# Patient Record
Sex: FEMALE | Race: WHITE | Employment: OTHER | URBAN - METROPOLITAN AREA
[De-identification: names, ages, dates, MRNs, and addresses within clinical notes are randomized per-mention and may not be internally consistent; named-entity substitution may affect disease eponyms.]

---

## 2017-05-15 LAB
ALBUMIN SERPL-MCNC: 4.3 G/DL
ALP BLD-CCNC: 92 U/L
ALT SERPL-CCNC: 16 U/L
AST SERPL-CCNC: 14 U/L
BASOPHILS ABSOLUTE: ABNORMAL /ΜL
BASOPHILS RELATIVE PERCENT: ABNORMAL %
BILIRUB SERPL-MCNC: 0.2 MG/DL (ref 0.1–1.4)
BUN BLDV-MCNC: 15 MG/DL
CALCIUM SERPL-MCNC: 9.1 MG/DL
CHLORIDE BLD-SCNC: 105 MMOL/L
CHOLESTEROL, TOTAL: 192 MG/DL
CHOLESTEROL/HDL RATIO: ABNORMAL
CO2: 21 MMOL/L
CREAT SERPL-MCNC: 0.77 MG/DL
EOSINOPHILS ABSOLUTE: ABNORMAL /ΜL
EOSINOPHILS RELATIVE PERCENT: ABNORMAL %
GFR CALCULATED: >60
GLUCOSE BLD-MCNC: 120 MG/DL
HCT VFR BLD CALC: 44.3 % (ref 36–46)
HDLC SERPL-MCNC: 76 MG/DL (ref 35–70)
HEMOGLOBIN: 13.8 G/DL (ref 12–16)
LDL CHOLESTEROL CALCULATED: 104 MG/DL (ref 0–160)
LYMPHOCYTES ABSOLUTE: ABNORMAL /ΜL
LYMPHOCYTES RELATIVE PERCENT: ABNORMAL %
MCH RBC QN AUTO: 25.1 PG
MCHC RBC AUTO-ENTMCNC: 31.1 G/DL
MCV RBC AUTO: 80.9 FL
MONOCYTES ABSOLUTE: ABNORMAL /ΜL
MONOCYTES RELATIVE PERCENT: ABNORMAL %
NEUTROPHILS ABSOLUTE: ABNORMAL /ΜL
NEUTROPHILS RELATIVE PERCENT: ABNORMAL %
PDW BLD-RTO: 15.9 %
PLATELET # BLD: 245 K/ΜL
PMV BLD AUTO: ABNORMAL FL
POTASSIUM SERPL-SCNC: 4.8 MMOL/L
RBC # BLD: 5.47 10^6/ΜL
SODIUM BLD-SCNC: 141 MMOL/L
TOTAL PROTEIN: 7.1
TRIGL SERPL-MCNC: 59 MG/DL
VLDLC SERPL CALC-MCNC: ABNORMAL MG/DL
WBC # BLD: 9 10^3/ML

## 2017-06-14 ENCOUNTER — HOSPITAL ENCOUNTER (OUTPATIENT)
Age: 77
Setting detail: SPECIMEN
Discharge: HOME | End: 2017-06-14
Payer: MEDICARE

## 2017-06-14 ENCOUNTER — OFFICE VISIT (OUTPATIENT)
Dept: INTERNAL MEDICINE | Age: 77
End: 2017-06-14

## 2017-06-14 VITALS
HEIGHT: 62 IN | SYSTOLIC BLOOD PRESSURE: 128 MMHG | WEIGHT: 248.8 LBS | BODY MASS INDEX: 45.78 KG/M2 | DIASTOLIC BLOOD PRESSURE: 70 MMHG | HEART RATE: 68 BPM

## 2017-06-14 DIAGNOSIS — E03.9 ACQUIRED HYPOTHYROIDISM: ICD-10-CM

## 2017-06-14 LAB
T3 FREE: 2.4 PG/ML (ref 2–4.4)
T4 FREE: 1.39 NG/DL (ref 0.93–1.7)
TSH REFLEX: 1.52 UIU/ML (ref 0.27–4.2)

## 2017-06-14 PROCEDURE — 36415 COLL VENOUS BLD VENIPUNCTURE: CPT | Performed by: FAMILY MEDICINE

## 2017-06-14 PROCEDURE — 99213 OFFICE O/P EST LOW 20 MIN: CPT | Performed by: FAMILY MEDICINE

## 2017-06-14 PROCEDURE — 84439 ASSAY OF FREE THYROXINE: CPT

## 2017-06-14 PROCEDURE — 84481 FREE ASSAY (FT-3): CPT

## 2017-06-14 PROCEDURE — 84443 ASSAY THYROID STIM HORMONE: CPT

## 2017-06-14 RX ORDER — LEVOTHYROXINE SODIUM 150 MCG
TABLET ORAL
Qty: 90 TABLET | Refills: 3 | Status: SHIPPED | OUTPATIENT
Start: 2017-06-14 | End: 2018-06-13 | Stop reason: SDUPTHER

## 2017-06-14 ASSESSMENT — ENCOUNTER SYMPTOMS
APNEA: 0
CHOKING: 0
EYE ITCHING: 0
CHEST TIGHTNESS: 0
EYE PAIN: 0
EYE DISCHARGE: 0

## 2017-06-14 ASSESSMENT — PATIENT HEALTH QUESTIONNAIRE - PHQ9
1. LITTLE INTEREST OR PLEASURE IN DOING THINGS: 0
2. FEELING DOWN, DEPRESSED OR HOPELESS: 0
SUM OF ALL RESPONSES TO PHQ9 QUESTIONS 1 & 2: 0
SUM OF ALL RESPONSES TO PHQ QUESTIONS 1-9: 0

## 2017-07-29 ENCOUNTER — OFFICE VISIT (OUTPATIENT)
Dept: FAMILY MEDICINE CLINIC | Age: 77
End: 2017-07-29

## 2017-07-29 VITALS
DIASTOLIC BLOOD PRESSURE: 77 MMHG | HEART RATE: 104 BPM | BODY MASS INDEX: 43.94 KG/M2 | RESPIRATION RATE: 20 BRPM | TEMPERATURE: 98 F | OXYGEN SATURATION: 98 % | WEIGHT: 248 LBS | HEIGHT: 63 IN | SYSTOLIC BLOOD PRESSURE: 128 MMHG

## 2017-07-29 DIAGNOSIS — H61.21 IMPACTED CERUMEN OF RIGHT EAR: Primary | ICD-10-CM

## 2017-07-29 PROCEDURE — 99213 OFFICE O/P EST LOW 20 MIN: CPT | Performed by: NURSE PRACTITIONER

## 2017-07-29 PROCEDURE — 69209 REMOVE IMPACTED EAR WAX UNI: CPT | Performed by: NURSE PRACTITIONER

## 2017-07-29 ASSESSMENT — ENCOUNTER SYMPTOMS: SORE THROAT: 0

## 2017-07-30 ASSESSMENT — ENCOUNTER SYMPTOMS
WHEEZING: 0
SHORTNESS OF BREATH: 0

## 2018-06-13 ENCOUNTER — OFFICE VISIT (OUTPATIENT)
Dept: INTERNAL MEDICINE | Age: 78
End: 2018-06-13
Payer: MEDICARE

## 2018-06-13 ENCOUNTER — HOSPITAL ENCOUNTER (OUTPATIENT)
Age: 78
Setting detail: SPECIMEN
Discharge: HOME | End: 2018-06-13
Payer: MEDICARE

## 2018-06-13 VITALS
SYSTOLIC BLOOD PRESSURE: 162 MMHG | WEIGHT: 244.6 LBS | HEIGHT: 62 IN | OXYGEN SATURATION: 98 % | BODY MASS INDEX: 45.01 KG/M2 | DIASTOLIC BLOOD PRESSURE: 78 MMHG | HEART RATE: 57 BPM

## 2018-06-13 DIAGNOSIS — R73.03 PREDIABETES: ICD-10-CM

## 2018-06-13 DIAGNOSIS — Z13.220 SCREENING CHOLESTEROL LEVEL: ICD-10-CM

## 2018-06-13 DIAGNOSIS — Z00.00 ROUTINE GENERAL MEDICAL EXAMINATION AT A HEALTH CARE FACILITY: Primary | ICD-10-CM

## 2018-06-13 DIAGNOSIS — E03.9 ACQUIRED HYPOTHYROIDISM: ICD-10-CM

## 2018-06-13 DIAGNOSIS — Z12.39 SCREENING FOR BREAST CANCER: ICD-10-CM

## 2018-06-13 DIAGNOSIS — Z00.00 ROUTINE GENERAL MEDICAL EXAMINATION AT A HEALTH CARE FACILITY: ICD-10-CM

## 2018-06-13 LAB
ALBUMIN SERPL-MCNC: 4 G/DL (ref 3.9–4.9)
ALP BLD-CCNC: 87 U/L (ref 40–130)
ALT SERPL-CCNC: 17 U/L (ref 0–33)
ANION GAP SERPL CALCULATED.3IONS-SCNC: 16 MEQ/L (ref 7–13)
AST SERPL-CCNC: 15 U/L (ref 0–35)
BASOPHILS ABSOLUTE: 0.1 K/UL (ref 0–0.2)
BASOPHILS RELATIVE PERCENT: 0.8 %
BILIRUB SERPL-MCNC: 0.3 MG/DL (ref 0–1.2)
BUN BLDV-MCNC: 15 MG/DL (ref 8–23)
CALCIUM SERPL-MCNC: 9.5 MG/DL (ref 8.6–10.2)
CHLORIDE BLD-SCNC: 104 MEQ/L (ref 98–107)
CHOLESTEROL, TOTAL: 181 MG/DL (ref 0–199)
CO2: 22 MEQ/L (ref 22–29)
CREAT SERPL-MCNC: 0.7 MG/DL (ref 0.5–0.9)
EOSINOPHILS ABSOLUTE: 0.2 K/UL (ref 0–0.7)
EOSINOPHILS RELATIVE PERCENT: 2.4 %
GFR AFRICAN AMERICAN: >60
GFR NON-AFRICAN AMERICAN: >60
GLOBULIN: 3.1 G/DL (ref 2.3–3.5)
GLUCOSE BLD-MCNC: 110 MG/DL (ref 74–109)
HCT VFR BLD CALC: 43.8 % (ref 37–47)
HDLC SERPL-MCNC: 67 MG/DL (ref 40–59)
HEMOGLOBIN: 14 G/DL (ref 12–16)
LDL CHOLESTEROL CALCULATED: 101 MG/DL (ref 0–129)
LYMPHOCYTES ABSOLUTE: 1.8 K/UL (ref 1–4.8)
LYMPHOCYTES RELATIVE PERCENT: 22.7 %
MCH RBC QN AUTO: 25.4 PG (ref 27–31.3)
MCHC RBC AUTO-ENTMCNC: 31.9 % (ref 33–37)
MCV RBC AUTO: 79.7 FL (ref 82–100)
MONOCYTES ABSOLUTE: 0.5 K/UL (ref 0.2–0.8)
MONOCYTES RELATIVE PERCENT: 6.1 %
NEUTROPHILS ABSOLUTE: 5.4 K/UL (ref 1.4–6.5)
NEUTROPHILS RELATIVE PERCENT: 68 %
PDW BLD-RTO: 15.8 % (ref 11.5–14.5)
PLATELET # BLD: 282 K/UL (ref 130–400)
POTASSIUM SERPL-SCNC: 4.8 MEQ/L (ref 3.5–5.1)
RBC # BLD: 5.5 M/UL (ref 4.2–5.4)
SODIUM BLD-SCNC: 142 MEQ/L (ref 132–144)
TOTAL PROTEIN: 7.1 G/DL (ref 6.4–8.1)
TRIGL SERPL-MCNC: 66 MG/DL (ref 0–200)
TSH REFLEX: 1.4 UIU/ML (ref 0.27–4.2)
WBC # BLD: 7.9 K/UL (ref 4.8–10.8)

## 2018-06-13 PROCEDURE — 85025 COMPLETE CBC W/AUTO DIFF WBC: CPT

## 2018-06-13 PROCEDURE — 80053 COMPREHEN METABOLIC PANEL: CPT

## 2018-06-13 PROCEDURE — 36415 COLL VENOUS BLD VENIPUNCTURE: CPT

## 2018-06-13 PROCEDURE — 84443 ASSAY THYROID STIM HORMONE: CPT

## 2018-06-13 PROCEDURE — G0439 PPPS, SUBSEQ VISIT: HCPCS | Performed by: FAMILY MEDICINE

## 2018-06-13 PROCEDURE — 83036 HEMOGLOBIN GLYCOSYLATED A1C: CPT

## 2018-06-13 PROCEDURE — 80061 LIPID PANEL: CPT

## 2018-06-13 RX ORDER — LEVOTHYROXINE SODIUM 150 MCG
TABLET ORAL
Qty: 90 TABLET | Refills: 3 | Status: SHIPPED | OUTPATIENT
Start: 2018-06-13 | End: 2019-06-19 | Stop reason: SDUPTHER

## 2018-06-13 ASSESSMENT — LIFESTYLE VARIABLES: HOW OFTEN DO YOU HAVE A DRINK CONTAINING ALCOHOL: 0

## 2018-06-13 ASSESSMENT — PATIENT HEALTH QUESTIONNAIRE - PHQ9: SUM OF ALL RESPONSES TO PHQ QUESTIONS 1-9: 0

## 2018-06-13 ASSESSMENT — ANXIETY QUESTIONNAIRES: GAD7 TOTAL SCORE: 0

## 2018-06-14 DIAGNOSIS — R73.03 PREDIABETES: Primary | ICD-10-CM

## 2018-06-14 LAB — HBA1C MFR BLD: 6.3 % (ref 4.8–5.9)

## 2018-06-21 ENCOUNTER — HOSPITAL ENCOUNTER (OUTPATIENT)
Dept: WOMENS IMAGING | Age: 78
Discharge: HOME | End: 2018-06-23
Payer: MEDICARE

## 2018-06-21 DIAGNOSIS — Z12.39 SCREENING FOR BREAST CANCER: ICD-10-CM

## 2018-06-21 PROCEDURE — 77067 SCR MAMMO BI INCL CAD: CPT

## 2019-06-15 ENCOUNTER — OFFICE VISIT (OUTPATIENT)
Dept: FAMILY MEDICINE CLINIC | Age: 79
End: 2019-06-15
Payer: MEDICARE

## 2019-06-15 VITALS
OXYGEN SATURATION: 96 % | WEIGHT: 240 LBS | RESPIRATION RATE: 20 BRPM | BODY MASS INDEX: 44.16 KG/M2 | DIASTOLIC BLOOD PRESSURE: 70 MMHG | HEIGHT: 62 IN | SYSTOLIC BLOOD PRESSURE: 124 MMHG | TEMPERATURE: 99 F | HEART RATE: 94 BPM

## 2019-06-15 DIAGNOSIS — J40 BRONCHITIS: ICD-10-CM

## 2019-06-15 PROCEDURE — 99213 OFFICE O/P EST LOW 20 MIN: CPT | Performed by: NURSE PRACTITIONER

## 2019-06-15 RX ORDER — ERYTHROMYCIN ETHYLSUCCINATE 400 MG/1
400 TABLET ORAL 4 TIMES DAILY
Qty: 40 TABLET | Refills: 0 | Status: SHIPPED | OUTPATIENT
Start: 2019-06-15 | End: 2019-06-25

## 2019-06-15 ASSESSMENT — ENCOUNTER SYMPTOMS
WHEEZING: 0
SWOLLEN GLANDS: 0
TROUBLE SWALLOWING: 0
RHINORRHEA: 1
SINUS PAIN: 0
CHEST TIGHTNESS: 0
SINUS PRESSURE: 1
DIARRHEA: 0
FACIAL SWELLING: 0
SORE THROAT: 1
NAUSEA: 0
ABDOMINAL PAIN: 0
COUGH: 1
VOMITING: 0
SHORTNESS OF BREATH: 0

## 2019-06-15 ASSESSMENT — PATIENT HEALTH QUESTIONNAIRE - PHQ9
SUM OF ALL RESPONSES TO PHQ QUESTIONS 1-9: 0
SUM OF ALL RESPONSES TO PHQ QUESTIONS 1-9: 0
1. LITTLE INTEREST OR PLEASURE IN DOING THINGS: 0
SUM OF ALL RESPONSES TO PHQ9 QUESTIONS 1 & 2: 0
2. FEELING DOWN, DEPRESSED OR HOPELESS: 0

## 2019-06-15 NOTE — PROGRESS NOTES
Non-medical: Not on file   Tobacco Use    Smoking status: Former Smoker     Packs/day: 5.00     Years: 30.00     Pack years: 150.00     Types: Cigarettes     Last attempt to quit: 10/1/2017     Years since quittin.7    Smokeless tobacco: Never Used    Tobacco comment: Has been trying to quit since January   Substance and Sexual Activity    Alcohol use: No     Alcohol/week: 0.0 oz    Drug use: No    Sexual activity: Not on file   Lifestyle    Physical activity:     Days per week: Not on file     Minutes per session: Not on file    Stress: Not on file   Relationships    Social connections:     Talks on phone: Not on file     Gets together: Not on file     Attends [employer] service: Not on file     Active member of club or organization: Not on file     Attends meetings of clubs or organizations: Not on file     Relationship status: Not on file    Intimate partner violence:     Fear of current or ex partner: Not on file     Emotionally abused: Not on file     Physically abused: Not on file     Forced sexual activity: Not on file   Other Topics Concern    Not on file   Social History Narrative    Not on file     Allergies:  Benadryl [diphenhydramine hcl]; Cortisone; Codeine; and Pcn [penicillins]    Review of Systems   Constitutional: Negative for chills, diaphoresis, fatigue and fever. HENT: Positive for congestion, postnasal drip, rhinorrhea, sinus pressure and sore throat. Negative for ear pain, facial swelling, sinus pain, sneezing and trouble swallowing. Respiratory: Positive for cough. Negative for chest tightness, shortness of breath and wheezing. Cardiovascular: Negative for chest pain. Gastrointestinal: Negative for abdominal pain, diarrhea, nausea and vomiting. Genitourinary: Negative for dysuria. Musculoskeletal: Negative for neck pain. Skin: Negative for rash. Neurological: Negative for dizziness, weakness, light-headedness and headaches.        Objective:    /70 (Site: Left Upper Arm, Position: Sitting, Cuff Size: Large Adult)   Pulse 94   Temp 99 °F (37.2 °C) (Temporal)   Resp 20   Ht 5' 2\" (1.575 m)   Wt 240 lb (108.9 kg)   SpO2 96%   Breastfeeding? No   BMI 43.90 kg/m²     Physical Exam   Constitutional: She is oriented to person, place, and time. She appears well-developed and well-nourished. No distress. HENT:   Head: Normocephalic and atraumatic. Right Ear: Tympanic membrane, external ear and ear canal normal.   Left Ear: Tympanic membrane, external ear and ear canal normal.   Nose: Nose normal.   Mouth/Throat: Uvula is midline and mucous membranes are normal. Posterior oropharyngeal erythema present. No oropharyngeal exudate or posterior oropharyngeal edema. Eyes: Conjunctivae are normal. Right eye exhibits no discharge. Left eye exhibits no discharge. Neck: Normal range of motion. Neck supple. Cardiovascular: Normal rate, regular rhythm and normal heart sounds. Pulmonary/Chest: Effort normal and breath sounds normal. No respiratory distress. She has no decreased breath sounds. She has no wheezes. She has no rhonchi. She has no rales. Musculoskeletal: She exhibits no edema. Lymphadenopathy:     She has no cervical adenopathy. Neurological: She is alert and oriented to person, place, and time. Skin: Skin is warm. No rash noted. She is not diaphoretic. Assessment & Plan:       Diagnosis Orders   1. Bronchitis  erythromycin ethylsuccinate (EES) 400 MG tablet     No orders of the defined types were placed in this encounter. Orders Placed This Encounter   Medications    erythromycin ethylsuccinate (EES) 400 MG tablet     Sig: Take 1 tablet by mouth 4 times daily for 10 days     Dispense:  40 tablet     Refill:  0     There are no discontinued medications. Return in about 1 week (around 6/22/2019) for PCP follow-up. Reviewed with the patient: currentclinical status, medications, activities and diet.      Side effects, adverse effects of the medicationprescribed today, as well as treatment plan/ rationale and result expectations havebeen discussed with the patient who expresses understanding and desires to proceed. Pt instructions reviewed and given to patient.     Close follow up to evaluate treatment resultsand for coordination of care. I have reviewed the patient's medical historyin detail and updated the computerized patient record.     Ekta Burns, MARLEE - CNP

## 2019-07-10 ENCOUNTER — APPOINTMENT (OUTPATIENT)
Dept: GENERAL RADIOLOGY | Age: 79
End: 2019-07-10
Payer: MEDICARE

## 2019-07-10 ENCOUNTER — HOSPITAL ENCOUNTER (EMERGENCY)
Age: 79
Discharge: HOME | End: 2019-07-10
Attending: EMERGENCY MEDICINE
Payer: MEDICARE

## 2019-07-10 VITALS
DIASTOLIC BLOOD PRESSURE: 82 MMHG | TEMPERATURE: 98.5 F | RESPIRATION RATE: 20 BRPM | HEART RATE: 85 BPM | BODY MASS INDEX: 42.17 KG/M2 | WEIGHT: 238 LBS | OXYGEN SATURATION: 98 % | HEIGHT: 63 IN | SYSTOLIC BLOOD PRESSURE: 150 MMHG

## 2019-07-10 DIAGNOSIS — S29.011A INTERCOSTAL MUSCLE STRAIN, INITIAL ENCOUNTER: ICD-10-CM

## 2019-07-10 DIAGNOSIS — S23.9XXA THORACIC SPRAIN: Primary | ICD-10-CM

## 2019-07-10 PROCEDURE — 71100 X-RAY EXAM RIBS UNI 2 VIEWS: CPT

## 2019-07-10 PROCEDURE — 6370000000 HC RX 637 (ALT 250 FOR IP): Performed by: EMERGENCY MEDICINE

## 2019-07-10 PROCEDURE — 99283 EMERGENCY DEPT VISIT LOW MDM: CPT

## 2019-07-10 RX ORDER — OXYCODONE HYDROCHLORIDE AND ACETAMINOPHEN 5; 325 MG/1; MG/1
1 TABLET ORAL ONCE
Status: COMPLETED | OUTPATIENT
Start: 2019-07-10 | End: 2019-07-10

## 2019-07-10 RX ORDER — OXYCODONE HYDROCHLORIDE AND ACETAMINOPHEN 5; 325 MG/1; MG/1
1-2 TABLET ORAL EVERY 6 HOURS PRN
Qty: 14 TABLET | Refills: 0 | Status: SHIPPED | OUTPATIENT
Start: 2019-07-10 | End: 2019-07-13

## 2019-07-10 RX ORDER — IBUPROFEN 600 MG/1
600 TABLET ORAL ONCE
Status: COMPLETED | OUTPATIENT
Start: 2019-07-10 | End: 2019-07-10

## 2019-07-10 RX ADMIN — OXYCODONE HYDROCHLORIDE AND ACETAMINOPHEN 1 TABLET: 5; 325 TABLET ORAL at 22:26

## 2019-07-10 RX ADMIN — IBUPROFEN 600 MG: 600 TABLET ORAL at 21:15

## 2019-07-10 RX ADMIN — OXYCODONE HYDROCHLORIDE AND ACETAMINOPHEN 1 TABLET: 5; 325 TABLET ORAL at 21:34

## 2019-07-10 ASSESSMENT — PAIN DESCRIPTION - FREQUENCY
FREQUENCY: CONTINUOUS
FREQUENCY: CONTINUOUS

## 2019-07-10 ASSESSMENT — PAIN DESCRIPTION - DESCRIPTORS
DESCRIPTORS: SHARP
DESCRIPTORS: SHARP

## 2019-07-10 ASSESSMENT — PAIN - FUNCTIONAL ASSESSMENT: PAIN_FUNCTIONAL_ASSESSMENT: 0-10

## 2019-07-10 ASSESSMENT — PAIN DESCRIPTION - LOCATION
LOCATION: RIB CAGE
LOCATION: RIB CAGE

## 2019-07-10 ASSESSMENT — PAIN DESCRIPTION - ORIENTATION
ORIENTATION: LEFT;POSTERIOR
ORIENTATION: LEFT

## 2019-07-10 ASSESSMENT — PAIN DESCRIPTION - ONSET: ONSET: ON-GOING

## 2019-07-10 ASSESSMENT — PAIN SCALES - GENERAL
PAINLEVEL_OUTOF10: 9
PAINLEVEL_OUTOF10: 10
PAINLEVEL_OUTOF10: 7

## 2019-07-10 ASSESSMENT — PAIN DESCRIPTION - PROGRESSION: CLINICAL_PROGRESSION: GRADUALLY IMPROVING

## 2019-07-10 ASSESSMENT — PAIN DESCRIPTION - PAIN TYPE
TYPE: ACUTE PAIN
TYPE: ACUTE PAIN

## 2019-07-11 NOTE — ED PROVIDER NOTES
[address] [place] [address] ED  eMERGENCY dEPARTMENT eNCOUnter      Pt Name: Ricky Foss  MRN: [id number]  [place] 1940  Date of evaluation: 7/10/2019  Provider: Lianne Trent MD    CHIEF COMPLAINT       Chief Complaint   Patient presents with    Rib Pain     x 2-3 days. Pt lifted 27 lb bag of dog food Friday, thinks it irritated a healed rib fracture. HISTORY OF PRESENT ILLNESS   (Location/Symptom, Timing/Onset,Context/Setting, Quality, Duration, Modifying Factors, Severity)  Note limiting factors. Ricky Foss is a [de-identified] y.o. female who presents to the emergency department with left posterior rib pain, #4 and 5 while she lifted up a bag of dog food, 27 pounds, onset and then twisted again later on in the day and made it worse. There is no midline back pain, only rib pain. She had fractured ribs before. She recently got over bronchitis but this is much better. There is no blunt trauma or falling trauma and shortness of breath is not present    HPI    NursingNotes were reviewed. REVIEW OF SYSTEMS    (2-9 systems for level 4, 10 or more for level 5)     Review of Systems     Constitutional symptoms:  no Fatigue, no fever, no chills. Skin symptoms:  Negative except as documented in HPI. ENMT symptoms:  Negative except as documented in HPI. Respiratory symptoms:  Negative except as documented in HPI. Rib pain posteriorly left as above  Cardiovascular symptoms:  Negative except as documented in HPI. Gastrointestinal symptoms: Negative except for documented as above in the HPI   Genitourinary symptoms:  Negative except as documented in HPI. Musculoskeletal symptoms:  Negative except as documented in HPI. Neurologic symptoms:  Negative except as documented in HPI. Remainder of 10 systems, all negative except for mentioned above      Except as noted above the remainder of the review of systems was reviewed and negative.        PAST MEDICAL HISTORY     Past Medical History:   Diagnosis Non-plain filmimages such as CT, Ultrasound and MRI are read by the radiologist. Plain radiographic images are visualized and preliminarily interpreted by the emergency physician with the below findings:        Interpretation per the Radiologist below, if available at the time ofthis note:    XR RIBS LEFT (2 VIEWS)    (Results Pending)         ED BEDSIDE ULTRASOUND:   Performed by ED Physician - none    LABS:  Labs Reviewed - No data to display    All other labs were within normal range or not returned as of this dictation. EMERGENCY DEPARTMENT COURSE and DIFFERENTIAL DIAGNOSIS/MDM:   Vitals:    Vitals:    07/10/19 2048   BP: (!) 195/88   Pulse: 94   Resp: (!) 35   Temp: 98.5 °F (36.9 °C)   TempSrc: Oral   SpO2: 95%   Weight: 238 lb (108 kg)   Height: 5' 3\" (1.6 m)           MDM     X-rays failed to demonstrate a fracture to my reading, we will go ahead and treat for thoracic sprain and intercostal sprain. She is in significant pain at this juncture. CRITICAL CARE TIME   Total Critical Care time was  minutes, excluding separately reportableprocedures. There was a high probability of clinicallysignificant/life threatening deterioration in the patient's condition which required my urgent intervention. CONSULTS:  None    PROCEDURES:  Unless otherwise noted below, none     Procedures    FINAL IMPRESSION      1. Thoracic sprain    2. Intercostal muscle strain, initial encounter          DISPOSITION/PLAN   DISPOSITION Decision To Discharge 07/10/2019 09:33:05 PM      PATIENT REFERRED TO:  Arielle Hinds MD  [address] [place] [address] [phone]    In 1 week  Return for weakening, worsening, chest pain, fever      DISCHARGE MEDICATIONS:  New Prescriptions    OXYCODONE-ACETAMINOPHEN (PERCOCET) 5-325 MG PER TABLET    Take 1-2 tablets by mouth every 6 hours as needed for Pain for up to 3 days. WARNING:  May cause drowsiness. May impair ability to operate vehicles or machinery.   Do not use in combination with alcohol.           (Please note that portions of this note were completed with a voice recognition program.  Efforts were made to edit the dictations but occasionally words are mis-transcribed.)    Andres Salazar MD (electronically signed)  Attending Emergency Physician          Andres Salazar MD  07/10/19 4623

## 2019-07-25 ENCOUNTER — OFFICE VISIT (OUTPATIENT)
Dept: INTERNAL MEDICINE | Age: 79
End: 2019-07-25
Payer: MEDICARE

## 2019-07-25 VITALS
SYSTOLIC BLOOD PRESSURE: 118 MMHG | HEIGHT: 63 IN | HEART RATE: 90 BPM | BODY MASS INDEX: 42.95 KG/M2 | DIASTOLIC BLOOD PRESSURE: 60 MMHG | WEIGHT: 242.4 LBS | OXYGEN SATURATION: 98 %

## 2019-07-25 DIAGNOSIS — Z12.39 SCREENING FOR BREAST CANCER: ICD-10-CM

## 2019-07-25 DIAGNOSIS — Z12.11 SCREEN FOR COLON CANCER: ICD-10-CM

## 2019-07-25 DIAGNOSIS — R73.03 PREDIABETES: ICD-10-CM

## 2019-07-25 DIAGNOSIS — E03.9 ACQUIRED HYPOTHYROIDISM: ICD-10-CM

## 2019-07-25 DIAGNOSIS — K21.9 GASTROESOPHAGEAL REFLUX DISEASE WITHOUT ESOPHAGITIS: ICD-10-CM

## 2019-07-25 DIAGNOSIS — H61.23 BILATERAL IMPACTED CERUMEN: ICD-10-CM

## 2019-07-25 DIAGNOSIS — Z13.220 SCREENING CHOLESTEROL LEVEL: ICD-10-CM

## 2019-07-25 DIAGNOSIS — R73.03 PREDIABETES: Primary | ICD-10-CM

## 2019-07-25 LAB
ALBUMIN SERPL-MCNC: 4 G/DL (ref 3.5–4.6)
ALP BLD-CCNC: 82 U/L (ref 40–130)
ALT SERPL-CCNC: 22 U/L (ref 0–33)
ANION GAP SERPL CALCULATED.3IONS-SCNC: 11 MEQ/L (ref 9–15)
AST SERPL-CCNC: 19 U/L (ref 0–35)
BASOPHILS ABSOLUTE: 0 K/UL (ref 0–0.2)
BASOPHILS RELATIVE PERCENT: 0.5 %
BILIRUB SERPL-MCNC: 0.3 MG/DL (ref 0.2–0.7)
BUN BLDV-MCNC: 17 MG/DL (ref 8–23)
CALCIUM SERPL-MCNC: 9.4 MG/DL (ref 8.5–9.9)
CHLORIDE BLD-SCNC: 104 MEQ/L (ref 95–107)
CHOLESTEROL, TOTAL: 183 MG/DL (ref 0–199)
CO2: 25 MEQ/L (ref 20–31)
CREAT SERPL-MCNC: 0.73 MG/DL (ref 0.5–0.9)
EOSINOPHILS ABSOLUTE: 0.2 K/UL (ref 0–0.7)
EOSINOPHILS RELATIVE PERCENT: 2.5 %
GFR AFRICAN AMERICAN: >60
GFR NON-AFRICAN AMERICAN: >60
GLOBULIN: 3.3 G/DL (ref 2.3–3.5)
GLUCOSE BLD-MCNC: 116 MG/DL (ref 70–99)
HBA1C MFR BLD: 6.4 % (ref 4.8–5.9)
HCT VFR BLD CALC: 39.6 % (ref 37–47)
HDLC SERPL-MCNC: 70 MG/DL (ref 40–59)
HEMOGLOBIN: 13 G/DL (ref 12–16)
LDL CHOLESTEROL CALCULATED: 98 MG/DL (ref 0–129)
LYMPHOCYTES ABSOLUTE: 1.9 K/UL (ref 1–4.8)
LYMPHOCYTES RELATIVE PERCENT: 24.7 %
MCH RBC QN AUTO: 25.5 PG (ref 27–31.3)
MCHC RBC AUTO-ENTMCNC: 32.8 % (ref 33–37)
MCV RBC AUTO: 77.8 FL (ref 82–100)
MONOCYTES ABSOLUTE: 0.5 K/UL (ref 0.2–0.8)
MONOCYTES RELATIVE PERCENT: 5.8 %
NEUTROPHILS ABSOLUTE: 5.2 K/UL (ref 1.4–6.5)
NEUTROPHILS RELATIVE PERCENT: 66.5 %
PDW BLD-RTO: 16.6 % (ref 11.5–14.5)
PLATELET # BLD: 264 K/UL (ref 130–400)
POTASSIUM SERPL-SCNC: 4.3 MEQ/L (ref 3.4–4.9)
RBC # BLD: 5.08 M/UL (ref 4.2–5.4)
SODIUM BLD-SCNC: 140 MEQ/L (ref 135–144)
T3 FREE: 2.2 PG/ML (ref 2–4.4)
TOTAL PROTEIN: 7.3 G/DL (ref 6.3–8)
TRIGL SERPL-MCNC: 73 MG/DL (ref 0–150)
TSH REFLEX: 2.08 UIU/ML (ref 0.44–3.86)
WBC # BLD: 7.8 K/UL (ref 4.8–10.8)

## 2019-07-25 PROCEDURE — 99214 OFFICE O/P EST MOD 30 MIN: CPT | Performed by: FAMILY MEDICINE

## 2019-07-25 RX ORDER — LEVOTHYROXINE SODIUM 150 MCG
150 TABLET ORAL DAILY
Qty: 90 TABLET | Refills: 3 | Status: SHIPPED | OUTPATIENT
Start: 2019-07-25 | End: 2020-08-06 | Stop reason: SDUPTHER

## 2019-07-25 ASSESSMENT — ENCOUNTER SYMPTOMS
APNEA: 0
CHEST TIGHTNESS: 0
CHOKING: 0

## 2019-08-02 ENCOUNTER — TELEPHONE (OUTPATIENT)
Dept: INTERNAL MEDICINE | Age: 79
End: 2019-08-02

## 2020-08-14 ENCOUNTER — TELEPHONE (OUTPATIENT)
Dept: INTERNAL MEDICINE | Age: 80
End: 2020-08-14

## 2020-08-14 NOTE — LETTER
[facility]  [address] [address]  Phone: [phone]  Fax: [phone]    Fatoumata Gonzalez MD        August 31, 2020    [address] [address]      Dear Alexi WELLS:    We were unable to reach you by phone to let you know that your labs had been ordered. If you have any questions or concerns, please don't hesitate to call.     Sincerely,        Fatoumata Gonzalez MD

## 2020-08-14 NOTE — TELEPHONE ENCOUNTER
Pt called to ask for her blood work to be ordered. She has appt coming up, but will be out of her thyroid medication before that. This way a refill can be sent before she is out. She is aware to fast for the blood work. Please review pended orders.  Last year TSH and T3 free was ordered, instead of T4 free

## 2020-08-17 NOTE — TELEPHONE ENCOUNTER
Orders all placed, fasting labs, can advise pt to have them drawn    Thank you!     Rony Castellanos MD

## 2020-09-07 ENCOUNTER — HOSPITAL ENCOUNTER (EMERGENCY)
Age: 80
Discharge: HOME | End: 2020-09-07
Attending: EMERGENCY MEDICINE
Payer: MEDICARE

## 2020-09-07 VITALS
OXYGEN SATURATION: 97 % | DIASTOLIC BLOOD PRESSURE: 88 MMHG | HEART RATE: 73 BPM | TEMPERATURE: 99.2 F | BODY MASS INDEX: 43.05 KG/M2 | WEIGHT: 243 LBS | RESPIRATION RATE: 18 BRPM | HEIGHT: 63 IN | SYSTOLIC BLOOD PRESSURE: 214 MMHG

## 2020-09-07 PROCEDURE — 6370000000 HC RX 637 (ALT 250 FOR IP): Performed by: EMERGENCY MEDICINE

## 2020-09-07 PROCEDURE — 99283 EMERGENCY DEPT VISIT LOW MDM: CPT

## 2020-09-07 RX ORDER — OXYCODONE HYDROCHLORIDE AND ACETAMINOPHEN 5; 325 MG/1; MG/1
1 TABLET ORAL EVERY 8 HOURS PRN
Qty: 12 TABLET | Refills: 0 | Status: SHIPPED | OUTPATIENT
Start: 2020-09-07 | End: 2020-09-12

## 2020-09-07 RX ORDER — OXYCODONE HYDROCHLORIDE AND ACETAMINOPHEN 5; 325 MG/1; MG/1
1 TABLET ORAL ONCE
Status: COMPLETED | OUTPATIENT
Start: 2020-09-07 | End: 2020-09-07

## 2020-09-07 RX ORDER — CLONIDINE HYDROCHLORIDE 0.1 MG/1
0.2 TABLET ORAL ONCE
Status: COMPLETED | OUTPATIENT
Start: 2020-09-07 | End: 2020-09-07

## 2020-09-07 RX ADMIN — CLONIDINE HYDROCHLORIDE 0.2 MG: 0.1 TABLET ORAL at 18:40

## 2020-09-07 RX ADMIN — OXYCODONE HYDROCHLORIDE AND ACETAMINOPHEN 1 TABLET: 5; 325 TABLET ORAL at 18:20

## 2020-09-07 ASSESSMENT — ENCOUNTER SYMPTOMS
EYE PAIN: 0
FACIAL SWELLING: 0
SORE THROAT: 0
EYE DISCHARGE: 0
COUGH: 0
SINUS PRESSURE: 0
CHOKING: 0
ABDOMINAL PAIN: 0
BLOOD IN STOOL: 0
SHORTNESS OF BREATH: 0
DIARRHEA: 0
EYE REDNESS: 0
VOMITING: 0
CHEST TIGHTNESS: 0
WHEEZING: 0
TROUBLE SWALLOWING: 0
CONSTIPATION: 0
VOICE CHANGE: 0
STRIDOR: 0
BACK PAIN: 1

## 2020-09-07 ASSESSMENT — PAIN DESCRIPTION - PAIN TYPE: TYPE: ACUTE PAIN

## 2020-09-07 ASSESSMENT — PAIN DESCRIPTION - ONSET: ONSET: ON-GOING

## 2020-09-07 ASSESSMENT — PAIN DESCRIPTION - ORIENTATION: ORIENTATION: LEFT

## 2020-09-07 ASSESSMENT — PAIN SCALES - GENERAL: PAINLEVEL_OUTOF10: 10

## 2020-09-07 ASSESSMENT — PAIN DESCRIPTION - FREQUENCY: FREQUENCY: INTERMITTENT

## 2020-09-07 ASSESSMENT — PAIN DESCRIPTION - DESCRIPTORS: DESCRIPTORS: PATIENT UNABLE TO DESCRIBE

## 2020-09-07 ASSESSMENT — PAIN DESCRIPTION - LOCATION: LOCATION: BACK;LEG

## 2020-09-07 NOTE — ED PROVIDER NOTES
[facility] \[facility]"" ED  eMERGENCY dEPARTMENT eNCOUnter      Pt Name: Mackenzie Sutton  MRN: [id number]  [place] 1940  Date of evaluation: 9/7/2020  Provider: Jovan Mckinney MD    [address] [place] [address]       Chief Complaint   Patient presents with    Muscle Pain     Pt having pain in her back and into her leg       HISTORY OF PRESENT ILLNESS   (Location/Symptom, Timing/Onset,Context/Setting, Quality, Duration, Modifying Factors, Severity)  Note limiting factors. Mackenzie Sutton is a 79 y.o. female who presents to the emergency department patient comes to emergency requesting pain medication over-the-counter med pain medication not helping last oxycodone she finished and requesting more oxycodone patient this time blames it on lifting a case of Coke which she lifted and caused sudden pain to the right hip could feel the pain going to the right leg no numbness tingling to the leg no injury no fall no fever no chills as per patient she always has lower back pain history of obesity hypothyroidism anxiety type 2 diabetes with not taking any insulin at this time and history of hypothyroidism    HPI    NursingNotes were reviewed. REVIEW OF SYSTEMS    (2-9 systems for level 4, 10 or more for level 5)     Review of Systems   Constitutional: Negative. Negative for activity change and fever. HENT: Negative for congestion, drooling, facial swelling, mouth sores, nosebleeds, sinus pressure, sore throat, trouble swallowing and voice change. Eyes: Negative for pain, discharge, redness and visual disturbance. Respiratory: Negative for cough, choking, chest tightness, shortness of breath, wheezing and stridor. Cardiovascular: Negative for chest pain, palpitations and leg swelling. Gastrointestinal: Negative for abdominal pain, blood in stool, constipation, diarrhea and vomiting. Endocrine: Negative for cold intolerance, polyphagia and polyuria.    Genitourinary: Negative for dysuria, flank pain, frequency, genital sores and urgency. Musculoskeletal: Positive for arthralgias, back pain and myalgias. Negative for joint swelling, neck pain and neck stiffness. Skin: Negative for pallor and rash. Neurological: Negative for tremors, seizures, syncope, weakness, numbness and headaches. Hematological: Negative for adenopathy. Does not bruise/bleed easily. Psychiatric/Behavioral: Negative for agitation, behavioral problems, hallucinations and sleep disturbance. The patient is not hyperactive. All other systems reviewed and are negative. Except as noted above the remainder of the review of systems was reviewed and negative. 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 SYNTHROID 150 MCG TABLET    Take 1 tablet by mouth Daily       ALLERGIES     Benadryl [diphenhydramine hcl]; Cortisone;  Codeine; and Pcn [penicillins]    FAMILY HISTORY       Family History   Problem Relation Age of Onset    Kidney Disease Mother     High Blood Pressure Mother     Stroke Mother     Heart Disease Father           SOCIAL HISTORY       Social History     Socioeconomic History    Marital status:      Spouse name: None    Number of children: None    Years of education: None    Highest education level: None   Occupational History    None   Social Needs    Financial resource strain: None    Food insecurity     Worry: None     Inability: None    Transportation needs     Medical: None     Non-medical: None   Tobacco Use    Smoking status: Former Smoker     Packs/day: 5.00     Years: 30.00     Pack years: 150.00 discharge. Extraocular Movements: Extraocular movements intact. Pupils: Pupils are equal, round, and reactive to light. Neck:      Musculoskeletal: Normal range of motion. Cardiovascular:      Rate and Rhythm: Normal rate and regular rhythm. Pulses: Normal pulses. Heart sounds: Normal heart sounds. No murmur. No gallop. Pulmonary:      Effort: No respiratory distress. Breath sounds: Normal breath sounds. No stridor. No wheezing. Abdominal:      General: Abdomen is flat. There is no distension. Palpations: Abdomen is soft. There is no mass. Tenderness: There is no abdominal tenderness. There is no left CVA tenderness, guarding or rebound. Hernia: No hernia is present. Musculoskeletal: Normal range of motion. General: Tenderness present. Comments: Attention come to the back examined standing and supine and sitting position patient straight leg raising is negative patient has no sensory deficit diffuse tenderness of the right hip noticeable patient has no hematoma no bruise no rash no shingles no cellulitis no insect bite   Skin:     General: Skin is warm. Capillary Refill: Capillary refill takes less than 2 seconds. Coloration: Skin is not jaundiced. Findings: No bruising or lesion. Neurological:      General: No focal deficit present. Mental Status: She is alert. Cranial Nerves: No cranial nerve deficit. Sensory: No sensory deficit. Motor: No weakness.       Coordination: Coordination normal.      Gait: Gait normal.      Deep Tendon Reflexes: Reflexes normal.      Comments: Attention turned to the neurologic examination patient has no neuro deficit moving all extremities very well good bilateral handgrips no sensory deficit cranial nerves II through XII grossly intact         DIAGNOSTIC RESULTS     EKG: All EKG's are interpreted by the Emergency Department Physician who either signs or Co-signsthis chart in the absence of a cardiologist.        RADIOLOGY:   [place] such as CT, Ultrasound and MRI are read by the radiologist. Plain radiographic images are visualized and preliminarily interpreted by the emergency physician with the below findings:        Interpretation per the Radiologist below, if available at the time ofthis note:    No orders to display         ED BEDSIDE ULTRASOUND:   Performed by ED Physician - none    LABS:  Labs Reviewed - No data to display    All other labs were within normal range or not returned as of this dictation. EMERGENCY DEPARTMENT COURSE and DIFFERENTIAL DIAGNOSIS/MDM:   Vitals:    Vitals:    09/07/20 1805 09/07/20 1807 09/07/20 1822   BP:  (!) 230/104 (!) 205/91   Pulse: 95     Resp: 18     Temp: 99.2 °F (37.3 °C)     TempSrc: Oral     SpO2: 98%     Weight: 243 lb (110.2 kg)     Height: 5' 3\" (1.6 m)         MDM  Number of Diagnoses or Management Options  Contusion of right hip, initial encounter:   Sciatica of right side:   Diagnosis management comments: Patient requesting a pain management nothing works except oxycodone as per patient and she used last tablets over-the-counter medication not helping this time patient blames it on lifting a case of Coke while grocery shopping always have low back pain this time right hip pain and could feel the pain going to the right leg patient has no calf pain no short of breath no chest pain and has no injury no fall as per patient she cannot take steroid she is allergic to it and she allergic to lot of medications and only medication helps his oxycodone    CRITICAL CARE TIME   Total Critical Care time was  minutes, excluding separately reportableprocedures. There was a high probability of clinicallysignificant/life threatening deterioration in the patient's condition which required my urgent intervention. CONSULTS:  None    PROCEDURES:  Unless otherwise noted below, none     Procedures    FINAL IMPRESSION      1.  Contusion of right hip, initial encounter    2. Sciatica of right side    3. Essential hypertension          DISPOSITION/PLAN   DISPOSITION Discharge - Pending Orders Complete 09/07/2020 06:15:50 PM      PATIENT REFERRED TO:  Luciana Nichols MD  [address] [place] [address] [phone]    In 1 week  As needed      DISCHARGE MEDICATIONS:  New Prescriptions    OXYCODONE-ACETAMINOPHEN (PERCOCET) 5-325 MG PER TABLET    Take 1 tablet by mouth every 8 hours as needed for Pain for up to 5 days. Intended supply: 3 days.  Take lowest dose possible to manage pain          (Please note that portions of this note were completed with a voice recognition program.  Efforts were made to edit the dictations but occasionally words are mis-transcribed.)    Samm Pelayo MD (electronically signed)  Attending Emergency Physician       Samm Pelayo MD  09/07/20 Franklin Pelayo MD  09/07/20 [id number]

## 2020-09-15 ENCOUNTER — APPOINTMENT (OUTPATIENT)
Dept: CT IMAGING | Age: 80
End: 2020-09-15
Payer: MEDICARE

## 2020-09-15 ENCOUNTER — HOSPITAL ENCOUNTER (EMERGENCY)
Age: 80
Discharge: HOME | End: 2020-09-15
Attending: EMERGENCY MEDICINE
Payer: MEDICARE

## 2020-09-15 VITALS
TEMPERATURE: 98.3 F | OXYGEN SATURATION: 96 % | SYSTOLIC BLOOD PRESSURE: 196 MMHG | BODY MASS INDEX: 42.88 KG/M2 | RESPIRATION RATE: 20 BRPM | HEIGHT: 63 IN | WEIGHT: 242 LBS | HEART RATE: 80 BPM | DIASTOLIC BLOOD PRESSURE: 88 MMHG

## 2020-09-15 PROCEDURE — 6370000000 HC RX 637 (ALT 250 FOR IP): Performed by: EMERGENCY MEDICINE

## 2020-09-15 PROCEDURE — 99283 EMERGENCY DEPT VISIT LOW MDM: CPT

## 2020-09-15 PROCEDURE — 72128 CT CHEST SPINE W/O DYE: CPT

## 2020-09-15 PROCEDURE — 72131 CT LUMBAR SPINE W/O DYE: CPT

## 2020-09-15 RX ORDER — OXYCODONE HYDROCHLORIDE AND ACETAMINOPHEN 5; 325 MG/1; MG/1
2 TABLET ORAL ONCE
Status: COMPLETED | OUTPATIENT
Start: 2020-09-15 | End: 2020-09-15

## 2020-09-15 RX ORDER — METAXALONE 800 MG/1
800 TABLET ORAL 3 TIMES DAILY
Qty: 21 TABLET | Refills: 0 | Status: SHIPPED | OUTPATIENT
Start: 2020-09-15 | End: 2020-12-01 | Stop reason: SDUPTHER

## 2020-09-15 RX ORDER — OXYCODONE HYDROCHLORIDE AND ACETAMINOPHEN 5; 325 MG/1; MG/1
1 TABLET ORAL EVERY 4 HOURS PRN
COMMUNITY
End: 2020-09-15 | Stop reason: ALTCHOICE

## 2020-09-15 RX ORDER — PREDNISONE 10 MG/1
TABLET ORAL
Qty: 21 TABLET | Refills: 0 | Status: SHIPPED | OUTPATIENT
Start: 2020-09-15 | End: 2020-09-25

## 2020-09-15 RX ORDER — KETOROLAC TROMETHAMINE 15 MG/ML
15 INJECTION, SOLUTION INTRAMUSCULAR; INTRAVENOUS ONCE
Status: DISCONTINUED | OUTPATIENT
Start: 2020-09-15 | End: 2020-09-15 | Stop reason: HOSPADM

## 2020-09-15 RX ORDER — OXYCODONE AND ACETAMINOPHEN 10; 325 MG/1; MG/1
1 TABLET ORAL EVERY 6 HOURS PRN
Qty: 20 TABLET | Refills: 0 | Status: SHIPPED | OUTPATIENT
Start: 2020-09-15 | End: 2020-09-20

## 2020-09-15 RX ORDER — KETOROLAC TROMETHAMINE 15 MG/ML
15 INJECTION, SOLUTION INTRAMUSCULAR; INTRAVENOUS ONCE
Status: DISCONTINUED | OUTPATIENT
Start: 2020-09-15 | End: 2020-09-15

## 2020-09-15 RX ADMIN — OXYCODONE HYDROCHLORIDE AND ACETAMINOPHEN 2 TABLET: 5; 325 TABLET ORAL at 12:31

## 2020-09-15 ASSESSMENT — PAIN SCALES - GENERAL: PAINLEVEL_OUTOF10: 10

## 2020-09-15 ASSESSMENT — PAIN DESCRIPTION - ONSET: ONSET: ON-GOING

## 2020-09-15 ASSESSMENT — PAIN DESCRIPTION - FREQUENCY: FREQUENCY: CONTINUOUS

## 2020-09-15 ASSESSMENT — PAIN DESCRIPTION - DESCRIPTORS: DESCRIPTORS: THROBBING;ACHING

## 2020-09-15 ASSESSMENT — PAIN DESCRIPTION - LOCATION: LOCATION: HIP;BACK

## 2020-09-15 ASSESSMENT — PAIN - FUNCTIONAL ASSESSMENT
PAIN_FUNCTIONAL_ASSESSMENT: INTOLERABLE, UNABLE TO DO ANY ACTIVE OR PASSIVE ACTIVITIES
PAIN_FUNCTIONAL_ASSESSMENT: 0-10

## 2020-09-15 ASSESSMENT — PAIN DESCRIPTION - ORIENTATION: ORIENTATION: RIGHT

## 2020-09-15 ASSESSMENT — PAIN DESCRIPTION - PROGRESSION: CLINICAL_PROGRESSION: GRADUALLY WORSENING

## 2020-09-15 NOTE — ED NOTES
Patient presents with ongoing right hip pain radiating towards the knee. Patient states she took all her pain medications. Patient declines recheck blood pressure. Patient states she does not believe the blood pressure reading. Patient states she does not want shots just medication and a test. Patient denies numbness in leg, no loss of bladder out of the normal age incont.       Dio Singh RN  09/15/20 4623

## 2020-09-15 NOTE — ED PROVIDER NOTES
HPI:  9/15/20, Time: 12:15 PM EDT         Elgin Murdock is a 79 y.o. female presenting to the ED for right-sided back pain, beginning several weeks ago. The complaint has been persistent, moderate in severity, and worsened by changing position. No trauma. No fever no chills. No incontinence. No paresthesias. She describes a right-sided pain as well as spasm in her mid thoracic and lower lumbar spine. She has pain in the right sciatic region down to the level of the knee no difficulty with ambulation. ROS:   Pertinent positives and negatives are stated within HPI, all other systems reviewed and are negative.  --------------------------------------------- PAST HISTORY ---------------------------------------------  Past Medical History:  has a past medical history of Gastroesophageal reflux disease, Hypothyroidism, Obesity, Class III, BMI 40-49.9 (morbid obesity) ([address] and Tobacco abuse. Past Surgical History:  has a past surgical history that includes Cholecystectomy and Appendectomy. Social History:  reports that she quit smoking about 2 years ago. Her smoking use included cigarettes. She has a 150.00 pack-year smoking history. She has never used smokeless tobacco. She reports that she does not drink alcohol or use drugs. Family History: family history includes Heart Disease in her father; High Blood Pressure in her mother; Kidney Disease in her mother; Stroke in her mother. The patients home medications have been reviewed. Allergies: Benadryl [diphenhydramine hcl]; Cortisone;  Codeine; and Pcn [penicillins]    ---------------------------------------------------PHYSICAL EXAM--------------------------------------     Constitutional/General: Alert and oriented x3, well appearing, non toxic in NAD  Head: Normocephalic and atraumatic  Eyes: PERRL, EOMI  Mouth: Oropharynx clear, handling secretions, no trismus  Neck: Supple, full ROM, non tender to palpation in the midline, no stridor, no crepitus, no meningeal signs  Pulmonary: Lungs clear to auscultation bilaterally, no wheezes, rales, or rhonchi. Not in respiratory distress  Cardiovascular:  Regular rate. Regular rhythm. No murmurs, gallops, or rubs. 2+ distal pulses  Chest: no chest wall tenderness  Abdomen: Soft. Non tender. Non distended. +BS. No rebound, guarding, or rigidity. No pulsatile masses appreciated. Back exam reveals tenderness of T6 and T7. No step-off nor deformity there is tenderness of the right sacroiliac region with some spasm. No foot drop no saddle paresthesias normal gait the hips both have full range of motion with no pain elicited  Musculoskeletal: Moves all extremities x 4. Warm and well perfused, no clubbing, cyanosis, or edema. Capillary refill <3 seconds  Skin: warm and dry. No rashes. Neurologic: GCS 15, CN 2-12 grossly intact, no focal deficits, symmetric strength 5/5 in the upper and lower extremities bilaterally  Psych: Normal Affect    -------------------------------------------------- RESULTS -------------------------------------------------  I have personally reviewed all laboratory and imaging results for this patient. Results are listed below. LABS:  No results found for this visit on 09/15/20. RADIOLOGY:  Interpreted by RadiologistJake Pandya   Final Result      No acute fracture or traumatic subluxation. Multilevel degenerative changes lumbar spine, worst at L3-L4. CT THORACIC SPINE WO CONTRAST   Final Result      No acute fracture or traumatic malalignment within the thoracic spine.                                ------------------------- NURSING NOTES AND VITALS REVIEWED ---------------------------   The nursing notes within the ED encounter and vital signs as below have been reviewed by myself.   BP (!) 196/88 Comment: Wont allow EMS to take BP; states it hurts too much  Pulse 80   Temp 98.3 °F (36.8 °C) (Oral)   Resp 20   Ht 5' 3\" (1.6 m)   Wt 242 lb (109.8 kg)   SpO2 96%   BMI 42.87 kg/m²   Oxygen Saturation Interpretation: Normal    The patients available past medical records and past encounters were reviewed. ------------------------------ ED COURSE/MEDICAL DECISION MAKING----------------------  Medications   ketorolac (TORADOL) injection 15 mg (15 mg Intramuscular Not Given 9/15/20 1232)   oxyCODONE-acetaminophen (PERCOCET) 5-325 MG per tablet 2 tablet (2 tablets Oral Given 9/15/20 1231)             Medical Decision Making:    I have ordered a CT scan of the thoracic and lumbar spine. She was given Toradol 15 mg IM and Percocet 10 mg orally. CT findings were reviewed with the patient I did tell her to follow-up with her family doctor as she may need referred to a neurosurgeon for outpatient follow-up    She was discharged home on Percocet, prednisone, and Skelaxin    Re-Evaluations:             Re-evaluation. Patients symptoms are improving      Consultations: This patient's ED course included: re-evaluation prior to disposition and a personal history and physicial eaxmination    This patient has remained hemodynamically stable and improved during their ED course. Counseling: The emergency provider has spoken with the patient and discussed todays results, in addition to providing specific details for the plan of care and counseling regarding the diagnosis and prognosis. Questions are answered at this time and they are agreeable with the plan.       --------------------------------- IMPRESSION AND DISPOSITION ---------------------------------    IMPRESSION  1. Sciatica of right side    2. Bulging lumbar disc        DISPOSITION  Disposition: Discharge to home  Patient condition is good        NOTE: This report was transcribed using voice recognition software.  Every effort was made to ensure accuracy; however, inadvertent computerized transcription errors may be present          Curt Mercedes MD  09/15/20 [id number]

## 2020-09-22 NOTE — TELEPHONE ENCOUNTER
Pt called in today stating she has an appointment 10/8/2020 at 1130. States she was told to get blood work done prior to appointment. States it is impossible because she can't walk. She is asking if she could have it drawn the day of her appointment. States she also have 2 pills left of her thyroid medication and she would like a refill sent to [facility] in [place].

## 2020-09-23 RX ORDER — LEVOTHYROXINE SODIUM 150 MCG
150 TABLET ORAL DAILY
Qty: 90 TABLET | Refills: 0 | Status: SHIPPED | OUTPATIENT
Start: 2020-09-23 | End: 2020-10-23 | Stop reason: SDUPTHER

## 2020-10-23 RX ORDER — LEVOTHYROXINE SODIUM 150 MCG
150 TABLET ORAL DAILY
Qty: 30 TABLET | Refills: 0 | Status: SHIPPED | OUTPATIENT
Start: 2020-10-23 | End: 2020-12-04 | Stop reason: SDUPTHER

## 2020-10-23 NOTE — TELEPHONE ENCOUNTER
Requesting medication refill. Please approve or deny this request.    Rx requested:  Requested Prescriptions     Pending Prescriptions Disp Refills    SYNTHROID 150 MCG tablet 90 tablet 0     Sig: Take 1 tablet by mouth Daily       Last Office Visit:   7/25/2019        REASON LAST SEEN AND BY WHO:    [facility] F/U-DR. Ho     FOLLOW UP PLAN FROM LAST PCP VISIT: COPY AND PASTE FROM LAST PCP NOTE    Return in about 1 year (around 7/25/2020) for Yearly Exam.      PATIENT CONTACTED FOR A FOLLOW UP APPT: YES OR NO    Yes, Pt unable to get transportation to Office    Next Visit Date:  No future appointments.

## 2020-12-01 ENCOUNTER — VIRTUAL VISIT (OUTPATIENT)
Dept: INTERNAL MEDICINE | Age: 80
End: 2020-12-01
Payer: MEDICARE

## 2020-12-01 PROCEDURE — G2025 DIS SITE TELE SVCS RHC/FQHC: HCPCS | Performed by: FAMILY MEDICINE

## 2020-12-01 RX ORDER — METAXALONE 400 MG/1
400 TABLET ORAL NIGHTLY PRN
Qty: 30 TABLET | Refills: 0 | Status: SHIPPED | OUTPATIENT
Start: 2020-12-01 | End: 2021-01-25 | Stop reason: SDUPTHER

## 2020-12-01 ASSESSMENT — PATIENT HEALTH QUESTIONNAIRE - PHQ9
2. FEELING DOWN, DEPRESSED OR HOPELESS: 0
1. LITTLE INTEREST OR PLEASURE IN DOING THINGS: 0
SUM OF ALL RESPONSES TO PHQ QUESTIONS 1-9: 0
SUM OF ALL RESPONSES TO PHQ9 QUESTIONS 1 & 2: 0
SUM OF ALL RESPONSES TO PHQ QUESTIONS 1-9: 0
SUM OF ALL RESPONSES TO PHQ QUESTIONS 1-9: 0

## 2020-12-01 ASSESSMENT — ENCOUNTER SYMPTOMS
BACK PAIN: 1
RESPIRATORY NEGATIVE: 1

## 2020-12-01 NOTE — Clinical Note
Tana Trejo,    I'd love for your help. Jacinto Jorge had a fall recently, seen at ER, she had arthritis of the spine and is healing well. Had 2 open sores on the legs, one is healed, other is healing. She is way overdue for her labs, she says she has trouble with transportation - mainly needs help with getting from her house into and out of the car, not sure if there is any service who can help with this? Thank you!     Nohemy Krause MD

## 2020-12-01 NOTE — PROGRESS NOTES
Patient: Jerlean Kayser    YOB: 1940    Date: 12/1/20       Patient Active Problem List    Diagnosis Date Noted    Type 2 diabetes mellitus without complication, without long-term current use of insulin ([facility] 06/08/2016     Priority: High    Hypothyroidism      Priority: High    Gastroesophageal reflux disease 06/10/2002     Priority: Medium    Tobacco abuse 01/04/2016     Priority: Low    Atopic rhinitis 02/26/2004     Priority: Low    Hearing loss 02/26/2004     Priority: Low    Family history of other specified malignant neoplasm 06/10/2002     Past Medical History:   Diagnosis Date    Gastroesophageal reflux disease 6/10/2002    Hypothyroidism     Obesity, Class III, 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.9 (morbid obesity) ([facility] 1/4/2016    Tobacco abuse      Past Surgical History:   Procedure Laterality Date    APPENDECTOMY      CHOLECYSTECTOMY       Family History   Problem Relation Age of Onset    Kidney Disease Mother     High Blood Pressure Mother     Stroke Mother     Heart Disease Father      Social History     Socioeconomic History    Marital status:      Spouse name: Not on file    Number of children: Not on file    Years of education: Not on file    Highest education level: Not on file   Occupational History    Not on file   Social Needs    Financial resource strain: Not on file    Food insecurity     Worry: Not on file     Inability: Not on file    Transportation needs     Medical: Not on file     Non-medical: Not on file   Tobacco Use    Smoking status: Former Smoker     Packs/day: 5.00     Years: 30.00     Pack years: 150.00     Types: Cigarettes     Last attempt to quit: 10/1/2017     Years since quitting: 3.1    Smokeless tobacco: Never Used    Tobacco comment: Has been trying to quit since January   Substance and Sexual Activity    Alcohol use: No     Alcohol/week: 0.0 standard drinks    Drug use: No    Sexual activity: Not Currently   Lifestyle    Physical activity Days per week: Not on file     Minutes per session: Not on file    Stress: Not on file   Relationships    Social connections     Talks on phone: Not on file     Gets together: Not on file     Attends Sabianist service: Not on file     Active member of club or organization: Not on file     Attends meetings of clubs or organizations: Not on file     Relationship status: Not on file    Intimate partner violence     Fear of current or ex partner: Not on file     Emotionally abused: Not on file     Physically abused: Not on file     Forced sexual activity: Not on file   Other Topics Concern    Not on file   Social History Narrative    Not on file     Current Outpatient Medications on File Prior to Visit   Medication Sig Dispense Refill    SYNTHROID 150 MCG tablet Take 1 tablet by mouth Daily 30 tablet 0    aspirin 325 MG tablet Take 325 mg by mouth daily. No current facility-administered medications on file prior to visit. Allergies   Allergen Reactions    Benadryl [Diphenhydramine Hcl]     Cortisone Swelling    Codeine Rash    Pcn [Penicillins] Rash       Chief Complaint   Patient presents with    Back Pain     pain in tailbone. imaging done at ER shows herniated disk. pt had a fall and feels it made matters worse. also gets muscle spasms in her hips. also has swelling in thigh area and back of knees. Takes ibuprofen    Other     pt got sores on shinbones. one cleard up but still has one that has a white fillm and drains, has yellow/green discharge.  Other     declines awv visit     TELEHEALTH EVALUATION -- Audio/Visual (During [place]ES-85 public health emergency)    Due to COVID 19 outbreak, patient's office visit was converted to a virtual visit. Patient was contacted and agreed to proceed with a virtual visit via Telephone Visit  The risks and benefits of converting to a virtual visit were discussed in light of the current infectious disease epidemic.   Patient also understood that insurance coverage and co-pays are up to their individual insurance plans. Time spent with patient on the phone 14 mins    Pursuant to the emergency declaration under the [facility] [place] [address] waiver authority and the [employer] Act, this Virtual  Visit was conducted, with patient's consent, to reduce the patient's risk of exposure to COVID-19 and provide continuity of care for an established patient. Services were provided through a telephone discussion virtually to substitute for in-person clinic visit. HPI  Symptoms:thigh area muscle spasms, swelling in knees to thigh, soreness in the hip - right  Location:as above  Quality: sore  Severity:mild , improved  Duration:fall and ER visit 9/7 & 9/15  Timing:on and off  Context:hx of herniated disc with sciatica  Seen at ER twice for it  Given oxy - lasted a few days, then she did ok  Then she slipped/trippped and fell on her left side - aggravated her back - Nov 21st  She twisted her ankles at the time and her toe was black/blue with swelling, she scooted across the carpet to get up which caused sores on her legs - one has cleared up, the other still drains green/yellow stuff. never had this problem before, was due to the fall. Alleviating/aggravting factors:  worse at night - muscle spasms in the back of thighs and calves, muscles there feel very hard  Associated signs & symptoms:  Mild swelling in thigh area  Swelling in feet and legs has resolved  Past few weeks - stiffness in feet and ankles     No labs done this year - can not get out of the house       RESULT:         Counting reference:  Lumbosacral junction.  For the purposes of this report, L4-5 is considered the level of the iliac crest.         Alignment:  No traumatic malalignment.  Mild to moderate levoscoliosis.         Bone marrow /fracture:  No evidence of a lytic or blastic process in the visualized spine.  No evidence of acute or chronic fracture. Multilevel degenerative changes. Osteopenia.         Paraspinal soft tissues:  Low-attenuation left adrenal nodule, likely adenoma. Calcified uterine fibroids. Diverticulosis without diverticulitis.         Lower thoracic spine:  Refer to concurrent CT.         T12-L1:  No significant canal or foraminal narrowing.         L1-L2:    No significant canal or foraminal narrowing.         L2-L3:    Broad-based disc bulge, facet degenerative changes, with at least mild to moderate canal narrowing and mild-to-moderate bilateral foraminal narrowing.         L3-L4:    Broad-based disc bulge, disc calcification, disc height loss with vacuum disc phenomena, facet degenerative changes, with at least moderate canal narrowing and moderate bilateral foraminal narrowing.         L4-L5:    Disc bulge, facet degenerative changes, with at least mild to moderate canal narrowing and mild-to-moderate bilateral foraminal narrowing.         L5-S1:    Disc height loss, endplate osteophytes, facet degenerative changes, with moderate left foraminal narrowing without significant canal narrowing         Sacrum and iliac wings:  The visualized sacrum and iliac wings are within normal limits.                     Impression         No acute fracture or traumatic subluxation.         Multilevel degenerative changes lumbar spine, worst at L3-L4. RESULT:         Counting reference:  Lumbosacral junction.  For the purposes of this report,  L4-5 is considered the level of the iliac crest and assume there are 5 lumbar-type vertebrae.  Anatomic variant:  None.         Alignment:    Mild to moderate thoracic kyphosis. Moderate dextroscoliosis.         Bone marrow / fracture: No CT evidence for acute thoracic spine fracture. Osteopenia. Bridging anterior endplate osteophytes.  Remote-appearing rib fractures, including fracture of the left seventh posterior rib.         Thoracic soft tissues:   Limited evaluation of the visualized lungs with motion artifact, without distinct acute finding. Cardiomegaly. Aortic root calcifications.         Canal and foramina:  No distinct significant bony thoracic canal or foraminal narrowing.           Visualized lower cervical and upper lumbar spine: There is some narrowing within the visualized lower cervical spine. See concurrent, separately dictated lumbar spine, for lumbar findings.                   Impression         No acute fracture or traumatic malalignment within the thoracic spine.             Review of Systems   HENT: Negative. Respiratory: Negative. Cardiovascular: Negative. Musculoskeletal: Positive for back pain. Physical Exam    Due to this being a TeleHealth encounter, evaluation of the following organ systems is limited: Vitals/Constitutional/EENT/Resp/CV/GI//MS/Neuro/Skin/Heme-Lymph-Imm. [x] Alert  [] Oriented to person/place/time    [x] No apparent distress  [] Toxic appearing    [] Face flushed appearing [] Sclera clear  [] Lips are cyanotic      [x] Breathing appears normal  [] Appears tachypneic      [] Rash on visible skin    [] Cranial Nerves II-XII grossly intact    [] Motor grossly intact in visible upper extremities    [] Motor grossly intact in visible lower extremities    [] Normal Mood  [] Anxious appearing    [] Depressed appearing  [] Confused appearing      [] Poor short term memory  [] Poor long term memory    [] OTHER: Patient is aware of today's time and date  Patient is aware of current pandemic situation with Covid-19  Patient is able to speaking full sentences  Patient is not SOB during speech    Assessment/Plan:  Barb Conley was seen today for back pain, other and other. Diagnoses and all orders for this visit:    DDD (degenerative disc disease), lumbosacral  -     metaxalone (SKELAXIN) 400 MG tablet; Take 1 tablet by mouth nightly as needed for Pain  Fall, sequela  -     metaxalone (SKELAXIN) 400 MG tablet;  Take 1 tablet by mouth nightly as needed for Pain    Discussed wound care for the leg open sores  Pt described the wound with granulation tissue, and healing redness and pain, therefore less likely infection  She was advised to call me if it does not continue to heal, pain worsens, redness worsens, or discharge changes or increases  She does have evidence of DDD on CT scan, likely exacerbated with her fall  Supportive care discussed  Would like labs - pt is overdue, will place care coordinator referral to help with transportation   For now can cont NSAID, tylenol, and ok to add back on muscle relaxer given at ER for short use    Oralia Regalado MD    Pt is aware that the insurance will be charged for this electric/telephone/virtual visit.

## 2020-12-02 ENCOUNTER — CARE COORDINATION (OUTPATIENT)
Dept: CARE COORDINATION | Age: 80
End: 2020-12-02

## 2020-12-02 ENCOUNTER — TELEPHONE (OUTPATIENT)
Dept: INTERNAL MEDICINE | Age: 80
End: 2020-12-02

## 2020-12-02 RX ORDER — CEPHALEXIN 500 MG/1
500 CAPSULE ORAL 4 TIMES DAILY
Qty: 28 CAPSULE | Refills: 0 | Status: SHIPPED | OUTPATIENT
Start: 2020-12-02 | End: 2021-01-25 | Stop reason: SDUPTHER

## 2020-12-02 NOTE — TELEPHONE ENCOUNTER
SHE NEEDS TO GET HER LABS DONE    Requested Prescriptions     Signed Prescriptions Disp Refills    cephALEXin (KEFLEX) 500 MG capsule 28 capsule 0     Sig: Take 1 capsule by mouth 4 times daily for 7 days     Authorizing Provider: Ruperto Hernandez         Thank you!     Prasanna Crawford MD

## 2020-12-02 NOTE — TELEPHONE ENCOUNTER
Patient says she had a VV yesterday and spoke to you about her leg. She feels she needs an antibiotic.   Please advise    Thank you

## 2020-12-02 NOTE — CARE COORDINATION
Case referred to this writer by Sabrina Cm to assist patient with transportation. Telephone call to patient. Left voicemail of nature of call with request for return phone call. Call back number was provided.

## 2020-12-03 DIAGNOSIS — E03.9 ACQUIRED HYPOTHYROIDISM: ICD-10-CM

## 2020-12-03 RX ORDER — LEVOTHYROXINE SODIUM 150 MCG
150 TABLET ORAL DAILY
Qty: 90 TABLET | Refills: 1 | Status: CANCELLED | OUTPATIENT
Start: 2020-12-03

## 2020-12-03 NOTE — TELEPHONE ENCOUNTER
Patient in need of her synthroid refilled.  Please call patient either way to let her know if it can be done/

## 2020-12-03 NOTE — TELEPHONE ENCOUNTER
Requesting medication refill. Please approve or deny this request.    Rx requested:  Requested Prescriptions     Pending Prescriptions Disp Refills    SYNTHROID 150 MCG tablet 30 tablet 3     Sig: Take 1 tablet by mouth Daily       Last Office Visit:   12/1/2020        REASON LAST SEEN AND BY WHO:    [facility]     FOLLOW UP PLAN FROM LAST PCP VISIT: COPY AND PASTE FROM LAST PCP NOTE     Return if symptoms worsen or fail to improve        PATIENT CONTACTED FOR A FOLLOW UP APPT: YES OR NO    no    Next Visit Date:  No future appointments.

## 2020-12-04 ENCOUNTER — TELEPHONE (OUTPATIENT)
Dept: INTERNAL MEDICINE | Age: 80
End: 2020-12-04

## 2020-12-04 RX ORDER — LEVOTHYROXINE SODIUM 150 MCG
150 TABLET ORAL DAILY
Qty: 90 TABLET | Refills: 1 | Status: SHIPPED | OUTPATIENT
Start: 2020-12-04 | End: 2021-06-17 | Stop reason: SDUPTHER

## 2020-12-04 NOTE — TELEPHONE ENCOUNTER
Requested Prescriptions     Signed Prescriptions Disp Refills    SYNTHROID 150 MCG tablet 90 tablet 1     Sig: Take 1 tablet by mouth Daily     Authorizing Provider: Dianna Bernard         Thank you!     Dagmar Wahl MD

## 2020-12-04 NOTE — TELEPHONE ENCOUNTER
Pt called asking about a prescription med that was not called in the other day with the other meds her  picked up. Pt states she can not leave the house. her  picked them up, the med she is upset and calling about is synthroid. Pt states she doesn't need a  to come to the house and if they do she will tell them to leave. Pt said she needs help getting down the stairs because shes on a walker and her legs are bad. I informed the pt the the physician has 24 to 48 hours to approve or deny a medication. She stated she needs the med but her physician wouldn't give it to her till she had blood work done. Pt stated she will get out of the house and get blood work done when she gets help getting down the stairs. States her  can not help due to back problems.

## 2020-12-07 ENCOUNTER — CARE COORDINATION (OUTPATIENT)
Dept: CARE COORDINATION | Age: 80
End: 2020-12-07

## 2020-12-07 NOTE — CARE COORDINATION
Telephone call with patient. She indicated that she is in need of help getting out of her house for transportation to medical appointment/lab work. She stated that she has rides but needs two people to get down her stairs to get out of the home. She relayed that her insurance provides transportation to medical appointments but she needs to be able to get out of the home. She has spoken to [employer] and was told to go by [employer] she would need to be able to get down the stairs. She also stated that [facility] would charge 250 dollars and she could not afford this. Explained that resources this writer has she would need to get out of the home without assistance. Patient indicated that she was going to contact physician about home care and getting assistance with getting lab work done. Patient also feels she will have the support of her son later this month to assist with transportation.

## 2020-12-18 ENCOUNTER — CARE COORDINATION (OUTPATIENT)
Dept: CARE COORDINATION | Age: 80
End: 2020-12-18

## 2021-01-05 ENCOUNTER — TELEPHONE (OUTPATIENT)
Dept: INTERNAL MEDICINE | Age: 81
End: 2021-01-05

## 2021-01-05 NOTE — TELEPHONE ENCOUNTER
Pt was prescribed keflex 12/2 but she never took it due to her penicillin allergy. She will only take erythromycin.

## 2021-01-05 NOTE — TELEPHONE ENCOUNTER
This was back in December, at the time I did not think she needed an antibiotic anyway.   I do not think she needs an antibiotic for her leg

## 2021-01-06 RX ORDER — SULFAMETHOXAZOLE AND TRIMETHOPRIM 800; 160 MG/1; MG/1
1 TABLET ORAL 2 TIMES DAILY
Qty: 20 TABLET | Refills: 0 | Status: SHIPPED | OUTPATIENT
Start: 2021-01-06 | End: 2021-01-16

## 2021-01-06 NOTE — TELEPHONE ENCOUNTER
Looked at attached picture  I called pt to see how she is doing  She say she has had this for over 4 months now  Never took the keflex given on 12/2 due to PCN allergy  She did not seek any medical advice since then    Says the leg is draining clear fluid and skin is red   She denies pain, fevers, nausea, vomiting    I had a long discussion with the pt regarding her leg and that it NEEDS to be examined in office  Pt refused, she says she can not leave the house  I discussed the risk of worsening infection and even bone infection leading to death. Pt is aware of the risks of waiting. She says she will come by office or ER on Friday  I sent in script of bactrim in the meantime for her to start    Requested Prescriptions     Signed Prescriptions Disp Refills    sulfamethoxazole-trimethoprim (BACTRIM DS) 800-160 MG per tablet 20 tablet 0     Sig: Take 1 tablet by mouth 2 times daily for 10 days     Authorizing Provider: Renita Rosales         Thank you!     Tyler Leblanc MD

## 2021-01-08 ENCOUNTER — CARE COORDINATION (OUTPATIENT)
Dept: CARE COORDINATION | Age: 81
End: 2021-01-08

## 2021-01-08 NOTE — CARE COORDINATION
Called patient for follow up. Spoke with patient to discuss transportation options. Patient shared that she has worked out her transportation concern and no longer needs assistance. We will no longer follow patient.

## 2021-01-15 ENCOUNTER — TELEPHONE (OUTPATIENT)
Dept: FAMILY MEDICINE CLINIC | Age: 81
End: 2021-01-15

## 2021-01-15 ENCOUNTER — HOSPITAL ENCOUNTER (OUTPATIENT)
Dept: LAB | Age: 81
Discharge: HOME | End: 2021-01-15
Payer: MEDICARE

## 2021-01-15 DIAGNOSIS — K21.9 GASTROESOPHAGEAL REFLUX DISEASE WITHOUT ESOPHAGITIS: ICD-10-CM

## 2021-01-15 DIAGNOSIS — E11.9 TYPE 2 DIABETES MELLITUS WITHOUT COMPLICATION, WITHOUT LONG-TERM CURRENT USE OF INSULIN (HCC): ICD-10-CM

## 2021-01-15 DIAGNOSIS — E03.9 ACQUIRED HYPOTHYROIDISM: ICD-10-CM

## 2021-01-15 LAB
ALBUMIN SERPL-MCNC: 4 G/DL (ref 3.5–4.6)
ALP BLD-CCNC: 87 U/L (ref 40–130)
ALT SERPL-CCNC: 12 U/L (ref 0–33)
ANION GAP SERPL CALCULATED.3IONS-SCNC: 13 MEQ/L (ref 9–15)
AST SERPL-CCNC: 13 U/L (ref 0–35)
BASOPHILS ABSOLUTE: 0 K/UL (ref 0–0.2)
BASOPHILS RELATIVE PERCENT: 0.5 %
BILIRUB SERPL-MCNC: <0.2 MG/DL (ref 0.2–0.7)
BUN BLDV-MCNC: 16 MG/DL (ref 8–23)
CALCIUM SERPL-MCNC: 9.2 MG/DL (ref 8.5–9.9)
CHLORIDE BLD-SCNC: 106 MEQ/L (ref 95–107)
CHOLESTEROL, TOTAL: 180 MG/DL (ref 0–199)
CO2: 21 MEQ/L (ref 20–31)
CREAT SERPL-MCNC: 0.87 MG/DL (ref 0.5–0.9)
EOSINOPHILS ABSOLUTE: 0.3 K/UL (ref 0–0.7)
EOSINOPHILS RELATIVE PERCENT: 4.1 %
GFR AFRICAN AMERICAN: >60
GFR NON-AFRICAN AMERICAN: >60
GLOBULIN: 3.5 G/DL (ref 2.3–3.5)
GLUCOSE BLD-MCNC: 88 MG/DL (ref 70–99)
HBA1C MFR BLD: 6.1 % (ref 4.8–5.9)
HCT VFR BLD CALC: 39.7 % (ref 37–47)
HDLC SERPL-MCNC: 77 MG/DL (ref 40–59)
HEMOGLOBIN: 12.5 G/DL (ref 12–16)
LDL CHOLESTEROL CALCULATED: 90 MG/DL (ref 0–129)
LYMPHOCYTES ABSOLUTE: 1.9 K/UL (ref 1–4.8)
LYMPHOCYTES RELATIVE PERCENT: 23.4 %
MCH RBC QN AUTO: 25 PG (ref 27–31.3)
MCHC RBC AUTO-ENTMCNC: 31.5 % (ref 33–37)
MCV RBC AUTO: 79.4 FL (ref 82–100)
MONOCYTES ABSOLUTE: 0.4 K/UL (ref 0.2–0.8)
MONOCYTES RELATIVE PERCENT: 5 %
NEUTROPHILS ABSOLUTE: 5.5 K/UL (ref 1.4–6.5)
NEUTROPHILS RELATIVE PERCENT: 67 %
PDW BLD-RTO: 16.4 % (ref 11.5–14.5)
PLATELET # BLD: 286 K/UL (ref 130–400)
POTASSIUM SERPL-SCNC: 4.6 MEQ/L (ref 3.4–4.9)
RBC # BLD: 4.99 M/UL (ref 4.2–5.4)
SODIUM BLD-SCNC: 140 MEQ/L (ref 135–144)
T4 FREE: 0.73 NG/DL (ref 0.84–1.68)
TOTAL PROTEIN: 7.5 G/DL (ref 6.3–8)
TRIGL SERPL-MCNC: 66 MG/DL (ref 0–150)
TSH SERPL DL<=0.05 MIU/L-ACNC: 10.16 UIU/ML (ref 0.44–3.86)
WBC # BLD: 8.2 K/UL (ref 4.8–10.8)

## 2021-01-15 PROCEDURE — 80053 COMPREHEN METABOLIC PANEL: CPT

## 2021-01-15 PROCEDURE — 85025 COMPLETE CBC W/AUTO DIFF WBC: CPT

## 2021-01-15 PROCEDURE — 84443 ASSAY THYROID STIM HORMONE: CPT

## 2021-01-15 PROCEDURE — 83036 HEMOGLOBIN GLYCOSYLATED A1C: CPT

## 2021-01-15 PROCEDURE — 84439 ASSAY OF FREE THYROXINE: CPT

## 2021-01-15 PROCEDURE — 36415 COLL VENOUS BLD VENIPUNCTURE: CPT

## 2021-01-15 PROCEDURE — 80061 LIPID PANEL: CPT

## 2021-01-15 NOTE — TELEPHONE ENCOUNTER
Patient called to inform Dr. Susana Zacarias that she went to the hospital today and got her blood work done.

## 2021-01-25 ENCOUNTER — TELEPHONE (OUTPATIENT)
Dept: FAMILY MEDICINE CLINIC | Age: 81
End: 2021-01-25

## 2021-01-25 DIAGNOSIS — M51.37 DDD (DEGENERATIVE DISC DISEASE), LUMBOSACRAL: ICD-10-CM
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
## 2021-01-25 RX ORDER — METAXALONE 400 MG/1
400 TABLET ORAL NIGHTLY PRN
Qty: 30 TABLET | Refills: 0 | Status: SHIPPED | OUTPATIENT
Start: 2021-01-25 | End: 2021-02-01

## 2021-01-25 RX ORDER — CEPHALEXIN 500 MG/1
500 CAPSULE ORAL 4 TIMES DAILY
Qty: 28 CAPSULE | Refills: 0 | Status: SHIPPED | OUTPATIENT
Start: 2021-01-25 | End: 2021-02-01

## 2021-01-25 NOTE — TELEPHONE ENCOUNTER
Pt says pharmacy does not have 400mg metaxalone and her insurance will not cover it, but they will cover the 800mg and she could take half a tablet

## 2021-01-25 NOTE — TELEPHONE ENCOUNTER
Patient called back upset that you sent in keflex. States she is not taking that she wants bactrim sent in.     Please advise

## 2021-01-25 NOTE — TELEPHONE ENCOUNTER
Both sent  If she wants anything stronger then NSAIDs she will need to see back specialist, can refer to [facility]    Thank you!     Jennifer Gonzalez MD

## 2021-01-26 RX ORDER — SULFAMETHOXAZOLE AND TRIMETHOPRIM 800; 160 MG/1; MG/1
1 TABLET ORAL 2 TIMES DAILY
Qty: 14 TABLET | Refills: 0 | Status: SHIPPED | OUTPATIENT
Start: 2021-01-26 | End: 2021-02-02

## 2021-01-26 NOTE — TELEPHONE ENCOUNTER
Pharmacist Maritza Pantoja)  Said that they will run the 800mg. Through and see if the insurance will cover it.

## 2021-01-26 NOTE — TELEPHONE ENCOUNTER
Patient is very mad that you did not send bactrim, says she can have an allergic reaction to keflex.

## 2021-01-26 NOTE — TELEPHONE ENCOUNTER
Called patient  Bactrim sent     Requested Prescriptions     Signed Prescriptions Disp Refills    sulfamethoxazole-trimethoprim (BACTRIM DS) 800-160 MG per tablet 14 tablet 0     Sig: Take 1 tablet by mouth 2 times daily for 7 days     Authorizing Provider: Kelvin Nunez         Thank you!     Royce Garza MD

## 2021-01-26 NOTE — TELEPHONE ENCOUNTER
That is what I thought I gave her last time for her legs? Is that not what she took? We had discussed her allergy to penicillin which she was unsure about and I thought we decided she was going to try the Keflex. Since I did not hear back from her, I figured she took the Keflex and did fine with it. Was I mistaken?

## 2021-03-11 ENCOUNTER — HOSPITAL ENCOUNTER (OUTPATIENT)
Age: 81
Setting detail: SPECIMEN
Discharge: HOME | End: 2021-03-11
Payer: MEDICARE

## 2021-03-11 ENCOUNTER — OFFICE VISIT (OUTPATIENT)
Dept: FAMILY MEDICINE CLINIC | Age: 81
End: 2021-03-11
Payer: MEDICARE

## 2021-03-11 VITALS
HEART RATE: 82 BPM | OXYGEN SATURATION: 98 % | HEIGHT: 63 IN | TEMPERATURE: 97.1 F | WEIGHT: 242 LBS | BODY MASS INDEX: 42.88 KG/M2 | SYSTOLIC BLOOD PRESSURE: 134 MMHG | RESPIRATION RATE: 12 BRPM | DIASTOLIC BLOOD PRESSURE: 80 MMHG

## 2021-03-11 DIAGNOSIS — L03.116 BILATERAL LOWER LEG CELLULITIS: ICD-10-CM

## 2021-03-11 DIAGNOSIS — I87.2 STASIS DERMATITIS OF BOTH LEGS: ICD-10-CM

## 2021-03-11 DIAGNOSIS — L03.115 BILATERAL LOWER LEG CELLULITIS: ICD-10-CM

## 2021-03-11 DIAGNOSIS — L03.115 BILATERAL LOWER LEG CELLULITIS: Primary | ICD-10-CM

## 2021-03-11 DIAGNOSIS — L03.116 BILATERAL LOWER LEG CELLULITIS: Primary | ICD-10-CM

## 2021-03-11 PROCEDURE — 87070 CULTURE OTHR SPECIMN AEROBIC: CPT

## 2021-03-11 PROCEDURE — 87205 SMEAR GRAM STAIN: CPT

## 2021-03-11 PROCEDURE — 87186 SC STD MICRODIL/AGAR DIL: CPT

## 2021-03-11 PROCEDURE — 87077 CULTURE AEROBIC IDENTIFY: CPT

## 2021-03-11 PROCEDURE — 99214 OFFICE O/P EST MOD 30 MIN: CPT | Performed by: NURSE PRACTITIONER

## 2021-03-11 RX ORDER — ALUMINUM ACETATE
SOLUTION, NON-ORAL TOPICAL
Qty: 1 BOTTLE | Refills: 0 | Status: SHIPPED | OUTPATIENT
Start: 2021-03-11 | End: 2021-03-21

## 2021-03-11 RX ORDER — METAXALONE 800 MG/1
TABLET ORAL
COMMUNITY
Start: 2021-01-26 | End: 2021-04-08

## 2021-03-11 RX ORDER — SULFAMETHOXAZOLE AND TRIMETHOPRIM 800; 160 MG/1; MG/1
1 TABLET ORAL 2 TIMES DAILY
Qty: 20 TABLET | Refills: 0 | Status: SHIPPED | OUTPATIENT
Start: 2021-03-11 | End: 2021-04-08 | Stop reason: SDUPTHER

## 2021-03-11 ASSESSMENT — PATIENT HEALTH QUESTIONNAIRE - PHQ9
2. FEELING DOWN, DEPRESSED OR HOPELESS: 0
SUM OF ALL RESPONSES TO PHQ QUESTIONS 1-9: 0
SUM OF ALL RESPONSES TO PHQ QUESTIONS 1-9: 0

## 2021-03-11 NOTE — PATIENT INSTRUCTIONS
Antibiotic was e-scripted to your pharmacy- begin today  We'll call with wound culture results  Follow-up with wound care specialist and PCP    Patient Education   Cellulitis: Care Instructions  Your Care Instructions     Cellulitis is a skin infection caused by bacteria, most often strep or staph. It often occurs after a break in the skin from a scrape, cut, bite, or puncture, or after a rash. Cellulitis may be treated without doing tests to find out what caused it. But your doctor may do tests, if needed, to look for a specific bacteria, like methicillin-resistant Staphylococcus aureus (MRSA). The doctor has checked you carefully, but problems can develop later. If you notice any problems or new symptoms, get medical treatment right away. Follow-up care is a key part of your treatment and safety. Be sure to make and go to all appointments, and call your doctor if you are having problems. It's also a good idea to know your test results and keep a list of the medicines you take. How can you care for yourself at home? · Take your antibiotics as directed. Do not stop taking them just because you feel better. You need to take the full course of antibiotics. · Prop up the infected area on pillows to reduce pain and swelling. Try to keep the area above the level of your heart as often as you can. · If your doctor told you how to care for your wound, follow your doctor's instructions. If you did not get instructions, follow this general advice:  ? Wash the wound with clean water 2 times a day. Don't use hydrogen peroxide or alcohol, which can slow healing. ? You may cover the wound with a thin layer of petroleum jelly, such as Vaseline, and a nonstick bandage. ? Apply more petroleum jelly and replace the bandage as needed. · Be safe with medicines. Take pain medicines exactly as directed. ? If the doctor gave you a prescription medicine for pain, take it as prescribed.   ? If you are not taking a prescription circulate and helps prevent other ulcers from forming. · Walk daily. Walking helps your blood circulation. When should you call for help? Call your doctor now or seek immediate medical care if:    · You have symptoms of infection, such as:  ? Increased pain, swelling, warmth, or redness. ? Red streaks leading from the ulcer. ? Pus draining from the ulcer. ? A fever. Watch closely for changes in your health, and be sure to contact your doctor if:    · Your ulcer is not healing.     · You have new ulcers.     · The ulcer starts to bleed, and blood soaks through the bandage. Oozing small amounts of a mix of blood and fluid is normal.     · You have new bleeding.     · You do not get better as expected. Where can you learn more? Go to [email or web address]. org and sign in to your [employer] account. Enter Z580 in the Met[employer] box to learn more about \"Venous Skin Ulcer: Care Instructions. \"     If you do not have an account, please click on the \"Sign Up Now\" link. Current as of: February 26, 2020               Content Version: 12.8  © 2344-4683 [employer]wise, Incorporated. Care instructions adapted under license by [facility] ([facility]). If you have questions about a medical condition or this instruction, always ask your healthcare professional. [address] any warranty or liability for your use of this information.

## 2021-03-11 NOTE — PROGRESS NOTES
3/11/2021    SUBJECTIVE:     Sushila Ordonez, 79 y.o. female presents today with:  Chief Complaint   Patient presents with    Leg Problem     C/o possible cellulitis on the legs      HPI  Leg Problem  Patient is accompanied by her son Jose Alfredo Arreguin today. Patient complains of non-healing lower leg wounds. Onset: 3-6 months ago. Hurt her back in September and was diagnosed with \"herniated disc pressing on her sciatic nerve. \"  One week after ED visit for back pain, she fell at home onto her left side and \"had to drag my leg across the carpet to pull myself up off the floor. \"   She thinks she got a scratch on L lower leg during this incident. The scratch never healed. Now both lower legs are \"red and draining. \"   Also swollen. Denies severe leg pain. Denies fever, numbness, weakness. Eval/ Treatment to date:    Says she sent PCP a picture of the initial leg wound and was rx'ed cephalexin- but she didn't  Take it because she was told not to due to PCN allergy- so she was then prescribed Bactrim. Says she still had posterior leg drainage a week after finishing the bactrim, so she rx'ed another course of Bactrim. Symptoms have been gradually worsening. States she originally asked Dr. Enedina Castellanos for erythromycin, which is the [place] antibiotic that has helped in my entire life. \"   States [employer] in [facility] can't get Erythromycin\" 2/2 backorder. Review of Systems   Constitutional: Negative for chills and fever. Respiratory: Negative for cough, chest tightness and shortness of breath. Cardiovascular: Positive for leg swelling. Negative for chest pain. Gastrointestinal: Negative for abdominal pain, diarrhea, nausea and vomiting. Genitourinary: Negative for dysuria. Musculoskeletal: Negative for myalgias. Skin: Positive for wound. Neurological: Negative for numbness.      OBJECTIVE:     Vitals:    03/11/21 1143   BP: 134/80   Site: Left Upper Arm   Position: Sitting   Cuff Size: Large Adult Pulse: 82   Resp: 12   Temp: 97.1 °F (36.2 °C)   TempSrc: Temporal   SpO2: 98%   Weight: 242 lb (109.8 kg)   Height: 5' 3\" (1.6 m)     Physical Exam  Vitals signs reviewed. Constitutional:       Appearance: She is well-groomed. HENT:      Head: Normocephalic and atraumatic. Jaw: There is normal jaw occlusion. Right Ear: External ear normal.      Left Ear: External ear normal.      Nose: Nose normal.      Mouth/Throat:      Lips: Pink. No lesions. Mouth: Mucous membranes are moist. No oral lesions. Eyes:      General: Lids are normal.      Conjunctiva/sclera: Conjunctivae normal.      Pupils: Pupils are equal, round, and reactive to light. Neck:      Musculoskeletal: Normal range of motion and neck supple. Cardiovascular:      Rate and Rhythm: Normal rate. Pulmonary:      Effort: Pulmonary effort is normal. No tachypnea. Breath sounds: Normal breath sounds and air entry. Musculoskeletal: Normal range of motion. Right lower leg: Edema present. Left lower leg: Edema present. Skin:     General: Skin is warm and moist.      Findings: Erythema and wound (bilateral LEs, sloughing superficial ulcerations, some with purulence in wound bed, serous drainage) present. Neurological:      General: No focal deficit present. Mental Status: She is alert. Mental status is at baseline. Sensory: Sensation is intact. Gait: Gait is intact. Psychiatric:         Mood and Affect: Mood and affect normal.         Speech: Speech normal.         Behavior: Behavior normal. Behavior is cooperative. POC Testing Today: No results found for this visit on 03/11/21. ASSESSMENT & PLAN:   79 y.o. female presenting with non-healing lower leg wounds. Culture obtained and sent. Antibiotic and topical antiseptic prescribed. Referral to wound care placed and I recommended close follow-up with PCP for continuity and coordination of care.     Diagnoses and all orders for this visit: ICD-10-CM    1. Bilateral lower leg cellulitis  L03.116 Culture, Wound    L03.115 [facility]) Referral to Wound Clinic, [place]     sulfamethoxazole-trimethoprim (BACTRIM DS;SEPTRA DS) 800-160 MG per tablet   2. Stasis dermatitis of both legs  I87.2 Aluminum Acetate SOLN     Orders Placed This Encounter   Medications    Aluminum Acetate SOLN     Sig: soak a clean, soft, white cloth in solution and apply loosely to affected area for 15 to 30 minutes two times daily for 10 days     Dispense:  1 Bottle     Refill:  0    sulfamethoxazole-trimethoprim (BACTRIM DS;SEPTRA DS) 800-160 MG per tablet     Sig: Take 1 tablet by mouth 2 times daily for 10 days     Dispense:  20 tablet     Refill:  0     - Elevate legs when possible  - Warm compresses to affected area 2-3x/ day until visible signs of healing- decreased erythema/ pain/ swelling   - Follow-up with PCP in one week for wound check    Antibiotic Instructions: Complete the full course of antibiotics as ordered. Take each dose with a small snack or meal to lessen potential GI upset. To prevent antibiotic resistance, please take medication as ordered and for the full duration even if you start to feel better. Consider intake of yogurt or probiotic during antibiotic use and for a few days after to help reduce the risk of developing a secondary infection. Separate the yogurt and antibiotic by at least 1 hour. Avoid alcohol while taking antibiotics. Return in 1 week (on 3/18/2021) for Follow-up with your Primary Care Provider. Side effects and adverse effects of any medication prescribed today, as well as treatment plan/rationale, follow-up care, and result expectations have been discussed with the patient. Wellington Corea expresses understanding and wishes to proceed with the plan. Discussed signs and symptoms which require immediate follow-up in ED/call to 911. Understanding verbalized. I have reviewed and updated the electronic medical record.     Chetan Lino Vicky Peer - CNP

## 2021-03-15 LAB
GRAM STAIN RESULT: ABNORMAL
ORGANISM: ABNORMAL
WOUND/ABSCESS: ABNORMAL

## 2021-03-19 ENCOUNTER — APPOINTMENT (OUTPATIENT)
Dept: GENERAL RADIOLOGY | Age: 81
End: 2021-03-19
Payer: MEDICARE

## 2021-03-19 ENCOUNTER — APPOINTMENT (OUTPATIENT)
Dept: CT IMAGING | Age: 81
End: 2021-03-19
Payer: MEDICARE

## 2021-03-19 ENCOUNTER — HOSPITAL ENCOUNTER (EMERGENCY)
Age: 81
Discharge: HOME | End: 2021-03-20
Attending: EMERGENCY MEDICINE
Payer: MEDICARE

## 2021-03-19 DIAGNOSIS — S39.012A LUMBOSACRAL STRAIN, INITIAL ENCOUNTER: ICD-10-CM

## 2021-03-19 DIAGNOSIS — S29.019A THORACIC MYOFASCIAL STRAIN, INITIAL ENCOUNTER: Primary | ICD-10-CM

## 2021-03-19 DIAGNOSIS — F41.1 ANXIETY STATE: ICD-10-CM

## 2021-03-19 PROCEDURE — 6370000000 HC RX 637 (ALT 250 FOR IP): Performed by: EMERGENCY MEDICINE

## 2021-03-19 PROCEDURE — 99283 EMERGENCY DEPT VISIT LOW MDM: CPT

## 2021-03-19 RX ORDER — ONDANSETRON 4 MG/1
4 TABLET, ORALLY DISINTEGRATING ORAL ONCE
Status: DISCONTINUED | OUTPATIENT
Start: 2021-03-19 | End: 2021-03-19

## 2021-03-19 RX ORDER — OXYCODONE HYDROCHLORIDE AND ACETAMINOPHEN 5; 325 MG/1; MG/1
1 TABLET ORAL ONCE
Status: COMPLETED | OUTPATIENT
Start: 2021-03-19 | End: 2021-03-19

## 2021-03-19 RX ADMIN — OXYCODONE AND ACETAMINOPHEN 1 TABLET: 5; 325 TABLET ORAL at 23:56

## 2021-03-19 ASSESSMENT — ENCOUNTER SYMPTOMS
ABDOMINAL PAIN: 0
SHORTNESS OF BREATH: 0
COUGH: 0

## 2021-03-19 ASSESSMENT — PAIN SCALES - GENERAL: PAINLEVEL_OUTOF10: 8

## 2021-03-20 VITALS
HEIGHT: 63 IN | SYSTOLIC BLOOD PRESSURE: 148 MMHG | OXYGEN SATURATION: 98 % | TEMPERATURE: 98 F | RESPIRATION RATE: 20 BRPM | HEART RATE: 90 BPM | BODY MASS INDEX: 42.88 KG/M2 | DIASTOLIC BLOOD PRESSURE: 72 MMHG | WEIGHT: 242 LBS

## 2021-03-20 RX ORDER — OXYCODONE HYDROCHLORIDE AND ACETAMINOPHEN 5; 325 MG/1; MG/1
1 TABLET ORAL EVERY 6 HOURS PRN
Qty: 10 TABLET | Refills: 0 | Status: SHIPPED | OUTPATIENT
Start: 2021-03-20 | End: 2021-03-23

## 2021-03-20 ASSESSMENT — ENCOUNTER SYMPTOMS
ABDOMINAL PAIN: 0
DIARRHEA: 0
BACK PAIN: 1
PHOTOPHOBIA: 0
RHINORRHEA: 0
WHEEZING: 0
VOMITING: 0
NAUSEA: 0
SORE THROAT: 0
EYE PAIN: 0
COLOR CHANGE: 0
COUGH: 0
CONSTIPATION: 0
APNEA: 0
SHORTNESS OF BREATH: 0
SINUS PRESSURE: 0
ABDOMINAL DISTENTION: 0

## 2021-03-20 NOTE — ED PROVIDER NOTES
gait problem and myalgias. Negative for neck stiffness. Skin: Negative for color change, pallor and rash. Allergic/Immunologic: Negative for food allergies and immunocompromised state. Neurological: Negative for dizziness, tremors, syncope, weakness, light-headedness and headaches. Psychiatric/Behavioral: Negative for agitation, confusion and hallucinations. All other systems reviewed and are negative. Except as noted above the remainder of the review of systems was reviewed and negative. PAST MEDICAL HISTORY     Past Medical History:   Diagnosis Date    Gastroesophageal reflux disease 6/10/2002    Hypothyroidism     Obesity, Class III, BMI 40-49.9 (morbid obesity) ([facility] [address] 1/4/2016    Tobacco abuse          SURGICAL HISTORY       Past Surgical History:   Procedure Laterality Date    APPENDECTOMY      CHOLECYSTECTOMY           CURRENT MEDICATIONS       Discharge Medication List as of 3/20/2021 12:09 AM      CONTINUE these medications which have NOT CHANGED    Details   metaxalone (SKELAXIN) 800 MG tablet TAKE 1/2 (ONE-HALF) OF A TABLET by mouth nightly as needed (leg cramps)Historical Med      Aluminum Acetate SOLN soak a clean, soft, white cloth in solution and apply loosely to affected area for 15 to 30 minutes two times daily for 10 days, Disp-1 Bottle, R-0Normal      sulfamethoxazole-trimethoprim (BACTRIM DS;SEPTRA DS) 800-160 MG per tablet Take 1 tablet by mouth 2 times daily for 10 days, Disp-20 tablet, R-0Normal      SYNTHROID 150 MCG tablet Take 1 tablet by mouth Daily, Disp-90 tablet,R-1,DAWNormal      aspirin 325 MG tablet Take 325 mg by mouth daily.                ALLERGIES     Benadryl [diphenhydramine hcl], Cortisone, Codeine, and Pcn [penicillins]    FAMILY HISTORY       Family History   Problem Relation Age of Onset    Kidney Disease Mother     High Blood Pressure Mother     Stroke Mother     Heart Disease Father           SOCIAL HISTORY       Social History No congestion or rhinorrhea. Mouth/Throat:      Mouth: Mucous membranes are moist.      Pharynx: Oropharynx is clear. No oropharyngeal exudate or posterior oropharyngeal erythema. Eyes:      General: No scleral icterus. Right eye: No discharge. Left eye: No discharge. Extraocular Movements: Extraocular movements intact. Conjunctiva/sclera: Conjunctivae normal.      Pupils: Pupils are equal, round, and reactive to light. Neck:      Musculoskeletal: Normal range of motion and neck supple. No neck rigidity or muscular tenderness. Thyroid: No thyromegaly. Vascular: No carotid bruit or JVD. Trachea: No tracheal deviation. Cardiovascular:      Rate and Rhythm: Normal rate and regular rhythm. Heart sounds: Normal heart sounds. No murmur. No friction rub. No gallop. Pulmonary:      Effort: Pulmonary effort is normal. No respiratory distress. Breath sounds: Normal breath sounds. No stridor. No wheezing, rhonchi or rales. Chest:      Chest wall: No tenderness. Abdominal:      General: Bowel sounds are normal. There is no distension. Palpations: Abdomen is soft. There is no mass. Tenderness: There is no abdominal tenderness. There is right CVA tenderness. There is no left CVA tenderness, guarding or rebound. Hernia: No hernia is present. Musculoskeletal: Normal range of motion. General: Signs of injury present. No swelling, tenderness or deformity. Right lower leg: No edema. Left lower leg: No edema. Comments: Paralumbar, and  parathoracic spasms and tenderness   Lymphadenopathy:      Cervical: No cervical adenopathy. Skin:     General: Skin is warm and dry. Capillary Refill: Capillary refill takes less than 2 seconds. Coloration: Skin is not jaundiced or pale. Findings: No bruising, erythema, lesion or rash. Neurological:      General: No focal deficit present. Mental Status: She is alert.  Mental status is at baseline. She is disoriented. Cranial Nerves: No cranial nerve deficit. Sensory: No sensory deficit. Motor: No weakness or abnormal muscle tone. Coordination: Coordination normal.      Gait: Gait abnormal.      Deep Tendon Reflexes: Reflexes are normal and symmetric. Reflexes normal.   Psychiatric:         Behavior: Behavior normal.         Thought Content: Thought content normal.         Judgment: Judgment normal.         DIAGNOSTIC RESULTS     EKG: All EKG's are interpreted by the Emergency Department Physician who either signs or Co-signs this chart in the absence of a cardiologist.        RADIOLOGY:   Non-plain film images such as CT, Ultrasound and MRI are read by the radiologist. [employer] radiographicimages are visualized and preliminarily interpreted by the emergency physician with the below findings:        Interpretation per the Radiologist below, if available at the time of this note:    No orders to display         ED BEDSIDE ULTRASOUND:   Performed by ED Physician - none    LABS:  Labs Reviewed - No data to display    All other labs were within normal range or not returned as of this dictation. EMERGENCY DEPARTMENT COURSE and DIFFERENTIALDIAGNOSIS/MDM:   Vitals:    Vitals:    03/19/21 2305 03/20/21 0018   BP: (!) 187/101 (!) 148/72   Pulse: 105 90   Resp: 20    Temp: 98 °F (36.7 °C)    SpO2: 97% 98%   Weight: 242 lb (109.8 kg)    Height: 5' 3\" (1.6 m)            MDM    CRITICAL CARE TIME   Total Critical Care time was  minutes, excluding separately reportable procedures. There was a high probability of clinically significant/life threatening deterioration in the patient's condition which required my urgentintervention. CONSULTS:  None    PROCEDURES:  Unless otherwise noted below, none     Procedures    FINAL IMPRESSION      1. Thoracic myofascial strain, initial encounter    2. Lumbosacral strain, initial encounter    3.  Anxiety state          DISPOSITION/PLAN DISPOSITION        PATIENT REFERRED TO:  Clark Griffin MD  [address] [facility] [address]  [phone]    In 2 days        DISCHARGE MEDICATIONS:  Discharge Medication List as of 3/20/2021 12:09 AM      START taking these medications    Details   oxyCODONE-acetaminophen (PERCOCET) 5-325 MG per tablet Take 1 tablet by mouth every 6 hours as needed for Pain for up to 3 days. WARNING:  May cause drowsiness. May impair ability to operate vehicles or machinery.   Do not use in combination with alcohol., Disp-10 tablet, R-0Print                (Please note that portions of this note were completed with a voice recognitionprogram.  Efforts were made to edit the dictations but occasionally words are mis-transcribed.)    Sumit Mathew MD (electronically signed)  Attending Emergency Physician          Sumit Mathew MD  03/20/21 [address], [place]  03/20/21 6953

## 2021-03-28 ASSESSMENT — ENCOUNTER SYMPTOMS
DIARRHEA: 0
VOMITING: 0
CHEST TIGHTNESS: 0
NAUSEA: 0

## 2021-04-08 ENCOUNTER — OFFICE VISIT (OUTPATIENT)
Dept: INTERNAL MEDICINE | Age: 81
End: 2021-04-08
Payer: MEDICARE

## 2021-04-08 VITALS
HEIGHT: 63 IN | TEMPERATURE: 98.2 F | WEIGHT: 245 LBS | HEART RATE: 84 BPM | BODY MASS INDEX: 43.41 KG/M2 | OXYGEN SATURATION: 98 % | SYSTOLIC BLOOD PRESSURE: 138 MMHG | DIASTOLIC BLOOD PRESSURE: 88 MMHG

## 2021-04-08 DIAGNOSIS — E03.9 ACQUIRED HYPOTHYROIDISM: ICD-10-CM

## 2021-04-08 DIAGNOSIS — R60.0 BILATERAL LEG EDEMA: ICD-10-CM

## 2021-04-08 DIAGNOSIS — L03.115 BILATERAL LOWER LEG CELLULITIS: ICD-10-CM

## 2021-04-08 DIAGNOSIS — E11.9 TYPE 2 DIABETES MELLITUS WITHOUT COMPLICATION, WITHOUT LONG-TERM CURRENT USE OF INSULIN (HCC): ICD-10-CM

## 2021-04-08 DIAGNOSIS — E11.9 TYPE 2 DIABETES MELLITUS WITHOUT COMPLICATION, WITHOUT LONG-TERM CURRENT USE OF INSULIN (HCC): Primary | ICD-10-CM

## 2021-04-08 DIAGNOSIS — L03.116 BILATERAL LOWER LEG CELLULITIS: ICD-10-CM

## 2021-04-08 LAB
PRO-BNP: 464 PG/ML
T4 FREE: 1.56 NG/DL (ref 0.84–1.68)
TSH REFLEX: 2.62 UIU/ML (ref 0.44–3.86)

## 2021-04-08 PROCEDURE — 99214 OFFICE O/P EST MOD 30 MIN: CPT | Performed by: FAMILY MEDICINE

## 2021-04-08 RX ORDER — POTASSIUM CHLORIDE 750 MG/1
10 TABLET, EXTENDED RELEASE ORAL DAILY
Qty: 90 TABLET | Refills: 1 | Status: SHIPPED | OUTPATIENT
Start: 2021-04-08 | End: 2021-07-05

## 2021-04-08 RX ORDER — FUROSEMIDE 20 MG/1
20 TABLET ORAL DAILY
Qty: 60 TABLET | Refills: 3 | Status: SHIPPED | OUTPATIENT
Start: 2021-04-08 | End: 2021-07-05

## 2021-04-08 RX ORDER — SULFAMETHOXAZOLE AND TRIMETHOPRIM 800; 160 MG/1; MG/1
1 TABLET ORAL 2 TIMES DAILY
Qty: 20 TABLET | Refills: 0 | Status: SHIPPED | OUTPATIENT
Start: 2021-04-08 | End: 2021-04-18

## 2021-04-08 ASSESSMENT — ENCOUNTER SYMPTOMS
CHOKING: 0
CHEST TIGHTNESS: 0
APNEA: 0

## 2021-04-08 NOTE — PROGRESS NOTES
Patient: Anthony Galvez    YOB: 1940    Date: 4/8/21       Patient Active Problem List    Diagnosis Date Noted    Type 2 diabetes mellitus without complication, without long-term current use of insulin ([facility] 06/08/2016     Priority: High    Hypothyroidism      Priority: High    Gastroesophageal reflux disease 06/10/2002     Priority: Medium    Tobacco abuse 01/04/2016     Priority: Low    Atopic rhinitis 02/26/2004     Priority: Low    Hearing loss 02/26/2004     Priority: Low    Family history of other specified malignant neoplasm 06/10/2002     Past Medical History:   Diagnosis Date    Gastroesophageal reflux disease 6/10/2002    Hypothyroidism     Obesity, Class III, BMI 40-49.9 (morbid obesity) ([facility] 1/4/2016    Tobacco abuse      Past Surgical History:   Procedure Laterality Date    APPENDECTOMY      CHOLECYSTECTOMY       Family History   Problem Relation Age of Onset    Kidney Disease Mother     High Blood Pressure Mother     Stroke Mother     Heart Disease Father      Social History     Socioeconomic History    Marital status:      Spouse name: Not on file    Number of children: Not on file    Years of education: Not on file    Highest education level: Not on file   Occupational History    Not on file   Social Needs    Financial resource strain: Not on file    Food insecurity     Worry: Not on file     Inability: Not on file    Transportation needs     Medical: Not on file     Non-medical: Not on file   Tobacco Use    Smoking status: Former Smoker     Packs/day: 5.00     Years: 30.00     Pack years: 150.00     Types: Cigarettes     Quit date: 10/1/2017     Years since quitting: 3.5    Smokeless tobacco: Never Used    Tobacco comment: Has been trying to quit since January   Substance and Sexual Activity    Alcohol use: No     Alcohol/week: 0.0 standard drinks    Drug use: No    Sexual activity: Not Currently   Lifestyle    Physical activity     Days per week: Not on file     Minutes per session: Not on file    Stress: Not on file   Relationships    Social connections     Talks on phone: Not on file     Gets together: Not on file     Attends Druze service: Not on file     Active member of club or organization: Not on file     Attends meetings of clubs or organizations: Not on file     Relationship status: Not on file    Intimate partner violence     Fear of current or ex partner: Not on file     Emotionally abused: Not on file     Physically abused: Not on file     Forced sexual activity: Not on file   Other Topics Concern    Not on file   Social History Narrative    Not on file     Current Outpatient Medications on File Prior to Visit   Medication Sig Dispense Refill    metaxalone (SKELAXIN) 800 MG tablet TAKE 1/2 (ONE-HALF) OF A TABLET by mouth nightly as needed (leg cramps)      SYNTHROID 150 MCG tablet Take 1 tablet by mouth Daily 90 tablet 1    aspirin 325 MG tablet Take 325 mg by mouth daily. No current facility-administered medications on file prior to visit. Allergies   Allergen Reactions    Benadryl [Diphenhydramine Hcl]     Cortisone Swelling    Codeine Rash    Pcn [Penicillins] Rash       Chief Complaint   Patient presents with    Diabetes     pt declined AWV       HPI    Treatment Adherence:   Medication compliance:  refused medication  Diet compliance:  compliant most of the time  Weight trend: stable  Current exercise: no regular exercise    Diabetes Mellitus Type 2: Current symptoms/problems include none. Home blood sugar records: patient does not test  Any episodes of hypoglycemia? no  Eye exam current (within one year): unknown  Tobacco history: She  reports that she quit smoking about 3 years ago. Her smoking use included cigarettes. She has a 150.00 pack-year smoking history. She has never used smokeless tobacco.   Daily Aspirin?  Yes    Lab Results   Component Value Date    LABA1C 6.1 (H) 01/15/2021    LABA1C 6.4 (H) 07/25/2019    LABA1C 6.3 (H) 06/14/2018     Lab Results   Component Value Date    CREATININE 0.87 01/15/2021     Lab Results   Component Value Date    ALT 12 01/15/2021    AST 13 01/15/2021     Lab Results   Component Value Date    CHOL 180 01/15/2021    TRIG 66 01/15/2021    HDL 77 (H) 01/15/2021    [address] 90 01/15/2021        Still gets ongoing mid and lower back that comes & goes  Currently motrin is helping her pain when it is really bad    FINDINGS:   The left costophrenic angle appears to be blunted. No pneumothorax is seen. Degenerative changes are seen in the visualized thoracic spine. No displaced fracture is seen in the left ribs.         Impression   A small pleural effusion is seen on the left. No definitive fracture identified.        Hypothyroidism  Patient complains of hypothyroidism. Current symptoms: none. Patient denies change in energy level, diarrhea, heat / cold intolerance, nervousness, palpitations and weight changes. Onset of symptoms was several years ago. Symptoms have been well-controlled. Symptoms:LE edema and recurrent cellulitis  Location:B/L LE  Quality:no pain, itchy  Severity:moderate  Duration:ongoing for months  Timing:constant  Context:has been seen at walk in clinic and got Abx, then a few refills for Abx from me over the phone  Patient says started after she hit her leg on bed post and started as scratch, then transferred to other leg   Alleviating/aggravting factors:none  Associated signs & symptoms:  No SOB  No CP      Review of Systems   Constitutional: Negative for activity change, appetite change and chills. HENT: Positive for hearing loss. Negative for congestion and dental problem. Respiratory: Negative for apnea, choking and chest tightness. Musculoskeletal: Positive for gait problem. Negative for myalgias and neck pain. Physical Exam  There were no vitals filed for this visit. There is no height or weight on file to calculate BMI.    Wt Readings from Last 3 Encounters:   03/19/21 242 lb (109.8 kg)   03/11/21 242 lb (109.8 kg)   09/15/20 242 lb (109.8 kg)     Physical Exam  Constitutional: appears well-developed and well-nourished. Obese No distress. HENT:   Head: Normocephalic and atraumatic. Right Ear: Tympanic membrane, external ear and ear canal normal.   Left Ear: Tympanic membrane, external ear and ear canal normal.   Nose: Nose normal.   Mouth/Throat: Oropharynx is clear and moist. No oropharyngeal exudate. Eyes: Conjunctivae and EOM are normal. Right eye exhibits no discharge. No scleral icterus. Neck: Normal range of motion. Neck supple. Cardiovascular: Normal rate, regular rhythm and normal heart sounds. Pulmonary/Chest: Effort normal and breath sounds normal. No respiratory distress. no wheezes. no rales. Lymphadenopathy:      no cervical adenopathy. Skin:  not diaphoretic. Nursing note and vitals reviewed. LE: 3+ B/L LE pitting edema with draining and open areas of skin    Assessment:  Tereza Vogel was seen today for diabetes and leg swelling. Diagnoses and all orders for this visit:    Type 2 diabetes mellitus without complication, without long-term current use of insulin (HCC)  -     Brain Natriuretic Peptide; Future  Stable  Patient refused medication    Acquired hypothyroidism  -     TSH with Reflex; Future  -     T4, Free; Future  -     Brain Natriuretic Peptide; Future  Recent dose adjustment  Recheck labs    Bilateral leg edema  -     Brain Natriuretic Peptide; Future  -     furosemide (LASIX) 20 MG tablet; Take 1 tablet by mouth daily  -     potassium chloride (KLOR-CON M) 10 MEQ extended release tablet; Take 1 tablet by mouth daily    Bilateral lower leg cellulitis  -     sulfamethoxazole-trimethoprim (BACTRIM DS;SEPTRA DS) 800-160 MG per tablet; Take 1 tablet by mouth 2 times daily for 10 days    Patient is still convinced that her like lesions are from the initial injury that occurred.   I tried to explain to the patient that she has bilateral lower extremity venous stasis dermatitis and until the edema is not improved the skin will not heal and it will continue to drain. I discussed the possible etiologies to her lower extremity edema including but not limited to possible congestive heart failure. Patient is not ready to accept that this might be related to her heart at this time. I discussed cardiac work-up including but not limited to an echocardiogram and possibly need for sleep study. Patient is not ready for this at this time. She is agreeable however to try water pill. Medication risks benefits and side effects discussed. Orders Placed This Encounter   Procedures    TSH with Reflex     Standing Status:   Future     Standing Expiration Date:   4/8/2022    T4, Free     Standing Status:   Future     Standing Expiration Date:   4/8/2022    Brain Natriuretic Peptide     Standing Status:   Future     Standing Expiration Date:   4/8/2022      I personally reviewed all recent labs and imaging pertaining to conditions mentioned above in assessment/plan in [facility] and care everywhere, discussed results with patient in office      Return in about 2 weeks (around 4/22/2021).

## 2021-05-25 ENCOUNTER — HOSPITAL ENCOUNTER (OUTPATIENT)
Dept: WOUND CARE | Age: 81
Discharge: HOME | End: 2021-05-25
Payer: MEDICARE

## 2021-05-25 VITALS
RESPIRATION RATE: 20 BRPM | WEIGHT: 237 LBS | DIASTOLIC BLOOD PRESSURE: 72 MMHG | TEMPERATURE: 96.8 F | HEART RATE: 96 BPM | HEIGHT: 63 IN | SYSTOLIC BLOOD PRESSURE: 180 MMHG | BODY MASS INDEX: 41.99 KG/M2

## 2021-05-25 DIAGNOSIS — I87.2 VENOUS STASIS ULCER OF LEFT CALF WITH FAT LAYER EXPOSED WITHOUT VARICOSE VEINS (HCC): ICD-10-CM

## 2021-05-25 DIAGNOSIS — L97.223 VENOUS STASIS ULCER OF LEFT CALF WITH NECROSIS OF MUSCLE WITHOUT VARICOSE VEINS (HCC): Primary | ICD-10-CM

## 2021-05-25 DIAGNOSIS — L97.222 VENOUS STASIS ULCER OF LEFT CALF WITH FAT LAYER EXPOSED WITHOUT VARICOSE VEINS (HCC): ICD-10-CM

## 2021-05-25 DIAGNOSIS — I83.012 VENOUS STASIS ULCER OF RIGHT CALF WITH FAT LAYER EXPOSED WITH VARICOSE VEINS (HCC): ICD-10-CM

## 2021-05-25 DIAGNOSIS — L97.212 VENOUS STASIS ULCER OF RIGHT CALF WITH FAT LAYER EXPOSED WITH VARICOSE VEINS (HCC): ICD-10-CM

## 2021-05-25 DIAGNOSIS — L97.112 VENOUS STASIS ULCER OF RIGHT THIGH WITH FAT LAYER EXPOSED WITHOUT VARICOSE VEINS (HCC): ICD-10-CM

## 2021-05-25 DIAGNOSIS — R60.0 LOCALIZED EDEMA: Chronic | ICD-10-CM

## 2021-05-25 DIAGNOSIS — I87.2 VENOUS STASIS ULCER OF LEFT CALF WITH NECROSIS OF MUSCLE WITHOUT VARICOSE VEINS (HCC): Primary | ICD-10-CM

## 2021-05-25 DIAGNOSIS — L97.212 VENOUS STASIS ULCER OF RIGHT CALF WITH FAT LAYER EXPOSED, UNSPECIFIED WHETHER VARICOSE VEINS PRESENT (HCC): ICD-10-CM

## 2021-05-25 DIAGNOSIS — I83.012 VENOUS STASIS ULCER OF RIGHT CALF WITH FAT LAYER EXPOSED, UNSPECIFIED WHETHER VARICOSE VEINS PRESENT (HCC): ICD-10-CM

## 2021-05-25 DIAGNOSIS — I87.2 VENOUS STASIS ULCER OF RIGHT THIGH WITH FAT LAYER EXPOSED WITHOUT VARICOSE VEINS (HCC): ICD-10-CM

## 2021-05-25 PROBLEM — L97.229 VENOUS STASIS ULCER OF LEFT CALF WITHOUT VARICOSE VEINS (HCC): Chronic | Status: ACTIVE | Noted: 2021-05-25

## 2021-05-25 PROBLEM — R60.9 EDEMA: Chronic | Status: ACTIVE | Noted: 2021-05-25

## 2021-05-25 PROCEDURE — 87205 SMEAR GRAM STAIN: CPT

## 2021-05-25 PROCEDURE — 87070 CULTURE OTHR SPECIMN AEROBIC: CPT

## 2021-05-25 PROCEDURE — 87186 SC STD MICRODIL/AGAR DIL: CPT

## 2021-05-25 PROCEDURE — 87077 CULTURE AEROBIC IDENTIFY: CPT

## 2021-05-25 PROCEDURE — 87075 CULTR BACTERIA EXCEPT BLOOD: CPT

## 2021-05-25 PROCEDURE — 99213 OFFICE O/P EST LOW 20 MIN: CPT

## 2021-05-25 PROCEDURE — 6370000000 HC RX 637 (ALT 250 FOR IP): Performed by: PODIATRIST

## 2021-05-25 PROCEDURE — 87185 SC STD ENZYME DETCJ PER NZM: CPT

## 2021-05-25 RX ORDER — GENTAMICIN SULFATE 1 MG/G
OINTMENT TOPICAL ONCE
Status: COMPLETED | OUTPATIENT
Start: 2021-05-25 | End: 2021-05-25

## 2021-05-25 RX ORDER — LIDOCAINE HYDROCHLORIDE 20 MG/ML
JELLY TOPICAL ONCE
Status: CANCELLED | OUTPATIENT
Start: 2021-05-25 | End: 2021-05-25

## 2021-05-25 RX ORDER — GINSENG 100 MG
CAPSULE ORAL ONCE
Status: CANCELLED | OUTPATIENT
Start: 2021-05-25 | End: 2021-05-25

## 2021-05-25 RX ORDER — BACITRACIN ZINC AND POLYMYXIN B SULFATE 500; 1000 [USP'U]/G; [USP'U]/G
OINTMENT TOPICAL ONCE
Status: CANCELLED | OUTPATIENT
Start: 2021-05-25 | End: 2021-05-25

## 2021-05-25 RX ORDER — LIDOCAINE HYDROCHLORIDE 40 MG/ML
SOLUTION TOPICAL ONCE
Status: CANCELLED | OUTPATIENT
Start: 2021-05-25 | End: 2021-05-25

## 2021-05-25 RX ORDER — GENTAMICIN SULFATE 1 MG/G
OINTMENT TOPICAL ONCE
Status: CANCELLED | OUTPATIENT
Start: 2021-05-25 | End: 2021-05-25

## 2021-05-25 RX ORDER — BETAMETHASONE DIPROPIONATE 0.05 %
OINTMENT (GRAM) TOPICAL ONCE
Status: CANCELLED | OUTPATIENT
Start: 2021-05-25 | End: 2021-05-25

## 2021-05-25 RX ORDER — LIDOCAINE 40 MG/G
CREAM TOPICAL ONCE
Status: CANCELLED | OUTPATIENT
Start: 2021-05-25 | End: 2021-05-25

## 2021-05-25 RX ORDER — LIDOCAINE 50 MG/G
OINTMENT TOPICAL ONCE
Status: CANCELLED | OUTPATIENT
Start: 2021-05-25 | End: 2021-05-25

## 2021-05-25 RX ORDER — BACITRACIN, NEOMYCIN, POLYMYXIN B 400; 3.5; 5 [USP'U]/G; MG/G; [USP'U]/G
OINTMENT TOPICAL ONCE
Status: CANCELLED | OUTPATIENT
Start: 2021-05-25 | End: 2021-05-25

## 2021-05-25 RX ORDER — CLOBETASOL PROPIONATE 0.5 MG/G
OINTMENT TOPICAL ONCE
Status: CANCELLED | OUTPATIENT
Start: 2021-05-25 | End: 2021-05-25

## 2021-05-25 RX ADMIN — GENTAMICIN SULFATE: 1 OINTMENT TOPICAL at 15:43

## 2021-05-25 ASSESSMENT — PAIN SCALES - GENERAL: PAINLEVEL_OUTOF10: 8

## 2021-05-25 ASSESSMENT — PAIN DESCRIPTION - FREQUENCY: FREQUENCY: INTERMITTENT

## 2021-05-25 ASSESSMENT — PAIN DESCRIPTION - ORIENTATION: ORIENTATION: RIGHT;LEFT

## 2021-05-25 ASSESSMENT — PAIN DESCRIPTION - LOCATION: LOCATION: LEG

## 2021-05-25 ASSESSMENT — PAIN DESCRIPTION - DESCRIPTORS: DESCRIPTORS: BURNING

## 2021-05-25 NOTE — CODE DOCUMENTATION
[address] [facility] Physician Billing Sheet. Isaiah Newman  AGE: 79 y.o.    GENDER: female  : 1940  TODAY'S DATE:  2021    ICD-10 CODES  Active Hospital Problems    Diagnosis Date Noted    Venous stasis ulcer of right calf with fat layer exposed ([facility] [I83.012, L97.212] 2021    Venous stasis ulcer of left calf without varicose veins ([facility] [I87.2, L97.229] 2021    Edema [R60.9] 2021       PHYSICIAN PROCEDURES    CPT CODE  70107      Electronically signed by Mauricio Becker DPM on 2021 at 3:28 PM

## 2021-05-25 NOTE — PROGRESS NOTES
[facility]   Progress Note and Procedure Note      [facility] RECORD NUMBER:  [id number]  AGE: 79 y.o. GENDER: female  : 1940  EPISODE DATE:  2021    Subjective:     Chief Complaint   Patient presents with    Wound Check     bilateral legs         HISTORY of PRESENT ILLNESS HPI     Deandra Woodruff is a 79 y.o. female who presents today for wound/ulcer evaluation. History of Wound Context: Patient presents for 3 month history of non-healing venous stasis ulcerations of both leg. She is very adament that she is in the wrong place and should be at a dermatologist office. patient is very anxious.    Wound/Ulcer Pain Timing/Severity: intermittent  Quality of pain: aching, burning  Severity:  3 / 10   Modifying Factors: Pain worsens with walking  Associated Signs/Symptoms: edema, erythema, drainage and pain    Ulcer Identification:  Ulcer Type: venous and diabetic  Contributing Factors: edema, venous stasis, diabetes, poor glucose control and decreased mobility    Wound: N/A        PAST MEDICAL HISTORY        Diagnosis Date    Gastroesophageal reflux disease 6/10/2002    Hypothyroidism     Obesity, Class III, BMI 40-49.9 (morbid obesity) ([facility] 2016    Tobacco abuse        PAST SURGICAL HISTORY    Past Surgical History:   Procedure Laterality Date    APPENDECTOMY      CHOLECYSTECTOMY         FAMILY HISTORY    Family History   Problem Relation Age of Onset    Kidney Disease Mother     High Blood Pressure Mother     Stroke Mother     Heart Disease Father        SOCIAL HISTORY    Social History     Tobacco Use    Smoking status: Former Smoker     Packs/day: 5.00     Years: 30.00     Pack years: 150.00     Types: Cigarettes     Quit date: 10/1/2017     Years since quitting: 3.6    Smokeless tobacco: Never Used    Tobacco comment: Has been trying to quit since January   Vaping Use    Vaping Use: Never used   Substance Use Topics    Alcohol use: No     Alcohol/week: 0.0 agitation. Patient is calm, cooperative, and communicative. Appropriate interactions and affect. Assessment:      Active Hospital Problems    Diagnosis Date Noted    Venous stasis ulcer of right calf with fat layer exposed ([address] [I83.012, L97.212] 05/25/2021    Venous stasis ulcer of left calf without varicose veins ([address] [I87.2, L97.229] 05/25/2021    Edema [R60.9] 05/25/2021        Procedure Note  Indications:  Based on my examination of this patient's wound(s)/ulcer(s) today, debridement is not required to promote healing and evaluate the wound base.     Wound 05/25/21 Pretibial Right;Lateral;Posterior #1 (Active)   Wound Image   05/25/21 1449   Wound Etiology Venous 05/25/21 1449   Wound Cleansed Cleansed with saline 05/25/21 1449   Wound Length (cm) 10 cm 05/25/21 1449   Wound Width (cm) 22 cm 05/25/21 1449   Wound Depth (cm) 0.1 cm 05/25/21 1449   Wound Surface Area (cm^2) 220 cm^2 05/25/21 1449   Wound Volume (cm^3) 22 cm^3 05/25/21 1449   Drainage Amount Large 05/25/21 1449   Drainage Description Yellow 05/25/21 1449   Odor Malodorous/putrid 05/25/21 1449   Komal-wound Assessment Dry/flaky 05/25/21 1449   Wound Thickness Description not for Pressure Injury Full thickness 05/25/21 1449   Number of days: 0       Wound 05/25/21 Leg Left;Lateral;Posterior #2 (Active)   Wound Image   05/25/21 1449   Wound Etiology Venous 05/25/21 1449   Wound Cleansed Cleansed with saline 05/25/21 1449   Wound Length (cm) 9.5 cm 05/25/21 1449   Wound Width (cm) 20 cm 05/25/21 1449   Wound Depth (cm) 0.1 cm 05/25/21 1449   Wound Surface Area (cm^2) 190 cm^2 05/25/21 1449   Wound Volume (cm^3) 19 cm^3 05/25/21 1449   Wound Assessment Pink/red;Slough 05/25/21 1449   Drainage Amount Large 05/25/21 1449   Drainage Description Yellow 05/25/21 1449   Odor Malodorous/putrid 05/25/21 1449   Komal-wound Assessment Dry/flaky 05/25/21 1449   Wound Thickness Description not for Pressure Injury Full thickness 05/25/21 1449   Number of days: 0                  Plan:   Patient examined  Culture taken  [facility] ordered for dressing changes  Edema education explained. Patient encouraged to take her water pills as prescribed. Follow up in 2 weeks. Treatment Note please see attached Discharge Instructions    Written patient dismissal instructions given to patient and signed by patient or POA. Discharge [address] and [facility] Medicine   Physician Orders and Discharge Instructions  [address]  Telephone: [phone]      NAME:  Jose M Avelar  YOB: 1940  MEDICAL RECORD NUMBER:  [id number]    Home Care/Facility: [address] [place] [address] Referral - Please obtain supplies      Wound Location:Left Lateral/Posterior Leg and Right Lateral/Posterior Leg    Dressing orders:. 1. Cleanse wound(s) with normal saline. 2. Apply dry SILVERCEL OR CALCIUM ALGINATE WITH Ag or eqivalent to wound bed. Then cover with Opti-Loc or eqivalent (secondary absorbent dressing)  3. Cover with 4x4's and wrap with gauze (elvis or kerlix)  4. Change  Every other day or Monday, Wednesday, and Friday                                                                                                                        Today in the wound center apply thin layer of Gentamicin     Compression:. Apply5-10 mmHg Spandagrip to right and left lower leg, apply first thing in the morning, remove at bedtime, elevate legs as much as possible during the day. Offloading Device:    Other Instructions: Culture done today in Clinic. Please Take your water pills as prescribed. Keep all dressings clean, dry and intact. Keep pressure off the wound(s) at all times. Follow up visit  2  Weeks June 11, 2021 @ 10:45 am    Please give 24 hour notice if unable to keep appointment.  [phone]    If you experience any of the following, please call the Wound Care Service at  [phone] or go to the nearest emergency room. *Increase in pain *Temperature over 101 *Increase in drainage from your wound or a foul odor  *Uncontrolled swelling *Need for compression bandage changes due to slippage, breakthrough drainage       PLEASE NOTE: IF YOU ARE UNABLE TO OBTAIN WOUND SUPPLIES, CONTINUE TO USE THE SUPPLIES YOU HAVE AVAILABLE UNTIL YOU ARE ABLE TO REACH US.  IT IS MOST IMPORTANT TO KEEP THE WOUND COVERED AT ALL TIMES  Electronically signed by Juan Carlos Anthony DPM on 5/25/2021 at 3:15 PM                      Electronically signed by Juan Carlos Anthony DPM on 5/25/2021 at 3:20 PM

## 2021-05-25 NOTE — PLAN OF CARE
Problem: Pain:  Goal: Pain level will decrease  5/25/2021 1455 by Kevin Oliveira RN  Outcome: Ongoing  5/25/2021 1454 by Kevin Oliveira RN  Outcome: Ongoing  Goal: Control of acute pain  Outcome: Ongoing  Goal: Control of chronic pain  5/25/2021 1455 by Kevin Oliveira RN  Outcome: Ongoing  5/25/2021 1454 by Kevin Oliveira RN  Outcome: Ongoing     Problem: Wound:  Goal: Will show signs of wound healing; wound closure and no evidence of infection  5/25/2021 1455 by Kevin Oliveira RN  Outcome: Ongoing  5/25/2021 1454 by Kevin Oliveira RN  Outcome: Ongoing     Problem: Venous:  Goal: Signs of wound healing will improve  Outcome: Ongoing

## 2021-05-27 ENCOUNTER — TELEPHONE (OUTPATIENT)
Dept: WOUND CARE | Age: 81
End: 2021-05-27

## 2021-05-27 NOTE — TELEPHONE ENCOUNTER
Pt called to states she wants to cancel home health care that was ordered. Pt states \"I have been doing the dressings already myself, I dont need anyone to do them for me. \"

## 2021-05-28 ENCOUNTER — TELEPHONE (OUTPATIENT)
Dept: WOUND CARE | Age: 81
End: 2021-05-28

## 2021-05-28 LAB
ANAEROBIC CULTURE: ABNORMAL
GRAM STAIN RESULT: ABNORMAL
ORGANISM: ABNORMAL
WOUND/ABSCESS: ABNORMAL

## 2021-05-28 RX ORDER — CIPROFLOXACIN 500 MG/1
500 TABLET, FILM COATED ORAL 2 TIMES DAILY
Qty: 20 TABLET | Refills: 0 | Status: SHIPPED | OUTPATIENT
Start: 2021-05-28 | End: 2021-07-05 | Stop reason: SDUPTHER

## 2021-05-28 NOTE — TELEPHONE ENCOUNTER
Called patient to inform her that I cancelled [facility] per her request.  She said she could do her dressings on her own. Also that I ordered her dressings per willian and they would be shipped to her home. She also wanted Dr Pedro Serra to remove that she was diabetic per her charting. Per Dr Pedro Serra, her family doctor put that on the problem list and she she should contact them. She also said she saw her culture in My chart and would only take Bactrim because she was allergic to everything else. That is all that she ever takes. I informed Dr Pedro Serra.

## 2021-05-28 NOTE — PLAN OF CARE
[address]:     [employer] [id number] [facility], [address] [place] [address] p: [phone] f: [phone]     Ordering Center:     [address] [place] [address]  [phone]  [facility] [address] [facility] NUMBER [phone]    Patient Information:      Hiro Sanjeev  [address] [facility]  [place] [address]   [phone]   : 1940  AGE: 79 y.o.      GENDER: female   TODAYS DATE:  2021    Insurance:      PRIMARY INSURANCE:  Plan: Dory Torres ESSENTIAL/PLUS  Coverage: [employer] MEDICARE  Effective Date: 2015  [id number] - (Medicare Managed)    SECONDARY INSURANCE:  Plan:   Coverage:   Effective Date:   [unfilled]    [unfilled]   [unfilled]     Patient Wound Information:      Problem List Items Addressed This Visit     Venous stasis ulcer of right calf with fat layer exposed ([address] (Chronic)    Relevant Orders    External Referral to Home Health    Venous stasis ulcer of left calf without varicose veins (HCC) - Primary (Chronic)    Relevant Orders    External Referral to Home Health    Edema (Chronic)    Venous stasis ulcer of right thigh with fat layer exposed without varicose veins ([address]          WOUNDS REQUIRING DRESSING SUPPLIES:     Wound 21 Pretibial Right;Lateral;Posterior #1 (Active)   Wound Image   21 1449   Wound Etiology Venous 21 1449   Wound Cleansed Cleansed with saline 21 1449   Dressing/Treatment Antibacterial ointment;Alginate with Ag;Roll gauze 21 1544   Wound Length (cm) 10 cm 21 1449   Wound Width (cm) 22 cm 21 1449   Wound Depth (cm) 0.1 cm 21 1449   Wound Surface Area (cm^2) 220 cm^2 21 1449   Wound Volume (cm^3) 22 cm^3 21 1449   Drainage Amount Large 21 1449   Drainage Description Yellow 21 1449   Odor Malodorous/putrid 21 1449   Komal-wound Assessment Dry/flaky 21 1449   Wound Thickness Description not for Pressure Injury Full thickness 05/25/21 1449   Number of days: 3       Wound 05/25/21 Leg Left;Lateral;Posterior #2 (Active)   Wound Image   05/25/21 1449   Wound Etiology Venous 05/25/21 1449   Wound Cleansed Cleansed with saline 05/25/21 1449   Dressing/Treatment Alginate with Ag; Antibacterial ointment; Roll gauze 05/25/21 1544   Wound Length (cm) 9.5 cm 05/25/21 1449   Wound Width (cm) 20 cm 05/25/21 1449   Wound Depth (cm) 0.1 cm 05/25/21 1449   Wound Surface Area (cm^2) 190 cm^2 05/25/21 1449   Wound Volume (cm^3) 19 cm^3 05/25/21 1449   Wound Assessment Pink/red;Slough 05/25/21 1449   Drainage Amount Large 05/25/21 1449   Drainage Description Yellow 05/25/21 1449   Odor Malodorous/putrid 05/25/21 1449   Komal-wound Assessment Dry/flaky 05/25/21 1449   Wound Thickness Description not for Pressure Injury Full thickness 05/25/21 1449   Number of days: 3          Supplies Requested :      WOUND #: 1 and 2   PRIMARY DRESSING:  Alginate with silver pad   Cover and Secure with: 4X4 gauze pad  Conforming roll gauze  Other optilock     FREQUENCY OF DRESSING CHANGES:  Three times per week         ADDITIONAL ITEMS:  [] Gloves Small  [] Gloves Medium [] Gloves Large [] Gloves XLarge  [] Tape 1\" [] Tape 2\" [] Tape 3\"  [x] Medipore Tape  [x] Saline  [] Skin Prep   [] Adhesive Remover   [] Cotton Tip Applicators   [] Other:    Patient Wound(s) Debrided: [x] Yes   [] No    Debribement Type: epidermis    Debridement Date: 5/25/2021    Patient currently being seen by Home Health: [] Yes   [x] No    Duration for needed supplies:  []15  [x]30  []60  []90 Days    Provider Information:      [facility] [id number]

## 2021-06-10 ENCOUNTER — TELEPHONE (OUTPATIENT)
Dept: WOUND CARE | Age: 81
End: 2021-06-10

## 2021-06-11 ENCOUNTER — TELEPHONE (OUTPATIENT)
Dept: WOUND CARE | Age: 81
End: 2021-06-11

## 2021-06-17 DIAGNOSIS — E03.9 ACQUIRED HYPOTHYROIDISM: ICD-10-CM

## 2021-06-17 RX ORDER — LEVOTHYROXINE SODIUM 150 MCG
150 TABLET ORAL DAILY
Qty: 90 TABLET | Refills: 0 | Status: SHIPPED | OUTPATIENT
Start: 2021-06-17

## 2021-06-17 NOTE — TELEPHONE ENCOUNTER
Requesting medication refill. Please approve or deny this request.    Rx requested:  Requested Prescriptions     Pending Prescriptions Disp Refills    SYNTHROID 150 MCG tablet 90 tablet 1     Sig: Take 1 tablet by mouth Daily       Last Office Visit, reason seen and by who:   4/8/2021 DM and labs Dr. Iris Mcgee: Crystal Calles   Return in about 2 weeks (around 4/22/2021). PATIENT CONTACTED FOR A FOLLOW UP APPT:   YES OR NO    no    Next Visit Date:  No future appointments.

## 2021-07-02 ENCOUNTER — TELEPHONE (OUTPATIENT)
Dept: INTERNAL MEDICINE | Age: 81
End: 2021-07-02

## 2021-07-02 NOTE — TELEPHONE ENCOUNTER
Pt called. She stated she was put on CIPRO 2x a day for the infection in her leg. She wanted discharged from the wound center. She stated the infection isn't improving much. She wanted a second opinion/new dermatologist.She was also upset to find out she had poor glucose control that is diabetic related. I offered her a visit here, the ER, or walk in. I could NOT get in a word with her. She said that's not gonna help me I cannot leave the house goodbye and hung up.

## 2021-07-05 ENCOUNTER — OFFICE VISIT (OUTPATIENT)
Dept: FAMILY MEDICINE CLINIC | Age: 81
End: 2021-07-05
Payer: MEDICARE

## 2021-07-05 ENCOUNTER — HOSPITAL ENCOUNTER (OUTPATIENT)
Age: 81
Setting detail: SPECIMEN
Discharge: HOME | End: 2021-07-05
Payer: MEDICARE

## 2021-07-05 VITALS
HEART RATE: 97 BPM | OXYGEN SATURATION: 98 % | DIASTOLIC BLOOD PRESSURE: 70 MMHG | HEIGHT: 63 IN | SYSTOLIC BLOOD PRESSURE: 118 MMHG | TEMPERATURE: 98.5 F | BODY MASS INDEX: 41.98 KG/M2

## 2021-07-05 DIAGNOSIS — I83.029 VENOUS STASIS ULCERS OF BOTH LOWER EXTREMITIES (HCC): Primary | ICD-10-CM

## 2021-07-05 DIAGNOSIS — L97.919 VENOUS STASIS ULCERS OF BOTH LOWER EXTREMITIES (HCC): ICD-10-CM

## 2021-07-05 DIAGNOSIS — I83.019 VENOUS STASIS ULCERS OF BOTH LOWER EXTREMITIES (HCC): ICD-10-CM

## 2021-07-05 DIAGNOSIS — L97.919 VENOUS STASIS ULCERS OF BOTH LOWER EXTREMITIES (HCC): Primary | ICD-10-CM

## 2021-07-05 DIAGNOSIS — R60.0 BILATERAL LEG EDEMA: ICD-10-CM

## 2021-07-05 DIAGNOSIS — I83.019 VENOUS STASIS ULCERS OF BOTH LOWER EXTREMITIES (HCC): Primary | ICD-10-CM

## 2021-07-05 DIAGNOSIS — L97.929 VENOUS STASIS ULCERS OF BOTH LOWER EXTREMITIES (HCC): ICD-10-CM

## 2021-07-05 DIAGNOSIS — I83.029 VENOUS STASIS ULCERS OF BOTH LOWER EXTREMITIES (HCC): ICD-10-CM

## 2021-07-05 DIAGNOSIS — L97.929 VENOUS STASIS ULCERS OF BOTH LOWER EXTREMITIES (HCC): Primary | ICD-10-CM

## 2021-07-05 PROCEDURE — 99214 OFFICE O/P EST MOD 30 MIN: CPT | Performed by: NURSE PRACTITIONER

## 2021-07-05 PROCEDURE — 87077 CULTURE AEROBIC IDENTIFY: CPT

## 2021-07-05 PROCEDURE — 87205 SMEAR GRAM STAIN: CPT

## 2021-07-05 PROCEDURE — 87186 SC STD MICRODIL/AGAR DIL: CPT

## 2021-07-05 PROCEDURE — 87070 CULTURE OTHR SPECIMN AEROBIC: CPT

## 2021-07-05 RX ORDER — CIPROFLOXACIN 500 MG/1
500 TABLET, FILM COATED ORAL 2 TIMES DAILY
Qty: 14 TABLET | Refills: 0 | Status: SHIPPED | OUTPATIENT
Start: 2021-07-05 | End: 2021-07-12

## 2021-07-05 RX ORDER — FUROSEMIDE 20 MG/1
20 TABLET ORAL DAILY
Qty: 60 TABLET | Refills: 3 | Status: ON HOLD
Start: 2021-07-05 | End: 2021-07-27

## 2021-07-05 ASSESSMENT — ENCOUNTER SYMPTOMS
ABDOMINAL PAIN: 0
NAUSEA: 0
DIARRHEA: 0
SHORTNESS OF BREATH: 0
VOMITING: 0
COUGH: 0

## 2021-07-05 NOTE — PATIENT INSTRUCTIONS
We'll call with wound culture results  Keep your legs elevated whenever possible  Please continue to follow-up with the wound care clinic and see your PCP    Patient Education   Venous Skin Ulcer: Care Instructions  Your Care Instructions  A venous skin ulcer is a shallow wound that develops when the leg veins do not move blood back to the heart normally. Your veins have one-way valves that keep blood flowing toward the heart. When the valves are damaged, the blood can back up and pool in the vein. The blood may leak out of the vein into tissue around the vein. The tissue can break down and form an ulcer. The first sign of a venous skin ulcer is skin that turns dark red or purple over the area where the blood is leaking out of the vein. The skin also may become thick, dry, and itchy. Without treatment, an ulcer may form. The ulcer may be painful. Your leg also may swell and ache. If the ulcer becomes infected, the infection may cause an odor, and pus may drain from the ulcer. The area around the ulcer also may be more tender and red. Follow-up care is a key part of your treatment and safety. Be sure to make and go to all appointments, and call your doctor if you are having problems. It's also a good idea to know your test results and keep a list of the medicines you take. How can you care for yourself at home? · Follow your doctor's instructions on how to clean the ulcer and change the bandage. · If your doctor prescribed antibiotics, take them as directed. Do not stop taking them just because you feel better. You need to take the full course of antibiotics. · Lift your legs above the level of your heart as often as possible. For example, lie down and then prop up your legs with pillows. · Wear compression stockings or bandages. They help the blood circulate in your legs. And they help prevent blood from pooling in your legs. But there are different types of stockings, and they need to fit right.  So your doctor will recommend what you need. · After your ulcer has healed, continue to wear compression stockings. Take them off only when you bathe and sleep. Compression helps your blood circulate and helps prevent other ulcers from forming. · Walk daily. Walking helps your blood circulation. When should you call for help? Call your doctor now or seek immediate medical care if:    · You have symptoms of infection, such as:  ? Increased pain, swelling, warmth, or redness. ? Red streaks leading from the ulcer. ? Pus draining from the ulcer. ? A fever. Watch closely for changes in your health, and be sure to contact your doctor if:    · Your ulcer is not healing.     · You have new ulcers.     · The ulcer starts to bleed, and blood soaks through the bandage. Oozing small amounts of a mix of blood and fluid is normal.     · You have new bleeding.     · You do not get better as expected. Where can you learn more? Go to https://ManagerComplete.Not iT. org and sign in to your [employer] account. Enter B831 in the [employer] box to learn more about \"Venous Skin Ulcer: Care Instructions. \"     If you do not have an account, please click on the \"Sign Up Now\" link. Current as of: October 19, 2020               Content Version: 12.9  © 2006-2021 Healthwise, Incorporated. Care instructions adapted under license by [facility] ([facility]). If you have questions about a medical condition or this instruction, always ask your healthcare professional. [place] [address] any warranty or liability for your use of this information.

## 2021-07-05 NOTE — PROGRESS NOTES
[address] Encounter  CHIEF COMPLAINT       Chief Complaint   Patient presents with    Rash     rash on both legs      HISTORY OF PRESENT ILLNESS   Elza Bowles is a 79 y.o. female who presents with:    HPI   Leg Wounds  Megan WELLS is here for evaluation of non-healing bilateral LE wounds. Wounds have been present several months. She was seen by me in [facility] for same 3/11/2021. Current symptoms: pain: severe, intolerable; pruritis: moderate; drainage: yellow, mucopurulent and malodorous. Interventions to date: wounds cultures, PO antibiotics, referral to Wound Care. States she was seen in the [facility] [address], but says she didn't know it would be an outpatient treatment. Thought she was going to be admitted for wound care. She's not planning on returning. Says she doesn't know what they did for her, but knows\"I was in more pain after leaving than before I came. \"     Reports is trying to find a dermatologist.   Wants \"to see what somebody else has to say about it. \"   She found someone, but earliest appointment is November. So she came to [address] because she wants to try Cipro again to \"see if that works and to get my pain treated. \"    Relevant PMH: reports hx of pre-diabetes. States at her last visit with PCP her A1C was 6.1 and \"the cutoff was 5.9- does that make me a diabetic? \"    Patient's medications, allergies, past medical, surgical, social and family histories were reviewed and updated as appropriate. REVIEW OF SYSTEMS     Review of Systems   Constitutional: Negative for chills, fever and unexpected weight change. Respiratory: Negative for cough and shortness of breath. Cardiovascular: Positive for leg swelling. Negative for chest pain and palpitations. Gastrointestinal: Negative for abdominal pain, diarrhea, nausea and vomiting. Genitourinary: Negative for dysuria. Musculoskeletal: Negative for myalgias.    Skin: Positive for color change and wound. Neurological: Negative for syncope, light-headedness and numbness. PAST MEDICAL HISTORY         Diagnosis Date    ROCHELLE (acute kidney injury) ([facility] 7/25/2021    Gastroesophageal reflux disease 6/10/2002    Hypothyroidism     Obesity, Class III, BMI 40-49.9 (morbid obesity) ([facility] 1/4/2016    Tobacco abuse      Patient Active Problem List   Diagnosis    Hypothyroidism    Tobacco abuse    Atopic rhinitis    Gastroesophageal reflux disease    Hearing loss    Type 2 diabetes mellitus without complication, without long-term current use of insulin ([address]    Family history of other specified malignant neoplasm    Venous stasis ulcer of right thigh with fat layer exposed without varicose veins (HCC)    Venous stasis ulcer of right calf with fat layer exposed ([facility]    Venous stasis ulcer of left calf without varicose veins (HCC)    Edema    ROCHELLE (acute kidney injury) ([facility]    Wounds, multiple open, lower extremity    Excessive use of nonsteroidal anti-inflammatory drugs (NSAIDs)    Medical non-compliance     SURGICAL HISTORY     Patient  has a past surgical history that includes Cholecystectomy and Appendectomy. CURRENT MEDICATIONS       Outpatient Medications Marked as Taking for the 7/5/21 encounter (Office Visit) with MARLEE James CNP   Medication Sig Dispense Refill    [DISCONTINUED] furosemide (LASIX) 20 MG tablet Take 1 tablet by mouth daily 60 tablet 3    SYNTHROID 150 MCG tablet Take 1 tablet by mouth Daily 90 tablet 0    [DISCONTINUED] furosemide (LASIX) 20 MG tablet Take 1 tablet by mouth daily 60 tablet 3     ALLERGIES     Patient is is allergic to benadryl [diphenhydramine hcl], cortisone, codeine, and pcn [penicillins]. FAMILY HISTORY     Patient'sfamily history includes Heart Disease in her father; High Blood Pressure in her mother; Kidney Disease in her mother; Stroke in her mother.   SOCIAL HISTORY     Patient  reports that she quit smoking about 3 years ago. Her smoking use included cigarettes. She has a 150.00 pack-year smoking history. She has never used smokeless tobacco. She reports that she does not drink alcohol and does not use drugs. PHYSICAL EXAM       Vitals:    07/05/21 1125   BP: 118/70   Site: Left Upper Arm   Position: Sitting   Cuff Size: Medium Adult   Pulse: 97   Temp: 98.5 °F (36.9 °C)   TempSrc: Temporal   SpO2: 98%   Height: 5' 3\" (1.6 m)     Physical Exam  Vitals reviewed. Constitutional:       General: She is not in acute distress. Comments: sitting quietly in wheelchair, both LEs wrapped in chux pads. patient's son is present during exam.   HENT:      Head: Normocephalic and atraumatic. Right Ear: External ear normal.      Left Ear: External ear normal.      Nose: Nose normal.   Neck:      Trachea: Trachea and phonation normal.   Cardiovascular:      Rate and Rhythm: Normal rate and regular rhythm. Heart sounds: Normal heart sounds. No murmur heard. No friction rub. No gallop. Pulmonary:      Effort: Pulmonary effort is normal. No tachypnea. Breath sounds: Normal breath sounds and air entry. Musculoskeletal:      Cervical back: Normal range of motion and neck supple. Right lower leg: Edema present. Left lower leg: Edema present. Right ankle: Swelling present. Normal pulse. Left ankle: Swelling present. Normal pulse. Legs:    Skin:     General: Skin is warm and moist.      Findings: Wound present. Neurological:      General: No focal deficit present. Mental Status: She is alert. Mental status is at baseline. Comments: gait not observed. patient used wheelchair while in office. Psychiatric:         Mood and Affect: Mood is anxious.          Speech: Speech normal.        READY CARE COURSE   Lab Review:  Organism   Date Value Ref Range Status   07/05/2021 Gram negative autumn (A)  Preliminary   07/05/2021 Gram negative autumn (A)  Preliminary   07/05/2021 Beta Strep Group G (A) Preliminary   05/25/2021 Klebsiella oxytoca (A)  Final   05/25/2021 Beta Strep Group G (A)  Final   05/25/2021 Citrobacter amalonaticus (A)  Final   03/11/2021 Proteus mirabilis (A)  Final   03/11/2021 Enterobacter cloacae complex (A)  Final   03/11/2021 Beta Strep Group G (A)  Final     No results found for this visit on 07/05/21. IMAGING:  No orders to display     Scheduled Meds:  Continuous Infusions:  PRN Meds:. PROCEDURES:  Wound cares provided were:    - Removal of existing dressing   - Visual inspection   - Cleansing with sterile NS   - Bacterial swabs were obtained from ulcers with active drainage   - Application of clean dressing    FINAL IMPRESSION    79 y.o. female with non-healing lower leg ulcers for > 3 months. Patient with normal vitals, non-toxic appearance. Diagnosis Orders   1. Venous stasis ulcers of both lower extremities (HCC)  Culture, [address], [place]    ciprofloxacin (CIPRO) 500 MG tablet   2. Bilateral leg edema  DISCONTINUED: furosemide (LASIX) 20 MG tablet       1. Worsening pain, redness, drainage concerning for cellulitis  · Reports previous improvement on Cipro  · Reviewed microbial culture/ susceptibility results from May and noted Klebsiella oxytoca, Citrobacter amalonaticus susceptible to Cipro, so will start it again  · Chronic wound colonization is likely given duration, though with the worsening sxs, culture is indicated and will be obtained. 2. Furosemide inadvertently dc'ed during med reconciliation and was re-ordered for accuracy of current med list. No refill needed, so Rx not sent. · Keep LEs elevated above the level of the heart when sitting to promote venous return    DISPOSITION/PLAN   I discussed at length with the patient my concerns about the duration/ progression of these wounds and emphasized the need for close follow-up with Wound Care and her Primary Care Doctor.  I explained that definitive treatment of chronic wounds and/or chronic pain is not the role of the [address] [place], [place] [address]d. I urged her to reconsider her plan to stop following with Wound Care and she agreed, so I referred her back to Wound. I explained treatment options for pain control that were in keeping with our policies/ procedures and medically appropriate for the objective findings on her exam. Unfortunately this did not alleviate the patient's concerns. Patient said \"it's a little irritating to think I can't get pain medication because everybody else screwed up so that people who need pain medication can't get what they need; and \"I'm going to have to go to the Emergency Room and go through all of this again. \"    PATIENT REFERRED TO:  Return for follow-up with PCP and [address] in 1 week. DISCHARGE MEDICATIONS:  Orders Placed This Encounter   Medications    ciprofloxacin (CIPRO) 500 MG tablet     Sig: Take 1 tablet by mouth 2 times daily for 7 days     Dispense:  14 tablet     Refill:  0    DISCONTD: furosemide (LASIX) 20 MG tablet     Sig: Take 1 tablet by mouth daily     Dispense:  60 tablet     Refill:  3      Cannot display discharge medications since this is not an admission. Return for follow-up with PCP and [address] [facility] in 1 week. Side effects and adverse effects of any medication prescribed today, as well as treatment plan/rationale, follow-up care, and result expectations have been discussed with the patient. Sandra Fletcher expresses understanding and wishes to proceed with the plan. Discussed signs and symptoms which require immediate follow-up in ED/call to 911. Understanding verbalized. I have reviewed and updated the electronic medical record.     Melony Green, APRN - CNP

## 2021-07-08 ENCOUNTER — TELEPHONE (OUTPATIENT)
Dept: FAMILY MEDICINE CLINIC | Age: 81
End: 2021-07-08

## 2021-07-08 ENCOUNTER — HOSPITAL ENCOUNTER (EMERGENCY)
Age: 81
Discharge: LEFT BEFORE DISCHARGE | End: 2021-07-08
Attending: EMERGENCY MEDICINE
Payer: MEDICARE

## 2021-07-08 VITALS
RESPIRATION RATE: 16 BRPM | OXYGEN SATURATION: 97 % | TEMPERATURE: 98.9 F | HEIGHT: 63 IN | HEART RATE: 102 BPM | WEIGHT: 232 LBS | BODY MASS INDEX: 41.11 KG/M2

## 2021-07-08 DIAGNOSIS — L03.115 CELLULITIS OF RIGHT LOWER EXTREMITY: Primary | ICD-10-CM

## 2021-07-08 DIAGNOSIS — L03.116 CELLULITIS OF LEFT LOWER EXTREMITY: ICD-10-CM

## 2021-07-08 LAB
GRAM STAIN RESULT: ABNORMAL
ORGANISM: ABNORMAL
WOUND/ABSCESS: ABNORMAL

## 2021-07-08 PROCEDURE — 99284 EMERGENCY DEPT VISIT MOD MDM: CPT

## 2021-07-08 PROCEDURE — 6370000000 HC RX 637 (ALT 250 FOR IP): Performed by: EMERGENCY MEDICINE

## 2021-07-08 PROCEDURE — 99283 EMERGENCY DEPT VISIT LOW MDM: CPT

## 2021-07-08 RX ORDER — OXYCODONE HYDROCHLORIDE AND ACETAMINOPHEN 5; 325 MG/1; MG/1
1 TABLET ORAL EVERY 6 HOURS PRN
Qty: 12 TABLET | Refills: 0 | Status: SHIPPED | OUTPATIENT
Start: 2021-07-08 | End: 2021-07-11

## 2021-07-08 RX ORDER — OXYCODONE HYDROCHLORIDE AND ACETAMINOPHEN 5; 325 MG/1; MG/1
1 TABLET ORAL ONCE
Status: COMPLETED | OUTPATIENT
Start: 2021-07-08 | End: 2021-07-08

## 2021-07-08 RX ADMIN — OXYCODONE HYDROCHLORIDE AND ACETAMINOPHEN 1 TABLET: 5; 325 TABLET ORAL at 21:03

## 2021-07-08 ASSESSMENT — PAIN DESCRIPTION - FREQUENCY: FREQUENCY: CONTINUOUS

## 2021-07-08 ASSESSMENT — PAIN DESCRIPTION - DESCRIPTORS: DESCRIPTORS: BURNING;PINS AND NEEDLES

## 2021-07-08 ASSESSMENT — ENCOUNTER SYMPTOMS
EYE PAIN: 0
COLOR CHANGE: 0
VOMITING: 0
SHORTNESS OF BREATH: 0
NAUSEA: 0
COUGH: 0
BACK PAIN: 0
SORE THROAT: 0
DIARRHEA: 0
ABDOMINAL PAIN: 0
EYE REDNESS: 0

## 2021-07-08 ASSESSMENT — PAIN DESCRIPTION - PROGRESSION: CLINICAL_PROGRESSION: NOT CHANGED

## 2021-07-08 ASSESSMENT — PAIN DESCRIPTION - LOCATION: LOCATION: LEG

## 2021-07-08 ASSESSMENT — PAIN DESCRIPTION - ONSET: ONSET: SUDDEN

## 2021-07-08 ASSESSMENT — PAIN SCALES - GENERAL
PAINLEVEL_OUTOF10: 10
PAINLEVEL_OUTOF10: 10

## 2021-07-08 ASSESSMENT — PAIN DESCRIPTION - PAIN TYPE: TYPE: ACUTE PAIN

## 2021-07-08 ASSESSMENT — PAIN DESCRIPTION - ORIENTATION: ORIENTATION: RIGHT;LEFT

## 2021-07-08 NOTE — TELEPHONE ENCOUNTER
Patient called back and wanted to know if her labs results were back yet please call her at [phone]. cp

## 2021-07-08 NOTE — LETTER
[facility] ED  [address]  Phone: [phone]    Patient: Nora Oro  YOB: 1940  Date: 7/8/2021 Time: 8:58 PM    Leaving the Hospital Against Medical Advice    Chart #:[id number]    This will certify that I, the undersigned,    ______________________________________________________________________    A patient in the above named medical center, having requested discharge and removal from the medical center against the advice of my attending physician(s), hereby release the Emergency Department, its physicians, officers and employees, severally and individually, from any and all liability of any nature whatsoever for any injury or harm or complication of any kind that may result directly or indirectly, by reason of my terminating my stay as a patient from [facility], and hereby waive any and all rights of action I may now have or later acquire as a result of my voluntary departure from [facility] and the termination of my stay as a patient therein. This release is made with the full knowledge of the danger that may result from the action which I am taking.       Date:_______________________                         ___________________________                                                                                    Patient/Legal Representative    Witness:        ____________________________                          ___________________________  Nurse                                                                        Physician

## 2021-07-09 NOTE — ED NOTES
Wound cleansed with Hibiclens. Non adherent DSD applied and wrapped in gauze. AMA reviewed with patient and son. Son confirmed that Dr Micky Arango reviewed the risks of leaving AMA with the son and patient. They both understand that the risk of sepsis and debilitating injury are possible. Discharge instructions reviewed and given.        Darwin Smith RN  07/08/21 2116       Darwin Smith RN  07/08/21 2119

## 2021-07-09 NOTE — ED TRIAGE NOTES
Complains of bilateral leg pain. Multiple wounds that have not healed over the last 8 months. Last saw primary care on 4/8.

## 2021-07-09 NOTE — ED PROVIDER NOTES
back pain and myalgias. Skin: Negative for color change and rash. Neurological: Negative for dizziness, weakness, numbness and headaches. Hematological: Negative for adenopathy. Except as noted above the remainder of the review of systems was reviewed and negative.        PAST MEDICAL HISTORY     Past Medical History:   Diagnosis Date    Gastroesophageal reflux disease 6/10/2002    Hypothyroidism     Obesity, Class III, BMI 40-49.9 (morbid obesity) ([facility] 1/4/2016    Tobacco abuse          SURGICAL HISTORY       Past Surgical History:   Procedure Laterality Date    APPENDECTOMY      CHOLECYSTECTOMY           CURRENT MEDICATIONS       Previous Medications    CIPROFLOXACIN (CIPRO) 500 MG TABLET    Take 1 tablet by mouth 2 times daily for 7 days    FUROSEMIDE (LASIX) 20 MG TABLET    Take 1 tablet by mouth daily    SYNTHROID 150 MCG TABLET    Take 1 tablet by mouth Daily       ALLERGIES     Benadryl [diphenhydramine hcl], Cortisone, Codeine, and Pcn [penicillins]    FAMILY HISTORY       Family History   Problem Relation Age of Onset    Kidney Disease Mother     High Blood Pressure Mother     Stroke Mother     Heart Disease Father           SOCIAL HISTORY       Social History     Socioeconomic History    Marital status:      Spouse name: None    Number of children: None    Years of education: None    Highest education level: None   Occupational History    None   Tobacco Use    Smoking status: Former Smoker     Packs/day: 5.00     Years: 30.00     Pack years: 150.00     Types: Cigarettes     Quit date: 10/1/2017     Years since quitting: 3.7    Smokeless tobacco: Never Used    Tobacco comment: Has been trying to quit since January   Vaping Use    Vaping Use: Never used   Substance and Sexual Activity    Alcohol use: No     Alcohol/week: 0.0 standard drinks    Drug use: No    Sexual activity: Not Currently   Other Topics Concern    None   Social History Narrative    None     Social Determinants of Health     Financial Resource Strain:     Difficulty of Paying Living Expenses:    Food Insecurity:     Worried About Running Out of Food in the Last Year:     [address] in the Last Year:    Transportation Needs:     Lack of Transportation (Medical):      Lack of Transportation (Non-Medical):    Physical Activity:     Days of Exercise per Week:     Minutes of Exercise per Session:    Stress:     Feeling of Stress :    Social Connections:     Frequency of Communication with Friends and Family:     Frequency of Social Gatherings with Friends and Family:     Attends Zoroastrianism Services:     Active Member of Clubs or Organizations:     Attends Club or Organization Meetings:     Marital Status:    Intimate Partner Violence:     Fear of Current or Ex-Partner:     Emotionally Abused:     Physically Abused:     Sexually Abused:          PHYSICAL EXAM    (up to 7 for level 4, 8 or more for level 5)     ED Triage Vitals [07/08/21 1932]   BP Temp Temp Source Pulse Resp SpO2 Height Weight   -- 98.9 °F (37.2 °C) Skin 102 16 97 % 5' 3\" (1.6 m) 232 lb (105.2 kg)       Physical Exam   General appearance: Patient is awake alert interactive appropriate nontoxic in no acute distress  Head is atraumatic normocephalic  Eyes pupils are equal and reactive sclera white conjunctive are pink  Oral pharyngeal cavity is pink with good moisture, no exudates or ulcerations no asymmetry, the airway is widely patent  Neck: Supple no meningeal signs no adenopathy no JVD  Heart: Regular rate and rhythm no gross murmurs rubs or clicks  Lungs: Breath sounds are clear with good air movement throughout no active wheezes rales or rhonchi no respiratory distress  Abdomen: Soft nontender with good bowel sounds no rebound rigidity or guarding no firm or pulsatile masses, no gross distention, limited examination unable to assess femoral pulses with patient refusing to get into the examination bed examination performed at bedside to aid with patient in a chair. Back: Nontender to palpation no costovertebral angle tenderness  Skin: Warm and dry without rashes  Lower extremities: There is bilateral lower extremity swelling with diffuse circumferential erythematous changes to the distal aspect of the lower extremities bilaterally with draining erythematous wounds posteriorly bilaterally. There is tenderness to palpation there is associated mild warmth. There is no lymphangitis. There is bilateral dorsalis pedis pulse sensation is intact to light touch to the feet. No pain or swelling over the joints. Good flexion extension/order function to the feet and knees. DIAGNOSTIC RESULTS     Patient refusing IV laboratory or imaging studies. EMERGENCY DEPARTMENT COURSE and DIFFERENTIAL DIAGNOSIS/MDM:   Vitals:    Vitals:    07/08/21 1932   Pulse: 102   Resp: 16   Temp: 98.9 °F (37.2 °C)   TempSrc: Skin   SpO2: 97%   Weight: 232 lb (105.2 kg)   Height: 5' 3\" (1.6 m)     Treatment and course: I did discuss with patient and family at bedside concerned with underlying infection with yellow-greenish watery discharge noted on the dressing with odor particularly from the right lower extremity. I did advise IV blood work IV antibiotic and hospitalization for further evaluation and care. I advised the patient in fact infection may spread into her bloodstream or bone because sepsis osteomyelitis loss of an extremity permanent disability sepsis or death. Patient expresses understanding she is refusing any further evaluation just wants a pain pill states she will follow-up tomorrow with her doctor and a dermatologist at the [facility] clinic. Patient is awake alert appropriate expresses understanding and is permitted to refuse care and sign out [address] Family is present at bedside. Patient invited to return at any time immediately if any change or worsening fever vomiting weak or dizzy.   Discoloration of the feet  Patient states she did take her Cipro today. Patient given Percocet 1 p.o. wound care was provided and the legs were redressed. FINAL IMPRESSION      1. Cellulitis of right lower extremity    2. Cellulitis of left lower extremity          DISPOSITION/PLAN   DISPOSITION [place] 07/08/2021 08:41:11 PM  Patient left the emergency department [address] Patient to continue her Cipro as directed given a home-going prescription for Percocet No. 12    PATIENT REFERRED TO:  Linda Byrnes MD  [address] [place] [address]  [phone]    In 1 day        DISCHARGE MEDICATIONS:  New Prescriptions    OXYCODONE-ACETAMINOPHEN (PERCOCET) 5-325 MG PER TABLET    Take 1 tablet by mouth every 6 hours as needed for Pain for up to 3 days. Intended supply: 3 days. Take lowest dose possible to manage pain     Controlled Substances Monitoring:     No flowsheet data found.     (Please note that portions of this note were completed with a voice recognition program.  Efforts were made to edit the dictations but occasionally words are mis-transcribed.)    Toni Jerome DO (electronically signed)  Attending Emergency Physician           Toni Jerome DO  07/08/21 2051

## 2021-07-14 NOTE — TELEPHONE ENCOUNTER
Chief Complaint   Patient presents with    Results     Attempted to contact patient at home number and mobile number- no answer. Left voice message on mobile number [phone] requesting return call for results. Preliminary wound culture results show Group G strep bacteria and probable staph bacteria growing. There are other bacteria present, identification of  bacteria type and susceptibility testing is in process. She will be notified when final results are available. Hospital Outpatient Visit on 07/05/2021   Component Date Value Ref Range Status    Gram Stain Result 07/05/2021 *  Final                    Value:No WBC's  Many Small Gram positive rods  Many Gram negative rods  Moderate Gram positive cocci in pairs, chains      Organism 07/05/2021 Gram negative autumn*  Preliminary    WOUND/ABSCESS 07/05/2021    Preliminary                    Value:Heavy growth  ID and sensitivity to follow      Organism 07/05/2021 Gram negative autumn*  Preliminary    WOUND/ABSCESS 07/05/2021    Preliminary                    Value:Heavy growth  ID and sensitivity to follow      Organism 07/05/2021 Beta Strep Group G*  Preliminary    WOUND/ABSCESS 07/05/2021    Preliminary                    Value: Moderate growth  No further workup  Susceptibility testing of penicillin and other beta lactams is  not necessary for beta hemolytic Streptococci since resistant  strains have not been identified.  (CLSI M100)         Electronically signed by MARLEE Alvarez CNP on 7/14/2021 at 6:12 PM

## 2021-07-24 ENCOUNTER — HOSPITAL ENCOUNTER (EMERGENCY)
Age: 81
Discharge: HOME | End: 2021-07-25
Attending: EMERGENCY MEDICINE
Payer: MEDICARE

## 2021-07-24 DIAGNOSIS — Z53.29 LEFT AGAINST MEDICAL ADVICE: Primary | ICD-10-CM

## 2021-07-24 PROCEDURE — 96375 TX/PRO/DX INJ NEW DRUG ADDON: CPT

## 2021-07-24 PROCEDURE — 96374 THER/PROPH/DIAG INJ IV PUSH: CPT

## 2021-07-24 PROCEDURE — 99283 EMERGENCY DEPT VISIT LOW MDM: CPT

## 2021-07-24 ASSESSMENT — PAIN DESCRIPTION - FREQUENCY: FREQUENCY: CONTINUOUS

## 2021-07-24 ASSESSMENT — PAIN DESCRIPTION - PROGRESSION: CLINICAL_PROGRESSION: NOT CHANGED

## 2021-07-24 ASSESSMENT — PAIN DESCRIPTION - ONSET: ONSET: SUDDEN

## 2021-07-24 ASSESSMENT — PAIN SCALES - GENERAL: PAINLEVEL_OUTOF10: 8

## 2021-07-24 ASSESSMENT — PAIN DESCRIPTION - LOCATION: LOCATION: ABDOMEN

## 2021-07-24 ASSESSMENT — PAIN DESCRIPTION - PAIN TYPE: TYPE: ACUTE PAIN

## 2021-07-24 ASSESSMENT — PAIN DESCRIPTION - DESCRIPTORS: DESCRIPTORS: BURNING

## 2021-07-24 NOTE — LETTER
[facility] ED  [address]  Phone: [phone]    Patient: Janice Dudley  YOB: 1940  Date: 7/25/2021 Time: 4:26 AM    Leaving the Hospital Against Medical Advice    Chart #:[id number]    This will certify that I, the undersigned,    ______________________________________________________________________    A patient in the above named medical center, having requested discharge and removal from the medical center against the advice of my attending physician(s), hereby release the Emergency Department, its physicians, officers and employees, severally and individually, from any and all liability of any nature whatsoever for any injury or harm or complication of any kind that may result directly or indirectly, by reason of my terminating my stay as a patient from [facility], and hereby waive any and all rights of action I may now have or later acquire as a result of my voluntary departure from [facility] and the termination of my stay as a patient therein. This release is made with the full knowledge of the danger that may result from the action which I am taking.       Date:_______________________                         ___________________________                                                                                    Patient/Legal Representative    Witness:        ____________________________                          ___________________________  Nurse                                                                        Physician

## 2021-07-25 ENCOUNTER — APPOINTMENT (OUTPATIENT)
Dept: CT IMAGING | Age: 81
End: 2021-07-25
Payer: MEDICARE

## 2021-07-25 ENCOUNTER — HOSPITAL ENCOUNTER (INPATIENT)
Age: 81
LOS: 2 days | Discharge: HOME | DRG: 683 | End: 2021-07-27
Attending: INTERNAL MEDICINE | Admitting: INTERNAL MEDICINE
Payer: MEDICARE

## 2021-07-25 ENCOUNTER — APPOINTMENT (OUTPATIENT)
Dept: GENERAL RADIOLOGY | Age: 81
End: 2021-07-25
Payer: MEDICARE

## 2021-07-25 VITALS
HEIGHT: 63 IN | SYSTOLIC BLOOD PRESSURE: 148 MMHG | RESPIRATION RATE: 22 BRPM | HEART RATE: 93 BPM | WEIGHT: 180 LBS | TEMPERATURE: 98.5 F | OXYGEN SATURATION: 99 % | BODY MASS INDEX: 31.89 KG/M2 | DIASTOLIC BLOOD PRESSURE: 69 MMHG

## 2021-07-25 DIAGNOSIS — N17.9 ACUTE RENAL FAILURE, UNSPECIFIED ACUTE RENAL FAILURE TYPE (HCC): Primary | ICD-10-CM

## 2021-07-25 DIAGNOSIS — S81.809D MULTIPLE OPENS WOUND OF LOWER EXTREMITY, UNSPECIFIED LATERALITY, SUBSEQUENT ENCOUNTER: ICD-10-CM

## 2021-07-25 PROBLEM — S81.809A WOUNDS, MULTIPLE OPEN, LOWER EXTREMITY: Status: ACTIVE | Noted: 2021-07-25

## 2021-07-25 LAB
ALBUMIN SERPL-MCNC: 3.9 G/DL (ref 3.5–4.6)
ALBUMIN SERPL-MCNC: 4 G/DL (ref 3.5–4.6)
ALP BLD-CCNC: 93 U/L (ref 40–130)
ALP BLD-CCNC: 94 U/L (ref 40–130)
ALT SERPL-CCNC: 12 U/L (ref 0–33)
ALT SERPL-CCNC: 13 U/L (ref 0–33)
AMYLASE: 90 U/L (ref 22–93)
ANION GAP SERPL CALCULATED.3IONS-SCNC: 15 MEQ/L (ref 9–15)
ANION GAP SERPL CALCULATED.3IONS-SCNC: 16 MEQ/L (ref 9–15)
AST SERPL-CCNC: 10 U/L (ref 0–35)
AST SERPL-CCNC: 11 U/L (ref 0–35)
BACTERIA: NEGATIVE /HPF
BASOPHILS ABSOLUTE: 0 K/UL (ref 0–0.1)
BASOPHILS ABSOLUTE: 0.1 K/UL (ref 0–0.2)
BASOPHILS RELATIVE PERCENT: 0.3 % (ref 0.1–1.2)
BASOPHILS RELATIVE PERCENT: 0.5 %
BILIRUB SERPL-MCNC: <0.2 MG/DL (ref 0.2–0.7)
BILIRUB SERPL-MCNC: <0.2 MG/DL (ref 0.2–0.7)
BILIRUBIN URINE: NEGATIVE
BLOOD, URINE: NEGATIVE
BUN BLDV-MCNC: 68 MG/DL (ref 8–23)
BUN BLDV-MCNC: 68 MG/DL (ref 8–23)
CALCIUM SERPL-MCNC: 9.6 MG/DL (ref 8.5–9.9)
CALCIUM SERPL-MCNC: 9.7 MG/DL (ref 8.5–9.9)
CHLORIDE BLD-SCNC: 106 MEQ/L (ref 95–107)
CHLORIDE BLD-SCNC: 106 MEQ/L (ref 95–107)
CLARITY: CLEAR
CO2: 15 MEQ/L (ref 20–31)
CO2: 15 MEQ/L (ref 20–31)
COLOR: YELLOW
CREAT SERPL-MCNC: 2.5 MG/DL (ref 0.5–0.9)
CREAT SERPL-MCNC: 2.54 MG/DL (ref 0.5–0.9)
EKG ATRIAL RATE: 100 BPM
EKG P AXIS: 42 DEGREES
EKG P-R INTERVAL: 194 MS
EKG Q-T INTERVAL: 346 MS
EKG QRS DURATION: 84 MS
EKG QTC CALCULATION (BAZETT): 446 MS
EKG R AXIS: -7 DEGREES
EKG T AXIS: 29 DEGREES
EKG VENTRICULAR RATE: 100 BPM
EOSINOPHILS ABSOLUTE: 0.2 K/UL (ref 0–0.4)
EOSINOPHILS ABSOLUTE: 0.2 K/UL (ref 0–0.7)
EOSINOPHILS RELATIVE PERCENT: 1.4 % (ref 0.7–5.8)
EOSINOPHILS RELATIVE PERCENT: 1.5 %
EPITHELIAL CELLS, UA: ABNORMAL /HPF (ref 0–5)
GFR AFRICAN AMERICAN: 21.9
GFR AFRICAN AMERICAN: 22.3
GFR NON-AFRICAN AMERICAN: 18.1
GFR NON-AFRICAN AMERICAN: 18.4
GLOBULIN: 3.4 G/DL (ref 2.3–3.5)
GLOBULIN: 4.3 G/DL (ref 2.3–3.5)
GLUCOSE BLD-MCNC: 109 MG/DL (ref 70–99)
GLUCOSE BLD-MCNC: 118 MG/DL (ref 70–99)
GLUCOSE URINE: NEGATIVE MG/DL
HCT VFR BLD CALC: 32.7 % (ref 37–47)
HCT VFR BLD CALC: 32.9 % (ref 37–47)
HEMOGLOBIN: 10.5 G/DL (ref 12–16)
HEMOGLOBIN: 10.6 G/DL (ref 11.2–15.7)
HYALINE CASTS: ABNORMAL /HPF (ref 0–5)
IMMATURE GRANULOCYTES #: 0.1 K/UL
IMMATURE GRANULOCYTES %: 0.5 %
KETONES, URINE: NEGATIVE MG/DL
LEUKOCYTE ESTERASE, URINE: ABNORMAL
LIPASE: 36 U/L (ref 12–95)
LYMPHOCYTES ABSOLUTE: 1.2 K/UL (ref 1–4.8)
LYMPHOCYTES ABSOLUTE: 1.5 K/UL (ref 1.2–3.7)
LYMPHOCYTES RELATIVE PERCENT: 10.6 %
LYMPHOCYTES RELATIVE PERCENT: 9.8 %
MAGNESIUM: 2.3 MG/DL (ref 1.7–2.4)
MCH RBC QN AUTO: 24.3 PG (ref 27–31.3)
MCH RBC QN AUTO: 24.4 PG (ref 25.6–32.2)
MCHC RBC AUTO-ENTMCNC: 32 % (ref 33–37)
MCHC RBC AUTO-ENTMCNC: 32.2 % (ref 32.2–35.5)
MCV RBC AUTO: 75.8 FL (ref 79.4–94.8)
MCV RBC AUTO: 75.9 FL (ref 82–100)
MONOCYTES ABSOLUTE: 0.8 K/UL (ref 0.2–0.8)
MONOCYTES ABSOLUTE: 1 K/UL (ref 0.2–0.9)
MONOCYTES RELATIVE PERCENT: 6.6 %
MONOCYTES RELATIVE PERCENT: 7.3 % (ref 4.7–12.5)
NEUTROPHILS ABSOLUTE: 11.1 K/UL (ref 1.6–6.1)
NEUTROPHILS ABSOLUTE: 9.8 K/UL (ref 1.4–6.5)
NEUTROPHILS RELATIVE PERCENT: 79.9 % (ref 34–71.1)
NEUTROPHILS RELATIVE PERCENT: 81.6 %
NITRITE, URINE: NEGATIVE
PDW BLD-RTO: 16.9 % (ref 11.5–14.5)
PDW BLD-RTO: 18.1 % (ref 11.7–14.4)
PH UA: 5.5 (ref 5–9)
PLATELET # BLD: 365 K/UL (ref 130–400)
PLATELET # BLD: 386 K/UL (ref 182–369)
POTASSIUM SERPL-SCNC: 4.2 MEQ/L (ref 3.4–4.9)
POTASSIUM SERPL-SCNC: 4.2 MEQ/L (ref 3.4–4.9)
PROTEIN UA: 30 MG/DL
RBC # BLD: 4.3 M/UL (ref 4.2–5.4)
RBC # BLD: 4.34 M/UL (ref 3.93–5.22)
RBC UA: ABNORMAL /HPF (ref 0–5)
RENAL EPITHELIAL, UA: ABNORMAL /HPF
SARS-COV-2, NAAT: NOT DETECTED
SODIUM BLD-SCNC: 136 MEQ/L (ref 135–144)
SODIUM BLD-SCNC: 137 MEQ/L (ref 135–144)
SPECIFIC GRAVITY UA: 1.02 (ref 1–1.03)
TOTAL PROTEIN: 7.4 G/DL (ref 6.3–8)
TOTAL PROTEIN: 8.2 G/DL (ref 6.3–8)
URINE REFLEX TO CULTURE: YES
UROBILINOGEN, URINE: 0.2 E.U./DL
WBC # BLD: 12 K/UL (ref 4.8–10.8)
WBC # BLD: 13.9 K/UL (ref 4–10)
WBC UA: ABNORMAL /HPF (ref 0–5)

## 2021-07-25 PROCEDURE — 83735 ASSAY OF MAGNESIUM: CPT

## 2021-07-25 PROCEDURE — 99283 EMERGENCY DEPT VISIT LOW MDM: CPT

## 2021-07-25 PROCEDURE — 85025 COMPLETE CBC W/AUTO DIFF WBC: CPT

## 2021-07-25 PROCEDURE — 80053 COMPREHEN METABOLIC PANEL: CPT

## 2021-07-25 PROCEDURE — 36415 COLL VENOUS BLD VENIPUNCTURE: CPT

## 2021-07-25 PROCEDURE — 93010 ELECTROCARDIOGRAM REPORT: CPT | Performed by: INTERNAL MEDICINE

## 2021-07-25 PROCEDURE — 83690 ASSAY OF LIPASE: CPT

## 2021-07-25 PROCEDURE — 93005 ELECTROCARDIOGRAM TRACING: CPT

## 2021-07-25 PROCEDURE — 2580000003 HC RX 258: Performed by: PHYSICIAN ASSISTANT

## 2021-07-25 PROCEDURE — 81001 URINALYSIS AUTO W/SCOPE: CPT

## 2021-07-25 PROCEDURE — 2580000003 HC RX 258: Performed by: EMERGENCY MEDICINE

## 2021-07-25 PROCEDURE — 1210000000 HC MED SURG R&B

## 2021-07-25 PROCEDURE — 87086 URINE CULTURE/COLONY COUNT: CPT

## 2021-07-25 PROCEDURE — 87635 SARS-COV-2 COVID-19 AMP PRB: CPT

## 2021-07-25 PROCEDURE — 82150 ASSAY OF AMYLASE: CPT

## 2021-07-25 PROCEDURE — 6370000000 HC RX 637 (ALT 250 FOR IP): Performed by: EMERGENCY MEDICINE

## 2021-07-25 PROCEDURE — 74176 CT ABD & PELVIS W/O CONTRAST: CPT

## 2021-07-25 PROCEDURE — 71046 X-RAY EXAM CHEST 2 VIEWS: CPT

## 2021-07-25 PROCEDURE — 6360000002 HC RX W HCPCS: Performed by: EMERGENCY MEDICINE

## 2021-07-25 RX ORDER — ACETAMINOPHEN 650 MG/1
650 SUPPOSITORY RECTAL EVERY 6 HOURS PRN
Status: DISCONTINUED | OUTPATIENT
Start: 2021-07-25 | End: 2021-07-27 | Stop reason: HOSPADM

## 2021-07-25 RX ORDER — 0.9 % SODIUM CHLORIDE 0.9 %
1000 INTRAVENOUS SOLUTION INTRAVENOUS ONCE
Status: COMPLETED | OUTPATIENT
Start: 2021-07-25 | End: 2021-07-25

## 2021-07-25 RX ORDER — SODIUM CHLORIDE 0.9 % (FLUSH) 0.9 %
5-40 SYRINGE (ML) INJECTION PRN
Status: DISCONTINUED | OUTPATIENT
Start: 2021-07-25 | End: 2021-07-27 | Stop reason: HOSPADM

## 2021-07-25 RX ORDER — SODIUM CHLORIDE 9 MG/ML
25 INJECTION, SOLUTION INTRAVENOUS PRN
Status: DISCONTINUED | OUTPATIENT
Start: 2021-07-25 | End: 2021-07-27 | Stop reason: HOSPADM

## 2021-07-25 RX ORDER — ACETAMINOPHEN 325 MG/1
650 TABLET ORAL EVERY 6 HOURS PRN
Status: DISCONTINUED | OUTPATIENT
Start: 2021-07-25 | End: 2021-07-27 | Stop reason: HOSPADM

## 2021-07-25 RX ORDER — TRAMADOL HYDROCHLORIDE 50 MG/1
25 TABLET ORAL EVERY 4 HOURS PRN
Status: DISCONTINUED | OUTPATIENT
Start: 2021-07-25 | End: 2021-07-27 | Stop reason: HOSPADM

## 2021-07-25 RX ORDER — ONDANSETRON 4 MG/1
4 TABLET, ORALLY DISINTEGRATING ORAL EVERY 8 HOURS PRN
Status: DISCONTINUED | OUTPATIENT
Start: 2021-07-25 | End: 2021-07-27 | Stop reason: HOSPADM

## 2021-07-25 RX ORDER — IBUPROFEN 200 MG
400 TABLET ORAL EVERY 6 HOURS PRN
Status: ON HOLD | COMMUNITY
End: 2021-07-27

## 2021-07-25 RX ORDER — KETOROLAC TROMETHAMINE 15 MG/ML
15 INJECTION, SOLUTION INTRAMUSCULAR; INTRAVENOUS ONCE
Status: COMPLETED | OUTPATIENT
Start: 2021-07-25 | End: 2021-07-25

## 2021-07-25 RX ORDER — ONDANSETRON 2 MG/ML
4 INJECTION INTRAMUSCULAR; INTRAVENOUS ONCE
Status: COMPLETED | OUTPATIENT
Start: 2021-07-25 | End: 2021-07-25

## 2021-07-25 RX ORDER — MORPHINE SULFATE 4 MG/ML
4 INJECTION, SOLUTION INTRAMUSCULAR; INTRAVENOUS
Status: DISCONTINUED | OUTPATIENT
Start: 2021-07-25 | End: 2021-07-25 | Stop reason: HOSPADM

## 2021-07-25 RX ORDER — SODIUM CHLORIDE 0.9 % (FLUSH) 0.9 %
5-40 SYRINGE (ML) INJECTION EVERY 12 HOURS SCHEDULED
Status: DISCONTINUED | OUTPATIENT
Start: 2021-07-25 | End: 2021-07-27 | Stop reason: HOSPADM

## 2021-07-25 RX ORDER — SODIUM CHLORIDE 0.9 % (FLUSH) 0.9 %
3 SYRINGE (ML) INJECTION EVERY 8 HOURS
Status: DISCONTINUED | OUTPATIENT
Start: 2021-07-25 | End: 2021-07-25 | Stop reason: HOSPADM

## 2021-07-25 RX ORDER — ONDANSETRON 2 MG/ML
4 INJECTION INTRAMUSCULAR; INTRAVENOUS EVERY 6 HOURS PRN
Status: DISCONTINUED | OUTPATIENT
Start: 2021-07-25 | End: 2021-07-27 | Stop reason: HOSPADM

## 2021-07-25 RX ORDER — OXYCODONE HYDROCHLORIDE AND ACETAMINOPHEN 5; 325 MG/1; MG/1
1 TABLET ORAL ONCE
Status: COMPLETED | OUTPATIENT
Start: 2021-07-25 | End: 2021-07-25

## 2021-07-25 RX ORDER — ONDANSETRON 2 MG/ML
4 INJECTION INTRAMUSCULAR; INTRAVENOUS ONCE
Status: DISCONTINUED | OUTPATIENT
Start: 2021-07-25 | End: 2021-07-25 | Stop reason: HOSPADM

## 2021-07-25 RX ADMIN — ONDANSETRON 4 MG: 2 INJECTION INTRAMUSCULAR; INTRAVENOUS at 00:50

## 2021-07-25 RX ADMIN — OXYCODONE HYDROCHLORIDE AND ACETAMINOPHEN 1 TABLET: 5; 325 TABLET ORAL at 04:41

## 2021-07-25 RX ADMIN — Medication 3 ML: at 00:51

## 2021-07-25 RX ADMIN — SODIUM CHLORIDE 1000 ML: 9 INJECTION, SOLUTION INTRAVENOUS at 18:53

## 2021-07-25 RX ADMIN — KETOROLAC TROMETHAMINE 15 MG: 15 INJECTION, SOLUTION INTRAMUSCULAR; INTRAVENOUS at 00:47

## 2021-07-25 SDOH — ECONOMIC STABILITY: TRANSPORTATION INSECURITY
IN THE PAST 12 MONTHS, HAS THE LACK OF TRANSPORTATION KEPT YOU FROM MEDICAL APPOINTMENTS OR FROM GETTING MEDICATIONS?: NO

## 2021-07-25 SDOH — SOCIAL STABILITY: SOCIAL NETWORK: ARE YOU MARRIED, WIDOWED, DIVORCED, SEPARATED, NEVER MARRIED, OR LIVING WITH A PARTNER?: MARRIED

## 2021-07-25 SDOH — SOCIAL STABILITY: SOCIAL INSECURITY: WITHIN THE LAST YEAR, HAVE YOU BEEN AFRAID OF YOUR PARTNER OR EX-PARTNER?: NO

## 2021-07-25 SDOH — SOCIAL STABILITY: SOCIAL INSECURITY
WITHIN THE LAST YEAR, HAVE YOU BEEN KICKED, HIT, SLAPPED, OR OTHERWISE PHYSICALLY HURT BY YOUR PARTNER OR EX-PARTNER?: NO

## 2021-07-25 SDOH — HEALTH STABILITY: PHYSICAL HEALTH: ON AVERAGE, HOW MANY MINUTES DO YOU ENGAGE IN EXERCISE AT THIS LEVEL?: 0 MIN

## 2021-07-25 SDOH — ECONOMIC STABILITY: FOOD INSECURITY: WITHIN THE PAST 12 MONTHS, YOU WORRIED THAT YOUR FOOD WOULD RUN OUT BEFORE YOU GOT MONEY TO BUY MORE.: NEVER TRUE

## 2021-07-25 SDOH — HEALTH STABILITY: MENTAL HEALTH
STRESS IS WHEN SOMEONE FEELS TENSE, NERVOUS, ANXIOUS, OR CAN'T SLEEP AT NIGHT BECAUSE THEIR MIND IS TROUBLED. HOW STRESSED ARE YOU?: ONLY A LITTLE

## 2021-07-25 SDOH — SOCIAL STABILITY: SOCIAL INSECURITY: WITHIN THE LAST YEAR, HAVE YOU BEEN HUMILIATED OR EMOTIONALLY ABUSED IN OTHER WAYS BY YOUR PARTNER OR EX-PARTNER?: NO

## 2021-07-25 SDOH — ECONOMIC STABILITY: INCOME INSECURITY: IN THE LAST 12 MONTHS, WAS THERE A TIME WHEN YOU WERE NOT ABLE TO PAY THE MORTGAGE OR RENT ON TIME?: NO

## 2021-07-25 SDOH — HEALTH STABILITY: PHYSICAL HEALTH: ON AVERAGE, HOW MANY DAYS PER WEEK DO YOU ENGAGE IN MODERATE TO STRENUOUS EXERCISE (LIKE A BRISK WALK)?: 0 DAYS

## 2021-07-25 SDOH — SOCIAL STABILITY: SOCIAL NETWORK: HOW OFTEN DO YOU ATTENT MEETINGS OF THE CLUB OR ORGANIZATION YOU BELONG TO?: NEVER

## 2021-07-25 SDOH — ECONOMIC STABILITY: FOOD INSECURITY: WITHIN THE PAST 12 MONTHS, THE FOOD YOU BOUGHT JUST DIDN'T LAST AND YOU DIDN'T HAVE MONEY TO GET MORE.: NEVER TRUE

## 2021-07-25 SDOH — ECONOMIC STABILITY: INCOME INSECURITY: HOW HARD IS IT FOR YOU TO PAY FOR THE VERY BASICS LIKE FOOD, HOUSING, MEDICAL CARE, AND HEATING?: NOT HARD AT ALL

## 2021-07-25 SDOH — HEALTH STABILITY: MENTAL HEALTH: HOW OFTEN DO YOU HAVE A DRINK CONTAINING ALCOHOL?: NEVER

## 2021-07-25 SDOH — SOCIAL STABILITY: SOCIAL INSECURITY
WITHIN THE LAST YEAR, HAVE TO BEEN RAPED OR FORCED TO HAVE ANY KIND OF SEXUAL ACTIVITY BY YOUR PARTNER OR EX-PARTNER?: NO

## 2021-07-25 SDOH — SOCIAL STABILITY: SOCIAL NETWORK
DO YOU BELONG TO ANY CLUBS OR ORGANIZATIONS SUCH AS CHURCH GROUPS UNIONS, FRATERNAL OR ATHLETIC GROUPS, OR SCHOOL GROUPS?: NO

## 2021-07-25 SDOH — ECONOMIC STABILITY: HOUSING INSECURITY
IN THE LAST 12 MONTHS, WAS THERE A TIME WHEN YOU DID NOT HAVE A STEADY PLACE TO SLEEP OR SLEPT IN A SHELTER (INCLUDING NOW)?: NO

## 2021-07-25 SDOH — SOCIAL STABILITY: SOCIAL NETWORK: IN A TYPICAL WEEK, HOW MANY TIMES DO YOU TALK ON THE PHONE WITH FAMILY, FRIENDS, OR NEIGHBORS?: THREE TIMES A WEEK

## 2021-07-25 SDOH — SOCIAL STABILITY: SOCIAL NETWORK: HOW OFTEN DO YOU GET TOGETHER WITH FRIENDS OR RELATIVES?: THREE TIMES A WEEK

## 2021-07-25 SDOH — HEALTH STABILITY: MENTAL HEALTH: HOW MANY STANDARD DRINKS CONTAINING ALCOHOL DO YOU HAVE ON A TYPICAL DAY?: PATIENT DECLINED

## 2021-07-25 SDOH — ECONOMIC STABILITY: TRANSPORTATION INSECURITY
IN THE PAST 12 MONTHS, HAS LACK OF TRANSPORTATION KEPT YOU FROM MEETINGS, WORK, OR FROM GETTING THINGS NEEDED FOR DAILY LIVING?: NO

## 2021-07-25 SDOH — SOCIAL STABILITY: SOCIAL NETWORK: HOW OFTEN DO YOU ATTEND CHURCH OR RELIGIOUS SERVICES?: NEVER

## 2021-07-25 ASSESSMENT — ENCOUNTER SYMPTOMS
EYE DISCHARGE: 0
ABDOMINAL PAIN: 0
DIARRHEA: 0
ABDOMINAL PAIN: 1
SORE THROAT: 0
EYE PAIN: 0
VOMITING: 0
CHEST TIGHTNESS: 0
WHEEZING: 0
SHORTNESS OF BREATH: 1
SHORTNESS OF BREATH: 0
COUGH: 0
CONSTIPATION: 0
SHORTNESS OF BREATH: 0
VOMITING: 0
NAUSEA: 0
EYE REDNESS: 0
COUGH: 0
BACK PAIN: 1
BACK PAIN: 0
APNEA: 0
TROUBLE SWALLOWING: 0
ABDOMINAL DISTENTION: 0
SORE THROAT: 0
COLOR CHANGE: 0
ALLERGIC/IMMUNOLOGIC NEGATIVE: 1
NAUSEA: 1

## 2021-07-25 ASSESSMENT — PAIN DESCRIPTION - LOCATION: LOCATION: BACK

## 2021-07-25 ASSESSMENT — PAIN DESCRIPTION - PAIN TYPE: TYPE: ACUTE PAIN

## 2021-07-25 ASSESSMENT — PAIN DESCRIPTION - DESCRIPTORS: DESCRIPTORS: SORE

## 2021-07-25 ASSESSMENT — PAIN SCALES - GENERAL
PAINLEVEL_OUTOF10: 9
PAINLEVEL_OUTOF10: 10
PAINLEVEL_OUTOF10: 9
PAINLEVEL_OUTOF10: 4

## 2021-07-25 ASSESSMENT — PAIN DESCRIPTION - FREQUENCY: FREQUENCY: CONTINUOUS

## 2021-07-25 ASSESSMENT — PAIN DESCRIPTION - ORIENTATION: ORIENTATION: LOWER

## 2021-07-25 NOTE — ED PROVIDER NOTES
[address] [facility] ED  EMERGENCY DEPARTMENT ENCOUNTER      Pt Name: Caitlin Gregg  MRN: [id number]  Alogfoniel 1940  Date of evaluation: 7/25/2021  Provider: Baltazar Leyva PA-C    CHIEF COMPLAINT       Chief Complaint   Patient presents with    Other     ptstates she was at [facility] lastnight, Dx with kidney failure and left AMA, instructed to come to ER today         HISTORY OF PRESENT ILLNESS   (Location/Symptom, Timing/Onset, Context/Setting, Quality, Duration, Modifying Factors, Severity)  Note limiting factors. Caitlin Gregg is a 79 y.o. female who presents to the emergency department for having evaluation late last night/early this morning at [facility] for loss of taste and appetite, fatigue and lower back pain. Patient states that she was diagnosed with acute kidney failure and they wanted to admit her however patient stated that she had things to do at home so she signed out AMA and came back to this hospital instead. Patient denies any chest pain, palpitations or shortness of breath. Patient denies any headache or dizziness. Patient states that her lower back pain is recurrent in nature. Patient denies any diarrhea or change in bowel habits. Patient states that she has not been eating or drinking much over the past several days    HPI    Nursing Notes were reviewed. REVIEW OF SYSTEMS    (2-9 systems for level 4, 10 or more for level 5)     Review of Systems   Constitutional: Positive for appetite change and fatigue. Negative for diaphoresis and fever. HENT: Negative for hearing loss and trouble swallowing. Eyes: Negative for pain. Respiratory: Negative for apnea and shortness of breath. Cardiovascular: Negative for chest pain. Gastrointestinal: Negative for abdominal pain. Endocrine: Negative. Genitourinary: Negative for hematuria. Musculoskeletal: Positive for back pain. Negative for neck pain and neck stiffness. Skin: Negative for color change. Allergic/Immunologic: Negative. Neurological: Negative for dizziness and numbness. Hematological: Negative. Psychiatric/Behavioral: Negative. All other systems reviewed and are negative. Except as noted above the remainder of the review of systems was reviewed and negative.        PAST MEDICAL HISTORY     Past Medical History:   Diagnosis Date    Gastroesophageal reflux disease 6/10/2002    Hypothyroidism     Obesity, Class III, BMI 40-49.9 (morbid obesity) ([facility] [address] 1/4/2016    Tobacco abuse          SURGICAL HISTORY       Past Surgical History:   Procedure Laterality Date    APPENDECTOMY      CHOLECYSTECTOMY           CURRENT MEDICATIONS       Previous Medications    FUROSEMIDE (LASIX) 20 MG TABLET    Take 1 tablet by mouth daily    IBUPROFEN (ADVIL;MOTRIN) 200 MG TABLET    Take 400 mg by mouth every 6 hours as needed for Pain    SYNTHROID 150 MCG TABLET    Take 1 tablet by mouth Daily       ALLERGIES     Benadryl [diphenhydramine hcl], Cortisone, Codeine, and Pcn [penicillins]    FAMILY HISTORY       Family History   Problem Relation Age of Onset    Kidney Disease Mother     High Blood Pressure Mother     Stroke Mother     Heart Disease Father           SOCIAL HISTORY       Social History     Socioeconomic History    Marital status:      Spouse name: Not on file    Number of children: Not on file    Years of education: Not on file    Highest education level: Not on file   Occupational History    Not on file   Tobacco Use    Smoking status: Former Smoker     Packs/day: 5.00     Years: 30.00     Pack years: 150.00     Types: Cigarettes     Quit date: 10/1/2017     Years since quitting: 3.8    Smokeless tobacco: Never Used    Tobacco comment: Has been trying to quit since January   Vaping Use    Vaping Use: Never used   Substance and Sexual Activity    Alcohol use: No     Alcohol/week: 0.0 standard drinks    Drug use: No    Sexual activity: Not Currently   Other Topics Concern    Not on file   Social History Narrative    Not on file     Social Determinants of Health     Financial Resource Strain:     Difficulty of Paying Living Expenses:    Food Insecurity:     Worried About Running Out of Food in the Last Year:     [address] in the Last Year:    Transportation Needs:     Lack of Transportation (Medical):  Lack of Transportation (Non-Medical):    Physical Activity:     Days of Exercise per Week:     Minutes of Exercise per Session:    Stress:     Feeling of Stress :    Social Connections:     Frequency of Communication with Friends and Family:     Frequency of Social Gatherings with Friends and Family:     Attends [employer] Services:     Active Member of Clubs or Organizations:     Attends Club or Organization Meetings:     Marital Status:    Intimate Partner Violence:     Fear of Current or Ex-Partner:     Emotionally Abused:     Physically Abused:     Sexually Abused:        SCREENINGS                        PHYSICAL EXAM    (up to 7 for level 4, 8 or more for level 5)     ED Triage Vitals [07/25/21 1739]   BP Temp Temp Source Pulse Resp SpO2 Height Weight   (!) 144/81 98.4 °F (36.9 °C) Oral 82 18 100 % 5' 3\" (1.6 m) 220 lb (99.8 kg)       Physical Exam  Vitals and nursing note reviewed. Constitutional:       General: She is not in acute distress. Appearance: She is well-developed. She is not diaphoretic. HENT:      Head: Normocephalic and atraumatic. Mouth/Throat:      Pharynx: No oropharyngeal exudate. Eyes:      General: No scleral icterus. Conjunctiva/sclera: Conjunctivae normal.      Pupils: Pupils are equal, round, and reactive to light. Neck:      Trachea: No tracheal deviation. Cardiovascular:      Rate and Rhythm: Normal rate. Heart sounds: Normal heart sounds. Pulmonary:      Effort: Pulmonary effort is normal. No respiratory distress. Breath sounds: Normal breath sounds.    Abdominal:      General: Bowel sounds are normal. There is no distension. Palpations: Abdomen is soft. Musculoskeletal:         General: Normal range of motion. Cervical back: Normal range of motion and neck supple. Skin:     General: Skin is warm and dry. Findings: No erythema or rash. Neurological:      Mental Status: She is alert and oriented to person, place, and time. Cranial Nerves: No cranial nerve deficit. Motor: No abnormal muscle tone. Psychiatric:         Behavior: Behavior normal.         Thought Content: Thought content normal.         Judgment: Judgment normal.         DIAGNOSTIC RESULTS     EKG: All EKG's are interpreted by the Emergency Department Physician who either signs or Co-signs this chart in the absence of a cardiologist.      RADIOLOGY:   Non-plain film images such as CT, Ultrasound and MRI are read by the radiologist. Plain radiographic images are visualized and preliminarily interpreted by the emergency physician with the below findings:      Interpretation per the Radiologist below, if available at the time of this note:    No orders to display         ED BEDSIDE ULTRASOUND:   Performed by ED Physician - none    LABS:  Labs Reviewed   COMPREHENSIVE METABOLIC PANEL   CBC WITH AUTO DIFFERENTIAL   MAGNESIUM   URINE RT REFLEX TO CULTURE       All other labs were within normal range or not returned as of this dictation. EMERGENCY DEPARTMENT COURSE and DIFFERENTIAL DIAGNOSIS/MDM:   Vitals:    Vitals:    07/25/21 1739   BP: (!) 144/81   Pulse: 82   Resp: 18   Temp: 98.4 °F (36.9 °C)   TempSrc: Oral   SpO2: 100%   Weight: 220 lb (99.8 kg)   Height: 5' 3\" (1.6 m)           MDM      REASSESSMENT      Patient presented to emergency department after having laboratory testing done at another ER late last night with findings of acute renal failure.   Patient was given IV fluids in the emergency department and had labs rechecked which does show mild acidosis with elevated creatinine after having a baseline creatinine that is normal in January. Patient will be admitted for further evaluation and care      CONSULTS:  None    PROCEDURES:  Unless otherwise noted below, none     Procedures        FINAL IMPRESSION      1. Acute renal failure, unspecified acute renal failure type ([facility] [address]          DISPOSITION/PLAN   DISPOSITION  07/25/2021 05:53:01 PM      PATIENT REFERRED TO:  No follow-up provider specified. DISCHARGE MEDICATIONS:  New Prescriptions    No medications on file     Controlled Substances Monitoring:     No flowsheet data found.     (Please note that portions of this note were completed with a voice recognition program.  Efforts were made to edit the dictations but occasionally words are mis-transcribed.)    Nelsy Verdugo PA-C (electronically signed)  Attending Emergency Physician            Nelsy Verdugo PA-C  07/25/21 7910

## 2021-07-26 LAB
ALBUMIN SERPL-MCNC: 3.6 G/DL (ref 3.5–4.6)
ALP BLD-CCNC: 88 U/L (ref 40–130)
ALT SERPL-CCNC: 11 U/L (ref 0–33)
ANION GAP SERPL CALCULATED.3IONS-SCNC: 13 MEQ/L (ref 9–15)
AST SERPL-CCNC: 11 U/L (ref 0–35)
BASOPHILS ABSOLUTE: 0.1 K/UL (ref 0–0.2)
BASOPHILS RELATIVE PERCENT: 0.6 %
BILIRUB SERPL-MCNC: <0.2 MG/DL (ref 0.2–0.7)
BUN BLDV-MCNC: 58 MG/DL (ref 8–23)
CALCIUM SERPL-MCNC: 9.3 MG/DL (ref 8.5–9.9)
CHLORIDE BLD-SCNC: 113 MEQ/L (ref 95–107)
CO2: 14 MEQ/L (ref 20–31)
CREAT SERPL-MCNC: 2.05 MG/DL (ref 0.5–0.9)
EOSINOPHILS ABSOLUTE: 0.2 K/UL (ref 0–0.7)
EOSINOPHILS RELATIVE PERCENT: 2.1 %
GFR AFRICAN AMERICAN: 28
GFR NON-AFRICAN AMERICAN: 23.2
GLOBULIN: 3.3 G/DL (ref 2.3–3.5)
GLUCOSE BLD-MCNC: 96 MG/DL (ref 70–99)
HCT VFR BLD CALC: 30.7 % (ref 37–47)
HEMOGLOBIN: 9.9 G/DL (ref 12–16)
LYMPHOCYTES ABSOLUTE: 1.1 K/UL (ref 1–4.8)
LYMPHOCYTES RELATIVE PERCENT: 10.3 %
MCH RBC QN AUTO: 24.3 PG (ref 27–31.3)
MCHC RBC AUTO-ENTMCNC: 32.2 % (ref 33–37)
MCV RBC AUTO: 75.7 FL (ref 82–100)
MONOCYTES ABSOLUTE: 0.8 K/UL (ref 0.2–0.8)
MONOCYTES RELATIVE PERCENT: 7.7 %
NEUTROPHILS ABSOLUTE: 8.2 K/UL (ref 1.4–6.5)
NEUTROPHILS RELATIVE PERCENT: 79.3 %
PDW BLD-RTO: 17.5 % (ref 11.5–14.5)
PLATELET # BLD: 357 K/UL (ref 130–400)
POTASSIUM REFLEX MAGNESIUM: 4.4 MEQ/L (ref 3.4–4.9)
RBC # BLD: 4.05 M/UL (ref 4.2–5.4)
SODIUM BLD-SCNC: 140 MEQ/L (ref 135–144)
TOTAL PROTEIN: 6.9 G/DL (ref 6.3–8)
TSH REFLEX: 2.11 UIU/ML (ref 0.44–3.86)
WBC # BLD: 10.4 K/UL (ref 4.8–10.8)

## 2021-07-26 PROCEDURE — 6370000000 HC RX 637 (ALT 250 FOR IP): Performed by: INTERNAL MEDICINE

## 2021-07-26 PROCEDURE — 6360000002 HC RX W HCPCS: Performed by: NURSE PRACTITIONER

## 2021-07-26 PROCEDURE — 99213 OFFICE O/P EST LOW 20 MIN: CPT

## 2021-07-26 PROCEDURE — 85025 COMPLETE CBC W/AUTO DIFF WBC: CPT

## 2021-07-26 PROCEDURE — 84443 ASSAY THYROID STIM HORMONE: CPT

## 2021-07-26 PROCEDURE — 99211 OFF/OP EST MAY X REQ PHY/QHP: CPT

## 2021-07-26 PROCEDURE — 80053 COMPREHEN METABOLIC PANEL: CPT

## 2021-07-26 PROCEDURE — 2580000003 HC RX 258: Performed by: STUDENT IN AN ORGANIZED HEALTH CARE EDUCATION/TRAINING PROGRAM

## 2021-07-26 PROCEDURE — 2580000003 HC RX 258: Performed by: INTERNAL MEDICINE

## 2021-07-26 PROCEDURE — 2580000003 HC RX 258: Performed by: NURSE PRACTITIONER

## 2021-07-26 PROCEDURE — 2500000003 HC RX 250 WO HCPCS: Performed by: STUDENT IN AN ORGANIZED HEALTH CARE EDUCATION/TRAINING PROGRAM

## 2021-07-26 PROCEDURE — 1210000000 HC MED SURG R&B

## 2021-07-26 PROCEDURE — 36415 COLL VENOUS BLD VENIPUNCTURE: CPT

## 2021-07-26 RX ORDER — OXYCODONE HYDROCHLORIDE AND ACETAMINOPHEN 5; 325 MG/1; MG/1
1 TABLET ORAL ONCE
Status: COMPLETED | OUTPATIENT
Start: 2021-07-26 | End: 2021-07-26

## 2021-07-26 RX ORDER — SODIUM CHLORIDE 9 MG/ML
INJECTION, SOLUTION INTRAVENOUS CONTINUOUS
Status: DISCONTINUED | OUTPATIENT
Start: 2021-07-26 | End: 2021-07-26

## 2021-07-26 RX ORDER — LEVOTHYROXINE SODIUM 0.07 MG/1
150 TABLET ORAL DAILY
Status: DISCONTINUED | OUTPATIENT
Start: 2021-07-26 | End: 2021-07-27 | Stop reason: HOSPADM

## 2021-07-26 RX ADMIN — TRAMADOL HYDROCHLORIDE 25 MG: 50 TABLET, FILM COATED ORAL at 00:13

## 2021-07-26 RX ADMIN — SODIUM CHLORIDE: 9 INJECTION, SOLUTION INTRAVENOUS at 02:26

## 2021-07-26 RX ADMIN — OXYCODONE HYDROCHLORIDE AND ACETAMINOPHEN 1 TABLET: 5; 325 TABLET ORAL at 08:48

## 2021-07-26 RX ADMIN — SODIUM BICARBONATE: 84 INJECTION, SOLUTION INTRAVENOUS at 14:44

## 2021-07-26 RX ADMIN — ENOXAPARIN SODIUM 30 MG: 30 INJECTION SUBCUTANEOUS at 08:48

## 2021-07-26 RX ADMIN — LEVOTHYROXINE SODIUM 150 MCG: 0.07 TABLET ORAL at 08:48

## 2021-07-26 RX ADMIN — Medication 10 ML: at 02:26

## 2021-07-26 ASSESSMENT — PAIN SCALES - GENERAL
PAINLEVEL_OUTOF10: 10
PAINLEVEL_OUTOF10: 0
PAINLEVEL_OUTOF10: 9

## 2021-07-26 NOTE — CONSULTS
[facility]  Consult Note           Reason for Consult: ROCHELLE  Requesting Physician:  Dr. Ilana Meyer    Chief Complaint:  Generalized weakness  History Obtained From:  patient, electronic medical record    History of Present Ilness:    79y.o. year old female with history s/f GERD, hypothyroidism, obesity who presented for generalized weakness and LE swelling. Had initially presented to [place] the day before and would have been admitted but pt left AMA. She presented to [facility] EMERGENCY [facility] [place] for the complaint of ROCHELLE. Notably pt has been taking multiple pills of ibuprofen. In addition has abdominal pain as well. Had nml renal function in 01/21. Now Scr 3.5 and improving. CT A/P showed colonic diverticulitis and 1 cm hyperdense structure of the RT kidney felt to likely be a hemorrhagic/proteinaceous cyst. Also on lasix as outpatient. States she has had significant leg swelling for years     Past Medical History:        Diagnosis Date    ROCHELLE (acute kidney injury) ([facility] [address] 7/25/2021    Gastroesophageal reflux disease 6/10/2002    Hypothyroidism     Obesity, Class III, BMI 40-49.9 (morbid obesity) ([facility] [address] 1/4/2016    Tobacco abuse        Past Surgical History:        Procedure Laterality Date    APPENDECTOMY      CHOLECYSTECTOMY         Home Medications:    No current facility-administered medications on file prior to encounter.      Current Outpatient Medications on File Prior to Encounter   Medication Sig Dispense Refill    ibuprofen (ADVIL;MOTRIN) 200 MG tablet Take 400 mg by mouth every 6 hours as needed for Pain      SYNTHROID 150 MCG tablet Take 1 tablet by mouth Daily 90 tablet 0    furosemide (LASIX) 20 MG tablet Take 1 tablet by mouth daily 60 tablet 3       Allergies:  Benadryl [diphenhydramine hcl], Cortisone, Codeine, and Pcn [penicillins]    Social History:    Social History     Socioeconomic History    Marital status:      Spouse name: Not on file    Number of children: Not on file    Years of Review of Systems:   Positives in bold  Constitutional: fever, chills, fatigue, malaise   HENT:  rhinorrhea, sinus pain, sore throat, epistaxis    Eyes:  photophobia, visual disturbance, eye redness  Respiratory: shortness of breath, cough, hemoptysis    Cardiovascular: chest pain, palpitations, orthopnea, leg swelling   Gastrointestinal: abdominal pain, nausea, vomiting, diarrhea, constipation   Endocrine: polydipsia, polyphagia, cold intolerance, heat intolerance  Genitourinary: dysuria, flank pain, frequency, urgency   Hematologic: easy bruising, easy bleeding  Musculoskeletal: back pain, neck pain, myalgias, arthalgias  Neurological: syncope, lightheadedness, dizziness, seizures, tremors, weakness  Psychiatric/Behavioral: anxiety, depression, hallucinations  Skin: rash, itching    Physical exam:   Constitutional:  VITALS:  BP (!) 124/56   Pulse 76   Temp 98.2 °F (36.8 °C)   Resp 18   Ht 5' 3\" (1.6 m)   Wt 220 lb (99.8 kg)   SpO2 97%   BMI 38.97 kg/m²     General: alert, in no apparent distress, obese  HEENT: normocephalic, atraumatic, anicteric  Neck: supple, no mass  Lungs: non-labored respirations, clear to auscultation bilaterally  Heart: regular rate and rhythm, no murmurs or rubs  Abdomen: soft, non-tender, non-distended  MSK: no joint swelling or tenderness  Ext: no cyanosis, +++ peripheral edema  Neuro: alert and oriented, no gross abnormalities  Psych: normal mood and affect    Data/  CBC:   Lab Results   Component Value Date    WBC 10.4 07/26/2021    RBC 4.05 07/26/2021    HGB 9.9 07/26/2021    HCT 30.7 07/26/2021    MCV 75.7 07/26/2021    MCH 24.3 07/26/2021    MCHC 32.2 07/26/2021    RDW 17.5 07/26/2021     07/26/2021    MPV 8.7 03/06/2014     BMP:    Lab Results   Component Value Date     07/26/2021    K 4.4 07/26/2021     07/26/2021    CO2 14 07/26/2021    BUN 58 07/26/2021    LABALBU 3.6 07/26/2021    CREATININE 2.05 07/26/2021    CALCIUM 9.3 07/26/2021    GFRAA 28.0 reasonably achievable. XR CHEST (2 VW)    Result Date: 7/25/2021  EXAMINATION: Chest x-ray, 2 view HISTORY: Abdominal pain TECHNIQUE: Frontal and lateral views of the chest COMPARISON: FINDINGS: Cardiomediastinal silhouette is within normal limits. No pneumothorax, pleural effusion, or consolidation. Lungs appear hyperinflated. Degenerative changes of the spine. Accentuated thoracic kyphosis. Chronic left rib deformity noted. No radiographic evidence of acute intrathoracic process. Findings suggesting COPD. Assessment:  79y.o. year old female with history s/f GERD, hypothyroidism, obesity who presented for generalized weakness and LE swelling. 1. ROCHELLE: likely due to volume depletion due to decreased PO +/- NSAID Nephropathy (significant motrin use in the past few months), has nml renal function at baseline (01/21), UA w/ prot and LE   2. Metabolic acidosis  3. Anemia  4. HTN    Plan:  - changed fluids to bicarb gtt  - long discussion about NSAID use and decreasing use  - quantify proteinuria  - checking iron studies    Thank you for the consultation. Will continue to follow  Please do not hesitate to call with questions.     Ese Mancini MD, MD

## 2021-07-26 NOTE — PROGRESS NOTES
Physician Progress Note    2021   4:28 PM    Name:  Darlyn Lacey  MRN:    [id number]      Day: 1     Admit Date: 2021  5:41 PM  PCP: Johanna Quinn MD    Code Status:  Full Code    Subjective:     Upset she is in the hospital but otherwise denies complaints.     Current Facility-Administered Medications   Medication Dose Route Frequency Provider Last Rate Last Admin    levothyroxine (SYNTHROID) tablet 150 mcg  150 mcg Oral Daily [facility] DO   150 mcg at 21 0848    sodium bicarbonate 150 mEq in dextrose 5 % 1,000 mL infusion   Intravenous Continuous Anaid Huggins MD 75 mL/hr at 21 1444 New Bag at 21 1444    sodium chloride flush 0.9 % injection 5-40 mL  5-40 mL Intravenous 2 times per day Arminda Bernabe APRN - CNP   10 mL at 21 0226    sodium chloride flush 0.9 % injection 5-40 mL  5-40 mL Intravenous PRN Arminda Bernabe APRN - CNP        0.9 % sodium chloride infusion  25 mL Intravenous PRN Arminda Bernabe APRN - CNP        enoxaparin (LOVENOX) injection 30 mg  30 mg Subcutaneous Daily Arminda Bernabe APRN - CNP   30 mg at 21 0848    ondansetron (ZOFRAN-ODT) disintegrating tablet 4 mg  4 mg Oral Q8H PRN Arminda Bernabe APRN - CNP        Or    ondansetron [facility]) injection 4 mg  4 mg Intravenous Q6H PRN Arminda Bernabe APRN - CNP        acetaminophen (TYLENOL) tablet 650 mg  650 mg Oral Q6H PRN Arminda Bernabe APRN - CNP        Or    acetaminophen (TYLENOL) suppository 650 mg  650 mg Rectal Q6H PRN Arminda Bernabe APRN - CNP        traMADol [facility]) tablet 25 mg  25 mg Oral Q4H PRN Rusty Bush MD   25 mg at 21 0013       Physical Examination:      Vitals:  BP (!) 124/56   Pulse 76   Temp 98.2 °F (36.8 °C)   Resp 18   Ht 5' 3\" (1.6 m)   Wt 220 lb (99.8 kg)   SpO2 97%   BMI 38.97 kg/m²   Temp (24hrs), Av.3 °F (36.8 °C), Min:98.2 °F (36.8 °C), Max:98.4 °F (36.9 °C)      General appearance: alert, cooperative and no distress. Obese  Mental Status: oriented to person, place and time and normal affect  Lungs: clear to auscultation bilaterally, normal effort  Heart: regular rate and rhythm, no murmur  Abdomen: soft, nontender, nondistended, bowel sounds present, no masses  Extremities: Nonpitting edema of lower extremities. Both are wrapped. Data:     Labs:  Recent Labs     07/25/21  1800 07/26/21 0518   WBC 12.0* 10.4   HGB 10.5* 9.9*    357     Recent Labs     07/25/21  1800 07/26/21 0518    140   K 4.2 4.4    113*   CO2 15* 14*   BUN 68* 58*   CREATININE 2.54* 2.05*   GLUCOSE 109* 96     Recent Labs     07/25/21  1800 07/26/21 0518   AST 11 11   ALT 12 11   BILITOT <0.2 <0.2   ALKPHOS 93 88       Assessment and Plan:        1. ROCHELLE suspect secondary to excessive NSAID use  -IV bicarbonate infusion. Follow creatinine. Nephrology following. Educated on discontinuation of NSAID use    Diet: ADULT DIET; Regular;  Low Sodium (2 gm)  Ppx: lovenox  Full Code    Dispo: home at d/c when renal function improved    >35 minutes in total care time    Electronically signed by Kole De Leon DO on 7/26/2021 at 4:28 PM

## 2021-07-26 NOTE — H&P
[place] [address] MEDICINE    HISTORY AND PHYSICAL EXAM    PATIENT NAME:  Elza Bowles    MRN:  [id number]  SERVICE DATE:  7/25/2021   SERVICE TIME:  11:06 PM    Primary Care Physician: Christie Rivas MD     SUBJECTIVE  CHIEF COMPLAINT:  ROCHELLE  - was seen at local ED yesterday and diagnosed w ROCHELLE, left AMA. HPI:  Elza Bowles is a 79 y.o., , female who  has a past medical history of ROCHELLE (acute kidney injury) ([facility] [address] Gastroesophageal reflux disease, Hypothyroidism, Obesity, Class III, BMI 40-49.9 (morbid obesity) ([facility] [address] and Tobacco abuse. that is hospitalized for ROCHELLE    HPI  Presents for further treatment of ROCHELLE. She has noticed increased fatigue and mild SOB over some months. She also has LE edema and open wounds on both LEs. Currently wrapped because they [facility] everywhere\"   She has some loss of appetite, chronic low back pain. No chest or abdominal pain, no palpitations. She notices no change in amount of urine output and has no change in her hydration status. No new medications, she does take occasional NSAIDS. No flank pain, hematuria or recent UTI.       Admitted for management of ROCHELLE w B/ Scr 76 / 2.54      PAST MEDICAL HISTORY:    Past Medical History:   Diagnosis Date    ROCHELLE (acute kidney injury) ([facility] [address] 7/25/2021    Gastroesophageal reflux disease 6/10/2002    Hypothyroidism     Obesity, Class III, BMI 40-49.9 (morbid obesity) ([facility] [address] 1/4/2016    Tobacco abuse      PAST SURGICAL HISTORY:    Past Surgical History:   Procedure Laterality Date    APPENDECTOMY      CHOLECYSTECTOMY       FAMILY HISTORY:    Family History   Problem Relation Age of Onset    Kidney Disease Mother     High Blood Pressure Mother     Stroke Mother     Heart Disease Father      SOCIAL HISTORY:    Social History     Socioeconomic History    Marital status:      Spouse name: Not on file    Number of children: Not on file    Years of education: Not on file    Highest education level: Not on file   Occupational History    Not on file   Tobacco Use    Smoking status: Former Smoker     Packs/day: 5.00     Years: 30.00     Pack years: 150.00     Types: Cigarettes     Quit date: 10/1/2017     Years since quitting: 3.8    Smokeless tobacco: Never Used    Tobacco comment: Has been trying to quit since January   Vaping Use    Vaping Use: Never used   Substance and Sexual Activity    Alcohol use: No     Alcohol/week: 0.0 standard drinks    Drug use: No    Sexual activity: Not Currently   Other Topics Concern    Not on file   Social History Narrative    Not on file     Social Determinants of Health     Financial Resource Strain:     Difficulty of Paying Living Expenses:    Food Insecurity:     Worried About Running Out of Food in the Last Year:     [address] in the Last Year:    Transportation Needs:     Lack of Transportation (Medical):  Lack of Transportation (Non-Medical):    Physical Activity:     Days of Exercise per Week:     Minutes of Exercise per Session:    Stress:     Feeling of Stress :    Social Connections:     Frequency of Communication with Friends and Family:     Frequency of Social Gatherings with Friends and Family:     Attends [employer] Services:     Active Member of Clubs or Organizations:     Attends Club or Organization Meetings:     Marital Status:    Intimate Partner Violence:     Fear of Current or Ex-Partner:     Emotionally Abused:     Physically Abused:     Sexually Abused:      MEDICATIONS:   Prior to Admission medications    Medication Sig Start Date End Date Taking?  Authorizing Provider   ibuprofen (ADVIL;MOTRIN) 200 MG tablet Take 400 mg by mouth every 6 hours as needed for Pain    Historical Provider, MD   furosemide (LASIX) 20 MG tablet Take 1 tablet by mouth daily 7/5/21   MARLEE Brown CNP   SYNTHROID 150 MCG tablet Take 1 tablet by mouth Daily 6/17/21   Stephen Campos MD       ALLERGIES: Benadryl [diphenhydramine hcl], Cortisone, Codeine, and Pcn [penicillins]    REVIEW OF SYSTEM:   Review of Systems   Constitutional: Positive for activity change, appetite change and fatigue. Negative for chills and fever. HENT: Negative for sore throat and trouble swallowing. Eyes: Negative for visual disturbance. Respiratory: Positive for shortness of breath (rare). Negative for cough, chest tightness and wheezing. Cardiovascular: Positive for leg swelling. Negative for chest pain and palpitations. Gastrointestinal: Negative for abdominal distention, constipation, diarrhea, nausea and vomiting. Genitourinary: Negative for difficulty urinating, dysuria and hematuria. Musculoskeletal: Positive for back pain and gait problem. Skin: Positive for wound (bilateral LE). Neurological: Positive for weakness. Negative for dizziness, syncope and light-headedness. Psychiatric/Behavioral: Negative for confusion. The patient is not nervous/anxious. OBJECTIVE  PHYSICAL EXAM:   Physical Exam  Vitals and nursing note reviewed. Constitutional:       General: She is awake. She is not in acute distress. Appearance: Normal appearance. She is obese. She is not ill-appearing or diaphoretic. Comments: Unkempt, odorous   HENT:      Head: Normocephalic and atraumatic. Nose: Nose normal.      Mouth/Throat:      Pharynx: Oropharynx is clear. Eyes:      Conjunctiva/sclera: Conjunctivae normal.      Pupils: Pupils are equal, round, and reactive to light. Neck:      Vascular: No carotid bruit. Cardiovascular:      Rate and Rhythm: Normal rate and regular rhythm. Pulses: Normal pulses. Heart sounds: Normal heart sounds. No murmur heard. Pulmonary:      Effort: Pulmonary effort is normal.      Breath sounds: Normal breath sounds. No wheezing or rhonchi. Comments: Diminished bases. Abdominal:      General: Abdomen is flat. Bowel sounds are normal.      Palpations: Abdomen is soft. Tenderness:  There is no abdominal tenderness. Musculoskeletal:         General: Normal range of motion. Cervical back: Normal range of motion and neck supple. No muscular tenderness. Right lower leg: Edema present. Left lower leg: Edema present. Lymphadenopathy:      Cervical: No cervical adenopathy. Skin:     General: Skin is warm and dry. Capillary Refill: Capillary refill takes less than 2 seconds. Comments: Bilateral LE wounds,  Edema    Legs wrapped  Seeping serous fluid   Neurological:      Mental Status: She is alert and oriented to person, place, and time. Psychiatric:         Mood and Affect: Mood normal.         Behavior: Behavior normal. Behavior is cooperative. /86   Pulse 79   Temp 98.4 °F (36.9 °C) (Oral)   Resp 18   Ht 5' 3\" (1.6 m)   Wt 220 lb (99.8 kg)   SpO2 100%   BMI 38.97 kg/m²     DATA:     Diagnostic tests reviewed for today's visit:    Most recent labs and imaging results reviewed.      LABS:    Recent Results (from the past 24 hour(s))   EKG 12 Lead - Chest Pain    Collection Time: 07/25/21 12:39 AM   Result Value Ref Range    Ventricular Rate 100 BPM    Atrial Rate 100 BPM    P-R Interval 194 ms    QRS Duration 84 ms    Q-T Interval 346 ms    QTc Calculation (Bazett) 446 ms    P Axis 42 degrees    R Axis -7 degrees    T Axis 29 degrees   COVID-19, Rapid    Collection Time: 07/25/21 12:52 AM    Specimen: Nasopharyngeal Swab   Result Value Ref Range    SARS-CoV-2, NAAT Not Detected Not Detected   Comprehensive Metabolic Panel    Collection Time: 07/25/21  1:01 AM   Result Value Ref Range    Sodium 136 135 - 144 mEq/L    Potassium 4.2 3.4 - 4.9 mEq/L    Chloride 106 95 - 107 mEq/L    CO2 15 (L) 20 - 31 mEq/L    Anion Gap 15 9 - 15 mEq/L    Glucose 118 (H) 70 - 99 mg/dL    BUN 68 (H) 8 - 23 mg/dL    CREATININE 2.50 (H) 0.50 - 0.90 mg/dL    GFR Non-African American 18.4 (L) >60    GFR  22.3 (L) >60    Calcium 9.6 8.5 - 9.9 mg/dL    Total Protein 8.2 (H) 6.3 - 8.0 g/dL    Albumin 3.9 3.5 - 4.6 g/dL    Total Bilirubin <0.2 0.2 - 0.7 mg/dL    Alkaline Phosphatase 94 40 - 130 U/L    ALT 13 0 - 33 U/L    AST 10 0 - 35 U/L    Globulin 4.3 (H) 2.3 - 3.5 g/dL   Lipase    Collection Time: 07/25/21  1:01 AM   Result Value Ref Range    Lipase 36 12 - 95 U/L   CBC Auto Differential    Collection Time: 07/25/21  1:01 AM   Result Value Ref Range    WBC 13.9 (H) 4.0 - 10.0 K/uL    RBC 4.34 3.93 - 5.22 M/uL    Hemoglobin 10.6 (L) 11.2 - 15.7 g/dL    Hematocrit 32.9 (L) 37.0 - 47.0 %    MCV 75.8 (L) 79.4 - 94.8 fL    MCH 24.4 (L) 25.6 - 32.2 pg    MCHC 32.2 32.2 - 35.5 %    RDW 18.1 (H) 11.7 - 14.4 %    Platelets 416 (H) 571 - 369 K/uL    Neutrophils % 79.9 (H) 34.0 - 71.1 %    Immature Granulocytes % 0.5 %    Lymphocytes % 10.6 %    Monocytes % 7.3 4.7 - 12.5 %    Eosinophils % 1.4 0.7 - 5.8 %    Basophils % 0.3 0.1 - 1.2 %    Neutrophils Absolute 11.1 (H) 1.6 - 6.1 K/uL    Immature Granulocytes # 0.1 K/uL    Lymphocytes Absolute 1.5 1.2 - 3.7 K/uL    Monocytes Absolute 1.0 (H) 0.2 - 0.9 K/uL    Eosinophils Absolute 0.2 0.0 - 0.4 K/uL    Basophils Absolute 0.0 0.0 - 0.1 K/uL   Amylase    Collection Time: 07/25/21  1:01 AM   Result Value Ref Range    Amylase 90 22 - 93 U/L   Comprehensive Metabolic Panel    Collection Time: 07/25/21  6:00 PM   Result Value Ref Range    Sodium 137 135 - 144 mEq/L    Potassium 4.2 3.4 - 4.9 mEq/L    Chloride 106 95 - 107 mEq/L    CO2 15 (L) 20 - 31 mEq/L    Anion Gap 16 (H) 9 - 15 mEq/L    Glucose 109 (H) 70 - 99 mg/dL    BUN 68 (H) 8 - 23 mg/dL    CREATININE 2.54 (H) 0.50 - 0.90 mg/dL    GFR Non-African American 18.1 (L) >60    GFR  21.9 (L) >60    Calcium 9.7 8.5 - 9.9 mg/dL    Total Protein 7.4 6.3 - 8.0 g/dL    Albumin 4.0 3.5 - 4.6 g/dL    Total Bilirubin <0.2 0.2 - 0.7 mg/dL    Alkaline Phosphatase 93 40 - 130 U/L    ALT 12 0 - 33 U/L    AST 11 0 - 35 U/L    Globulin 3.4 2.3 - 3.5 g/dL   CBC Auto Differential Collection Time: 07/25/21  6:00 PM   Result Value Ref Range    WBC 12.0 (H) 4.8 - 10.8 K/uL    RBC 4.30 4.20 - 5.40 M/uL    Hemoglobin 10.5 (L) 12.0 - 16.0 g/dL    Hematocrit 32.7 (L) 37.0 - 47.0 %    MCV 75.9 (L) 82.0 - 100.0 fL    MCH 24.3 (L) 27.0 - 31.3 pg    MCHC 32.0 (L) 33.0 - 37.0 %    RDW 16.9 (H) 11.5 - 14.5 %    Platelets 904 673 - 105 K/uL    Neutrophils % 81.6 %    Lymphocytes % 9.8 %    Monocytes % 6.6 %    Eosinophils % 1.5 %    Basophils % 0.5 %    Neutrophils Absolute 9.8 (H) 1.4 - 6.5 K/uL    Lymphocytes Absolute 1.2 1.0 - 4.8 K/uL    Monocytes Absolute 0.8 0.2 - 0.8 K/uL    Eosinophils Absolute 0.2 0.0 - 0.7 K/uL    Basophils Absolute 0.1 0.0 - 0.2 K/uL   Magnesium    Collection Time: 07/25/21  6:00 PM   Result Value Ref Range    Magnesium 2.3 1.7 - 2.4 mg/dL   Urine Reflex to Culture    Collection Time: 07/25/21  6:00 PM    Specimen: Urine, clean catch   Result Value Ref Range    Color, UA Yellow Straw/Yellow    Clarity, UA Clear Clear    Glucose, Ur Negative Negative mg/dL    Bilirubin Urine Negative Negative    Ketones, Urine Negative Negative mg/dL    Specific Gravity, UA 1.021 1.005 - 1.030    Blood, Urine Negative Negative    pH, UA 5.5 5.0 - 9.0    Protein, UA 30 (A) Negative mg/dL    Urobilinogen, Urine 0.2 <2.0 E.U./dL    Nitrite, Urine Negative Negative    Leukocyte Esterase, Urine SMALL (A) Negative    Urine Reflex to Culture Yes    Microscopic Urinalysis    Collection Time: 07/25/21  6:00 PM   Result Value Ref Range    Renal Epithelial, UA 3-5 (A) /HPF    Bacteria, UA Negative Negative /HPF    Hyaline Casts, UA 5-10 0 - 5 /HPF    WBC, UA 10-20 (A) 0 - 5 /HPF    RBC, UA 0-2 0 - 5 /HPF    Epithelial Cells, UA 3-5 0 - 5 /HPF       IMAGING:  CT ABDOMEN PELVIS WO CONTRAST Additional Contrast? None    Result Date: 7/25/2021  EXAM:  CT ABDOMEN PELVIS WO CONTRAST History: Abdominal pain Technique: Multiple contiguous axial images were obtained of the abdomen and pelvis from an level of the lung bases through the ischial tuberosities without contrast. Multiplanar reformats were obtained. Comparison: None available Findings: Lung bases are clear. Heart size is at the upper limits of normal. Lack of intravenous contrast precludes optimal evaluation of the abdominal and pelvic viscera. The liver, gallbladder, spleen, stomach, pancreas, and adrenal glands are within normal limits. Multiple small bilateral renal cysts are identified. A 1 cm hyperdense structure arising from the superior pole of the right kidney is most likely hemorrhagic/proteinaceous cyst. No urinary tract calculi or hydronephrosis. Urinary bladder is well distended. Calcified uterine fibroids are noted. Abdominal aorta is nonaneurysmal  and demonstrates atherosclerotic calcification . No retroperitoneal or abdominal/pelvic lymphadenopathy. No small bowel obstruction. Colonic diverticuli are identified, most significantly involving the sigmoid colon No overt colonic mass or pericolonic inflammation. Appendix is within normal limits. No free fluid, loculated fluid collection, or pneumoperitoneum. No acute osseous abnormality. Degenerative changes of the spine     No acute intra-abdominal process. Colonic diverticulosis without diverticulitis. 1 cm hyperdense structure of the right kidney is most likely a hemorrhagic/proteinaceous cyst however this is incompletely evaluated on this CT without contrast. CT or MRI of the kidneys with contrast is recommended to further evaluate. All CT scans at this facility use dose modulation, iterative reconstruction, and/or weight based dosing when appropriate to reduce radiation dose to as low as reasonably achievable. XR CHEST (2 VW)    Result Date: 7/25/2021  EXAMINATION: Chest x-ray, 2 view HISTORY: Abdominal pain TECHNIQUE: Frontal and lateral views of the chest COMPARISON: FINDINGS: Cardiomediastinal silhouette is within normal limits. No pneumothorax, pleural effusion, or consolidation.  Lungs appear hyperinflated. Degenerative changes of the spine. Accentuated thoracic kyphosis. Chronic left rib deformity noted. No radiographic evidence of acute intrathoracic process. Findings suggesting COPD.      VTE Prophylaxis: low molecular weight heparin -  start    ASSESSMENT AND PLAN  Principal Problem:    ROCHELLE   Unknown etiology. No flank pain or hematuria. CT as above - recommend imaging with contrast. No new meds, no change in intake or urine output. Plan: admit  Nephrology consult,  Defer imaging orders to nephrology. Daily labs. Active Problems:    Edema states chronic LE edema. She has significant edema of LEs  Wounds and seeping of serous drainage. I could not get a clear answer as to how long they have been that way. She is ordered lasix - questionable compliance - said lasix was for CHF and she does not have CHF. Plan: defer diuresis to nephrology,      Wounds, multiple open, lower extremity - states from scratching  Her legs are wrapped   Dressings w serous drainage. 3+ edema.    Plan: ask wound care to see,  Afebrile w WBC 12K    Hypothyroidism on synthroid 150 mcg  Normal heart rate and rhythm    Does complain of fatigue   Plan: continue synthroid,  Check TSH         Plan of care discussed with: patient    SIGNATURE: MARLEE Deluna - CNP  DATE: July 25, 2021  TIME: 11:06 PM   Parker Gramajo MD  - supervising physician

## 2021-07-26 NOTE — CARE COORDINATION
[facility] Case Management Initial Discharge Assessment    Met with Patient to discuss discharge plan. PCP: Juanita Daley MD                                Date of Last Visit:     If no PCP, list provided? N/A    Discharge Planning    Living Arrangements: independently at home    Who do you live with? SPOUSE    Who helps you with your care:  family    If lives at home:     Do you have any barriers navigating in your home? no    Patient can perform ADL? Yes    Current Services (outpatient and in home) :  None    Dialysis: No    Is transportation available to get to your appointments? Yes, [facility]    DME Equipment:  yes - [facility]    Respiratory equipment: None    Respiratory provider:  no     Pharmacy:  yes - [address] with Medication Assistance Program?  No      Patient agreeable to [facility] [place]? No    Patient agreeable to SNF/Rehab? No    Other discharge needs identified? N/A    Does Patient Have a High-Risk for Readmission Diagnosis (CHF, PN, MI, COPD)? No    If Yes,     Consult with pulmonologist? N/A   Consult with cardiologist? N/A   Cardiac Rehab referral if EF <35%? N/A   Consult with Pharmacy for medication assessment prior to discharge? N/A   Consult with Behavioral health to aid in depression, anxiety, or coping issues? N/A   Palliative Care Consult? N/A   Pulmonary Rehab order for COPD, PN, and CHF (if EF > 35%)? N/A    Does patient have a reliable scale and know how to read it (for CHF)? N/A   Nutrition consult for CHF? N/A   Respiratory therapy consult that includes bedside instruction on administration of nebulizers and/or inhalers, and assessment of oxygen and equipment needs in the home? N/A    Initial Discharge Plan? (Note: please see concurrent daily documentation for any updates after initial note). FROM HOME WITH SPOUSE. SHE HAS BEEN VERY INDEPENDENT UNTIL RECENTLY. PT PLANS TO RETURN HOME AND SHE DECLINED ANY HHC AT [place].   PT SAYS THAT SHE HAS A SON AND GRANDCHILDREN WHO WILL HELP HER ANYTIME SHE CALLS.       Readmission Risk              Risk of Unplanned Readmission:  11         Electronically signed by RAJEEV Nagy on 7/26/2021 at 4:41 PM

## 2021-07-26 NOTE — ED NOTES
Report called to [facility]. Tele pack applied. Transportation notified. Patient updated.       Sajan Nelson RN  07/25/21 [id number]

## 2021-07-26 NOTE — PROGRESS NOTES
Wound Ostomy Continence Nursing    [id number]  Nurse received referral for evaluation of LE wounds. Patient refusing to remove dressings from home. Multiple excuses were presented when trying to educate patient on importance of wound evaluation and wound care. Patient states \"I was a wound care doctor downstairs in the wound center, and I don't ever want to see her again. \" Patient continued to explain she needs a dermatologist and that no one here know what they are doing. I attempted to educate the patient again, but was unsuccessful. Patient currently has feminine products taped to her leg for absorption. [id number]  Nurse offered to remove the current dressings and place appropriate wound care products, patient refused and stated her  will bring in more stuff for them later. This [id number] [address] [facility] Nurse educated patient on elevating LE, above level of the heart, to decrease edema. Patient again had several excuses. Nursing was in the room for most of the conversation. This [id number] [id number] [facility] Nurse will attempt wound eval tomorrow, or podiatrist can be consulted for evaluation since patient is established in the wound center.      Electronically signed by RODO Lynn, RN, Baljinder Mandujano on 7/26/2021 at 2:28 PM

## 2021-07-27 VITALS
OXYGEN SATURATION: 100 % | WEIGHT: 220 LBS | SYSTOLIC BLOOD PRESSURE: 152 MMHG | BODY MASS INDEX: 38.98 KG/M2 | HEART RATE: 67 BPM | RESPIRATION RATE: 20 BRPM | TEMPERATURE: 98.6 F | DIASTOLIC BLOOD PRESSURE: 71 MMHG | HEIGHT: 63 IN

## 2021-07-27 PROBLEM — Z91.199 MEDICAL NON-COMPLIANCE: Status: ACTIVE | Noted: 2021-07-27

## 2021-07-27 PROBLEM — F19.90 EXCESSIVE USE OF NONSTEROIDAL ANTI-INFLAMMATORY DRUGS (NSAIDS): Status: ACTIVE | Noted: 2021-07-27

## 2021-07-27 LAB
ALBUMIN SERPL-MCNC: 3.4 G/DL (ref 3.5–4.6)
ALP BLD-CCNC: 83 U/L (ref 40–130)
ALT SERPL-CCNC: 13 U/L (ref 0–33)
ANION GAP SERPL CALCULATED.3IONS-SCNC: 14 MEQ/L (ref 9–15)
AST SERPL-CCNC: 13 U/L (ref 0–35)
BASOPHILS ABSOLUTE: 0.1 K/UL (ref 0–0.2)
BASOPHILS RELATIVE PERCENT: 0.5 %
BILIRUB SERPL-MCNC: <0.2 MG/DL (ref 0.2–0.7)
BUN BLDV-MCNC: 47 MG/DL (ref 8–23)
CALCIUM SERPL-MCNC: 9 MG/DL (ref 8.5–9.9)
CHLORIDE BLD-SCNC: 105 MEQ/L (ref 95–107)
CO2: 18 MEQ/L (ref 20–31)
CREAT SERPL-MCNC: 1.64 MG/DL (ref 0.5–0.9)
CREATININE URINE: 129.8 MG/DL
EOSINOPHILS ABSOLUTE: 0.4 K/UL (ref 0–0.7)
EOSINOPHILS RELATIVE PERCENT: 3.7 %
FERRITIN: 269.8 NG/ML (ref 13–150)
GFR AFRICAN AMERICAN: 36.2
GFR NON-AFRICAN AMERICAN: 30
GLOBULIN: 3.3 G/DL (ref 2.3–3.5)
GLUCOSE BLD-MCNC: 105 MG/DL (ref 70–99)
HCT VFR BLD CALC: 29.7 % (ref 37–47)
HEMOGLOBIN: 9.9 G/DL (ref 12–16)
IRON SATURATION: 14 % (ref 11–46)
IRON: 31 UG/DL (ref 37–145)
LYMPHOCYTES ABSOLUTE: 1.3 K/UL (ref 1–4.8)
LYMPHOCYTES RELATIVE PERCENT: 12.9 %
MAGNESIUM: 2.1 MG/DL (ref 1.7–2.4)
MCH RBC QN AUTO: 25.3 PG (ref 27–31.3)
MCHC RBC AUTO-ENTMCNC: 33.4 % (ref 33–37)
MCV RBC AUTO: 75.8 FL (ref 82–100)
MONOCYTES ABSOLUTE: 0.8 K/UL (ref 0.2–0.8)
MONOCYTES RELATIVE PERCENT: 7.9 %
NEUTROPHILS ABSOLUTE: 7.7 K/UL (ref 1.4–6.5)
NEUTROPHILS RELATIVE PERCENT: 75 %
PDW BLD-RTO: 17.7 % (ref 11.5–14.5)
PLATELET # BLD: 365 K/UL (ref 130–400)
POTASSIUM REFLEX MAGNESIUM: 3.3 MEQ/L (ref 3.4–4.9)
PROTEIN PROTEIN: 37 MG/DL
PROTEIN/CREAT RATIO: 0.3 ML/ML
PROTEIN/CREAT RATIO: 0.3 ML/ML (ref 0–0.2)
RBC # BLD: 3.92 M/UL (ref 4.2–5.4)
SODIUM BLD-SCNC: 137 MEQ/L (ref 135–144)
TOTAL IRON BINDING CAPACITY: 228 UG/DL (ref 178–450)
TOTAL PROTEIN: 6.7 G/DL (ref 6.3–8)
URINE CULTURE, ROUTINE: NORMAL
WBC # BLD: 10.3 K/UL (ref 4.8–10.8)

## 2021-07-27 PROCEDURE — 36415 COLL VENOUS BLD VENIPUNCTURE: CPT

## 2021-07-27 PROCEDURE — 83735 ASSAY OF MAGNESIUM: CPT

## 2021-07-27 PROCEDURE — 2580000003 HC RX 258: Performed by: NURSE PRACTITIONER

## 2021-07-27 PROCEDURE — 2580000003 HC RX 258: Performed by: STUDENT IN AN ORGANIZED HEALTH CARE EDUCATION/TRAINING PROGRAM

## 2021-07-27 PROCEDURE — 83550 IRON BINDING TEST: CPT

## 2021-07-27 PROCEDURE — 84156 ASSAY OF PROTEIN URINE: CPT

## 2021-07-27 PROCEDURE — 83540 ASSAY OF IRON: CPT

## 2021-07-27 PROCEDURE — 6370000000 HC RX 637 (ALT 250 FOR IP): Performed by: INTERNAL MEDICINE

## 2021-07-27 PROCEDURE — 80053 COMPREHEN METABOLIC PANEL: CPT

## 2021-07-27 PROCEDURE — 99211 OFF/OP EST MAY X REQ PHY/QHP: CPT

## 2021-07-27 PROCEDURE — 85025 COMPLETE CBC W/AUTO DIFF WBC: CPT

## 2021-07-27 PROCEDURE — 6370000000 HC RX 637 (ALT 250 FOR IP): Performed by: NURSE PRACTITIONER

## 2021-07-27 PROCEDURE — 6360000002 HC RX W HCPCS: Performed by: NURSE PRACTITIONER

## 2021-07-27 PROCEDURE — 2500000003 HC RX 250 WO HCPCS: Performed by: STUDENT IN AN ORGANIZED HEALTH CARE EDUCATION/TRAINING PROGRAM

## 2021-07-27 PROCEDURE — 82728 ASSAY OF FERRITIN: CPT

## 2021-07-27 RX ORDER — OXYCODONE HYDROCHLORIDE AND ACETAMINOPHEN 5; 325 MG/1; MG/1
0.5 TABLET ORAL EVERY 12 HOURS PRN
Qty: 10 TABLET | Refills: 0 | Status: SHIPPED | OUTPATIENT
Start: 2021-07-27 | End: 2021-07-30 | Stop reason: ALTCHOICE

## 2021-07-27 RX ORDER — POTASSIUM CHLORIDE 20 MEQ/1
40 TABLET, EXTENDED RELEASE ORAL ONCE
Status: COMPLETED | OUTPATIENT
Start: 2021-07-27 | End: 2021-07-27

## 2021-07-27 RX ADMIN — ENOXAPARIN SODIUM 30 MG: 30 INJECTION SUBCUTANEOUS at 08:41

## 2021-07-27 RX ADMIN — SODIUM BICARBONATE: 84 INJECTION, SOLUTION INTRAVENOUS at 05:57

## 2021-07-27 RX ADMIN — ACETAMINOPHEN 650 MG: 325 TABLET ORAL at 08:41

## 2021-07-27 RX ADMIN — TRAMADOL HYDROCHLORIDE 25 MG: 50 TABLET, FILM COATED ORAL at 05:55

## 2021-07-27 RX ADMIN — LEVOTHYROXINE SODIUM 150 MCG: 0.07 TABLET ORAL at 05:56

## 2021-07-27 RX ADMIN — Medication 10 ML: at 08:47

## 2021-07-27 RX ADMIN — TRAMADOL HYDROCHLORIDE 25 MG: 50 TABLET, FILM COATED ORAL at 10:35

## 2021-07-27 RX ADMIN — POTASSIUM CHLORIDE 40 MEQ: 1500 TABLET, EXTENDED RELEASE ORAL at 08:41

## 2021-07-27 ASSESSMENT — PAIN SCALES - GENERAL
PAINLEVEL_OUTOF10: 9
PAINLEVEL_OUTOF10: 0
PAINLEVEL_OUTOF10: 8
PAINLEVEL_OUTOF10: 7

## 2021-07-27 NOTE — DISCHARGE SUMMARY
[facility] Medicine Discharge Summary    Shannan Patel  :  1940  MRN:  [id number]    Admit date:  2021  Discharge date:  2021    Admitting Physician: Hank Moraes MD  Primary Care Physician:  Keiko Nicole MD    Discharge Diagnoses:    Principal Problem:    ROCHELLE (acute kidney injury) [facility])  Active Problems:    Hypothyroidism    Edema    Wounds, multiple open, lower extremity    Excessive use of nonsteroidal anti-inflammatory drugs (NSAIDs)    Medical non-compliance  Resolved Problems:    * No resolved hospital problems. *    Chief Complaint   Patient presents with    Other     ptstates she was at [facility], Dx with kidney failure and left AMA, instructed to come to ER today       Condition: improved  Activity: no restrictions   Diet: low sodium  Disposition: home with home care  Functional Status: ambulatory    Significant Findings:     Labs Renal Latest Ref Rng & Units 2021 2021 2021 2021 1/15/2021   BUN 8 - 23 mg/dL 47(H) 58(H) 68(H) 68(H) 16   Cr 0.50 - 0.90 mg/dL 1.64(H) 2.05(H) 2.54(H) 2.50(H) 0.87   K 3.4 - 4.9 mEq/L 3. 3(L) 4.4 4.2 4.2 4.6   Na 135 - 144 mEq/L 137 140 137 136 140         Hospital Course:   58-year-old female with class II obesity, GERD, tobacco use presented with generalized weakness-she was seen at [facility] the day before with similar symptoms and was found to have acute kidney injury but left AGAINST MEDICAL ADVICE. She returned for treatment of her acute kidney injury-review of history revealed that she had been taking excessive amounts of ibuprofen for months. Her renal function improved with bicarbonate infusion and she was instructed to stop taking NSAIDs.   During her course, she refused all attempts at local wound care for her lower extremities and stated that she would manage it herself and follow-up with a dermatologist.    She was given short course of low dose Percocet for pain control at

## 2021-07-27 NOTE — PROGRESS NOTES
Wound Ostomy Continence Nursing    [id number] [facility] Nurse following up to see if patient will allow wound evaluation today, since she refused yesterday. Jessica Litter at bedside with patient, who is wrapping her own legs at this time. Per Nursing, patient refused podiatry to provide wound care. Patient also refusing to allow this [id number] [id number] [facility] nurse to assist, and continues to wrap he legs in cut pieces of radha.      Electronically signed by RODO Oliveira, RN, Charles Master on 7/27/2021 at 11:29 AM

## 2021-07-27 NOTE — CONSULTS
PODIATRIC MEDICINE AND SURGERY  CONSULT HISTORY AND PHYSICAL    Consulting Service:  Medicine  Requesting Provider: Dr. Marisel Han regarding: BL LE Ulcerations  Staff Doctor:  Dr. Ling Ann:  79 y.o. female with PMH significant for GERD, hypothyroidism, obesity, LE edema for who podiatry was consulted for bilateral leg ulcerations in the setting of venous insufficiency. Patient refusing wound care at this time. PLAN AND RECOMMENDATIONS[de-identified]  Patient's case to be discussed with staff, Dr. Avel Isabel, who will provide final recommendations going forward. Patient is refusing wound care at this time stating that she has her own supplies and own wound care routine. Has also refused care from wound ostomy. Attempted to educate patient on the importance of elevation for edema management, but she stated that she does not believe that edema is contributing to her ulcerations. She stated that she would like to see dermatology for her wounds and has an appointment already scheduled as an outpatient. Podiatry will sign off. Patient can follow-up with her dermatologist as an outpatient. HPI: This 79y.o. year old female was seen today for bilateral venous stasis ulcerations. Patient has previously been seen by Dr. Avel Isabel at the wound care center. Patient states that she did not want to follow-up there again for her wound care. She refused [facility] to be set up for her to assist with dressing changes. Adamant that she can care for her own eou She states that everyone has been recommending elevation and that she does not believe that her swelling is contributing to her wounds. Therefore, she wants to see Dermatology to evaluate her wounds as an outpatient. Patient denies nausea, vomiting, diarrhea, fevers, chills, chest pain, shortness of breath, head ache, or calf pain. No other pedal complaints.      Past Medical History:   Diagnosis Date    ROCHELLE (acute kidney injury) ([facility] [address] 7/25/2021    (Medical): No    Lack of Transportation (Non-Medical): No   Physical Activity: Inactive    Days of Exercise per Week: 0 days    Minutes of Exercise per Session: 0 min   Stress: No Stress Concern Present    Feeling of Stress : Only a little   Social Connections: Moderately Isolated    Frequency of Communication with Friends and Family: Three times a week    Frequency of Social Gatherings with Friends and Family: Three times a week    Attends Qu[employer] Services: Never    Active Member of Clubs or Organizations: No    Attends Club or Organization Meetings: Never    Marital Status:    Intimate Partner Violence: Not At Risk    Fear of Current or Ex-Partner: No    Emotionally Abused: No    Physically Abused: No    Sexually Abused: No       Review of Systems  CONSTITUTIONAL: No fevers, chills, diaphoresis  HEENT: No epistaxis, tinnitus  EYES: No diplopia, blurry vision. CARDIOVASCULAR:  No chest pain, palpitations, + LE edema  PULM: No dyspnea, tachypnea, wheezing  GI: No nausea, vomiting, constipation, diarrhea  : No dysuria, gross hematuria, or pyuria  NEURO:  No new balance problems, peripheral weakness, paresthesias, numbness  MSK: + Leg pain  PSY: No concerns regarding depression, anxiety  INTEGUMENTARY: See HPI    OBJECTIVE:  BP (!) 152/71   Pulse 67   Temp 98.6 °F (37 °C) (Oral)   Resp 20   Ht 5' 3\" (1.6 m)   Wt 220 lb (99.8 kg)   SpO2 100%   BMI 38.97 kg/m²   Patient is alert and oriented to person, place, and time.     Vascular:   Non Palpable Dorsalis Pedis and  Non Palpable Posterior Tibial Pulses B/L   Capillary Fill time < 4 seconds to B/L digits  Skin temperature warm to warm tibial tuberosity to the digits B/L  Hair growth present to digits  severe edema      Neurological:   Sensation to light touch intact B/L      Musculoskeletal/Orthopaedic:   5/5 muscle strength Dorsiflexion, Plantarflexion, Inversion, Eversion B/L  Tenderness to ze-wound skin    Dermatological:   Skin appears well hydrated and supple with good temperature, texture, turgor  No hyperkeratotic lesions noted  Interspaces 1-4 B/L are clear and without debris    There are large circumferential wounds to both legs. Images uploaded to chart under media. Base: Mixed fibrotic/Granular  Drainage:  Heavy serous drainage  No surrounding erythema, no probe to bone, slight malodor. No proximal streaking noted.      LABS:   Lab Results   Component Value Date    WBC 10.3 07/27/2021    HGB 9.9 (L) 07/27/2021    HCT 29.7 (L) 07/27/2021    MCV 75.8 (L) 07/27/2021     07/27/2021     Lab Results   Component Value Date     07/27/2021    K 3.3 07/27/2021     07/27/2021    CO2 18 07/27/2021    BUN 47 07/27/2021    CREATININE 1.64 07/27/2021    GLUCOSE 105 07/27/2021    CALCIUM 9.0 07/27/2021      Lab Results   Component Value Date    LABPROT 0.3 (H) 07/27/2021    LABPROT 0.3 07/27/2021    LABALBU 3.4 (L) 07/27/2021     No results found for: SEDRATE  Lab Results   Component Value Date    CRP 4.2 06/01/2016     Lab Results   Component Value Date    LABA1C 6.1 (H) 01/15/2021       MICROBIOLOGY:   No results    IMAGING:   N/A        Ele Crook DPM, OPAL PGY-2  Podiatric Surgery Resident  Podiatry On Call Pager: [phone]  July 27, 2021  10:22 AM

## 2021-07-27 NOTE — DISCHARGE INSTR - COC
Continuity of Care Form    Patient Name: Michelle Patel   :  1940  MRN:  [id number]    Admit date:  2021  Discharge date:  ***    Code Status Order: Full Code   Advance Directives:     Admitting Physician: Krzysztof Morris MD  PCP: Juanita Daley MD    Discharging Nurse: [facility]/Room#: D717/A069-78  Discharging Unit Phone Number: ***    Emergency Contact:   Extended Emergency Contact Information  Primary Emergency Contact: [address] Phone: [phone]  Work Phone: [phone]  Mobile Phone: [phone]  Relation: Child  Secondary Emergency Contact: Young Womack  Mobile Phone: [phone]  Relation: Child    Past Surgical History:  Past Surgical History:   Procedure Laterality Date    APPENDECTOMY      CHOLECYSTECTOMY         Immunization History: There is no immunization history on file for this patient.     Active Problems:  Patient Active Problem List   Diagnosis Code    Hypothyroidism E03.9    Tobacco abuse Z72.0    Atopic rhinitis J30.9    Gastroesophageal reflux disease K21.9    Hearing loss H91.90    Type 2 diabetes mellitus without complication, without long-term current use of insulin (HCC) E11.9    Family history of other specified malignant neoplasm Z80.8    Venous stasis ulcer of right thigh with fat layer exposed without varicose veins (HCC) I87.2, L97.112    Venous stasis ulcer of right calf with fat layer exposed ([address] I83.012, Q13.212    Venous stasis ulcer of left calf without varicose veins (HCC) I87.2, L97.229    Edema R60.9    ROCHELLE (acute kidney injury) ([facility] [address] N17.9    Wounds, multiple open, lower extremity S81.809A    Excessive use of nonsteroidal anti-inflammatory drugs (NSAIDs) F19.90       Isolation/Infection:   Isolation          No Isolation        Patient Infection Status     Infection Onset Added Last Indicated Last Indicated By Review Planned Expiration Resolved Resolved By    None active Resolved    COVID-19 Rule Out 21 COVID-19, Rapid (Ordered)   21 Rule-Out Test Resulted          Nurse Assessment:  Last Vital Signs: BP (!) 152/71   Pulse 67   Temp 98.6 °F (37 °C) (Oral)   Resp 20   Ht 5' 3\" (1.6 m)   Wt 220 lb (99.8 kg)   SpO2 100%   BMI 38.97 kg/m²     Last documented pain score (0-10 scale): Pain Level: 9  Last Weight:   Wt Readings from Last 1 Encounters:   21 220 lb (99.8 kg)     Mental Status:  {IP PT MENTAL STATUS:}    IV Access:  { VANESSA IV ACCESS:439326290}    Nursing Mobility/ADLs:  Walking   {CHP DME YCKB:173431649}  Transfer  {CHP DME WODX:988346727}  Bathing  {CHP DME BYJN:724393713}  Dressing  {CHP DME XQTQ:427958383}  Toileting  {CHP DME KCFJ:981868672}  Feeding  {CHP DME PUE}  Med Admin  {P DME GDMC:685307144}  Med Delivery   { VANESSA MED Delivery:918716942}    Wound Care Documentation and Therapy:        Elimination:  Continence:   · Bowel: {YES / AU:45766}  · Bladder: {YES / VL:96948}  Urinary Catheter: {Urinary Catheter:454100179}   Colostomy/Ileostomy/Ileal Conduit: {YES / NE:46428}       Date of Last BM: ***  No intake or output data in the 24 hours ending 21 1245  No intake/output data recorded.     Safety Concerns:     [address] Safety Concerns:132347672}    Impairments/Disabilities:      [address] Impairments/Disabilities:265103024}    Nutrition Therapy:  Current Nutrition Therapy:   [address] Diet List:815865179}    Routes of Feeding: {CHP DME Other Feedings:491991631}  Liquids: {Slp liquid thickness:48697}  Daily Fluid Restriction: {CHP DME Yes amt example:772995537}  Last Modified Barium Swallow with Video (Video Swallowing Test): {Done Not Done SKPP:254505630}    Treatments at the Time of Hospital Discharge:   Respiratory Treatments: ***  Oxygen Therapy:  {Therapy; copd oxygen:88426}  Ventilator:    { CC Vent BSYY:413804478}    Rehab Therapies: {THERAPEUTIC INTERVENTION:7450959517}  Weight Bearing Status/Restrictions: [address] CC Weight Bearin}  Other Medical Equipment (for information only, NOT a DME order):  {EQUIPMENT:146939746}  Other Treatments: ***    Patient's personal belongings (please select all that are sent with patient):  {CHP DME Belongings:682979704}    RN SIGNATURE:  {Esignature:112561711}    CASE MANAGEMENT/SOCIAL WORK SECTION    Inpatient Status Date: ***    Readmission Risk Assessment Score:  Readmission Risk              Risk of Unplanned Readmission:  20           Discharging to Facility/ Agency   · Name:   · Address:  · Phone:  · Fax:    Dialysis Facility (if applicable)   · Name:  · Address:  · Dialysis Schedule:  · Phone:  · Fax:    / signature: {Esignature:107618860}    PHYSICIAN SECTION    Prognosis: {Prognosis:2681023601}    Condition at Discharge: [address] Patient Condition:212944288}    Rehab Potential (if transferring to Rehab): {Prognosis:6085804784}    Recommended Labs or Other Treatments After Discharge: ***    Physician Certification: I certify the above information and transfer of Sil May  is necessary for the continuing treatment of the diagnosis listed and that she requires {Admit to Appropriate Level of Care:07849} for {GREATER/LESS:839357718} 30 days.      Update Admission H&P: {CHP DME Changes in YOYP:486294324}    PHYSICIAN SIGNATURE:  {Esignature:070473021}

## 2021-07-27 NOTE — CARE COORDINATION
LSW SPOKE WITH THE PT'S POASUSANA REGARDING THE DC.  LSW WENT INTO THE ROOM TO TALK WITH PT PRESENT AND POA WAS ABLE TO HEAR HER DECLINE INTERVENTION. PT WILL BE DC TODAY TO HOME AND SHE PLANS TO FOLLOW UP WITH A DERMETOLOGIST.

## 2021-07-27 NOTE — PROGRESS NOTES
Nephrology Progress Note    Assessment:  79y.o. year old female with history s/f GERD, hypothyroidism, obesity who presented for generalized weakness and LE swelling.     1. ROCHELLE: likely due to volume depletion due to decreased PO +/- NSAID Nephropathy (significant motrin use in the past few months), has nml renal function at baseline (01/21), UA w/ prot and LE, UP/C 0.3   2. Metabolic acidosis  3. Anemia: tsat 14, ferritin   4. HTN  5.  Hypokalemia      Plan:  - had long discussion about patient being open to medical care   - pt wants to go home  - continue bicarb gtt till pt leaves   - agree w/ K repletion  - giving IV iron x2 if she remains admitted   - ok for discharge from renal standpoint     Patient Active Problem List:     Hypothyroidism     Tobacco abuse     Atopic rhinitis     Gastroesophageal reflux disease     Hearing loss     Type 2 diabetes mellitus without complication, without long-term current use of insulin ([address]     Family history of other specified malignant neoplasm     Venous stasis ulcer of right thigh with fat layer exposed without varicose veins (HCC)     Venous stasis ulcer of right calf with fat layer exposed ([address]     Venous stasis ulcer of left calf without varicose veins (HCC)     Edema     ROCHELLE (acute kidney injury) ([address]     Wounds, multiple open, lower extremity     Excessive use of nonsteroidal anti-inflammatory drugs (NSAIDs)      Subjective:  Admit Date: 7/25/2021    Interval History: renal function improving, K low, continues on bicarb gtt    Medications:  Scheduled Meds:   levothyroxine  150 mcg Oral Daily    sodium chloride flush  5-40 mL Intravenous 2 times per day    enoxaparin  30 mg Subcutaneous Daily     Continuous Infusions:   sodium bicarbonate infusion 75 mL/hr at 07/27/21 0557    sodium chloride         CBC:   Recent Labs     07/26/21  0518 07/27/21  0523   WBC 10.4 10.3   HGB 9.9* 9.9*    365     CMP:    Recent Labs     07/25/21  1800 07/26/21  0518 07/27/21  0523    140 137   K 4.2 4.4 3.3*    113* 105   CO2 15* 14* 18*   BUN 68* 58* 47*   CREATININE 2.54* 2.05* 1.64*   GLUCOSE 109* 96 105*   CALCIUM 9.7 9.3 9.0   LABGLOM 18.1* 23.2* 30.0*     Troponin: No results for input(s): TROPONINI in the last 72 hours. BNP: No results for input(s): BNP in the last 72 hours. INR: No results for input(s): INR in the last 72 hours. Lipids:   Recent Labs     07/25/21  0101   AMYLASE 90   LIPASE 36     Liver:   Recent Labs     07/27/21  0523   AST 13   ALT 13   ALKPHOS 83   PROT 6.7   LABALBU 3.4*   BILITOT <0.2     Iron:    Recent Labs     07/27/21  0523   FERRITIN 269.8*     Urinalysis: No results for input(s): UA in the last 72 hours.     Objective:  Vitals: BP (!) 152/71   Pulse 67   Temp 98.6 °F (37 °C) (Oral)   Resp 20   Ht 5' 3\" (1.6 m)   Wt 220 lb (99.8 kg)   SpO2 100%   BMI 38.97 kg/m²    Wt Readings from Last 3 Encounters:   07/25/21 220 lb (99.8 kg)   07/24/21 180 lb (81.6 kg)   07/08/21 232 lb (105.2 kg)      24HR INTAKE/OUTPUT:  No intake or output data in the 24 hours ending 07/27/21 1014    General: alert, in no apparent distress, obese  HEENT: normocephalic, atraumatic, anicteric  Lungs: non-labored respirations, clear to auscultation bilaterally  Heart: regular rate and rhythm, no murmurs or rubs  Abdomen: soft, non-tender, non-distended  Ext: no cyanosis, +++ peripheral edema  Neuro: alert and oriented, no gross abnormalities    Electronically signed by Sultana Grimm MD, MD

## 2021-07-30 ENCOUNTER — TELEPHONE (OUTPATIENT)
Dept: INTERNAL MEDICINE | Age: 81
End: 2021-07-30

## 2021-07-30 ENCOUNTER — VIRTUAL VISIT (OUTPATIENT)
Dept: INTERNAL MEDICINE | Age: 81
End: 2021-07-30
Payer: MEDICARE

## 2021-07-30 DIAGNOSIS — R60.0 BILATERAL LEG EDEMA: ICD-10-CM

## 2021-07-30 DIAGNOSIS — S81.809D MULTIPLE OPENS WOUND OF LOWER EXTREMITY, UNSPECIFIED LATERALITY, SUBSEQUENT ENCOUNTER: Primary | ICD-10-CM

## 2021-07-30 DIAGNOSIS — D50.8 IRON DEFICIENCY ANEMIA SECONDARY TO INADEQUATE DIETARY IRON INTAKE: ICD-10-CM

## 2021-07-30 PROCEDURE — 99442 PR PHYS/QHP TELEPHONE EVALUATION 11-20 MIN: CPT | Performed by: FAMILY MEDICINE

## 2021-07-30 RX ORDER — FERROUS SULFATE 300 MG/5ML
300 LIQUID (ML) ORAL 2 TIMES DAILY
Qty: 300 ML | Refills: 0 | Status: SHIPPED | OUTPATIENT
Start: 2021-07-30 | End: 2022-01-31

## 2021-07-30 RX ORDER — POTASSIUM CHLORIDE 750 MG/1
10 TABLET, EXTENDED RELEASE ORAL DAILY
Qty: 90 TABLET | Refills: 1 | COMMUNITY
Start: 2021-07-30 | End: 2022-01-31

## 2021-07-30 NOTE — PROGRESS NOTES
Patient: Omkar Bautista    YOB: 1940    Date: 7/30/21       Patient Active Problem List    Diagnosis Date Noted    Type 2 diabetes mellitus without complication, without long-term current use of insulin ([facility] [address] 06/08/2016     Priority: High    Hypothyroidism      Priority: High    Gastroesophageal reflux disease 06/10/2002     Priority: Medium    Tobacco abuse 01/04/2016     Priority: Low    Atopic rhinitis 02/26/2004     Priority: Low    Hearing loss 02/26/2004     Priority: Low    Excessive use of nonsteroidal anti-inflammatory drugs (NSAIDs) 07/27/2021    Medical non-compliance 07/27/2021     Overview Note:     Refuses wound care to lower extremity. Took excessive ibuprofen.    Left ED AMA      ROCHELLE (acute kidney injury) ([facility] [address] 07/25/2021    Wounds, multiple open, lower extremity 07/25/2021    Venous stasis ulcer of right thigh with fat layer exposed without varicose veins (HCC) 05/25/2021    Venous stasis ulcer of right calf with fat layer exposed ([address] 05/25/2021    Venous stasis ulcer of left calf without varicose veins ([address] 05/25/2021    Edema 05/25/2021    Family history of other specified malignant neoplasm 06/10/2002     Past Medical History:   Diagnosis Date    ROCHELLE (acute kidney injury) ([address] 7/25/2021    Gastroesophageal reflux disease 6/10/2002    Hypothyroidism     Obesity, Class III, BMI 40-49.9 (morbid obesity) ([address] 1/4/2016    Tobacco abuse      Past Surgical History:   Procedure Laterality Date    APPENDECTOMY      CHOLECYSTECTOMY       Family History   Problem Relation Age of Onset    Kidney Disease Mother     High Blood Pressure Mother     Stroke Mother     Heart Disease Father      Social History     Socioeconomic History    Marital status:      Spouse name: Not on file    Number of children: Not on file    Years of education: Not on file    Highest education level: Not on file   Occupational History    Not on file   Tobacco Use    Smoking Cortisone Swelling    Codeine Rash    Pcn [Penicillins] Rash       Chief Complaint   Patient presents with    Follow-Up from [facility], renal failure     TELEHEALTH EVALUATION -- Audio/Visual (During QUUWX-96 public health emergency)    Due to COVID 19 outbreak, patient's office visit was converted to a virtual visit. Patient was contacted and agreed to proceed with a virtual visit via Telephone Visit  The risks and benefits of converting to a virtual visit were discussed in light of the current infectious disease epidemic. Patient also understood that insurance coverage and co-pays are up to their individual insurance plans. Time spent with patient on the phone 13.7 mins    Pursuant to the emergency declaration under the [facility] waiver authority and the [employer] and [employer] Act, this Virtual  Visit was conducted, with patient's consent, to reduce the patient's risk of exposure to COVID-19 and provide continuity of care for an established patient. Services were provided through a telephone discussion virtually to substitute for in-person clinic visit. [address] discharge summary:    42-year-old female with class II obesity, GERD, tobacco use presented with generalized weakness-she was seen at [facility] the day before with similar symptoms and was found to have acute kidney injury but left AGAINST MEDICAL ADVICE. She returned for treatment of her acute kidney injury-review of history revealed that she had been taking excessive amounts of ibuprofen for months. Her renal function improved with bicarbonate infusion and she was instructed to stop taking NSAIDs.   During her course, she refused all attempts at local wound care for her lower extremities and stated that she would manage it herself and follow-up with a dermatologist.     She was given short course of low dose Percocet for pain control at discharge. She stated she already has referral to follow with Pain Management as outpatient. Appointment with kidney doctor in august  Stopped lasix and NSAIDs  Ct scan showed right kidney cyst    Review of Systems   Constitutional: Positive for fatigue. HEENT: Negative for eye discharge and vision loss. Negative for ear drainage, hearing loss and nasal drainage. Respiratory: Negative for cough, dyspnea and wheezing. Cardiovascular:  Negative for chest pain, claudication and irregular heartbeat/palpitations. Physical Exam    Due to this being a TeleHealth encounter, evaluation of the following organ systems is limited: Vitals/Constitutional/EENT/Resp/CV/GI//MS/Neuro/Skin/Heme-Lymph-Imm. [x] Alert  [] Oriented to person/place/time    [x] No apparent distress  [] Toxic appearing    [] Face flushed appearing [] Sclera clear  [] Lips are cyanotic      [x] Breathing appears normal  [] Appears tachypneic      [] Rash on visible skin    [] Cranial Nerves II-XII grossly intact    [] Motor grossly intact in visible upper extremities    [] Motor grossly intact in visible lower extremities    [x] Normal Mood  [] Anxious appearing    [] Depressed appearing  [] Confused appearing      [] Poor short term memory  [] Poor long term memory    [] OTHER: Patient is aware of today's time and date  Patient is aware of current pandemic situation with Covid-19  Patient is able to speaking full sentences  Patient is not SOB during speech    Assessment/Plan:  Tre Greenberg was seen today for follow-up from hospital and other. Diagnoses and all orders for this visit:    Multiple opens wound of lower extremity, unspecified laterality, subsequent encounter  Doing better per pt    Bilateral leg edema  Improved    Iron deficiency anemia secondary to inadequate dietary iron intake  -     ferrous sulfate 300 (60 Fe) MG/5ML syrup;  Take 5 mLs by mouth 2 times daily  Start oral iron    F/u kidney doctor & other specialists as directed      Lorelei Wilder MD    Patient is aware this encounter is billable to insurance. This encounter occurred with patient at home while provider was at the office.

## 2021-07-30 NOTE — TELEPHONE ENCOUNTER
Servando from [employer] called RE: [employer]. I guess the prescribed amount is expensive. He requested to do the pint 220 mg per 5 ml's because its cheaper that way.   Please advise    Thank you

## 2021-08-03 ENCOUNTER — TELEPHONE (OUTPATIENT)
Dept: INTERNAL MEDICINE | Age: 81
End: 2021-08-03

## 2021-08-03 DIAGNOSIS — M51.37 DDD (DEGENERATIVE DISC DISEASE), LUMBOSACRAL: Primary | ICD-10-CM

## 2021-08-03 RX ORDER — OXYCODONE HYDROCHLORIDE AND ACETAMINOPHEN 5; 325 MG/1; MG/1
0.5 TABLET ORAL DAILY PRN
Qty: 20 TABLET | Refills: 0 | Status: SHIPPED | OUTPATIENT
Start: 2021-08-03 | End: 2021-09-12

## 2021-08-04 ASSESSMENT — ENCOUNTER SYMPTOMS: COLOR CHANGE: 1

## 2021-09-03 LAB
ANION GAP SERPL CALCULATED.3IONS-SCNC: 18 MEQ/L (ref 9–15)
BUN BLDV-MCNC: 29 MG/DL (ref 8–23)
CALCIUM SERPL-MCNC: 9.7 MG/DL (ref 8.5–9.9)
CHLORIDE BLD-SCNC: 107 MEQ/L (ref 95–107)
CO2: 13 MEQ/L (ref 20–31)
CREAT SERPL-MCNC: 2.09 MG/DL (ref 0.5–0.9)
GFR AFRICAN AMERICAN: 27.4
GFR NON-AFRICAN AMERICAN: 22.6
GLUCOSE BLD-MCNC: 119 MG/DL (ref 70–99)
HCT VFR BLD CALC: 29.8 % (ref 37–47)
HEMOGLOBIN: 9.8 G/DL (ref 12–16)
MCH RBC QN AUTO: 25.9 PG (ref 27–31.3)
MCHC RBC AUTO-ENTMCNC: 32.8 % (ref 33–37)
MCV RBC AUTO: 78.8 FL (ref 82–100)
PDW BLD-RTO: 17.4 % (ref 11.5–14.5)
PLATELET # BLD: 292 K/UL (ref 130–400)
POTASSIUM SERPL-SCNC: 4.4 MEQ/L (ref 3.4–4.9)
RBC # BLD: 3.78 M/UL (ref 4.2–5.4)
SODIUM BLD-SCNC: 138 MEQ/L (ref 135–144)
WBC # BLD: 12 K/UL (ref 4.8–10.8)

## 2022-01-27 ENCOUNTER — TELEPHONE (OUTPATIENT)
Dept: FAMILY MEDICINE CLINIC | Age: 82
End: 2022-01-27

## 2022-01-27 NOTE — TELEPHONE ENCOUNTER
----- Message from Mariam Le sent at 1/26/2022  4:39 PM EST -----  Subject: Message to Provider    QUESTIONS  Information for Provider? Martha Queen from [facility]. would like to receive   callback from PCP Sushila Mendez MD in regards to mutual patient and   wanting to make sure PCP will follow for home care after patient is   discharged from Hospital   ---------------------------------------------------------------------------  --------------  [address]  What is the best way for the office to contact you? OK to leave message on   voicemail  Preferred Call Back Phone Number?  [phone]  ---------------------------------------------------------------------------  --------------  SCRIPT ANSWERS  undefined

## 2022-01-29 LAB
ALBUMIN SERPL-MCNC: 2.6 G/DL (ref 3.5–4.6)
ALP BLD-CCNC: 61 U/L (ref 40–130)
ALT SERPL-CCNC: 8 U/L (ref 0–33)
ANION GAP SERPL CALCULATED.3IONS-SCNC: 11 MEQ/L (ref 9–15)
AST SERPL-CCNC: 15 U/L (ref 0–35)
BASOPHILS ABSOLUTE: 0.1 K/UL (ref 0–0.2)
BASOPHILS RELATIVE PERCENT: 1 %
BILIRUB SERPL-MCNC: <0.2 MG/DL (ref 0.2–0.7)
BILIRUBIN DIRECT: <0.2 MG/DL (ref 0–0.4)
BILIRUBIN, INDIRECT: NORMAL MG/DL (ref 0–0.6)
BUN BLDV-MCNC: 11 MG/DL (ref 8–23)
C-REACTIVE PROTEIN: 11 MG/L (ref 0–5)
CALCIUM SERPL-MCNC: 8.9 MG/DL (ref 8.5–9.9)
CHLORIDE BLD-SCNC: 108 MEQ/L (ref 95–107)
CO2: 22 MEQ/L (ref 20–31)
CREAT SERPL-MCNC: 0.96 MG/DL (ref 0.5–0.9)
D DIMER: 2.63 MG/L FEU (ref 0–0.5)
EOSINOPHILS ABSOLUTE: 0.3 K/UL (ref 0–0.7)
EOSINOPHILS RELATIVE PERCENT: 3.9 %
GFR AFRICAN AMERICAN: >60
GFR NON-AFRICAN AMERICAN: 55.5
GLOBULIN: 3.5 G/DL (ref 2.3–3.5)
GLUCOSE BLD-MCNC: 82 MG/DL (ref 70–99)
HCT VFR BLD CALC: 30.4 % (ref 37–47)
HEMOGLOBIN: 9.9 G/DL (ref 12–16)
INR BLD: 1.1
LACTATE DEHYDROGENASE: 267 U/L (ref 135–214)
LYMPHOCYTES ABSOLUTE: 1.8 K/UL (ref 1–4.8)
LYMPHOCYTES RELATIVE PERCENT: 20.9 %
MCH RBC QN AUTO: 26.8 PG (ref 27–31.3)
MCHC RBC AUTO-ENTMCNC: 32.6 % (ref 33–37)
MCV RBC AUTO: 82.1 FL (ref 82–100)
MONOCYTES ABSOLUTE: 0.6 K/UL (ref 0.2–0.8)
MONOCYTES RELATIVE PERCENT: 6.3 %
NEUTROPHILS ABSOLUTE: 6 K/UL (ref 1.4–6.5)
NEUTROPHILS RELATIVE PERCENT: 67.9 %
PDW BLD-RTO: 15.9 % (ref 11.5–14.5)
PLATELET # BLD: 261 K/UL (ref 130–400)
POTASSIUM SERPL-SCNC: 4.5 MEQ/L (ref 3.4–4.9)
PROCALCITONIN: 0.06 NG/ML (ref 0–0.15)
PROTHROMBIN TIME: 14.1 SEC (ref 12.3–14.9)
RBC # BLD: 3.7 M/UL (ref 4.2–5.4)
SEDIMENTATION RATE, ERYTHROCYTE: 36 MM (ref 0–30)
SODIUM BLD-SCNC: 141 MEQ/L (ref 135–144)
TOTAL CK: 29 U/L (ref 0–170)
TOTAL PROTEIN: 6.1 G/DL (ref 6.3–8)
TROPONIN: 0.09 NG/ML (ref 0–0.01)
WBC # BLD: 8.8 K/UL (ref 4.8–10.8)

## 2022-01-31 DIAGNOSIS — R52 ACUTE PAIN: Primary | ICD-10-CM

## 2022-01-31 RX ORDER — OXYCODONE HYDROCHLORIDE AND ACETAMINOPHEN 5; 325 MG/1; MG/1
1 TABLET ORAL EVERY 12 HOURS PRN
COMMUNITY
End: 2022-01-31 | Stop reason: SDUPTHER

## 2022-01-31 RX ORDER — ZINC GLUCONATE 50 MG
50 TABLET ORAL DAILY
COMMUNITY

## 2022-01-31 RX ORDER — LOSARTAN POTASSIUM 100 MG/1
100 TABLET ORAL DAILY
COMMUNITY

## 2022-01-31 RX ORDER — FAMOTIDINE 20 MG/1
20 TABLET, FILM COATED ORAL 2 TIMES DAILY
COMMUNITY
End: 2022-04-05

## 2022-01-31 RX ORDER — NYSTATIN 100000 [USP'U]/G
POWDER TOPICAL 3 TIMES DAILY
COMMUNITY

## 2022-01-31 RX ORDER — ASCORBIC ACID 500 MG
500 TABLET ORAL 2 TIMES DAILY
COMMUNITY

## 2022-01-31 RX ORDER — CHOLECALCIFEROL (VITAMIN D3) 125 MCG
5 CAPSULE ORAL 2 TIMES DAILY
COMMUNITY
Start: 2022-01-28 | End: 2022-02-02

## 2022-01-31 RX ORDER — MENTHOL AND ZINC OXIDE .44; 20.625 G/100G; G/100G
OINTMENT TOPICAL 2 TIMES DAILY
COMMUNITY

## 2022-01-31 RX ORDER — AMLODIPINE BESYLATE 5 MG/1
5 TABLET ORAL 2 TIMES DAILY
COMMUNITY

## 2022-02-02 ENCOUNTER — OFFICE VISIT (OUTPATIENT)
Dept: GERIATRIC MEDICINE | Age: 82
End: 2022-02-02
Payer: MEDICARE

## 2022-02-02 DIAGNOSIS — E66.09 CLASS 2 OBESITY DUE TO EXCESS CALORIES WITH BODY MASS INDEX (BMI) OF 39.0 TO 39.9 IN ADULT, UNSPECIFIED WHETHER SERIOUS COMORBIDITY PRESENT: ICD-10-CM

## 2022-02-02 DIAGNOSIS — K21.9 GASTROESOPHAGEAL REFLUX DISEASE, UNSPECIFIED WHETHER ESOPHAGITIS PRESENT: ICD-10-CM

## 2022-02-02 DIAGNOSIS — N18.9 ACUTE KIDNEY INJURY SUPERIMPOSED ON CKD (HCC): ICD-10-CM

## 2022-02-02 DIAGNOSIS — Z91.199 MEDICAL NON-COMPLIANCE: ICD-10-CM

## 2022-02-02 DIAGNOSIS — E03.9 ACQUIRED HYPOTHYROIDISM: ICD-10-CM

## 2022-02-02 DIAGNOSIS — N17.9 ACUTE KIDNEY INJURY SUPERIMPOSED ON CKD (HCC): ICD-10-CM

## 2022-02-02 DIAGNOSIS — U07.1 COVID-19: Primary | ICD-10-CM

## 2022-02-02 DIAGNOSIS — J44.1 COPD EXACERBATION (HCC): ICD-10-CM

## 2022-02-02 DIAGNOSIS — I10 PRIMARY HYPERTENSION: ICD-10-CM

## 2022-02-02 PROCEDURE — 99309 SBSQ NF CARE MODERATE MDM 30: CPT | Performed by: NURSE PRACTITIONER

## 2022-02-02 RX ORDER — OXYCODONE HYDROCHLORIDE AND ACETAMINOPHEN 5; 325 MG/1; MG/1
1 TABLET ORAL EVERY 12 HOURS PRN
Qty: 14 TABLET | Refills: 0 | Status: SHIPPED | OUTPATIENT
Start: 2022-02-02 | End: 2022-02-14 | Stop reason: SDUPTHER

## 2022-02-14 DIAGNOSIS — R52 ACUTE PAIN: ICD-10-CM

## 2022-02-14 LAB
ANION GAP SERPL CALCULATED.3IONS-SCNC: 10 MEQ/L (ref 9–15)
BASOPHILS ABSOLUTE: 0 K/UL (ref 0–0.2)
BASOPHILS RELATIVE PERCENT: 0.5 %
BUN BLDV-MCNC: 18 MG/DL (ref 8–23)
CALCIUM SERPL-MCNC: 8.7 MG/DL (ref 8.5–9.9)
CHLORIDE BLD-SCNC: 108 MEQ/L (ref 95–107)
CO2: 24 MEQ/L (ref 20–31)
CREAT SERPL-MCNC: 1.18 MG/DL (ref 0.5–0.9)
D DIMER: 1.04 MG/L FEU (ref 0–0.5)
EOSINOPHILS ABSOLUTE: 0.2 K/UL (ref 0–0.7)
EOSINOPHILS RELATIVE PERCENT: 4 %
GFR AFRICAN AMERICAN: 52.9
GFR NON-AFRICAN AMERICAN: 43.7
GLUCOSE BLD-MCNC: 137 MG/DL (ref 70–99)
HCT VFR BLD CALC: 30.6 % (ref 37–47)
HEMOGLOBIN: 9.8 G/DL (ref 12–16)
LYMPHOCYTES ABSOLUTE: 1.2 K/UL (ref 1–4.8)
LYMPHOCYTES RELATIVE PERCENT: 21.8 %
MCH RBC QN AUTO: 26.7 PG (ref 27–31.3)
MCHC RBC AUTO-ENTMCNC: 31.9 % (ref 33–37)
MCV RBC AUTO: 83.6 FL (ref 82–100)
MONOCYTES ABSOLUTE: 0.3 K/UL (ref 0.2–0.8)
MONOCYTES RELATIVE PERCENT: 6.2 %
NEUTROPHILS ABSOLUTE: 3.8 K/UL (ref 1.4–6.5)
NEUTROPHILS RELATIVE PERCENT: 67.5 %
PDW BLD-RTO: 17.2 % (ref 11.5–14.5)
PLATELET # BLD: 259 K/UL (ref 130–400)
POTASSIUM SERPL-SCNC: 4.9 MEQ/L (ref 3.4–4.9)
RBC # BLD: 3.66 M/UL (ref 4.2–5.4)
SODIUM BLD-SCNC: 142 MEQ/L (ref 135–144)
WBC # BLD: 5.6 K/UL (ref 4.8–10.8)

## 2022-02-14 RX ORDER — OXYCODONE HYDROCHLORIDE AND ACETAMINOPHEN 5; 325 MG/1; MG/1
1 TABLET ORAL EVERY 12 HOURS PRN
Qty: 14 TABLET | Refills: 0 | Status: SHIPPED | OUTPATIENT
Start: 2022-02-14 | End: 2022-02-21

## 2022-02-21 LAB
ANION GAP SERPL CALCULATED.3IONS-SCNC: 12 MEQ/L (ref 9–15)
BASOPHILS ABSOLUTE: 0 K/UL (ref 0–0.2)
BASOPHILS RELATIVE PERCENT: 0.5 %
BUN BLDV-MCNC: 22 MG/DL (ref 8–23)
CALCIUM SERPL-MCNC: 8.9 MG/DL (ref 8.5–9.9)
CHLORIDE BLD-SCNC: 107 MEQ/L (ref 95–107)
CO2: 21 MEQ/L (ref 20–31)
CREAT SERPL-MCNC: 1.16 MG/DL (ref 0.5–0.9)
D DIMER: 0.65 MG/L FEU (ref 0–0.5)
EOSINOPHILS ABSOLUTE: 0.3 K/UL (ref 0–0.7)
EOSINOPHILS RELATIVE PERCENT: 4 %
GFR AFRICAN AMERICAN: 54
GFR NON-AFRICAN AMERICAN: 44.6
GLUCOSE BLD-MCNC: 96 MG/DL (ref 70–99)
HCT VFR BLD CALC: 32.4 % (ref 37–47)
HEMOGLOBIN: 10.5 G/DL (ref 12–16)
LYMPHOCYTES ABSOLUTE: 1.9 K/UL (ref 1–4.8)
LYMPHOCYTES RELATIVE PERCENT: 29.5 %
MCH RBC QN AUTO: 26.7 PG (ref 27–31.3)
MCHC RBC AUTO-ENTMCNC: 32.5 % (ref 33–37)
MCV RBC AUTO: 82.3 FL (ref 82–100)
MONOCYTES ABSOLUTE: 0.4 K/UL (ref 0.2–0.8)
MONOCYTES RELATIVE PERCENT: 5.7 %
NEUTROPHILS ABSOLUTE: 3.9 K/UL (ref 1.4–6.5)
NEUTROPHILS RELATIVE PERCENT: 60.3 %
PDW BLD-RTO: 18.1 % (ref 11.5–14.5)
PLATELET # BLD: 269 K/UL (ref 130–400)
POTASSIUM SERPL-SCNC: 4.4 MEQ/L (ref 3.4–4.9)
RBC # BLD: 3.94 M/UL (ref 4.2–5.4)
SODIUM BLD-SCNC: 140 MEQ/L (ref 135–144)
TSH SERPL DL<=0.05 MIU/L-ACNC: 3.48 UIU/ML (ref 0.44–3.86)
WBC # BLD: 6.4 K/UL (ref 4.8–10.8)

## 2022-02-24 ENCOUNTER — OFFICE VISIT (OUTPATIENT)
Dept: GERIATRIC MEDICINE | Age: 82
End: 2022-02-24
Payer: MEDICARE

## 2022-02-24 DIAGNOSIS — R53.83 FATIGUE, UNSPECIFIED TYPE: Primary | ICD-10-CM

## 2022-02-24 DIAGNOSIS — N18.30 STAGE 3 CHRONIC KIDNEY DISEASE, UNSPECIFIED WHETHER STAGE 3A OR 3B CKD (HCC): ICD-10-CM

## 2022-02-24 DIAGNOSIS — E03.9 ACQUIRED HYPOTHYROIDISM: ICD-10-CM

## 2022-02-24 PROBLEM — I10 PRIMARY HYPERTENSION: Status: ACTIVE | Noted: 2022-02-24

## 2022-02-24 PROBLEM — J44.1 COPD EXACERBATION (HCC): Status: ACTIVE | Noted: 2022-02-24

## 2022-02-24 PROBLEM — U07.1 COVID-19: Status: ACTIVE | Noted: 2022-02-24

## 2022-02-24 PROBLEM — E66.09 CLASS 2 OBESITY DUE TO EXCESS CALORIES WITH BODY MASS INDEX (BMI) OF 39.0 TO 39.9 IN ADULT: Status: ACTIVE | Noted: 2022-02-24

## 2022-02-24 PROBLEM — N18.9 ACUTE KIDNEY INJURY SUPERIMPOSED ON CKD (HCC): Status: ACTIVE | Noted: 2021-07-25

## 2022-02-24 PROBLEM — E66.812 CLASS 2 OBESITY DUE TO EXCESS CALORIES WITH BODY MASS INDEX (BMI) OF 39.0 TO 39.9 IN ADULT: Status: ACTIVE | Noted: 2022-02-24

## 2022-02-24 PROCEDURE — 99309 SBSQ NF CARE MODERATE MDM 30: CPT | Performed by: NURSE PRACTITIONER

## 2022-02-24 NOTE — PROGRESS NOTES
Nursing Home:  [address] [place] [address]    The patient is being seen today for follow-up after recent admission to this facility for:  Chief Complaint   Patient presents with    Follow-Up from Hospital         SUBJECTIVE: Patient being seen today for follow-up after admission to this facility from the hospital with primary diagnosis of COVID-19, acute kidney injury on CKD, COPD, essential hypertension, obesity, hypothyroidism, and GERD. FAMILY AND SOCIAL HISTORY:  Refer to H&P.    Past Medical History:   Diagnosis Date    ROCHELLE (acute kidney injury) ([facility] 2021    COPD exacerbation ([facility] 2022    Gastroesophageal reflux disease 6/10/2002    Hypothyroidism     Obesity, Class III, BMI 40-49.9 (morbid obesity) ([facility] 2016    Primary hypertension 2022    Tobacco abuse      Family History   Problem Relation Age of Onset    Kidney Disease Mother     High Blood Pressure Mother     Stroke Mother     Heart Disease Father      Social History     Socioeconomic History    Marital status:      Spouse name: Not on file    Number of children: Not on file    Years of education: Not on file    Highest education level: Not on file   Occupational History    Not on file   Tobacco Use    Smoking status: Former Smoker     Packs/day: 5.00     Years: 30.00     Pack years: 150.00     Types: Cigarettes     Quit date: 10/1/2017     Years since quittin.4    Smokeless tobacco: Never Used    Tobacco comment: Has been trying to quit since January   Vaping Use    Vaping Use: Never used   Substance and Sexual Activity    Alcohol use: No     Alcohol/week: 0.0 standard drinks    Drug use: No    Sexual activity: Not Currently   Other Topics Concern    Not on file   Social History Narrative    Not on file     Social Determinants of Health     Financial Resource Strain: Low Risk     Difficulty of Paying Living Expenses: Not hard at all   Food Insecurity: No Food Insecurity    Worried About Running Out of Food in the Last Year: Never true    Ran Out of Food in the Last Year: Never true   Transportation Needs: No Transportation Needs    Lack of Transportation (Medical): No    Lack of Transportation (Non-Medical): No   Physical Activity: Inactive    Days of Exercise per Week: 0 days    Minutes of Exercise per Session: 0 min   Stress: No Stress Concern Present    Feeling of Stress : Only a little   Social Connections: Moderately Isolated    Frequency of Communication with Friends and Family: Three times a week    Frequency of Social Gatherings with Friends and Family:  Three times a week    Attends [employer] Services: Never    Active Member of Clubs or Organizations: No    Attends Club or Organization Meetings: Never    Marital Status:    Intimate Partner Violence: Not At Risk    Fear of Current or Ex-Partner: No    Emotionally Abused: No    Physically Abused: No    Sexually Abused: No   Housing Stability: Unknown    Unable to Pay for Housing in the Last Year: No    Number of [place] in the Last Year: Not on file    Unstable Housing in the Last Year: No     ALLERGIES:  Benadryl [diphenhydramine hcl], Cortisone, Codeine, and Pcn [penicillins]    MEDICATIONS: Acetaminophen 3 or 25 mg 2 tabs p.o. every 4 as needed, amlodipine besilate 5 mg tab p.o. twice daily, Dulcolax suppository 10 mg rectal suppository daily as needed, famotidine 20 mg tab p.o. twice daily, levothyroxine sodium 150 MCG tab p.o. daily, losartan potassium 50 mg tab p.o. daily, Lovenox solution 30 mg\0.3 mL inject 0.3 mL subcu 2 times a day, melatonin 5 mg tab 5 mg p.o. twice daily x7 days, menthol-zinc oxide ointment 0.44-20.625% apply bilateral buttock topically every shift, milk of magnesia suspension 1200 mg per 15 mL give 30 mL p.o. daily as needed, nystatin powder apply to groin topically 3 times daily, oxycodone-acetaminophen tablet 5-325 mg 1 tab p.o. every 12 as needed pain, vitamin C tablet 500 mg p.o. twice daily, vitamin D 2000 units p.o. daily, zinc 50 mg tab p.o. daily    REVIEW OF SYSTEMS:  Resident denies any headache, dizziness, blurred vision. She denies any fever, chills, sore throat. She denies any rashes, bruises, or open areas. She denies any chest pain, chest discomfort, or heart palpitations. She denies any shortness of breath or cough. She denies any nausea, vomiting, or abdominal pain. She denies any urinary urgency, frequency, or dysuria. She denies any constipation or diarrhea. She states she is eating okay, sleeping okay, and she currently has no pain. She states that nobody is going to put any dressing on her legs. PHYSICAL EXAMINATION:  Most recent vital signs: Blood pressure 144/77, temperature 97.6, heart rate 76, respirations 18, pulse ox 96% room air, weight 186 pounds. Constitutional:  Resident is a 79 y.o. female alert,argumentative, and  cooperative with exam.  Patient reports that nobody was going to put any dressings on her legs. Integument:  Pink, warm, dry. Patient noted to have scabbed areas to her bilateral lower extremities as well as open blisters of her bilateral lower extremities with serosanguineous drainage from the posterior aspect of both legs onto her sheets. HEENT:  Normocephalic, atraumatic. External ears appear to be within normal limits. She is hard of hearing. Conjunctivae pink. Sclerae nonicteric. Mucous membranes pink, moist.  No oropharyngeal exudate. Neck:  Supple. No cervical or clavicular lymphadenopathy. No masses noted. Cardiovascular:  Heart rate regular rate and rhythm. No murmurs, gallops, rubs noted. Respiratory:  Lung sounds Normal.  Respirations nonlabored. The patient is not using accessory muscles with breathing. Pulse ox 96% room air. Abdomen:  Soft, nontender, nondistended, normoactive bowel sounds. No masses noted. Musculoskeletal: No muscle tenderness. No joint tenderness.     PV:  Peripheral pulses present. She  does have nonpitting edema to her bilateral lower extremities. Psychological: Mood stable. Affect argumentative. Speech normal rate and tone. ASSESSMENT AND PLAN:      Diagnosis Orders   1. COVID-19     2. Acute kidney injury superimposed on CKD ([facility]     3. COPD exacerbation ([facility]     4. Primary hypertension     5. Acquired hypothyroidism     6. Gastroesophageal reflux disease, unspecified whether esophagitis present     7. Class 2 obesity due to excess calories with body mass index (BMI) of 39.0 to 39.9 in adult, unspecified whether serious comorbidity present     8. Medical non-compliance          1. Patient's respiratory status is stable. She has not had any hypoxic episodes or episodes of respiratory distress. She is not using any of her accessory muscles with breathing. She is currently pulse oxing 96% on room air. 2. Labs pending for Monday. 3. Respiratory status stable no exacerbation of symptoms. 4. Blood pressure is stable we will continue her amlodipine and losartan as ordered. 5. Continue her levothyroxine as ordered. 6. Tolerating her prescribed diet. We will continue her famotidine as ordered. 7. Dietitian will be following patient. 8. I will refer the patient to the wound center at [facility] he is receiving treatment in-house at the facility. I have explained to the resident that she can get an infection leaving these wounds open to air. I have also explained to the patient that by leaving them open to air will cause them to have greater difficulty healing. Patient reports that she understands but she does not care nobody is putting dressings on her wounds. Nursing staff report that resident did arrive with dressings on her wounds and demanded they be removed upon admission. I have reviewed this resident's medication and treatment plan as well as most recent lab work. No further changes will be made at this time.       Return in about 1 week (around 2/9/2022), or if symptoms worsen or fail to improve, for chronic conditions. Please note this report is partially produced by using speech recognition hardware. It may contain errors related to the system, including grammar, punctuation and spelling as well as words and phrases that may seem inaccurate. For any questions or concerns feel free to contact me for clarification        Electronically Signed By: Oralia Vasquez. Rocco Nageotte, CNP on 2/23/22   ______________________________  Oralia Vasquez.  Fawn WHALEN CNP

## 2022-02-25 DIAGNOSIS — R52 ACUTE PAIN: Primary | ICD-10-CM

## 2022-02-25 LAB
AMYLASE: 77 U/L (ref 22–93)
ANION GAP SERPL CALCULATED.3IONS-SCNC: 8 MEQ/L (ref 9–15)
BASOPHILS ABSOLUTE: 0 K/UL (ref 0–0.2)
BASOPHILS RELATIVE PERCENT: 0.4 %
BUN BLDV-MCNC: 18 MG/DL (ref 8–23)
CALCIUM SERPL-MCNC: 9.2 MG/DL (ref 8.5–9.9)
CHLORIDE BLD-SCNC: 108 MEQ/L (ref 95–107)
CO2: 25 MEQ/L (ref 20–31)
CREAT SERPL-MCNC: 1.18 MG/DL (ref 0.5–0.9)
EOSINOPHILS ABSOLUTE: 0.3 K/UL (ref 0–0.7)
EOSINOPHILS RELATIVE PERCENT: 3.7 %
GFR AFRICAN AMERICAN: 52.9
GFR NON-AFRICAN AMERICAN: 43.7
GLUCOSE BLD-MCNC: 73 MG/DL (ref 70–99)
HCT VFR BLD CALC: 31.4 % (ref 37–47)
HEMOGLOBIN: 10 G/DL (ref 12–16)
LIPASE: 13 U/L (ref 12–95)
LYMPHOCYTES ABSOLUTE: 2.2 K/UL (ref 1–4.8)
LYMPHOCYTES RELATIVE PERCENT: 30.9 %
MCH RBC QN AUTO: 26.6 PG (ref 27–31.3)
MCHC RBC AUTO-ENTMCNC: 31.9 % (ref 33–37)
MCV RBC AUTO: 83.5 FL (ref 82–100)
MONOCYTES ABSOLUTE: 0.5 K/UL (ref 0.2–0.8)
MONOCYTES RELATIVE PERCENT: 7.1 %
NEUTROPHILS ABSOLUTE: 4 K/UL (ref 1.4–6.5)
NEUTROPHILS RELATIVE PERCENT: 57.9 %
PDW BLD-RTO: 17.8 % (ref 11.5–14.5)
PLATELET # BLD: 263 K/UL (ref 130–400)
POTASSIUM SERPL-SCNC: 4.5 MEQ/L (ref 3.4–4.9)
RBC # BLD: 3.76 M/UL (ref 4.2–5.4)
SODIUM BLD-SCNC: 141 MEQ/L (ref 135–144)
WBC # BLD: 7 K/UL (ref 4.8–10.8)

## 2022-02-25 RX ORDER — OXYCODONE HYDROCHLORIDE AND ACETAMINOPHEN 5; 325 MG/1; MG/1
1 TABLET ORAL EVERY 12 HOURS PRN
Qty: 28 TABLET | Refills: 0 | Status: SHIPPED | OUTPATIENT
Start: 2022-02-25 | End: 2022-03-04

## 2022-02-28 LAB
ANION GAP SERPL CALCULATED.3IONS-SCNC: 11 MEQ/L (ref 9–15)
BASOPHILS ABSOLUTE: 0 K/UL (ref 0–0.2)
BASOPHILS RELATIVE PERCENT: 0.6 %
BUN BLDV-MCNC: 23 MG/DL (ref 8–23)
CALCIUM SERPL-MCNC: 8.9 MG/DL (ref 8.5–9.9)
CHLORIDE BLD-SCNC: 107 MEQ/L (ref 95–107)
CO2: 23 MEQ/L (ref 20–31)
CREAT SERPL-MCNC: 1.16 MG/DL (ref 0.5–0.9)
D DIMER: 0.67 MG/L FEU (ref 0–0.5)
EOSINOPHILS ABSOLUTE: 0.2 K/UL (ref 0–0.7)
EOSINOPHILS RELATIVE PERCENT: 3.3 %
GFR AFRICAN AMERICAN: 54
GFR NON-AFRICAN AMERICAN: 44.6
GLUCOSE BLD-MCNC: 154 MG/DL (ref 70–99)
HCT VFR BLD CALC: 31.8 % (ref 37–47)
HEMOGLOBIN: 10 G/DL (ref 12–16)
LYMPHOCYTES ABSOLUTE: 2 K/UL (ref 1–4.8)
LYMPHOCYTES RELATIVE PERCENT: 28.6 %
MCH RBC QN AUTO: 26.3 PG (ref 27–31.3)
MCHC RBC AUTO-ENTMCNC: 31.5 % (ref 33–37)
MCV RBC AUTO: 83.5 FL (ref 82–100)
MONOCYTES ABSOLUTE: 0.5 K/UL (ref 0.2–0.8)
MONOCYTES RELATIVE PERCENT: 6.4 %
NEUTROPHILS ABSOLUTE: 4.3 K/UL (ref 1.4–6.5)
NEUTROPHILS RELATIVE PERCENT: 61.1 %
PDW BLD-RTO: 17.9 % (ref 11.5–14.5)
PLATELET # BLD: 251 K/UL (ref 130–400)
POTASSIUM SERPL-SCNC: 4.4 MEQ/L (ref 3.4–4.9)
RBC # BLD: 3.8 M/UL (ref 4.2–5.4)
REASON FOR REJECTION: NORMAL
REJECTED TEST: NORMAL
SODIUM BLD-SCNC: 141 MEQ/L (ref 135–144)
WBC # BLD: 7.1 K/UL (ref 4.8–10.8)

## 2022-03-03 ENCOUNTER — OFFICE VISIT (OUTPATIENT)
Dept: GERIATRIC MEDICINE | Age: 82
End: 2022-03-03
Payer: MEDICARE

## 2022-03-03 DIAGNOSIS — U09.9 POST-COVID SYNDROME: ICD-10-CM

## 2022-03-03 DIAGNOSIS — M15.9 OSTEOARTHRITIS OF MULTIPLE JOINTS, UNSPECIFIED OSTEOARTHRITIS TYPE: ICD-10-CM

## 2022-03-03 DIAGNOSIS — R53.1 WEAKNESS: ICD-10-CM

## 2022-03-03 DIAGNOSIS — E11.9 TYPE 2 DIABETES MELLITUS WITHOUT COMPLICATION, WITHOUT LONG-TERM CURRENT USE OF INSULIN (HCC): Primary | ICD-10-CM

## 2022-03-03 PROCEDURE — 99305 1ST NF CARE MODERATE MDM 35: CPT | Performed by: INTERNAL MEDICINE

## 2022-03-10 PROBLEM — R53.83 FATIGUE: Status: ACTIVE | Noted: 2022-03-10

## 2022-03-10 PROBLEM — N18.30 STAGE 3 CHRONIC KIDNEY DISEASE (HCC): Status: ACTIVE | Noted: 2022-03-10

## 2022-03-11 NOTE — PROGRESS NOTES
[facility]  2022    Juan Martinez  is a 79 y.o. in the  being seen for a f/u of   Chief Complaint   Patient presents with    1 Month Follow-Up       HPI lives in [address] [place] [address]  Being seen monthly for chronic illnesses.      Past Medical History:   Diagnosis Date    ROCHELLE [facility]acute kidney [facility]) ([facility] 2021    [facility]    Gastroesophageal reflux disease 6/10/2002    Hypothyroidism     Obesity, Class III, BMI 40-49.9 (morbid obesity) ([facility] 2016    Primary hypertension 2022    Tobacco abuse      Past Surgical History:   Procedure Laterality Date    APPENDECTOMY      CHOLECYSTECTOMY       Family History   Problem Relation Age of Onset    Kidney Disease Mother     High Blood Pressure Mother     Stroke Mother     Heart Disease Father      Social History     Socioeconomic History    Marital status:      Spouse name: Not on file    Number of children: Not on file    Years of education: Not on file    Highest education level: Not on file   Occupational History    Not on file   Tobacco Use    Smoking status: Former Smoker     Packs/day: 5.00     Years: 30.00     Pack years: 150.00     Types: Cigarettes     Quit date: 10/1/2017     Years since quittin.4    Smokeless tobacco: Never Used    Tobacco comment: Has been trying to quit since January   Vaping Use    Vaping Use: Never used   Substance and Sexual Activity    Alcohol use: No     Alcohol/week: 0.0 standard drinks    Drug use: No    Sexual activity: Not Currently   Other Topics Concern    Not on file   Social History Narrative    Not on file     Social Determinants of Health     Financial Resource Strain: Low Risk     Difficulty of Paying Living Expenses: Not hard at all   Food Insecurity: No Food Insecurity    Worried About Running Out of Food in the Last Year: Never true    Peter of Food in the Last Year: Never true   Transportation Needs: No Transportation Needs    Lack of Transportation (Medical): No    Lack of Transportation (Non-Medical): No   Physical Activity: Inactive    Days of Exercise per Week: 0 days    Minutes of Exercise per Session: 0 min   Stress: No Stress Concern Present    Feeling of Stress : Only a little   Social Connections: Moderately Isolated    Frequency of Communication with Friends and Family: Three times a week    Frequency of Social Gatherings with Friends and Family: Three times a week    Attends [employer] Services: Never    Active Member of Clubs or Organizations: No    Attends Club or Organization Meetings: Never    Marital Status:    Intimate Partner Violence: Not At Risk    Fear of Current or Ex-Partner: No    Emotionally Abused: No    Physically Abused: No    Sexually Abused: No   Housing Stability: Unknown    Unable to Pay for Housing in the Last Year: No    Number of [place] in the Last Year: Not on file    Unstable Housing in the Last Year: No       Allergies: Benadryl [diphenhydramine hcl], Cortisone, Codeine, and Pcn [penicillins]  NF MEDICATIONS REVIEWED    ROS:  See HPI  Constitutional: There are no reports of behavioral issues, change in appetite, fever, or weakness. No gross bleeding issues. Respiratory: denies SOB, dyspnea, dyspnea on exertion,   Cardiovascular: denies CP, lightheadedness,   GI: No reports of change of bowel habits, no N/V/D/C. : no reports of change in bladder habits  Extremities: No reports of pain issues, no edema    Physical exam:   Blood pressure 134/66, temperature 97.8, heart rate 84, respirations 18, pulse ox 96% room air, weight 185. 6 pounds. Constitutional: Elderly female cooperative with exam.  In no apparent distress. HEENT: normocephalic, conjunctiva pink, sclera nonicteric, MMM, no cyanosis.  NO neck mass visualized   Cardiovascular: Regular rate  Respiratory: unlabored respirations, no accessory muscle use noted  GI: abdomen obese, ND  : no bladder tenderness  Extremities: no edema,  Psych: pleasant and able to follow simple commands, normal thought content, speech clear    ASSESSMENT:     Diagnosis Orders   1. Fatigue, unspecified type     2. Stage 3 chronic kidney disease, unspecified whether stage 3a or 3b CKD ([facility]     3. Acquired hypothyroidism         PLAN:   Agrees with POC     BMP, CBC with differential, UA C&S    Return in about 1 month (around 3/24/2022), or if symptoms worsen or fail to improve, for chronic conditions. Pertinent POC, labs, have been reviewed, continue same. Encourage fluids and good nutrition. Stress fall prevention strategies. Nursing to notify Pt/POA for agreement to POC         ________________________    Meliton Gonzales. [facility], [address] [place] [address]  Office [phone]  Fax [phone]      Please note this report is partially produced by using speech recognition hardware. It may contain errors related to the system, including grammar, punctuation and spelling as well as words and phrases that may seem inaccurate.   For any questions or concerns feel free to contact me for clarification

## 2022-03-28 NOTE — PROGRESS NOTES
PATIENT: Diogenes Cardona : 1940 DOS:   A [facility] serving [place] and [address] [place] [address] ASSIST LIVING  Transfer of care. HISTORY OF PRESENT ILLNESS: This is an 69-year-old woman admitted to this facility  with COVID-19, chronic kidney disease, functional decline, weakness. Patient had post COVID  syndrome, profoundly weak. Had been recently hospitalized. Patient did undergo evaluation for  possibility of fever, diarrhea. Patient did make gains. She was stabilized. Patient's bowel  movements had improved. She was stabilized and subsequently transferred here for ongoing  supportive care. PAST MEDICAL HISTORY: Included COVID-19, encephalopathy, degenerative joint disease,  hypothyroidism, hypertension, chronic pain, chronic lower extremity wounds, weakness, edema,  hypothyroidism, GERD. MEDICATIONS: Her medications on arrival included the following: Patient is on famotidine  20 mg daily, Synthroid 150 mcg daily, losartan 150 mg, Norvasc 5 mg daily, Lovenox,  melatonin, Percocet 1 tablet q.12 hours, vitamin C, vitamin D. FAMILY HISTORY: Negative for early-onset neoplasm or cardiac events. SOCIAL HISTORY: Patient currently is nonsmoker, nondrinker. REVIEW OF SYSTEMS: Patient denied chest pain, palpitations, nausea, vomiting, emesis. No  change in her bowel or bladder habits. OBJECTIVE: She appeared chronically ill. Pupils are small, minimally reactive. Oral mucosa  moist. Chest showed no crackles, no wheezing. Cardiovascular exam showed a regular rate. Abdomen is soft, nontender. Extremities showed +1 dorsal pedal pulse. ASSESSMENT AND PLAN:  1. Degenerative joint disease. No new pain crisis. 2. Weakness. Patient is on course of therapy. Clinically, stable at this time. 3. Post COVID syndrome. Continue with supportive care. 4. Lower extremity wounds. Patient's skin appears to be improving at this time. Follow up  with wound care service.

## 2022-03-29 ENCOUNTER — OUTSIDE SERVICES (OUTPATIENT)
Dept: PODIATRY | Age: 82
End: 2022-03-29
Payer: MEDICARE

## 2022-03-29 DIAGNOSIS — L97.911 CHRONIC ULCER OF RIGHT LEG, LIMITED TO BREAKDOWN OF SKIN (HCC): ICD-10-CM

## 2022-03-29 DIAGNOSIS — L97.921 CHRONIC ULCER OF LEFT LEG, LIMITED TO BREAKDOWN OF SKIN (HCC): ICD-10-CM

## 2022-03-29 DIAGNOSIS — L03.116 BILATERAL CELLULITIS OF LOWER LEG: Primary | ICD-10-CM

## 2022-03-29 DIAGNOSIS — I87.2 CHRONIC VENOUS INSUFFICIENCY: ICD-10-CM

## 2022-03-29 DIAGNOSIS — L03.115 BILATERAL CELLULITIS OF LOWER LEG: Primary | ICD-10-CM

## 2022-03-29 PROCEDURE — 99233 SBSQ HOSP IP/OBS HIGH 50: CPT | Performed by: PODIATRIST

## 2022-04-01 ENCOUNTER — OUTSIDE SERVICES (OUTPATIENT)
Dept: PODIATRY | Age: 82
End: 2022-04-01
Payer: MEDICARE

## 2022-04-01 DIAGNOSIS — L97.911 CHRONIC ULCER OF RIGHT LEG, LIMITED TO BREAKDOWN OF SKIN (HCC): ICD-10-CM

## 2022-04-01 DIAGNOSIS — I87.2 CHRONIC VENOUS INSUFFICIENCY: ICD-10-CM

## 2022-04-01 DIAGNOSIS — L03.115 BILATERAL CELLULITIS OF LOWER LEG: Primary | ICD-10-CM

## 2022-04-01 DIAGNOSIS — L89.890 PRESSURE INJURY OF LEFT CALF, UNSTAGEABLE (HCC): ICD-10-CM

## 2022-04-01 DIAGNOSIS — L97.921 CHRONIC ULCER OF LEFT LEG, LIMITED TO BREAKDOWN OF SKIN (HCC): ICD-10-CM

## 2022-04-01 DIAGNOSIS — L03.116 BILATERAL CELLULITIS OF LOWER LEG: Primary | ICD-10-CM

## 2022-04-01 DIAGNOSIS — L89.890 PRESSURE INJURY OF RIGHT CALF, UNSTAGEABLE (HCC): ICD-10-CM

## 2022-04-01 PROCEDURE — 99231 SBSQ HOSP IP/OBS SF/LOW 25: CPT | Performed by: PODIATRIST

## 2022-04-05 ENCOUNTER — OFFICE VISIT (OUTPATIENT)
Dept: GERIATRIC MEDICINE | Age: 82
End: 2022-04-05
Payer: MEDICARE

## 2022-04-05 VITALS
RESPIRATION RATE: 16 BRPM | DIASTOLIC BLOOD PRESSURE: 75 MMHG | OXYGEN SATURATION: 95 % | SYSTOLIC BLOOD PRESSURE: 134 MMHG | HEART RATE: 93 BPM | TEMPERATURE: 98.1 F

## 2022-04-05 DIAGNOSIS — I83.012 VENOUS STASIS ULCER OF RIGHT CALF WITH FAT LAYER EXPOSED, UNSPECIFIED WHETHER VARICOSE VEINS PRESENT (HCC): Primary | Chronic | ICD-10-CM

## 2022-04-05 DIAGNOSIS — N18.9 ACUTE KIDNEY INJURY SUPERIMPOSED ON CKD (HCC): ICD-10-CM

## 2022-04-05 DIAGNOSIS — I87.2 VENOUS STASIS ULCER OF LEFT CALF WITH FAT LAYER EXPOSED WITHOUT VARICOSE VEINS (HCC): Chronic | ICD-10-CM

## 2022-04-05 DIAGNOSIS — J44.1 COPD EXACERBATION (HCC): ICD-10-CM

## 2022-04-05 DIAGNOSIS — L97.222 VENOUS STASIS ULCER OF LEFT CALF WITH FAT LAYER EXPOSED WITHOUT VARICOSE VEINS (HCC): Chronic | ICD-10-CM

## 2022-04-05 DIAGNOSIS — N17.9 ACUTE KIDNEY INJURY SUPERIMPOSED ON CKD (HCC): ICD-10-CM

## 2022-04-05 DIAGNOSIS — I10 PRIMARY HYPERTENSION: ICD-10-CM

## 2022-04-05 DIAGNOSIS — E11.9 TYPE 2 DIABETES MELLITUS WITHOUT COMPLICATION, WITHOUT LONG-TERM CURRENT USE OF INSULIN (HCC): ICD-10-CM

## 2022-04-05 DIAGNOSIS — N18.30 STAGE 3 CHRONIC KIDNEY DISEASE, UNSPECIFIED WHETHER STAGE 3A OR 3B CKD (HCC): ICD-10-CM

## 2022-04-05 DIAGNOSIS — E03.9 ACQUIRED HYPOTHYROIDISM: ICD-10-CM

## 2022-04-05 DIAGNOSIS — L97.212 VENOUS STASIS ULCER OF RIGHT CALF WITH FAT LAYER EXPOSED, UNSPECIFIED WHETHER VARICOSE VEINS PRESENT (HCC): Primary | Chronic | ICD-10-CM

## 2022-04-05 LAB
ALBUMIN SERPL-MCNC: 2.3 G/DL
ALP BLD-CCNC: 67 U/L
ALT SERPL-CCNC: 9 U/L
ANION GAP SERPL CALCULATED.3IONS-SCNC: NORMAL MMOL/L
AST SERPL-CCNC: 12 U/L
BASOPHILS ABSOLUTE: 0.1 /ΜL
BASOPHILS RELATIVE PERCENT: 0.8 %
BILIRUB SERPL-MCNC: 0.2 MG/DL (ref 0.1–1.4)
BUN BLDV-MCNC: 19 MG/DL
CALCIUM SERPL-MCNC: 8.5 MG/DL
CHLORIDE BLD-SCNC: 110 MMOL/L
CO2: 21 MMOL/L
CREAT SERPL-MCNC: 1.5 MG/DL
EOSINOPHILS ABSOLUTE: 0.3 /ΜL
EOSINOPHILS RELATIVE PERCENT: 4.6 %
GFR CALCULATED: NORMAL
GLUCOSE BLD-MCNC: 67 MG/DL
HCT VFR BLD CALC: 28.9 % (ref 36–46)
HEMOGLOBIN: 8.8 G/DL (ref 12–16)
LYMPHOCYTES ABSOLUTE: 1.6 /ΜL
LYMPHOCYTES RELATIVE PERCENT: 22.5 %
MCH RBC QN AUTO: 25.6 PG
MCHC RBC AUTO-ENTMCNC: 30.3 G/DL
MCV RBC AUTO: 84.3 FL
MONOCYTES ABSOLUTE: 0.7 /ΜL
MONOCYTES RELATIVE PERCENT: 10.3 %
NEUTROPHILS ABSOLUTE: 4.4 /ΜL
NEUTROPHILS RELATIVE PERCENT: 61.8 %
PLATELET # BLD: 337 K/ΜL
PMV BLD AUTO: 8.6 FL
POTASSIUM SERPL-SCNC: 4.7 MMOL/L
RBC # BLD: 3.43 10^6/ΜL
SODIUM BLD-SCNC: 140 MMOL/L
TOTAL PROTEIN: 4.9
WBC # BLD: 7.1 10^3/ML

## 2022-04-05 PROCEDURE — 99305 1ST NF CARE MODERATE MDM 35: CPT | Performed by: FAMILY MEDICINE

## 2022-04-05 RX ORDER — TAMSULOSIN HYDROCHLORIDE 0.4 MG/1
0.4 CAPSULE ORAL NIGHTLY
COMMUNITY

## 2022-04-05 RX ORDER — TRAMADOL HYDROCHLORIDE 50 MG/1
50 TABLET ORAL EVERY 8 HOURS PRN
COMMUNITY
End: 2022-04-15 | Stop reason: SDUPTHER

## 2022-04-05 ASSESSMENT — ENCOUNTER SYMPTOMS
RHINORRHEA: 0
ABDOMINAL PAIN: 0
WHEEZING: 0
CONSTIPATION: 0
COUGH: 0
SHORTNESS OF BREATH: 0
SORE THROAT: 0
DIARRHEA: 0

## 2022-04-05 NOTE — PROGRESS NOTES
[facility]  P: [phone]   L: [phone]     Chief Complaint  Chief Complaint   Patient presents with    Cellulitis       HPI:  79 y. o.female who presents for the following:      (Hx of multiple recent admission and SNF stays; DM2, HTN, COPD, CKD, hypothyroidism, noncompliance with medical treatment)    iMke Kunz was seen recently in the ED for painful ulcers on the b/l LEs (Had a recent admission for this and refused SNF so was [address] home). Found to have LLE DVT and admitted for treatment of b/l celullitis. Seen by wound care and started on 14 day course of levaquin. DC'd to SNF for continued rehab and wound care. We met today in her room at lunch time. She says her pain is tolerable but worse when she does therapy. She is happy to do therapy to help her become independent again. She is tolerating lunch but has had decreased appetite. Doesn't have a BM daily but feels comfortable and declined meds for constipation. Per nursing staff she has declined multiple therapies and wound care intermittently. Review of Systems   Constitutional: Negative for chills and fever. HENT: Negative for congestion, rhinorrhea and sore throat. Respiratory: Negative for cough, shortness of breath and wheezing. Gastrointestinal: Negative for abdominal pain, constipation and diarrhea. Endocrine: Negative for polydipsia and polyuria. Genitourinary: Negative for dysuria, frequency and urgency. Skin: Positive for wound. Neurological: Negative for syncope, light-headedness, numbness and headaches. Psychiatric/Behavioral: Negative for sleep disturbance. The patient is not nervous/anxious.         Past Medical History:   Diagnosis Date    ROCHELLE [facility]acute kidney [facility]) [facility] 7/25/2021    COPD exacerbation ([facility] 2/24/2022    Gastroesophageal reflux disease 6/10/2002    Hypothyroidism     Obesity, Class III, BMI 40-49.9 (morbid obesity) ([facility] 1/4/2016    Primary of Current or Ex-Partner: No    Emotionally Abused: No    Physically Abused: No    Sexually Abused: No   Housing Stability: Unknown    Unable to Pay for Housing in the Last Year: No    Number of Places Lived in the Last Year: Not on file    Unstable Housing in the Last Year: No     Family History   Problem Relation Age of Onset    Kidney Disease Mother     High Blood Pressure Mother     Stroke Mother     Heart Disease Father       Allergies   Allergen Reactions    Benadryl [Diphenhydramine Hcl]     Cortisone Swelling    Codeine Rash    Pcn [Penicillins] Rash     Current Outpatient Medications   Medication Sig Dispense Refill    apixaban (ELIQUIS) 5 MG TABS tablet Take 5 mg by mouth 2 times daily Indications: Anticoagulant Therapy      tamsulosin (FLOMAX) 0.4 MG capsule Take 0.4 mg by mouth nightly Indications: Urinary Retention      traMADol (ULTRAM) 50 MG tablet Take 50 mg by mouth every 8 hours as needed for Pain.  amLODIPine (NORVASC) 5 MG tablet Take 5 mg by mouth 2 times daily Indications: High Blood Pressure Disorder      losartan (COZAAR) 100 MG tablet Take 100 mg by mouth daily Indications: High Blood Pressure Disorder       menthol-zinc oxide (CALMOSEPTINE) 0.44-20.625 % OINT ointment Apply topically 2 times daily Indications: Wound Care Max 30 ml per day. Bilat. Buttocks      nystatin (MYCOSTATIN) 769243 UNIT/GM powder Apply topically 3 times daily Indications: Skin Infection due to Candida Yeast Apply topically 4 times daily. groin      ascorbic acid (VITAMIN C) 500 MG tablet Take 500 mg by mouth 2 times daily Indications: Nutritional Support      zinc 50 MG TABS tablet Take 50 mg by mouth daily Indications: Nutritional Support      SYNTHROID 150 MCG tablet Take 1 tablet by mouth Daily (Patient taking differently: Take 150 mcg by mouth Daily Indications: Underactive Thyroid ) 90 tablet 0     No current facility-administered medications for this visit.          There were no vitals filed for this visit. Physical exam:  Physical Exam  Vitals reviewed. Constitutional:       General: She is not in acute distress. Appearance: She is well-developed. HENT:      Head: Normocephalic and atraumatic. Mouth/Throat:      Pharynx: No oropharyngeal exudate. Neck:      Thyroid: No thyromegaly. Cardiovascular:      Rate and Rhythm: Normal rate and regular rhythm. Heart sounds: Normal heart sounds. No murmur heard. Pulmonary:      Effort: Pulmonary effort is normal. No respiratory distress. Breath sounds: Normal breath sounds. No wheezing. Abdominal:      General: There is no distension. Palpations: Abdomen is soft. Tenderness: There is no abdominal tenderness. There is no guarding or rebound. Musculoskeletal:      Cervical back: Normal range of motion. Legs:    Lymphadenopathy:      Cervical: No cervical adenopathy. Skin:     General: Skin is warm and dry. Neurological:      Mental Status: She is alert and oriented to person, place, and time. Psychiatric:         Behavior: Behavior normal.         Assessment/Plan:  79 y.o. female here mainly for LE infected venous stasis ulcers:  - ID: finished abx regimen; routine wound care when possible as patient refuses sometimes; has ID f/u in the near future as well as wound care f/u  - HTN: routine meds when patient allows  - rest of med problems stable on current regimen when she takes it     Diagnosis Orders   1. Venous stasis ulcer of right calf with fat layer exposed, unspecified whether varicose veins present ([address] [facility]     2. Venous stasis ulcer of left calf with fat layer exposed without varicose veins ([address]     3. Acquired hypothyroidism     4. Type 2 diabetes mellitus without complication, without long-term current use of insulin ([address]     5. Acute kidney injury superimposed on CKD ([address]     6. COPD exacerbation ([address]     7. Primary hypertension     8.  Stage 3 chronic kidney disease, unspecified whether stage 3a or 3b CKD ([facility]              Leslie Cifuentes MD

## 2022-04-07 ENCOUNTER — OFFICE VISIT (OUTPATIENT)
Dept: GERIATRIC MEDICINE | Age: 82
End: 2022-04-07
Payer: MEDICARE

## 2022-04-07 DIAGNOSIS — Z91.199 MEDICAL NON-COMPLIANCE: ICD-10-CM

## 2022-04-07 DIAGNOSIS — K21.9 GASTROESOPHAGEAL REFLUX DISEASE WITHOUT ESOPHAGITIS: ICD-10-CM

## 2022-04-07 DIAGNOSIS — S81.809S MULTIPLE OPENS WOUND OF LOWER EXTREMITY, UNSPECIFIED LATERALITY, SEQUELA: Primary | ICD-10-CM

## 2022-04-07 PROCEDURE — 99308 SBSQ NF CARE LOW MDM 20: CPT | Performed by: NURSE PRACTITIONER

## 2022-04-08 NOTE — PROGRESS NOTES
Subjective:    [facility]  Patient ID: Sil May is a pleasant 79 y.o. female who presents today for:  Chief Complaint   Patient presents with    Other     F/U   Resident sitting up in bed. She did receive wound care to her [facility] this morning by the nurse. She will see her wound care physician on 4/13. Denies pain. Tolerating Ultram.  No nausea/vomiting or diarrhea. No melena, hematochezia, or hematuria. No dysuria, constipation or abdominal pain. She denies chest pain, shortness of breath, or palpitations.    138/70, 97.2, 74, 16, 98% room air      Patient Active Problem List   Diagnosis    Hypothyroidism    Tobacco abuse    Atopic rhinitis    Gastroesophageal reflux disease    Hearing loss    Type 2 diabetes mellitus without complication, without long-term current use of insulin ([address]    Family history of other specified malignant neoplasm    Venous stasis ulcer of right thigh with fat layer exposed without varicose veins (HCC)    Venous stasis ulcer of right calf with fat layer exposed ([address]    Venous stasis ulcer of left calf without varicose veins (HCC)    Edema    Acute kidney injury superimposed on CKD (HCC)    Wounds, multiple open, lower extremity    Excessive use of nonsteroidal anti-inflammatory drugs (NSAIDs)    Medical non-compliance    COVID-19    COPD exacerbation ([address]    Primary hypertension    Class 2 obesity due to excess calories with body mass index (BMI) of 39.0 to 39.9 in adult    Fatigue    Stage 3 chronic kidney disease (HCC)     Past Medical History:   Diagnosis Date    ROCHELLE (acute kidney injury) ([address] 7/25/2021    COPD exacerbation ([address] 2/24/2022    Gastroesophageal reflux disease 6/10/2002    Hypothyroidism     Obesity, Class III, BMI 40-49.9 (morbid obesity) ([address] 1/4/2016    Primary hypertension 2/24/2022    Tobacco abuse      Past Surgical History:   Procedure Laterality Date    APPENDECTOMY      CHOLECYSTECTOMY       Social History Socioeconomic History    Marital status:      Spouse name: Not on file    Number of children: Not on file    Years of education: Not on file    Highest education level: Not on file   Occupational History    Not on file   Tobacco Use    Smoking status: Former Smoker     Packs/day: 5.00     Years: 30.00     Pack years: 150.00     Types: Cigarettes     Quit date: 10/1/2017     Years since quittin.5    Smokeless tobacco: Never Used    Tobacco comment: Has been trying to quit since January   Vaping Use    Vaping Use: Never used   Substance and Sexual Activity    Alcohol use: No     Alcohol/week: 0.0 standard drinks    Drug use: No    Sexual activity: Not Currently   Other Topics Concern    Not on file   Social History Narrative    Not on file     Social Determinants of Health     Financial Resource Strain: Low Risk     Difficulty of Paying Living Expenses: Not hard at all   Food Insecurity: No Food Insecurity    Worried About [address] in the Last Year: Never true    [facility] in the Last Year: Never true   Transportation Needs: No Transportation Needs    Lack of Transportation (Medical): No    Lack of Transportation (Non-Medical): No   Physical Activity: Inactive    Days of Exercise per Week: 0 days    Minutes of Exercise per Session: 0 min   Stress: No Stress Concern Present    Feeling of Stress : Only a little   Social Connections: Moderately Isolated    Frequency of Communication with Friends and Family: Three times a week    Frequency of Social Gatherings with Friends and Family:  Three times a week    Attends Mormonism Services: Never    Active Member of Clubs or Organizations: No    Attends Club or Organization Meetings: Never    Marital Status:    Intimate Partner Violence: Not At Risk    Fear of Current or Ex-Partner: No    Emotionally Abused: No    Physically Abused: No    Sexually Abused: No   Housing Stability: Unknown    Unable to Pay for Housing in the Last Year: No    Number of Places Lived in the Last Year: Not on file    Unstable Housing in the Last Year: No     Family History   Problem Relation Age of Onset    Kidney Disease Mother     High Blood Pressure Mother     Stroke Mother     Heart Disease Father      Allergies   Allergen Reactions    Benadryl [Diphenhydramine Hcl]     Cortisone Swelling    Codeine Rash    Pcn [Penicillins] Rash         Review of Systems   All other systems reviewed and are negative. Objective:       Physical Exam  Vitals reviewed. Constitutional:       General: She is awake. She is not in acute distress. Appearance: She is well-developed and normal weight. She is ill-appearing (chronic ). She is not toxic-appearing or diaphoretic. HENT:      Head: Normocephalic and atraumatic. Nose: Nose normal.      Mouth/Throat:      Mouth: Mucous membranes are moist.      Pharynx: Oropharynx is clear. Eyes:      General: No scleral icterus. Conjunctiva/sclera: Conjunctivae normal.      Pupils: Pupils are equal, round, and reactive to light. Cardiovascular:      Rate and Rhythm: Normal rate and regular rhythm. Pulses:           Radial pulses are 2+ on the right side and 2+ on the left side. Posterior tibial pulses are 1+ on the right side and 1+ on the left side. Heart sounds: Normal heart sounds. No murmur heard. Pulmonary:      Effort: Pulmonary effort is normal. No respiratory distress. Breath sounds: Normal breath sounds. No stridor. No wheezing, rhonchi or rales. Chest:      Chest wall: No tenderness. Abdominal:      General: Bowel sounds are normal. There is no distension. Palpations: Abdomen is soft. There is no mass. Tenderness: There is no abdominal tenderness. There is no guarding or rebound. Musculoskeletal:         General: Normal range of motion. Cervical back: Normal range of motion and neck supple. Right lower leg: Edema present. Left lower leg: Edema present. Comments: Nonpitting    Lymphadenopathy:      Cervical: No cervical adenopathy. Skin:     General: Skin is warm and dry. Coloration: Skin is not cyanotic, jaundiced, mottled or pale. Findings: Bruising and wound (bilat LE-see wound care notes ) present. No erythema or rash. Neurological:      Mental Status: She is alert and oriented to person, place, and time. Mental status is at baseline. Motor: Weakness (global) present. Psychiatric:         Attention and Perception: Attention and perception normal.         Mood and Affect: Mood and affect normal.         Speech: Speech normal.         Behavior: Behavior normal. Behavior is cooperative. Thought Content: Thought content normal.         Cognition and Memory: Cognition and memory normal.         Judgment: Judgment normal.       Assessment:       Diagnosis Orders   1. Multiple opens wound of lower extremity, unspecified laterality, sequela     2. Medical non-compliance     3. Gastroesophageal reflux disease without esophagitis           Plan:   1&2. Continue with daily wound care per nursing.   - Keep F/U with wound center on 4/13, recs appreciated. - Medical compliance strongly encouraged. 3. Antireflux lifestyle recommended, examples provided. PRN antacid. Monitor  for any acute changes.   -routine labs pending. Side effects, adverse effects of the medication prescribed today, as well as treatment plan and result expectations have beendiscussed with the patient who expresses understanding and desires to proceed.     MARLEE Valencia - CNP

## 2022-04-12 ENCOUNTER — OFFICE VISIT (OUTPATIENT)
Dept: GERIATRIC MEDICINE | Age: 82
End: 2022-04-12
Payer: MEDICARE

## 2022-04-12 DIAGNOSIS — L03.116 BILATERAL LOWER LEG CELLULITIS: ICD-10-CM

## 2022-04-12 DIAGNOSIS — D50.8 OTHER IRON DEFICIENCY ANEMIA: Primary | ICD-10-CM

## 2022-04-12 DIAGNOSIS — Z91.199 MEDICAL NON-COMPLIANCE: ICD-10-CM

## 2022-04-12 DIAGNOSIS — L03.115 BILATERAL LOWER LEG CELLULITIS: ICD-10-CM

## 2022-04-12 PROCEDURE — 99308 SBSQ NF CARE LOW MDM 20: CPT | Performed by: NURSE PRACTITIONER

## 2022-04-15 DIAGNOSIS — M16.0 OSTEOARTHRITIS OF BOTH HIPS, UNSPECIFIED OSTEOARTHRITIS TYPE: Primary | ICD-10-CM

## 2022-04-19 RX ORDER — TRAMADOL HYDROCHLORIDE 50 MG/1
50 TABLET ORAL 3 TIMES DAILY PRN
Qty: 40 TABLET | Refills: 0 | Status: SHIPPED | OUTPATIENT
Start: 2022-04-19 | End: 2022-05-19

## 2022-04-20 ENCOUNTER — TELEMEDICINE (OUTPATIENT)
Dept: INFECTIOUS DISEASES | Age: 82
End: 2022-04-20
Payer: MEDICARE

## 2022-04-20 DIAGNOSIS — L03.116 BILATERAL LOWER LEG CELLULITIS: Primary | ICD-10-CM

## 2022-04-20 DIAGNOSIS — L03.115 BILATERAL LOWER LEG CELLULITIS: Primary | ICD-10-CM

## 2022-04-20 DIAGNOSIS — A49.8 PSEUDOMONAS AERUGINOSA INFECTION: ICD-10-CM

## 2022-04-20 DIAGNOSIS — I83.209 INFECTED STASIS ULCER, UNSPECIFIED LATERALITY (HCC): ICD-10-CM

## 2022-04-20 DIAGNOSIS — L97.909 INFECTED STASIS ULCER, UNSPECIFIED LATERALITY (HCC): ICD-10-CM

## 2022-04-20 PROCEDURE — 99213 OFFICE O/P EST LOW 20 MIN: CPT | Performed by: INTERNAL MEDICINE

## 2022-04-20 NOTE — PROGRESS NOTES
Meng Ordonez (:  1940) is a Established patient, here for evaluation of the following:    Assessment & Plan   Below is the assessment and plan developed based on review of pertinent history, physical exam, labs, studies, and medications. Bilateral lower extremity cellulitis  Infected leg ulcers with Pseudomonas aeruginosa    Continue local wound care with Xeroform dressing  No need for additional antibiotics since there is remarkable clinical improvement  Patient was reassured and encouraged to allow the nurses to take good care of her at the nursing home       HPI   Follow-up [facility] hospitalization for bilateral legs cellulitis and infected ulcers with Pseudomonas aeruginosa, finished 2 weeks course of Levaquin, well-tolerated. Patient has chronic anxiety. She thinks she is dying because she has anemia and was told that she might be bleeding inside. Mild B legs pain. Small amount of serosanguineous drainage. Bilateral legs wound with progressive healing. Chronic generalized weakness  Nurse assisted with virtual visit  Review of Systems   Skin: Positive for wound. Neurological: Positive for weakness. Psychiatric/Behavioral: The patient is nervous/anxious. All other systems reviewed and are negative.   Patient-Reported Vitals  No data recorded     Physical Exam  [INSTRUCTIONS:  \"[x]\" Indicates a positive item  \"[]\" Indicates a negative item  -- DELETE ALL ITEMS NOT EXAMINED]    Constitutional: [x] Appears well-developed and well-nourished [x] No apparent distress      [] Abnormal -     Mental status: [x] Alert and awake  [x] Oriented to person/place/time [x] Able to follow commands    [] Abnormal -     Eyes:   EOM    [x]  Normal    [] Abnormal -   Sclera  [x]  Normal    [] Abnormal -          Discharge [x]  None visible   [] Abnormal -     HENT: [x] Normocephalic, atraumatic  [] Abnormal -   [x] Mouth/Throat: Mucous membranes are moist    External Ears [x] Normal  [] Abnormal -    Neck: [x] No visualized mass [] Abnormal -     Pulmonary/Chest: [x] Respiratory effort normal   [x] No visualized signs of difficulty breathing or respiratory distress        [] Abnormal -      Musculoskeletal:   [x] Normal gait with no signs of ataxia         [x] Normal range of motion of neck        [] Abnormal -     Neurological:        [x] No Facial Asymmetry (Cranial nerve 7 motor function) (limited exam due to video visit)          [x] No gaze palsy        [] Abnormal -          Skin:        [x] No significant exanthematous lesions or discoloration noted on facial skin         [] Abnormal -            Psychiatric:       [x] Normal Affect [] Abnormal -        [x] No Hallucinations    Other pertinent observable physical exam findings:-  Bilateral legs with healing wounds  Photos were obtained with patient's permission and nurses assistance                      Component Ref Range & Units 4/5/22 4/4/22 0605 4/2/22 0645 4/1/22 0655 3/31/22 0645 3/30/22 0429 3/29/22 0712   WBC 10^3/mL 7.1  8.6 R  8.5 R  9.8 R  10.2 R  11.1 R  11.6 High  R    Hemoglobin 12.0 - 16.0 g/dL 8.8 Abnormal   8.2 Low  R  8.8 Low  R  8.8 Low  R  9.5 Low  R  9.8 Low  R  9.8 Low  R    Hematocrit 36 - 46 % 28. 9 Abnormal   27.1 Low  R  31.2 Low  R  28.6 Low  R  30.4 Low  R  32.8 Low  R  31.5 Low  R    Platelets K/µL 395  104 R  285 R  271 R  300 R  268 R  301 R    Neutrophils % % 61.8  58.7 R  68.2 R        Lymphocytes % % 22.5  2.32 R  1.71 R              0 Result Notes     2  Topics    Component Ref Range & Units 4/5/22 4/4/22 0605 4/2/22 0645 4/1/22 0655 3/31/22 0645 3/30/22 0429 3/29/22 0712   Sodium mmol/L 140  138 R  138 R  139 R  138 R  137 R  139 R    Chloride mmol/L 110  111 High  R  111 High  R  112 High  R  111 High  R  112 High  R  111 High  R    Potassium mmol/L 4.7  4.3 R  4.3 R  4.2 R  4.0 R  4.2 R  4.0 R    BUN mg/dL 19          CREATININE  1.5  1.44 High  R  1.30 High  R  1.40 High  R  1.37 High  R  1.53 High  R 1.91 High  R    Glucose mg/dL 67  86 R  69 Low  R  79 R  74 R  71 Low  R  72 Low  R    AST U/L 12    11 R  14 R      ALT U/L 9    13 R  13 R      Calcium mg/dL 8.5  8.1 Low  R  8.3 Low  R  8.1 Low  R  8.5 Low  R  8.3 Low  R  8.8 R    Total Protein  4.9    4.7 Low  R  5.3 Low  R      CO2 mmol/L 21          Albumin  2.3    2.5 Low  R  2.6 Low  R      Alkaline Phosphatase U/L 67    70 R  69 R      Total Bilirubin 0.1 - 1.4 mg/dL 0.2    0.3 R  0.3 R      Gfr Calculated           Anion Gap   8 Low  R  11 R  9 Low  R  11 R  10 R  16 R    HCO3   23 R  20 Low  R  22 R  20 Low  R  19 Low  R  16 Low  R    Urea Nitrogen   25 High  R  27 High  R  34 High  R  35 High  R  38 High  R  44 High  R    eGFR Female   36 Abnormal  R  41 Abnormal  R  37 Abnormal  R  38 Abnormal  R  34 Abnormal  R  26 Abnormal  R    Resulting Agency   [facility]                      On this date 4/20/2022 I have spent 20 minutes reviewing previous notes, test results and face to face (virtual) with the patient discussing the diagnosis and importance of compliance with the treatment plan as well as documenting on the day of the visit. Silvio Cola, was evaluated through a synchronous (real-time) audio-video encounter. The patient (or guardian if applicable) is aware that this is a billable service, which includes applicable co-pays. This Virtual Visit was conducted with patient's (and/or legal guardian's) consent. The visit was conducted pursuant to the emergency declaration under the [facility], [address] [place] [address] authority and the [facility] and [employer] General Act. Patient identification was verified, and a caregiver was present when appropriate. The patient was located at home in a state where the provider was licensed to provide care.        --Luis Mann MD

## 2022-04-25 NOTE — PROGRESS NOTES
Subjective:    [facility]   [address]  [place], [address]  Patient ID: Meng Ordonez is a pleasant 79 y.o. female who presents today for:  Chief Complaint   Patient presents with   [facility] Other     F/U   Resident noncompliant with medication and treatments per staff. Patient also reports she refuses medications and treatments, will only take medications when she feels like it. Tearful affect. Denies SI or HI. On Eliquis. Noted decline in hemoglobin. No melena, hematochezia, hematemesis or hematuria per patient or staff. Patient is not keen on GI or hematology work-up at this time. On IV antibiotics, follows up with infectious disease on 4/20. Resident denies chest pain, shortness of breath, or palpitations. No change in B/B habits.   Tolerating  p.o.  142/55, 97.6, 82, 16, 97% room air      Patient Active Problem List   Diagnosis    Hypothyroidism    Tobacco abuse    Atopic rhinitis    Gastroesophageal reflux disease    Hearing loss    Type 2 diabetes mellitus without complication, without long-term current use of insulin ([address]    Family history of other specified malignant neoplasm    Venous stasis ulcer of right thigh with fat layer exposed without varicose veins (HCC)    Venous stasis ulcer of right calf with fat layer exposed ([address]    Venous stasis ulcer of left calf without varicose veins (HCC)    Edema    Acute kidney injury superimposed on CKD (HCC)    Wounds, multiple open, lower extremity    Excessive use of nonsteroidal anti-inflammatory drugs (NSAIDs)    Medical non-compliance    COVID-19    COPD exacerbation ([address]    Primary hypertension    Class 2 obesity due to excess calories with body mass index (BMI) of 39.0 to 39.9 in adult    Fatigue    Stage 3 chronic kidney disease (HCC)    Pseudomonas aeruginosa infection    Infected stasis ulcer ([address]    Bilateral lower leg cellulitis     Past Medical History:   Diagnosis Date    ROCHELLE (acute kidney injury) ([address] 2021    COPD exacerbation ([facility] 2022    Gastroesophageal reflux disease 6/10/2002    Hypothyroidism     Obesity, Class III, BMI 40-49.9 (morbid obesity) ([facility] 2016    Primary hypertension 2022    Tobacco abuse      Past Surgical History:   Procedure Laterality Date    APPENDECTOMY      CHOLECYSTECTOMY       Social History     Socioeconomic History    Marital status:      Spouse name: Not on file    Number of children: Not on file    Years of education: Not on file    Highest education level: Not on file   Occupational History    Not on file   Tobacco Use    Smoking status: Former Smoker     Packs/day: 5.00     Years: 30.00     Pack years: 150.00     Types: Cigarettes     Quit date: 10/1/2017     Years since quittin.5    Smokeless tobacco: Never Used    Tobacco comment: Has been trying to quit since January   Vaping Use    Vaping Use: Never used   Substance and Sexual Activity    Alcohol use: No     Alcohol/week: 0.0 standard drinks    Drug use: No    Sexual activity: Not Currently   Other Topics Concern    Not on file   Social History Narrative    Not on file     Social Determinants of Health     Financial Resource Strain: Low Risk     Difficulty of Paying Living Expenses: Not hard at all   Food Insecurity: No Food Insecurity    Worried About Running Out of Food in the Last Year: Never true    Peter of Food in the Last Year: Never true   Transportation Needs: No Transportation Needs    Lack of Transportation (Medical): No    Lack of Transportation (Non-Medical): No   Physical Activity: Inactive    Days of Exercise per Week: 0 days    Minutes of Exercise per Session: 0 min   Stress: No Stress Concern Present    Feeling of Stress : Only a little   Social Connections: Moderately Isolated    Frequency of Communication with Friends and Family: Three times a week    Frequency of Social Gatherings with Friends and Family:  Three times a week    Attends Religion Services: Never    Active Member of Clubs or Organizations: No    Attends Club or Organization Meetings: Never    Marital Status:    Intimate Partner Violence: Not At Risk    Fear of Current or Ex-Partner: No    Emotionally Abused: No    Physically Abused: No    Sexually Abused: No   Housing Stability: Unknown    Unable to Pay for Housing in the Last Year: No    Number of [place] in the Last Year: Not on file    Unstable Housing in the Last Year: No     Family History   Problem Relation Age of Onset    Kidney Disease Mother     High Blood Pressure Mother     Stroke Mother     Heart Disease Father      Allergies   Allergen Reactions    Benadryl [Diphenhydramine Hcl]     Cortisone Swelling    Codeine Rash    Pcn [Penicillins] Rash         Review of Systems   All other systems reviewed and are negative. Objective:       Physical Exam  Vitals reviewed. Constitutional:       General: She is awake. She is not in acute distress. Appearance: She is well-developed and normal weight. She is ill-appearing. She is not toxic-appearing or diaphoretic. HENT:      Head: Normocephalic and atraumatic. Nose: Nose normal.      Mouth/Throat:      Mouth: Mucous membranes are moist.      Pharynx: Oropharynx is clear. Eyes:      General: No scleral icterus. Conjunctiva/sclera: Conjunctivae normal.      Pupils: Pupils are equal, round, and reactive to light. Cardiovascular:      Rate and Rhythm: Normal rate. Rhythm irregular. Pulses:           Radial pulses are 2+ on the right side and 2+ on the left side. Dorsalis pedis pulses are 1+ on the right side and 1+ on the left side. Heart sounds: Normal heart sounds. No murmur heard. Pulmonary:      Effort: Pulmonary effort is normal. No respiratory distress. Breath sounds: Normal breath sounds. No stridor. No wheezing, rhonchi or rales. Chest:      Chest wall: No tenderness.    Abdominal:      General: Bowel sounds are normal. There is no distension. Palpations: Abdomen is soft. There is no mass. Tenderness: There is no abdominal tenderness. There is no guarding or rebound. Musculoskeletal:         General: Normal range of motion. Cervical back: Normal range of motion and neck supple. Right lower leg: No edema. Left lower leg: No edema. Lymphadenopathy:      Cervical: No cervical adenopathy. Skin:     General: Skin is warm and dry. Coloration: Skin is not cyanotic, jaundiced, mottled or pale. Findings: Wound (bilat LE-see wound nurse notes ) present. No bruising, erythema or rash. Neurological:      Mental Status: She is alert and oriented to person, place, and time. Mental status is at baseline. Motor: Weakness present. Psychiatric:         Mood and Affect: Mood normal. Affect is tearful. Speech: Speech normal.         Behavior: Behavior normal. Behavior is cooperative. Thought Content: Thought content normal.         Cognition and Memory: Cognition and memory normal.         Judgment: Judgment normal.         Assessment:       Diagnosis Orders   1. Other iron deficiency anemia     2. Bilateral lower leg cellulitis     3. Medical non-compliance           Plan:   1. Noted slow decline in hemoglobin from 10-8. 8. No overt signs of active bleeding. Risk factor includes Eliquis. Resident is not keen on GI or heme-onc work-up at this time. Will start oral iron supplementation and monitor H&H weekly. Nursing staff will monitor for any acute changes and or active bleeding. 2.  Continue with antibiotic treatment and follow-up with infectious disease.  -Weekly labs  -Wound care  3. Long discussion with the patient in regards to the importance of medical compliance.      Side effects, adverse effects of the medication prescribed today, as well as treatment plan and result expectations have beendiscussed with the patient who expresses understanding and desires to proceed.     Dominic Cade, APRN - CNP

## 2022-04-26 ENCOUNTER — OFFICE VISIT (OUTPATIENT)
Dept: GERIATRIC MEDICINE | Age: 82
End: 2022-04-26
Payer: MEDICARE

## 2022-04-26 DIAGNOSIS — I95.9 HYPOTENSION, UNSPECIFIED HYPOTENSION TYPE: Primary | ICD-10-CM

## 2022-04-26 DIAGNOSIS — E86.0 DEHYDRATION: ICD-10-CM

## 2022-04-26 PROCEDURE — 99309 SBSQ NF CARE MODERATE MDM 30: CPT | Performed by: NURSE PRACTITIONER

## 2022-04-26 NOTE — PROGRESS NOTES
Subjective:    [facility]   [address]  [place], [address]  Patient ID: Caitlin Gregg is a pleasant 79 y.o. female who presents today for:  Chief Complaint   Patient presents with    Other     Hypotension   Resident resting comfortably in bed. She reports her leg pain is well managed with Ultra and Zanaflex. No sedative effects per nurse. Resident is A/Ox3. She denies CP, SOB or palpitations. Tolerating PO. No N/V/D. On OAC-no melena, hematochezia, hematemesis or hematuria. Noted dehydration on recent blood work. She also experienced an episode of hypotension yesterday. Tolerating IV fluids today. No signs/symptoms of fluid overload. Resident offers no complaints at this time. Nursing staff offer no concerns.   110/68, 97.8, 76, 20, 97% room air    Patient Active Problem List   Diagnosis    Hypothyroidism    Tobacco abuse    Atopic rhinitis    Gastroesophageal reflux disease    Hearing loss    Type 2 diabetes mellitus without complication, without long-term current use of insulin ([address]    Family history of other specified malignant neoplasm    Venous stasis ulcer of right thigh with fat layer exposed without varicose veins (HCC)    Venous stasis ulcer of right calf with fat layer exposed ([address]    Venous stasis ulcer of left calf without varicose veins (HCC)    Edema    Acute kidney injury superimposed on CKD (HCC)    Wounds, multiple open, lower extremity    Excessive use of nonsteroidal anti-inflammatory drugs (NSAIDs)    Medical non-compliance    COVID-19    COPD exacerbation ([address]    Primary hypertension    Class 2 obesity due to excess calories with body mass index (BMI) of 39.0 to 39.9 in adult    Fatigue    Stage 3 chronic kidney disease (HCC)    Pseudomonas aeruginosa infection    Infected stasis ulcer ([address]    Bilateral lower leg cellulitis     Past Medical History:   Diagnosis Date    ROCHELLE (acute kidney injury) ([address] 7/25/2021    COPD exacerbation ([facility] 2022    Gastroesophageal reflux disease 6/10/2002    Hypothyroidism     Obesity, Class III, BMI 40-49.9 (morbid obesity) ([facility] 2016    Primary hypertension 2022    Tobacco abuse      Past Surgical History:   Procedure Laterality Date    APPENDECTOMY      CHOLECYSTECTOMY       Social History     Socioeconomic History    Marital status:      Spouse name: Not on file    Number of children: Not on file    Years of education: Not on file    Highest education level: Not on file   Occupational History    Not on file   Tobacco Use    Smoking status: Former Smoker     Packs/day: 5.00     Years: 30.00     Pack years: 150.00     Types: Cigarettes     Quit date: 10/1/2017     Years since quittin.5    Smokeless tobacco: Never Used    Tobacco comment: Has been trying to quit since January   Vaping Use    Vaping Use: Never used   Substance and Sexual Activity    Alcohol use: No     Alcohol/week: 0.0 standard drinks    Drug use: No    Sexual activity: Not Currently   Other Topics Concern    Not on file   Social History Narrative    Not on file     Social Determinants of Health     Financial Resource Strain: Low Risk     Difficulty of Paying Living Expenses: Not hard at all   Food Insecurity: No Food Insecurity    Worried About Running Out of Food in the Last Year: Never true    Peter of Food in the Last Year: Never true   Transportation Needs: No Transportation Needs    Lack of Transportation (Medical): No    Lack of Transportation (Non-Medical): No   Physical Activity: Inactive    Days of Exercise per Week: 0 days    Minutes of Exercise per Session: 0 min   Stress: No Stress Concern Present    Feeling of Stress : Only a little   Social Connections: Moderately Isolated    Frequency of Communication with Friends and Family: Three times a week    Frequency of Social Gatherings with Friends and Family:  Three times a week    Attends Spiritism Services: Never    Active Member of Clubs or Organizations: No    Attends Club or Organization Meetings: Never    Marital Status:    Intimate Partner Violence: Not At Risk    Fear of Current or Ex-Partner: No    Emotionally Abused: No    Physically Abused: No    Sexually Abused: No   Housing Stability: Unknown    Unable to Pay for Housing in the Last Year: No    Number of [place] in the Last Year: Not on file    Unstable Housing in the Last Year: No     Family History   Problem Relation Age of Onset    Kidney Disease Mother     High Blood Pressure Mother     Stroke Mother     Heart Disease Father      Allergies   Allergen Reactions    Benadryl [Diphenhydramine Hcl]     Cortisone Swelling    Codeine Rash    Pcn [Penicillins] Rash         Review of Systems   All other systems reviewed and are negative. Objective:       Physical Exam  Vitals reviewed. Constitutional:       General: She is awake. She is not in acute distress. Appearance: She is well-developed and normal weight. She is ill-appearing. She is not toxic-appearing or diaphoretic. HENT:      Head: Normocephalic and atraumatic. Nose: Nose normal.      Mouth/Throat:      Mouth: Mucous membranes are moist.      Pharynx: Oropharynx is clear. Eyes:      General: No scleral icterus. Conjunctiva/sclera: Conjunctivae normal.      Pupils: Pupils are equal, round, and reactive to light. Cardiovascular:      Rate and Rhythm: Normal rate and regular rhythm. Pulses: Normal pulses. Heart sounds: Normal heart sounds. No murmur heard. Pulmonary:      Effort: Pulmonary effort is normal. No respiratory distress. Breath sounds: Normal breath sounds. No stridor. No wheezing, rhonchi or rales. Chest:      Chest wall: No tenderness. Abdominal:      General: Bowel sounds are normal. There is no distension. Palpations: Abdomen is soft. There is no mass. Tenderness: There is no abdominal tenderness.  There is no guarding or rebound. Musculoskeletal:         General: Normal range of motion. Cervical back: Normal range of motion and neck supple. Right lower leg: No edema. Left lower leg: No edema. Lymphadenopathy:      Cervical: No cervical adenopathy. Skin:     General: Skin is warm and dry. Coloration: Skin is not cyanotic, jaundiced, mottled or pale. Findings: Bruising and wound (see wound notes ) present. No erythema or rash. Neurological:      Mental Status: She is alert and oriented to person, place, and time. Mental status is at baseline. Motor: Weakness (global ) present. Psychiatric:         Mood and Affect: Mood normal.         Speech: Speech normal.         Behavior: Behavior normal. Behavior is cooperative. Thought Content: Thought content normal.         Cognition and Memory: Cognition and memory normal.         Judgment: Judgment normal.       Assessment:       Diagnosis Orders   1. Hypotension, unspecified hypotension type     2. Dehydration           Plan:   76-year-old female with 1 episode of hypotension yesterday. Slight elevation of BUN and creatinine- appears to be from dehydration. Tolerating IV fluids, no S/S of fluid overload. Will complete 1 L of IV fluids, then discontinue. Blood pressure stable. Resident in NAD and lungs are CTAB. Repeat labs pending.   -Improved p.o. intake  -Monitor for any acute changes    Side effects, adverse effects of the medication prescribed today, as well as treatment plan and result expectations have beendiscussed with the patient who expresses understanding and desires to proceed.     MARLEE Schroeder - CNP

## 2022-04-28 ENCOUNTER — OFFICE VISIT (OUTPATIENT)
Dept: GERIATRIC MEDICINE | Age: 82
End: 2022-04-28
Payer: MEDICARE

## 2022-04-28 DIAGNOSIS — L97.909 INFECTED STASIS ULCER, UNSPECIFIED LATERALITY (HCC): ICD-10-CM

## 2022-04-28 DIAGNOSIS — L03.115 BILATERAL LOWER LEG CELLULITIS: Primary | ICD-10-CM

## 2022-04-28 DIAGNOSIS — L03.116 BILATERAL LOWER LEG CELLULITIS: Primary | ICD-10-CM

## 2022-04-28 DIAGNOSIS — I83.209 INFECTED STASIS ULCER, UNSPECIFIED LATERALITY (HCC): ICD-10-CM

## 2022-04-28 DIAGNOSIS — R29.898 WEAKNESS OF BOTH LOWER EXTREMITIES: ICD-10-CM

## 2022-04-28 DIAGNOSIS — I10 PRIMARY HYPERTENSION: ICD-10-CM

## 2022-04-28 PROCEDURE — 99308 SBSQ NF CARE LOW MDM 20: CPT | Performed by: NURSE PRACTITIONER

## 2022-04-28 NOTE — PROGRESS NOTES
Subjective:    [facility]   [address]  [place], [address]  Patient ID: Eric Lee is a pleasant 79 y.o. female who presents today for:  Chief Complaint   Patient presents with    Other     F/U   Resident resting in bed. Completed 100% of her breakfast.  Nursing staff reports bilateral lower extremity wounds improving with wound care. Resident reports pain is well managed, Tylenol has been working better than Ultram.  Followed up with infectious disease last week. Overall patient has been improving. Her medication/treatment compliance has improved as well. No melena, hematochezia, or hematemesis. Tolerating p.o. No nausea/vomiting or diarrhea. Patient denies chest pain, shortness of breath, or palpitations.       Patient Active Problem List   Diagnosis    Hypothyroidism    Tobacco abuse    Atopic rhinitis    Gastroesophageal reflux disease    Hearing loss    Type 2 diabetes mellitus without complication, without long-term current use of insulin ([address]    Family history of other specified malignant neoplasm    Venous stasis ulcer of right thigh with fat layer exposed without varicose veins (HCC)    Venous stasis ulcer of right calf with fat layer exposed ([address]    Venous stasis ulcer of left calf without varicose veins (HCC)    Edema    Acute kidney injury superimposed on CKD ([address]    Wounds, multiple open, lower extremity    Excessive use of nonsteroidal anti-inflammatory drugs (NSAIDs)    Medical non-compliance    COVID-19    COPD exacerbation ([address]    Primary hypertension    Class 2 obesity due to excess calories with body mass index (BMI) of 39.0 to 39.9 in adult    Fatigue    Stage 3 chronic kidney disease (HCC)    Pseudomonas aeruginosa infection    Infected stasis ulcer ([address]    Bilateral lower leg cellulitis     Past Medical History:   Diagnosis Date    ROCHELLE (acute kidney injury) ([address] 7/25/2021    COPD exacerbation ([address] 2/24/2022    Gastroesophageal reflux disease 6/10/2002    Hypothyroidism     Obesity, Class III, BMI 40-49.9 (morbid obesity) ([facility] 2016    Primary hypertension 2022    Tobacco abuse      Past Surgical History:   Procedure Laterality Date    APPENDECTOMY      CHOLECYSTECTOMY       Social History     Socioeconomic History    Marital status:      Spouse name: Not on file    Number of children: Not on file    Years of education: Not on file    Highest education level: Not on file   Occupational History    Not on file   Tobacco Use    Smoking status: Former Smoker     Packs/day: 5.00     Years: 30.00     Pack years: 150.00     Types: Cigarettes     Quit date: 10/1/2017     Years since quittin.5    Smokeless tobacco: Never Used    Tobacco comment: Has been trying to quit since January   Vaping Use    Vaping Use: Never used   Substance and Sexual Activity    Alcohol use: No     Alcohol/week: 0.0 standard drinks    Drug use: No    Sexual activity: Not Currently   Other Topics Concern    Not on file   Social History Narrative    Not on file     Social Determinants of Health     Financial Resource Strain: Low Risk     Difficulty of Paying Living Expenses: Not hard at all   Food Insecurity: No Food Insecurity    Worried About Running Out of Food in the Last Year: Never true    Peter of Food in the Last Year: Never true   Transportation Needs: No Transportation Needs    Lack of Transportation (Medical): No    Lack of Transportation (Non-Medical): No   Physical Activity: Inactive    Days of Exercise per Week: 0 days    Minutes of Exercise per Session: 0 min   Stress: No Stress Concern Present    Feeling of Stress : Only a little   Social Connections: Moderately Isolated    Frequency of Communication with Friends and Family: Three times a week    Frequency of Social Gatherings with Friends and Family:  Three times a week    Attends Zoroastrianism Services: Never    Active Member of Clubs or Organizations: No    Attends Club or Organization Meetings: Never    Marital Status:    Intimate Partner Violence: Not At Risk    Fear of Current or Ex-Partner: No    Emotionally Abused: No    Physically Abused: No    Sexually Abused: No   Housing Stability: Unknown    Unable to Pay for Housing in the Last Year: No    Number of [place] in the Last Year: Not on file    Unstable Housing in the Last Year: No     Family History   Problem Relation Age of Onset    Kidney Disease Mother     High Blood Pressure Mother     Stroke Mother     Heart Disease Father      Allergies   Allergen Reactions    Benadryl [Diphenhydramine Hcl]     Cortisone Swelling    Codeine Rash    Pcn [Penicillins] Rash         Review of Systems   All other systems reviewed and are negative. Objective:   See [facility] for updated vital signs    Physical Exam  Vitals reviewed. Constitutional:       General: She is awake. She is not in acute distress. Appearance: She is well-developed and normal weight. She is ill-appearing. She is not toxic-appearing or diaphoretic. HENT:      Head: Normocephalic and atraumatic. Nose: Nose normal.      Mouth/Throat:      Mouth: Mucous membranes are moist.      Pharynx: Oropharynx is clear. Eyes:      General: No scleral icterus. Conjunctiva/sclera: Conjunctivae normal.      Pupils: Pupils are equal, round, and reactive to light. Cardiovascular:      Rate and Rhythm: Normal rate and regular rhythm. Pulses: Normal pulses. Heart sounds: Normal heart sounds. No murmur heard. Pulmonary:      Effort: Pulmonary effort is normal. No respiratory distress. Breath sounds: Normal breath sounds. No stridor. No wheezing, rhonchi or rales. Chest:      Chest wall: No tenderness. Abdominal:      General: Abdomen is protuberant. Bowel sounds are normal. There is no distension. Palpations: Abdomen is soft. There is no mass. Tenderness: There is no abdominal tenderness.  There is no guarding or rebound. Musculoskeletal:         General: Normal range of motion. Cervical back: Normal range of motion and neck supple. Right lower leg: No edema. Left lower leg: No edema. Lymphadenopathy:      Cervical: No cervical adenopathy. Skin:     General: Skin is warm and dry. Coloration: Skin is not cyanotic, jaundiced, mottled or pale. Findings: Bruising and wound (bilat le-see wound nurse notes- dressing intact and C/D) present. No erythema or rash. Neurological:      Mental Status: She is alert and oriented to person, place, and time. Mental status is at baseline. Motor: Weakness (global) present. Psychiatric:         Mood and Affect: Mood normal.         Speech: Speech normal.         Behavior: Behavior normal. Behavior is cooperative. Thought Content: Thought content normal.         Cognition and Memory: Cognition and memory normal.         Judgment: Judgment normal.         Assessment:       Diagnosis Orders   1. Bilateral lower leg cellulitis     2. Infected stasis ulcer, unspecified laterality ([address]     3. Primary hypertension     4. Weakness of both lower extremities           Plan:   1&2-bilateral lower extremity wounds and cellulitis have been improving per nursing staff. To need to follow-up with infectious disease, recs appreciated. -Weekly labs  3. Stable. No episodes of hypo or hypertension. We will continue current POC and adhere to JNC 8 guidelines. 4.  Continue with PT/OT to help with endurance, strength and balance        Side effects, adverse effects of the medication prescribed today, as well as treatment plan and result expectations have beendiscussed with the patient who expresses understanding and desires to proceed.     Awa Rodarte, APRN - CNP

## 2022-05-02 LAB
BUN BLDV-MCNC: 32 MG/DL
BUN BLDV-MCNC: NORMAL MG/DL
CALCIUM SERPL-MCNC: 8.9 MG/DL
CALCIUM SERPL-MCNC: NORMAL MG/DL
CHLORIDE BLD-SCNC: 105 MMOL/L
CHLORIDE BLD-SCNC: NORMAL MMOL/L
CO2: 25 MMOL/L
CO2: NORMAL
CREAT SERPL-MCNC: 1.6 MG/DL
CREAT SERPL-MCNC: NORMAL MG/DL
GFR CALCULATED: NORMAL
GFR CALCULATED: NORMAL
GLUCOSE BLD-MCNC: 70 MG/DL
GLUCOSE BLD-MCNC: NORMAL MG/DL
HCT VFR BLD CALC: 29.4 % (ref 36–46)
HEMOGLOBIN: 9.2 G/DL (ref 12–16)
POTASSIUM SERPL-SCNC: 5.5 MMOL/L
POTASSIUM SERPL-SCNC: NORMAL MMOL/L
SODIUM BLD-SCNC: 137 MMOL/L
SODIUM BLD-SCNC: NORMAL MMOL/L

## 2022-05-04 LAB — POTASSIUM (K+): 4.5

## 2022-05-09 LAB
AVERAGE GLUCOSE: 30.1
BUN BLDV-MCNC: 28 MG/DL
CALCIUM SERPL-MCNC: 8.9 MG/DL
CHLORIDE BLD-SCNC: 110 MMOL/L
CO2: 22 MMOL/L
CREAT SERPL-MCNC: 1.5 MG/DL
GFR CALCULATED: NORMAL
GLUCOSE BLD-MCNC: 70 MG/DL
HBA1C MFR BLD: 9.1 %
POTASSIUM SERPL-SCNC: 4.7 MMOL/L
SODIUM BLD-SCNC: 141 MMOL/L

## 2022-05-23 ENCOUNTER — OFFICE VISIT (OUTPATIENT)
Dept: GERIATRIC MEDICINE | Age: 82
End: 2022-05-23
Payer: MEDICARE

## 2022-05-23 DIAGNOSIS — I10 PRIMARY HYPERTENSION: Primary | ICD-10-CM

## 2022-05-23 DIAGNOSIS — Z91.199 MEDICAL NON-COMPLIANCE: ICD-10-CM

## 2022-05-23 DIAGNOSIS — Z71.89 ACP (ADVANCE CARE PLANNING): ICD-10-CM

## 2022-05-23 DIAGNOSIS — I82.402 ACUTE EMBOLISM AND THROMBOSIS OF DEEP VEIN OF LEFT LOWER EXTREMITY (HCC): ICD-10-CM

## 2022-05-23 PROBLEM — I11.9 HYPERTENSIVE HEART DISEASE WITHOUT HEART FAILURE: Status: ACTIVE | Noted: 2022-05-23

## 2022-05-23 PROBLEM — D64.89 OTHER SPECIFIED ANEMIAS: Status: ACTIVE | Noted: 2022-05-23

## 2022-05-23 PROCEDURE — 99309 SBSQ NF CARE MODERATE MDM 30: CPT | Performed by: NURSE PRACTITIONER

## 2022-05-23 NOTE — PROGRESS NOTES
Subjective:    [facility]   [address]  [place], [address]  Patient ID: Caitlin Gregg is a pleasant 79 y.o. female who presents today for:  Chief Complaint   Patient presents with   Selby Other     F/U   Resident is A/Ox3. She is refusing BP medications, she is aware of repercussions. Cardiology NP rounded today due to complex cardiac history with non-occlusive DVT. Resident refused visit. Only taking Eliquis and PRN Tylenol. Also, refusing labs. Is not keen on changing code status to CC or CC-A. Family would like her to stay long-term. However, resident requesting discharge home. SS on the case. A long discussion in regards to the importance of compliance with care, meds and POC was discussed at length. Resident continues to refuse. She denies chest pain, shortness of breath or palpitations. Occasional bilateral lower extremity discomfort, Tylenol helps. 164/77, 98.1, 65, 18, 96% room air.       Patient Active Problem List   Diagnosis    Hypothyroidism    Tobacco abuse    Atopic rhinitis    Gastroesophageal reflux disease    Hearing loss    Type 2 diabetes mellitus without complication, without long-term current use of insulin ([address]    Family history of other specified malignant neoplasm    Venous stasis ulcer of right thigh with fat layer exposed without varicose veins (HCC)    Venous stasis ulcer of right calf with fat layer exposed ([address]    Venous stasis ulcer of left calf without varicose veins (HCC)    Edema    Acute kidney injury superimposed on CKD ([address]    Wounds, multiple open, lower extremity    Excessive use of nonsteroidal anti-inflammatory drugs (NSAIDs)    Medical non-compliance    COVID-19    COPD exacerbation ([address]    Primary hypertension    Class 2 obesity due to excess calories with body mass index (BMI) of 39.0 to 39.9 in adult    Fatigue    Stage 3 chronic kidney disease (HCC)    Pseudomonas aeruginosa infection    Infected stasis ulcer ([address]    Bilateral lower leg cellulitis    Acute embolism and thrombosis of deep vein of left lower extremity ([facility]    Hypertensive heart disease without heart failure    Other specified anemias     Past Medical History:   Diagnosis Date    ROCHELLE (acute kidney injury) ([facility] 2021    COPD exacerbation ([facility] 2022    Gastroesophageal reflux disease 6/10/2002    Hypothyroidism     Obesity, Class III, BMI 40-49.9 (morbid obesity) ([facility] 2016    Primary hypertension 2022    Tobacco abuse      Past Surgical History:   Procedure Laterality Date    APPENDECTOMY      CHOLECYSTECTOMY       Social History     Socioeconomic History    Marital status:      Spouse name: Not on file    Number of children: Not on file    Years of education: Not on file    Highest education level: Not on file   Occupational History    Not on file   Tobacco Use    Smoking status: Former Smoker     Packs/day: 5.00     Years: 30.00     Pack years: 150.00     Types: Cigarettes     Quit date: 10/1/2017     Years since quittin.6    Smokeless tobacco: Never Used    Tobacco comment: Has been trying to quit since January   Vaping Use    Vaping Use: Never used   Substance and Sexual Activity    Alcohol use: No     Alcohol/week: 0.0 standard drinks    Drug use: No    Sexual activity: Not Currently   Other Topics Concern    Not on file   Social History Narrative    Not on file     Social Determinants of Health     Financial Resource Strain: Low Risk     Difficulty of Paying Living Expenses: Not hard at all   Food Insecurity: No Food Insecurity    Worried About Running Out of Food in the Last Year: Never true    Peter of Food in the Last Year: Never true   Transportation Needs: No Transportation Needs    Lack of Transportation (Medical): No    Lack of Transportation (Non-Medical):  No   Physical Activity: Inactive    Days of Exercise per Week: 0 days    Minutes of Exercise per Session: 0 min   Stress: No Stress Concern Present    Feeling of Stress : Only a little   Social Connections: Moderately Isolated    Frequency of Communication with Friends and Family: Three times a week    Frequency of Social Gatherings with Friends and Family: Three times a week    Attends Taoist Services: Never    Active Member of Clubs or Organizations: No    Attends Club or Organization Meetings: Never    Marital Status:    Intimate Partner Violence: Not At Risk    Fear of Current or Ex-Partner: No    Emotionally Abused: No    Physically Abused: No    Sexually Abused: No   Housing Stability: Unknown    Unable to Pay for Housing in the Last Year: No    Number of [place] in the Last Year: Not on file    Unstable Housing in the Last Year: No     Family History   Problem Relation Age of Onset    Kidney Disease Mother     High Blood Pressure Mother     Stroke Mother     Heart Disease Father      Allergies   Allergen Reactions    Benadryl [Diphenhydramine Hcl]     Cortisone Swelling    Codeine Rash    Pcn [Penicillins] Rash         Review of Systems   All other systems reviewed and are negative. Objective:       Physical Exam  Vitals reviewed. Constitutional:       General: She is awake. She is not in acute distress. Appearance: She is well-developed and overweight. She is ill-appearing. She is not toxic-appearing or diaphoretic. HENT:      Head: Normocephalic and atraumatic. Eyes:      General: No scleral icterus. Conjunctiva/sclera: Conjunctivae normal.      Pupils: Pupils are equal, round, and reactive to light. Cardiovascular:      Rate and Rhythm: Normal rate and regular rhythm. Pulses: Normal pulses. Dorsalis pedis pulses are 2+ on the right side and 2+ on the left side. Heart sounds: Normal heart sounds. No murmur heard. Pulmonary:      Effort: Pulmonary effort is normal. No respiratory distress. Breath sounds: Normal breath sounds. No stridor.  No wheezing, rhonchi or rales. Chest:      Chest wall: No tenderness. Abdominal:      General: Abdomen is protuberant. Bowel sounds are normal. There is no distension. Palpations: Abdomen is soft. There is no mass. Tenderness: There is no abdominal tenderness. There is no guarding or rebound. Musculoskeletal:         General: Normal range of motion. Cervical back: Normal range of motion and neck supple. Right lower leg: No edema. Left lower leg: No edema. Lymphadenopathy:      Cervical: No cervical adenopathy. Skin:     General: Skin is warm and dry. Coloration: Skin is not cyanotic, jaundiced, mottled or pale. Findings: Bruising (senile) and wound (LLE- wound nurse following) present. No erythema or rash. Neurological:      Mental Status: She is alert and oriented to person, place, and time. Mental status is at baseline. Motor: Weakness (global) present. Psychiatric:         Attention and Perception: Attention and perception normal.         Mood and Affect: Mood and affect normal.         Speech: Speech normal.         Behavior: Behavior normal. Behavior is cooperative. Thought Content: Thought content normal.         Cognition and Memory: Cognition and memory normal.         Judgment: Judgment normal.         Assessment:       Diagnosis Orders   1. Primary hypertension     2. Acute embolism and thrombosis of deep vein of left lower extremity (HCC)     3. Medical non-compliance     4. ACP (advance care planning)           Plan:   1. Now hypertensive due to non-compliance of medications and POC. Refuses Cardiology follow-up and labs. Will continue to educate patient on the importance of POC compliance. 2. Taking Eliquis, but refuses follow up with cardiology. Will continue to monitor closely. Distal pulses intact with no alarming sx and she is in NAD. 4. ACP discussion with patient. Not keen on changing code status at this time, despite refusal of treatment/care. Recommend psych 360 consult. No follow-ups on file. Side effects, adverse effects of the medication prescribed today, as well as treatment plan and result expectations have beendiscussed with the patient who expresses understanding and desires to proceed.     Emory Gomez, APRN - CNP

## 2022-06-01 ENCOUNTER — TELEPHONE (OUTPATIENT)
Dept: FAMILY MEDICINE CLINIC | Age: 82
End: 2022-06-01

## 2022-06-01 NOTE — TELEPHONE ENCOUNTER
[facility] nurse Gaudencio Litzy) called and stated that the SNF sent signed home care orders from Dr. Kanwal Burdick and that they started SN/PT/OT today for this patient. Yaritza wanted to speak with someone to get some background information on the patient and was informed that Dr. Kanwal Burdick is not the patient's provider, that he only seen her one time in the nursing home. Stefani Judd states that the order came from the SNF with Dr. Kanwal Burdick signature and asked for him to be contacted to see if he is going to follow this patient.

## 2022-06-02 NOTE — TELEPHONE ENCOUNTER
Looks like she was seeing Dr. Domitila Crane as PCP but if she can establish with me if interested. I've only seen her once in the nursing home.

## 2022-06-03 NOTE — TELEPHONE ENCOUNTER
Nurse Bean Lopez called and was given message. Bean Lopez states that the patient is supposed to call and make an appointment with Dr. Bijal Bowden. I informed Bean Lopez that the patient has not contacted our office yet and suggested that Bean Lopez contact the patient as the provider needs to see the her before any orders can be signed.

## 2022-06-13 ENCOUNTER — TELEPHONE (OUTPATIENT)
Dept: FAMILY MEDICINE CLINIC | Age: 82
End: 2022-06-13

## 2022-06-14 RX ORDER — APIXABAN 5 MG/1
TABLET, FILM COATED ORAL
Qty: 28 TABLET | Refills: 0 | OUTPATIENT
Start: 2022-06-14

## 2022-06-14 NOTE — TELEPHONE ENCOUNTER
Amita Castro. Was wondering if you were able to help in this situation. Not the most cooperative patient. Do you know if she was set up with a PCP visit on her discharge?
Bucky Durand from visiting nurses called in today to ask about the patients Eliquis prescription. I let her know that the patient has never seen Jj Darby and that she would need to have a visit in order for him to take care of her medications. I let her know the patient has declined to make an appointment.
Patient called back in asking if you were going to send in her synthroid and a different medication for her Eliquis since it is not covered by her insurance. I asked the patient to make an appointment to establish care and she declined because she is unable to leave the office. States she is unable to do a virtual appointment either. Patient is asking for the medication to go to [facility] in [place]. Please give her a call back on her cell to let her know if this was taken care of for her.  [phone]
Patient called to report that her insurance company will no longer cover her prescription of Elaquis as it is no longer on their formulary. Advised patient that a message would be sent to the provider to see if there is a replacement and advised the patient to contact the insurance company to see if they have a replacement medication that is on their formulary.
She isn't established with me as a patient. She would need a visit if she is needing help with medical problems.
anything to happen to her. She said goodbye and hung up the phone on me. FYI.

## 2022-06-15 RX ORDER — APIXABAN 5 MG/1
TABLET, FILM COATED ORAL
Qty: 28 TABLET | Refills: 0 | OUTPATIENT
Start: 2022-06-15

## 2022-06-16 RX ORDER — APIXABAN 5 MG/1
TABLET, FILM COATED ORAL
Qty: 28 TABLET | Refills: 0 | OUTPATIENT
Start: 2022-06-16

## 2022-08-11 ENCOUNTER — TELEMEDICINE (OUTPATIENT)
Dept: FAMILY MEDICINE CLINIC | Age: 82
End: 2022-08-11
Payer: MEDICARE

## 2022-08-11 DIAGNOSIS — M79.604 PAIN OF RIGHT LOWER EXTREMITY: Primary | ICD-10-CM

## 2022-08-11 DIAGNOSIS — L98.9 SKIN SORE: ICD-10-CM

## 2022-08-11 DIAGNOSIS — R41.89 COGNITIVE DECLINE: ICD-10-CM

## 2022-08-11 DIAGNOSIS — M62.89 MUSCULAR DEGENERATION: ICD-10-CM

## 2022-08-11 PROCEDURE — 99443 PR PHYS/QHP TELEPHONE EVALUATION 21-30 MIN: CPT | Performed by: FAMILY MEDICINE

## 2022-08-11 RX ORDER — LIDOCAINE 50 MG/G
OINTMENT TOPICAL 3 TIMES DAILY PRN
Qty: 240 G | Refills: 0 | Status: SHIPPED | OUTPATIENT
Start: 2022-08-11

## 2022-08-11 SDOH — ECONOMIC STABILITY: FOOD INSECURITY: WITHIN THE PAST 12 MONTHS, THE FOOD YOU BOUGHT JUST DIDN'T LAST AND YOU DIDN'T HAVE MONEY TO GET MORE.: NEVER TRUE

## 2022-08-11 SDOH — ECONOMIC STABILITY: FOOD INSECURITY: WITHIN THE PAST 12 MONTHS, YOU WORRIED THAT YOUR FOOD WOULD RUN OUT BEFORE YOU GOT MONEY TO BUY MORE.: NEVER TRUE

## 2022-08-11 ASSESSMENT — ENCOUNTER SYMPTOMS
RHINORRHEA: 0
WHEEZING: 0
CONSTIPATION: 0
COUGH: 0
ABDOMINAL PAIN: 0
DIARRHEA: 0
SHORTNESS OF BREATH: 0
SORE THROAT: 0

## 2022-08-11 ASSESSMENT — PATIENT HEALTH QUESTIONNAIRE - PHQ9
SUM OF ALL RESPONSES TO PHQ QUESTIONS 1-9: 0
SUM OF ALL RESPONSES TO PHQ9 QUESTIONS 1 & 2: 0
SUM OF ALL RESPONSES TO PHQ QUESTIONS 1-9: 0
1. LITTLE INTEREST OR PLEASURE IN DOING THINGS: 0
2. FEELING DOWN, DEPRESSED OR HOPELESS: 0

## 2022-08-11 ASSESSMENT — SOCIAL DETERMINANTS OF HEALTH (SDOH): HOW HARD IS IT FOR YOU TO PAY FOR THE VERY BASICS LIKE FOOD, HOUSING, MEDICAL CARE, AND HEATING?: NOT HARD AT ALL

## 2022-08-11 NOTE — PROGRESS NOTES
[address] Virtual Visit  [facility] [address] [place] [facility] PRIMARY CARE  [address] [place] [id number]  Dept: [phone]  Dept Fax: [phone]: [phone]     Due to COVID 23 outbreak, patient's office visit was converted to a virtual visit. Patient was contacted and agreed to proceed with a virtual visit via Telephone Visit  The risks and benefits of converting to a virtual visit were discussed in light of the current infectious disease epidemic. Patient also understood that insurance coverage and co-pays are up to their individual insurance plans. Chief Complaint  Chief Complaint   Patient presents with    Leg Pain     B/L    Concern For COVID-19     Thinks she came home from the nursing home with covid    New Patient     Discuss possible home care orders. HPI:  79 y. o.female who presents for the following:  (Establish; was seeing Dr. Lisbeth Michelle)    Notes after coming home from the SNF started getting new sores again; spilled griffiths grease on her L foot and has sores on it; she hasn't seen any providers for this yet; she says she is in pain all the time and would like home care; says she can't leave the house because she says she is in the process of  with her  and he will put her in a nursing home. Recall from SNF visit 4/5/22:  (Hx of multiple recent admission and SNF stays; DM2, HTN, COPD, CKD, hypothyroidism, noncompliance with medical treatment)     Ayo Hutchinson was seen recently in the ED for painful ulcers on the b/l LEs (Had a recent admission for this and refused SNF so was [address] home). Found to have LLE DVT and admitted for treatment of b/l celullitis. Seen by wound care and started on 14 day course of levaquin. DC'd to SNF for continued rehab and wound care. We met today in her room at lunch time. She says her pain is tolerable but worse when she does therapy.  She is happy to do therapy to help her become independent again. She is tolerating lunch but has had decreased appetite. Doesn't have a BM daily but feels comfortable and declined meds for constipation. Per nursing staff she has declined multiple therapies and wound care intermittently. Review of Systems   Constitutional:  Negative for chills and fever. HENT:  Negative for congestion, rhinorrhea and sore throat. Respiratory:  Negative for cough, shortness of breath and wheezing. Gastrointestinal:  Negative for abdominal pain, constipation and diarrhea. Endocrine: Negative for polydipsia and polyuria. Genitourinary:  Negative for dysuria, frequency and urgency. Neurological:  Negative for syncope, light-headedness, numbness and headaches. Psychiatric/Behavioral:  Negative for sleep disturbance. The patient is not nervous/anxious.       Past Medical History:   Diagnosis Date    ROCHELLE (acute kidney injury) ([facility] 2021    COPD exacerbation ([facility] 2022    Gastroesophageal reflux disease 6/10/2002    Hypothyroidism     Obesity, Class III, BMI 40-49.9 (morbid obesity) ([facility] 2016    Primary hypertension 2022    Tobacco abuse      Past Surgical History:   Procedure Laterality Date    APPENDECTOMY      CHOLECYSTECTOMY       Social History     Socioeconomic History    Marital status:      Spouse name: Not on file    Number of children: Not on file    Years of education: Not on file    Highest education level: Not on file   Occupational History    Not on file   Tobacco Use    Smoking status: Former     Packs/day: 5.00     Years: 30.00     Pack years: 150.00     Types: Cigarettes     Quit date: 10/1/2017     Years since quittin.9    Smokeless tobacco: Never    Tobacco comments:     Has been trying to quit since January   Vaping Use    Vaping Use: Never used   Substance and Sexual Activity    Alcohol use: No     Alcohol/week: 0.0 standard drinks    Drug use: No    Sexual activity: Not Currently   Other Topics Concern    Not on file   Social History Narrative    Not on file     Social Determinants of Health     Financial Resource Strain: Low Risk     Difficulty of Paying Living Expenses: Not hard at all   Food Insecurity: No Food Insecurity    Worried About Running Out of Food in the Last Year: Never true    Ran Out of Food in the Last Year: Never true   Transportation Needs: Not on file   Physical Activity: Not on file   Stress: Not on file   Social Connections: Not on file   Intimate Partner Violence: Not on file   Housing Stability: Not on file     Family History   Problem Relation Age of Onset    Kidney Disease Mother     High Blood Pressure Mother     Stroke Mother     Heart Disease Father       Allergies   Allergen Reactions    Benadryl [Diphenhydramine Hcl]     Cortisone Swelling    Codeine Rash    Pcn [Penicillins] Rash     Current Outpatient Medications   Medication Sig Dispense Refill    lidocaine (XYLOCAINE) 5 % ointment Apply topically 3 times daily as needed for Pain Apply topically as needed. 240 g 0    apixaban (ELIQUIS) 5 MG TABS tablet Take 1 tablet by mouth 2 times daily Indications: Anticoagulant Therapy 28 tablet 0    tamsulosin (FLOMAX) 0.4 MG capsule Take 0.4 mg by mouth nightly Indications: Urinary Retention      amLODIPine (NORVASC) 5 MG tablet Take 5 mg by mouth 2 times daily Indications: High Blood Pressure Disorder      losartan (COZAAR) 100 MG tablet Take 100 mg by mouth daily Indications: High Blood Pressure Disorder       menthol-zinc oxide (CALMOSEPTINE) 0.44-20.625 % OINT ointment Apply topically 2 times daily Indications: Wound Care Max 30 ml per day. Bilat. Buttocks      nystatin (MYCOSTATIN) 540768 UNIT/GM powder Apply topically 3 times daily Indications: Skin Infection due to Candida Yeast Apply topically 4 times daily. groin      ascorbic acid (VITAMIN C) 500 MG tablet Take 500 mg by mouth 2 times daily Indications: Nutritional Support      zinc 50 MG TABS tablet Take 50 mg by mouth daily Indications: Nutritional Support      SYNTHROID 150 MCG tablet Take 1 tablet by mouth Daily (Patient taking differently: Take 150 mcg by mouth Daily Indications: Underactive Thyroid) 90 tablet 0     No current facility-administered medications for this visit. Physical exam:  Physical Exam  Constitutional:       General: She is not in acute distress. Appearance: Normal appearance. She is not ill-appearing. HENT:      Head: Normocephalic and atraumatic. Pulmonary:      Effort: Pulmonary effort is normal. No respiratory distress. Musculoskeletal:      Cervical back: Normal range of motion. Neurological:      Mental Status: She is alert. Assessment/Plan:  79 y.o. female here mainly for LE sores:  - LE sores: based on the pain she describes; would greatly benefit from being seen in person; she declines any interventions that involve leaving the home; based on her last nursing home visit she declines more interventions; I declined opiates today as I don't have a way of getting a good eval; placed order for home health; will start here. - I offered social work referral with regards to her worries about losing her home if she leaves but she says they don't do anything; I suspect cognitive decline and she actually might benefit from staying in [facility] but this is her greatest fear. Will see how things go with home health first.    21 or more minutes were spent on the digital evaluation and management of this patient. Diagnosis Orders   1. Pain of right lower extremity  lidocaine (XYLOCAINE) 5 % ointment    Ambulatory referral to Home Health      2. Skin sore  Ambulatory referral to Home Health      3. Cognitive decline  Ambulatory referral to Home Health      4. Muscular degeneration  Ambulatory referral to [address] [place] [address] Johnny Garcia           Return if symptoms worsen or fail to improve. MD Pascale Ku, was evaluated through a synchronous (real-time) audio-video encounter.  The patient (or guardian if applicable) is aware that this is a billable service, which includes applicable co-pays. This Virtual Visit was conducted with patient's (and/or legal guardian's) consent. The visit was conducted pursuant to the emergency declaration under the [facility], [address] [place] [address] authority and the [employer] Act. Patient identification was verified, and a caregiver was present when appropriate. The patient was located at Home: [address] [address]. Provider was located at [facility] ([facility] Dept): [address] [place] [address].

## 2022-08-26 ENCOUNTER — TELEPHONE (OUTPATIENT)
Dept: FAMILY MEDICINE CLINIC | Age: 82
End: 2022-08-26

## 2022-08-26 NOTE — TELEPHONE ENCOUNTER
[facility] cannot start care for this patient with the diagnosis code that is entered, Medicare will not pay for it. They are requesting a different diagnosis code be entered to be able to start services.

## 2022-08-30 ENCOUNTER — CARE COORDINATION (OUTPATIENT)
Dept: CARE COORDINATION | Age: 82
End: 2022-08-30
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
## 2022-08-31 ENCOUNTER — CARE COORDINATION (OUTPATIENT)
Dept: CARE COORDINATION | Age: 82
End: 2022-08-31

## 2022-08-31 ENCOUNTER — TELEPHONE (OUTPATIENT)
Dept: FAMILY MEDICINE CLINIC | Age: 82
End: 2022-08-31

## 2022-08-31 NOTE — CARE COORDINATION
ACM spoke to patient. Introduced [facility] program role of ACM goals and benefits. Patient would not discuss or describe her leg wounds or issues. Patient would not discuss her diabetes or hypertension or care needs. Patient repeatedly kept saying she cannot get out of her home to go to the doctor. ACM advised patient  to obtain any treatment plan for her legs a provider would need to see her legs  and complete evaluation of her needs. Patient interrupted ACM again and stated she thought that home health care was going to come out and she would talk to them. I advised patient that home health care does not diagnose or treat conditions without a provider evaluation diagnosis or plan of care. ACM encouraged patient to consider going to the emergency room if she was unable to get out of her home to come to the doctor. Patient immediately became agitated and stated,she is no longer going to the emergency room, hospital, or nursing homes they cannot help. ACM attempted to inquire what she meant and asked patient are you speaking of your leg wounds? Patient yelled into the phone no they cannot help me and I am absolutely not going to the hospital.      ACM provided patient with visiting physician Association contact information [phone]. This took 10 attempts as patient could not follow along with phone number. Patient did not want to discuss her diabetes stating she does not have diabetes. patient stated , she had a different home health care agency that took lab from her in June of this year ACM does not note any labs in our [facility] system. Patient states she was not going to allow them to wrap her legs or put any creams or ointments as she stated they were all full of alcohol and they hurt too bad. Patient stated multiple times during all conversations she would revert back to I am never going back to the hospital I am never going to the ER I am never going to the nursing home.   Patient stated what

## 2022-08-31 NOTE — TELEPHONE ENCOUNTER
Separate response placed and sent to [facility]. No need to call her back as there's much more to the story and a different approach will be taken.

## 2022-08-31 NOTE — TELEPHONE ENCOUNTER
Patient states she received a call from [address] [place] [address] Johnny Garcia but she was very dissatisfied. Patient says the the person just kept asking her a bunch of questions that \"didn't have to do with anything! \" and then told her \"this conversation is over! \" Per the patient, she just wants a nurse to come to her house so that she can explain things to them and get the help that she needs.

## 2022-08-31 NOTE — CARE COORDINATION
JENNY spoke to Jayson Wilson at [employer]. She requested today's case notes be sent to her via email. CARMENM reviewed conversation with patient today and PCP response. JENNY discussed with APS possible best actions would be to have APS, [place] police and venkat assist patient at home to get her to nearest ER for evaluation of her legs and current health status. She will discuss with supervisor. CARMENM requested son who is POA be notified of next actions.

## 2022-08-31 NOTE — TELEPHONE ENCOUNTER
Please see my care coordination note regarding call to patient. Patient could not comprehend call from [facility]. Per her call to you she thought I was from home health care. JENNY spent over 40 minutes on the phone with patient.

## 2022-08-31 NOTE — TELEPHONE ENCOUNTER
Seems clear she has some cognitive decline with associated agitation and paranoia. Will likely be calling this dementia at some point despite the fact she knows her name and birth date. Also clear she is a danger to herself and her family is no longer in a position to help her adequately. Agree with APS, VIKAS, or any other aggressive form of intervention unless she can agree with a change to palliative but I doubt she is in a state to logically make such a decision. I worry that there is a foot infection which could lead to further cognitive decline, loss of limb, or death from sepsis and unfortunately a virtual visit would not be adequate for assessment partly because a logical conversation might not be possible. Past visits show progression over time of her cognitive decline.

## 2022-09-01 ENCOUNTER — TELEPHONE (OUTPATIENT)
Dept: FAMILY MEDICINE CLINIC | Age: 82
End: 2022-09-01

## 2022-09-01 NOTE — TELEPHONE ENCOUNTER
Patient called and reports that [facility] does not take her insurance. Patient asked for the number to the other visiting nurses; she states that she spoke to someone in this office and was given the number. I tried to explain that she didn't get the number from our office but she was adamant and got very frustrated. Patient states that she is going to call her insurance company to see who they will cover.

## 2022-09-01 NOTE — TELEPHONE ENCOUNTER
AC provided patient yesterday with visiting physician Association contact number. She was given this number to see if they would accept her as patient so she could obtain primary care services in her home. Patient is confused on the difference between visiting physicians Association and visiting nurse Association. ACM called visiting physician Association and was advised they do not take anth Medicare. ACM knowledge there is only one other system that has visiting physicians. That system is [facility]. However [facility] visiting physicians will only see patients that have or had [facility] providers. So where it stands currently, if patient is not able to come into the office as she stated I do not know how PCP will continue to manage patient. ACM called patient and advised her of the above information. Patient advised her options are to come into the office for evaluation or go to the emergency room for evaluation. Patient declined. Spoke to Son, he is aware of all developments. He is going to call APS tomorrow and ask them as I have to go to home with lonny Murphy and venkat to assist patient.

## 2022-09-07 NOTE — CARE COORDINATION
JENNY received a call from [address]. She requested CARMENM review what is been going on with patient. JENNY provided her with information obtained from my conversations with patient and patient's family. Indira Son states she will reach out to Deborah Toledo and they will formulate a plan to assist patient to get her taken to a hospital for evaluation.

## 2022-09-13 LAB
C-REACTIVE PROTEIN, HIGH SENSITIVITY: 18.1 MG/L
SEDIMENTATION RATE, ERYTHROCYTE: 45 MM/H (ref 0–30)

## 2022-09-26 ENCOUNTER — TELEPHONE (OUTPATIENT)
Dept: FAMILY MEDICINE CLINIC | Age: 82
End: 2022-09-26

## 2022-09-26 NOTE — TELEPHONE ENCOUNTER
Patient's brother called in to give you a update about the patient. He wanted to let you know about 2 weeks ago a nurse was suppose to come to the patients house but the day before her  call the [facility] and she was taken to [facility] had to stay there for 3 days. She is currently has been admitted for a week and they are trying to find her a therapist. She just wanted to let you know that she did not refuse the nursing services. Her legs are also doing a lot better. Patients  brother stated he is now the  for the patient and I advised him she would need to update her HIPPA form for us to be able to contact him with information.

## 2022-09-27 NOTE — TELEPHONE ENCOUNTER
Yes. Patient usually isn't able to provide a reliable history or understand her health. Would be good if we had someone in the family on the HIPAA form.

## 2022-12-01 ENCOUNTER — TELEPHONE (OUTPATIENT)
Dept: FAMILY MEDICINE CLINIC | Age: 82
End: 2022-12-01

## 2022-12-01 NOTE — TELEPHONE ENCOUNTER
Jaimie, nurse from [address], called to report that the patient has a new wound. Patient dropped a wine bottle on her foot and now has a split wound on the 2nd toe on her left foot which is moving to the 3rd toe. Patient refused to go to the ER. Nurse would like to start wound care on it using Calcium Alginate Dressing, 3x a week. Nurse also reports that both of the patient's lower legs are having a lot of drainage and is requesting to increase her visits to 3x a week; Nurse also would like to change the dressing to Calcium Alginate Drawtex with Coban wrap, changing it 3x a week. Maik Taylor

## 2022-12-02 ENCOUNTER — TELEPHONE (OUTPATIENT)
Dept: FAMILY MEDICINE CLINIC | Age: 82
End: 2022-12-02

## 2022-12-02 NOTE — TELEPHONE ENCOUNTER
Nurse Jaimie from [facility] went to the patient's home to address her bandages but the patient refused, stating \"you're not touching me\". Nurse will try again next week but states that if the patient refuses again, [facility] will be forced to discharge her.

## 2022-12-02 NOTE — TELEPHONE ENCOUNTER
[facility] received call that the patient had removed all of her bandages. The nurse, Jaimie, is on her way to the patient and would like to change the dressing order to: Vicky boots with Coban. Cat believes the patient would do better with this.

## 2022-12-14 ENCOUNTER — TELEPHONE (OUTPATIENT)
Dept: FAMILY MEDICINE CLINIC | Age: 82
End: 2022-12-14

## 2022-12-14 NOTE — TELEPHONE ENCOUNTER
Pamlea Giron from [facility]  called in to give you a update about the patients wound care. States she seen the patient on 12/13/2022 for a wound change. She wanted to let you know the patient is currently experiencing a pain level 10/10 with her legs. States her legs are swollen, draining and have pitting edema. At the visit she did a wound culture and it was sent to [facility]. The patient does not leave the wound dressings on all the time. States she is extremely particular when it comes to her wounds and what goes on them. States she will not let them use calcium alginate with silver that was order for them to use on her. The patient will only agree to have ABD pads,  adaptic guaze and kerlix placed over her wounds. She wanted to let you know she wrapped the patients legs last night with ace bandages for compression and would like to know if you could place an order for that on her wound care orders? Please call Pamela Giron back with any advice.

## 2022-12-16 ENCOUNTER — TELEPHONE (OUTPATIENT)
Dept: FAMILY MEDICINE CLINIC | Age: 82
End: 2022-12-16

## 2022-12-16 NOTE — TELEPHONE ENCOUNTER
Incoming fax from [facility] lab with the patients wound culture results. Scanned into  .

## 2022-12-19 ENCOUNTER — TELEPHONE (OUTPATIENT)
Dept: FAMILY MEDICINE CLINIC | Age: 82
End: 2022-12-19

## 2022-12-19 NOTE — TELEPHONE ENCOUNTER
Veronique Cazares at [facility] states that they are good and are scheduled to go out and see patient until the end of the month.

## 2022-12-19 NOTE — TELEPHONE ENCOUNTER
Patient states that she was swabbed and came back positive for infection(please see encounter from 12/16). She is asking if a medicine should be called in? Patient called back in and made aware of message. Will call her PT to verify also. [facility] [phone]. [facility] for a return call.

## 2022-12-19 NOTE — TELEPHONE ENCOUNTER
Patient states that there has been a mix up with her insurance. All she has is Medicaid until the end of the year. She cannot get new insurance until Jan 1 2023. She has not had PT since September. She still has Nurses coming to her home. She is concerned that her nurses won't get paid. [facility].

## 2022-12-20 NOTE — TELEPHONE ENCOUNTER
A skin culture isn't good enough to determine if there is an infection as it will almost always be positive and the results show a mix of bacteria which is probably contamination from the usual skin bacteria. If you are having an issue then it would be good if a provider can see the wound. I can offer palliative care to come to the house, you could be seen in clinic with me, or you could visit the ED. Do you have a preference?

## 2022-12-20 NOTE — TELEPHONE ENCOUNTER
Patient is very adamant about not leaving the home. Nursing believes it would be best if we could get someone into the home for her like Palliative Care. They will be trying to fax over copies of pictures of this also.

## 2022-12-20 NOTE — TELEPHONE ENCOUNTER
I'm happy to order palliative. I just need the patient to agree on this (she has declined much care in the past). Can we ask her if I can order this for her.

## 2022-12-21 NOTE — TELEPHONE ENCOUNTER
Patient made aware, scheduled an appointment. Also she states that she forgot to mention earlier, she dropped a bottle of wine on her toe and it looks infected. Will mention to nurse also tomorrow when she comes in.

## 2022-12-21 NOTE — TELEPHONE ENCOUNTER
Patient states that the nurse is taking care of this that comes to her home. She states that the wounds are already healing and looking better. She states that this is the only thing that works, the Vaseline patches. Patient wants to know if you want her to have labs done before the end of the year? She would like to keep an eye on her kidneys etc.    Nurse has come on 12/20 and will come back on 12/22.

## 2022-12-27 ENCOUNTER — TELEPHONE (OUTPATIENT)
Dept: FAMILY MEDICINE CLINIC | Age: 82
End: 2022-12-27

## 2022-12-27 ENCOUNTER — TELEMEDICINE (OUTPATIENT)
Dept: FAMILY MEDICINE CLINIC | Age: 82
End: 2022-12-27
Payer: MEDICARE

## 2022-12-27 DIAGNOSIS — I83.002 VENOUS STASIS ULCER OF CALF WITH FAT LAYER EXPOSED, UNSPECIFIED LATERALITY, UNSPECIFIED WHETHER VARICOSE VEINS PRESENT (HCC): Primary | ICD-10-CM

## 2022-12-27 DIAGNOSIS — L97.202 VENOUS STASIS ULCER OF CALF WITH FAT LAYER EXPOSED, UNSPECIFIED LATERALITY, UNSPECIFIED WHETHER VARICOSE VEINS PRESENT (HCC): Primary | ICD-10-CM

## 2022-12-27 DIAGNOSIS — L97.521 ULCER OF LEFT FOOT, LIMITED TO BREAKDOWN OF SKIN (HCC): ICD-10-CM

## 2022-12-27 PROCEDURE — G8427 DOCREV CUR MEDS BY ELIG CLIN: HCPCS | Performed by: FAMILY MEDICINE

## 2022-12-27 PROCEDURE — 1123F ACP DISCUSS/DSCN MKR DOCD: CPT | Performed by: FAMILY MEDICINE

## 2022-12-27 PROCEDURE — 1036F TOBACCO NON-USER: CPT | Performed by: FAMILY MEDICINE

## 2022-12-27 PROCEDURE — G2025 DIS SITE TELE SVCS RHC/FQHC: HCPCS | Performed by: FAMILY MEDICINE

## 2022-12-27 PROCEDURE — G8421 BMI NOT CALCULATED: HCPCS | Performed by: FAMILY MEDICINE

## 2022-12-27 PROCEDURE — G8400 PT W/DXA NO RESULTS DOC: HCPCS | Performed by: FAMILY MEDICINE

## 2022-12-27 PROCEDURE — G8484 FLU IMMUNIZE NO ADMIN: HCPCS | Performed by: FAMILY MEDICINE

## 2022-12-27 PROCEDURE — 1090F PRES/ABSN URINE INCON ASSESS: CPT | Performed by: FAMILY MEDICINE

## 2022-12-27 RX ORDER — ZINC SULFATE 50(220)MG
CAPSULE ORAL
COMMUNITY
Start: 2022-04-02

## 2022-12-27 RX ORDER — SULFAMETHOXAZOLE AND TRIMETHOPRIM 800; 160 MG/1; MG/1
1 TABLET ORAL 2 TIMES DAILY
Qty: 20 TABLET | Refills: 0 | Status: SHIPPED | OUTPATIENT
Start: 2022-12-27 | End: 2023-01-06

## 2022-12-27 RX ORDER — CHOLECALCIFEROL (VITAMIN D3) 1250 MCG
CAPSULE ORAL
COMMUNITY
Start: 2022-11-15

## 2022-12-27 RX ORDER — FAMOTIDINE 20 MG/1
TABLET, FILM COATED ORAL
COMMUNITY

## 2022-12-27 ASSESSMENT — PATIENT HEALTH QUESTIONNAIRE - PHQ9: DEPRESSION UNABLE TO ASSESS: PT REFUSES

## 2022-12-27 ASSESSMENT — ENCOUNTER SYMPTOMS
RHINORRHEA: 0
SORE THROAT: 0
CONSTIPATION: 0
WHEEZING: 0
ABDOMINAL PAIN: 0
SHORTNESS OF BREATH: 0
DIARRHEA: 0
COUGH: 0

## 2022-12-27 NOTE — LETTER
[address] [facility] PRIMARY [facility]  [address] [address]  Dept: [phone]  Dept Fax: [phone]  Loc: [phone]      1/11/2023    Dear Sanket Mack,    I find it necessary to inform you that I must withdraw my professional commitment to you as a Primary Care Physician due to a breakdown in the doctor/patient relationship. It is essential that you continue to receive medical care for your condition. Therefore, I recommend you make immediate arrangements with another physician to provide the needed care. For emergency medical services, please go to the nearest emergency room for treatment. If you wish, I will continue to treat your urgent medical needs which may develop for the following thirty (30) days from today's date. Enclosed is a form authorizing me to release a copy of your medical records to your new treating physician. I will forward your records promptly upon receipt of this form, signed by you, with completed name and address of the physician to receive your records. If you have any questions regarding the contents of this letter, you may reach me at my office during normal business hours.     Respectfully,        Mis Pryor MD

## 2022-12-27 NOTE — TELEPHONE ENCOUNTER
The home nurse is connected with 's wound clinic. I placed an external referral to wound care. Could we help connect her to this.  Her legs are pretty bad and she isn't willing to leave the house

## 2022-12-27 NOTE — PROGRESS NOTES
[address] [facility]  Virtual Visit  [address] [facility] [address] [facility] PRIMARY [facility]  [address] [id number]  Dept: [phone]  Dept Fax: : [phone]     Due to COVID 23 outbreak, patient's office visit was converted to a virtual visit. Patient was contacted and agreed to proceed with a virtual visit via Doxy. me  The risks and benefits of converting to a virtual visit were discussed in light of the current infectious disease epidemic. Patient also understood that insurance coverage and co-pays are up to their individual insurance plans. Chief Complaint  Chief Complaint   Patient presents with    Wound Check     B/l lower extremities, nurse cultured it and it came back with more than one microorganism 12/13/22       HPI:  79 y. o.female who presents for the following:  (Here with home health nurse)    B/l lower legs with pain/sores with open ulcers and erythema and weeping; says has had this 4 years; improved in the SNF this year; lots of b/l LE swelling; sleeps in a chair with legs down; also has sores on the toes of the L foot after dropping a bottle of wine on it last week      Review of Systems   Constitutional:  Negative for chills and fever. HENT:  Negative for congestion, rhinorrhea and sore throat. Respiratory:  Negative for cough, shortness of breath and wheezing. Gastrointestinal:  Negative for abdominal pain, constipation and diarrhea. Endocrine: Negative for polydipsia and polyuria. Genitourinary:  Negative for dysuria, frequency and urgency. Skin:  Positive for wound. Neurological:  Negative for syncope, light-headedness, numbness and headaches. Psychiatric/Behavioral:  Negative for sleep disturbance. The patient is not nervous/anxious.       Past Medical History:   Diagnosis Date    ROCHELLE (acute kidney injury) ([facility] 7/25/2021    COPD exacerbation ([facility] 2/24/2022    Gastroesophageal reflux disease 6/10/2002 Hypothyroidism     Obesity, Class III, BMI 40-49.9 (morbid obesity) ([facility] 2016    Primary hypertension 2022    Tobacco abuse      Past Surgical History:   Procedure Laterality Date    APPENDECTOMY      CHOLECYSTECTOMY       Social History     Socioeconomic History    Marital status:      Spouse name: Not on file    Number of children: Not on file    Years of education: Not on file    Highest education level: Not on file   Occupational History    Not on file   Tobacco Use    Smoking status: Former     Packs/day: 5.00     Years: 30.00     Pack years: 150.00     Types: Cigarettes     Quit date: 10/1/2017     Years since quittin.2    Smokeless tobacco: Never    Tobacco comments:     Has been trying to quit since January   Vaping Use    Vaping Use: Never used   Substance and Sexual Activity    Alcohol use: No     Alcohol/week: 0.0 standard drinks    Drug use: No    Sexual activity: Not Currently   Other Topics Concern    Not on file   Social History Narrative    Not on file     Social Determinants of Health     Financial Resource Strain: Low Risk     Difficulty of Paying Living Expenses: Not hard at all   Food Insecurity: No Food Insecurity    Worried About Running Out of Food in the Last Year: Never true    Ran Out of Food in the Last Year: Never true   Transportation Needs: Not on file   Physical Activity: Not on file   Stress: Not on file   Social Connections: Not on file   Intimate Partner Violence: Not on file   Housing Stability: Not on file     Family History   Problem Relation Age of Onset    Kidney Disease Mother     High Blood Pressure Mother     Stroke Mother     Heart Disease Father       Allergies   Allergen Reactions    Benadryl [Diphenhydramine Hcl]     Cephalexin      Other reaction(s): Unknown  Tolerated ceftriaxone during 2021 admission    Cortisone Swelling    Codeine Rash    Pcn [Penicillins] Rash     Current Outpatient Medications   Medication Sig Dispense Refill    zinc sulfate (ZINCATE) 220 (50 Zn) MG capsule Take by mouth      sulfamethoxazole-trimethoprim (BACTRIM DS;SEPTRA DS) 800-160 MG per tablet Take 1 tablet by mouth 2 times daily for 10 days 20 tablet 0    apixaban (ELIQUIS) 5 MG TABS tablet Take 1 tablet by mouth 2 times daily Indications: Anticoagulant Therapy 28 tablet 0    SYNTHROID 150 MCG tablet Take 1 tablet by mouth Daily (Patient taking differently: Take 150 mcg by mouth Daily Indications: Underactive Thyroid) 90 tablet 0    Cholecalciferol (VITAMIN D3) 1.25 MG (08074 UT) CAPS       famotidine (PEPCID) 20 MG tablet Take by mouth      lidocaine (XYLOCAINE) 5 % ointment Apply topically 3 times daily as needed for Pain Apply topically as needed. 240 g 0    tamsulosin (FLOMAX) 0.4 MG capsule Take 0.4 mg by mouth nightly Indications: Urinary Retention      amLODIPine (NORVASC) 5 MG tablet Take 5 mg by mouth 2 times daily Indications: High Blood Pressure Disorder (Patient not taking: Reported on 12/27/2022)      losartan (COZAAR) 100 MG tablet Take 100 mg by mouth daily Indications: High Blood Pressure Disorder       menthol-zinc oxide (CALMOSEPTINE) 0.44-20.625 % OINT ointment Apply topically 2 times daily Indications: Wound Care Max 30 ml per day. Bilat. Buttocks      nystatin (MYCOSTATIN) 358189 UNIT/GM powder Apply topically 3 times daily Indications: Skin Infection due to Candida Yeast Apply topically 4 times daily. groin      ascorbic acid (VITAMIN C) 500 MG tablet Take 500 mg by mouth 2 times daily Indications: Nutritional Support      zinc 50 MG TABS tablet Take 50 mg by mouth daily Indications: Nutritional Support       No current facility-administered medications for this visit. Physical exam:  Physical Exam  Constitutional:       General: She is not in acute distress. Appearance: Normal appearance. She is not ill-appearing. HENT:      Head: Normocephalic and atraumatic. Pulmonary:      Effort: Pulmonary effort is normal. No respiratory distress. Musculoskeletal:      Cervical back: Normal range of motion. Skin:         Neurological:      Mental Status: She is alert. Assessment/Plan:  79 y.o. female here mainly for LE sores:  - per usual, she has no insight into her health and how serious the wounds are and the importance of getting treated. She is unwilling to leave the house but can provide a reason why; has chronic venous stasis ulcers related to lack of adequate compression/elevation; she was agreeable to continued visits by home nurse and virtual visits from [facility] home care; will try to get this set up and see her again virtually next week; empiric bactrim has been sent  - ideally she should live in a SNF or at least see wound care in person regularly; she rejects these options     Diagnosis Orders   1. Venous stasis ulcer of calf with fat layer exposed, unspecified laterality, unspecified whether varicose veins present (HCC)  Amb External Referral To Wound Clinic    sulfamethoxazole-trimethoprim (BACTRIM DS;SEPTRA DS) 800-160 MG per tablet      2. Ulcer of left foot, limited to breakdown of skin (HCC)  sulfamethoxazole-trimethoprim (BACTRIM DS;SEPTRA DS) 800-160 MG per tablet           Return if symptoms worsen or fail to improve. Savage Parent, MD [facility], was evaluated through a synchronous (real-time) audio-video encounter. The patient (or guardian if applicable) is aware that this is a billable service, which includes applicable co-pays. This Virtual Visit was conducted with patient's (and/or legal guardian's) consent. The visit was conducted pursuant to the emergency declaration under the [facility], [address] [place] [address] authority and the [employer] General Act. Patient identification was verified, and a caregiver was present when appropriate. The patient was located at Home: [address] [facility] [address].    Provider was located at [address] Dept): [address] [facility],  [address] [place] [address].

## 2023-01-03 NOTE — TELEPHONE ENCOUNTER
Kacy Hamilton am sorry for the delay in sending this message. Is this something that Care Coordination can help patient with?

## 2023-01-03 NOTE — TELEPHONE ENCOUNTER
Dr. Zenaida Harrison suggests reaching out to [facility] as they and [facility] were involved back in August. Is this something you'd like me to try and do? Or should I reach out to Wound Care still? Possibly both?

## 2023-01-03 NOTE — TELEPHONE ENCOUNTER
The nurse says that the wound care center at [facility] is willing to do virtual visits. I really just need her to make a virtual appt. . She is with it enough to deny help and get [facility] to leave her alone but she's not with it enough to find the phone number for the wound clinic. Just needs help getting her appt.

## 2023-01-04 NOTE — TELEPHONE ENCOUNTER
I don't think I have another option. The best thing for her is to be admitted and get wound care at a nursing home like last time. Can we offer this to her again. If she declines then I don't have another options. There is no substitute for being seen in person for her leg wounds.

## 2023-01-04 NOTE — TELEPHONE ENCOUNTER
Emmanuel Hummel and they advised me that they do not do home wound care or virtual visits they only do wound care within the office.

## 2023-01-11 NOTE — TELEPHONE ENCOUNTER
Patient states that the Vaseline patches weren't helping so she quit those and has been letting it dry out and states that it is healing now. She states that the wound care does not help and she does not think she needs this. She states that the swelling went down and there is some burning but nothing she cannot handle. Currently still trying to get on her new insurance. She states that once she gets back on her insurance she wants to have someone come check her out and have physical therapy.

## 2023-01-11 NOTE — TELEPHONE ENCOUNTER
Letter created and signed off on. Will mail once returned to [employer]. Discharged in [address]

## 2023-01-11 NOTE — TELEPHONE ENCOUNTER
I feel I can longer help you as your provider. If in the next 30 days you needs something, you can let us know. In the meantime, you will need to work on finding a new provider so that you are not without one in a month. (Please discharge).

## 2023-01-26 ENCOUNTER — TELEPHONE (OUTPATIENT)
Dept: FAMILY MEDICINE CLINIC | Age: 83
End: 2023-01-26

## 2023-01-26 NOTE — TELEPHONE ENCOUNTER
Patient called in stating that she received the discharge letter and she would like a better explanation than non compliance because she has done everything you told her to do. She states that she had to stop home care because she did not have insurance that she will be getting new insurance effective Monday.

## 2023-01-26 NOTE — TELEPHONE ENCOUNTER
I feel there is nothing more I can do to help her and that she has refused much of the therapy I have offered. I would prefer that she establish with another provider.

## 2023-02-16 LAB — VANCOMYCIN TROUGH: 17.6 UG/ML (ref 10–20)

## 2023-02-17 ENCOUNTER — OFFICE VISIT (OUTPATIENT)
Dept: GERIATRIC MEDICINE | Age: 83
End: 2023-02-17

## 2023-02-17 DIAGNOSIS — L97.222 VENOUS STASIS ULCER OF LEFT CALF WITH FAT LAYER EXPOSED WITHOUT VARICOSE VEINS (HCC): Primary | Chronic | ICD-10-CM

## 2023-02-17 DIAGNOSIS — I87.2 VENOUS STASIS ULCER OF LEFT CALF WITH FAT LAYER EXPOSED WITHOUT VARICOSE VEINS (HCC): Primary | Chronic | ICD-10-CM

## 2023-02-17 RX ORDER — OXYCODONE HYDROCHLORIDE AND ACETAMINOPHEN 5; 325 MG/1; MG/1
1 TABLET ORAL EVERY 4 HOURS PRN
COMMUNITY

## 2023-02-17 RX ORDER — METRONIDAZOLE 500 MG/1
500 TABLET ORAL 3 TIMES DAILY
COMMUNITY

## 2023-02-17 RX ORDER — OXYCODONE HYDROCHLORIDE 10 MG/1
10 TABLET ORAL EVERY 8 HOURS PRN
COMMUNITY

## 2023-02-20 ENCOUNTER — OFFICE VISIT (OUTPATIENT)
Dept: GERIATRIC MEDICINE | Age: 83
End: 2023-02-20
Payer: MEDICARE

## 2023-02-20 DIAGNOSIS — F03.918 DEMENTIA WITH OTHER BEHAVIORAL DISTURBANCE, UNSPECIFIED DEMENTIA SEVERITY, UNSPECIFIED DEMENTIA TYPE: ICD-10-CM

## 2023-02-20 DIAGNOSIS — I50.9 HEART FAILURE, UNSPECIFIED HF CHRONICITY, UNSPECIFIED HEART FAILURE TYPE (HCC): ICD-10-CM

## 2023-02-20 DIAGNOSIS — D63.8 ANEMIA OF CHRONIC DISEASE: ICD-10-CM

## 2023-02-20 DIAGNOSIS — N18.9 ACUTE KIDNEY INJURY SUPERIMPOSED ON CKD (HCC): ICD-10-CM

## 2023-02-20 DIAGNOSIS — N17.9 ACUTE KIDNEY INJURY SUPERIMPOSED ON CKD (HCC): ICD-10-CM

## 2023-02-20 DIAGNOSIS — L08.9 WOUND INFECTION: ICD-10-CM

## 2023-02-20 DIAGNOSIS — R29.6 FREQUENT FALLS: ICD-10-CM

## 2023-02-20 DIAGNOSIS — T14.8XXA WOUND INFECTION: ICD-10-CM

## 2023-02-20 DIAGNOSIS — G93.40 ENCEPHALOPATHY: ICD-10-CM

## 2023-02-20 DIAGNOSIS — J44.9 CHRONIC OBSTRUCTIVE PULMONARY DISEASE, UNSPECIFIED COPD TYPE (HCC): ICD-10-CM

## 2023-02-20 DIAGNOSIS — R52 ACUTE PAIN: Primary | ICD-10-CM

## 2023-02-20 DIAGNOSIS — A41.9 SEPSIS, DUE TO UNSPECIFIED ORGANISM, UNSPECIFIED WHETHER ACUTE ORGAN DYSFUNCTION PRESENT (HCC): Primary | ICD-10-CM

## 2023-02-20 DIAGNOSIS — E11.9 TYPE 2 DIABETES MELLITUS WITHOUT COMPLICATION, WITHOUT LONG-TERM CURRENT USE OF INSULIN (HCC): ICD-10-CM

## 2023-02-20 PROCEDURE — 1123F ACP DISCUSS/DSCN MKR DOCD: CPT | Performed by: NURSE PRACTITIONER

## 2023-02-20 PROCEDURE — 99310 SBSQ NF CARE HIGH MDM 45: CPT | Performed by: NURSE PRACTITIONER

## 2023-02-20 RX ORDER — OXYCODONE HYDROCHLORIDE AND ACETAMINOPHEN 5; 325 MG/1; MG/1
1 TABLET ORAL EVERY 4 HOURS PRN
Qty: 42 TABLET | Refills: 0 | Status: SHIPPED | OUTPATIENT
Start: 2023-02-20 | End: 2023-02-27

## 2023-03-01 ENCOUNTER — TELEPHONE (OUTPATIENT)
Dept: INFECTIOUS DISEASES | Age: 83
End: 2023-03-01

## 2023-03-01 ENCOUNTER — OFFICE VISIT (OUTPATIENT)
Dept: GERIATRIC MEDICINE | Age: 83
End: 2023-03-01

## 2023-03-01 DIAGNOSIS — Z91.199 MEDICAL NON-COMPLIANCE: ICD-10-CM

## 2023-03-01 DIAGNOSIS — L03.115 BILATERAL LOWER LEG CELLULITIS: Primary | ICD-10-CM

## 2023-03-01 DIAGNOSIS — L03.116 BILATERAL LOWER LEG CELLULITIS: Primary | ICD-10-CM

## 2023-03-02 NOTE — TELEPHONE ENCOUNTER
Per   review Cefepime 1 g Q 8 hours okay to D/C 3/2/23. IV VAnco continue till seen 3/9/23. Called Olivia read back 's ordered and She Verbalized Understanding.

## 2023-03-06 DIAGNOSIS — R52 ACUTE PAIN: Primary | ICD-10-CM

## 2023-03-06 LAB — VANCOMYCIN TROUGH: 17 UG/ML (ref 10–20)

## 2023-03-06 RX ORDER — OXYCODONE HYDROCHLORIDE AND ACETAMINOPHEN 5; 325 MG/1; MG/1
1 TABLET ORAL EVERY 4 HOURS PRN
COMMUNITY
End: 2023-03-06 | Stop reason: SDUPTHER

## 2023-03-06 RX ORDER — OXYCODONE HYDROCHLORIDE AND ACETAMINOPHEN 5; 325 MG/1; MG/1
1 TABLET ORAL EVERY 4 HOURS PRN
Qty: 42 TABLET | Refills: 0 | Status: SHIPPED | OUTPATIENT
Start: 2023-03-06 | End: 2023-03-13

## 2023-03-08 ENCOUNTER — OFFICE VISIT (OUTPATIENT)
Dept: GERIATRIC MEDICINE | Age: 83
End: 2023-03-08

## 2023-03-08 DIAGNOSIS — Z91.199 MEDICAL NON-COMPLIANCE: Primary | ICD-10-CM

## 2023-03-09 ENCOUNTER — TELEPHONE (OUTPATIENT)
Dept: INFECTIOUS DISEASES | Age: 83
End: 2023-03-09

## 2023-03-09 LAB
ANION GAP SERPL CALCULATED.3IONS-SCNC: 9 MEQ/L (ref 9–15)
BASOPHILS ABSOLUTE: 0.1 K/UL (ref 0–0.2)
BASOPHILS RELATIVE PERCENT: 1.2 %
BUN BLDV-MCNC: 20 MG/DL (ref 8–23)
CALCIUM SERPL-MCNC: 8.7 MG/DL (ref 8.5–9.9)
CHLORIDE BLD-SCNC: 110 MEQ/L (ref 95–107)
CO2: 20 MEQ/L (ref 20–31)
CREAT SERPL-MCNC: 1.09 MG/DL (ref 0.5–0.9)
EOSINOPHILS ABSOLUTE: 0.3 K/UL (ref 0–0.7)
EOSINOPHILS RELATIVE PERCENT: 4.6 %
GFR SERPL CREATININE-BSD FRML MDRD: 50.1 ML/MIN/{1.73_M2}
GLUCOSE BLD-MCNC: 64 MG/DL (ref 70–99)
HCT VFR BLD CALC: 30.8 % (ref 37–47)
HEMOGLOBIN: 9.6 G/DL (ref 12–16)
LYMPHOCYTES ABSOLUTE: 1.3 K/UL (ref 1–4.8)
LYMPHOCYTES RELATIVE PERCENT: 21.7 %
MCH RBC QN AUTO: 26.2 PG (ref 27–31.3)
MCHC RBC AUTO-ENTMCNC: 31.1 % (ref 33–37)
MCV RBC AUTO: 84.2 FL (ref 79.4–94.8)
MONOCYTES ABSOLUTE: 0.6 K/UL (ref 0.2–0.8)
MONOCYTES RELATIVE PERCENT: 10.4 %
NEUTROPHILS ABSOLUTE: 3.6 K/UL (ref 1.4–6.5)
NEUTROPHILS RELATIVE PERCENT: 62.1 %
PDW BLD-RTO: 21.1 % (ref 11.5–14.5)
PLATELET # BLD: 246 K/UL (ref 130–400)
POTASSIUM SERPL-SCNC: 4.8 MEQ/L (ref 3.4–4.9)
RBC # BLD: 3.66 M/UL (ref 4.2–5.4)
SODIUM BLD-SCNC: 139 MEQ/L (ref 135–144)
WBC # BLD: 5.8 K/UL (ref 4.8–10.8)

## 2023-03-09 NOTE — TELEPHONE ENCOUNTER
Received call from Jocelyn( Director of Nursing at [facility]) States patient on IV Vanco 750mg once a day till 3/9/23-Patient cancelled/Refused to her appointment. Wendi Monreal asked if its okay to D/C IV Abx. We will consult ID Doctor.

## 2023-03-09 NOTE — TELEPHONE ENCOUNTER
After  review all the hospital notes okay to D/C Abx. [address] spoke with her relayed 's Order and She Verbalized Understanding.

## 2023-03-16 PROBLEM — J44.9 CHRONIC OBSTRUCTIVE PULMONARY DISEASE (HCC): Status: ACTIVE | Noted: 2023-03-16

## 2023-03-16 PROBLEM — L08.9 WOUND INFECTION: Status: ACTIVE | Noted: 2023-03-16

## 2023-03-16 PROBLEM — F03.90 DEMENTIA (HCC): Status: ACTIVE | Noted: 2023-03-16

## 2023-03-16 PROBLEM — D63.8 ANEMIA OF CHRONIC DISEASE: Status: ACTIVE | Noted: 2023-03-16

## 2023-03-16 PROBLEM — I50.9 HEART FAILURE (HCC): Status: ACTIVE | Noted: 2023-03-16

## 2023-03-16 PROBLEM — T14.8XXA WOUND INFECTION: Status: ACTIVE | Noted: 2023-03-16

## 2023-03-16 PROBLEM — G93.40 ENCEPHALOPATHY: Status: ACTIVE | Noted: 2023-03-16

## 2023-03-16 PROBLEM — A41.9 SEPSIS (HCC): Status: ACTIVE | Noted: 2023-03-16

## 2023-03-16 PROBLEM — R29.6 FREQUENT FALLS: Status: ACTIVE | Noted: 2023-03-16

## 2023-03-16 NOTE — PROGRESS NOTES
Patient Name: Esequiel Ortiz  Date: 3/16/2023  YOB: 1940  Medical Record Number: [id number]              History of Present Illness:      Review of Systems    Review of Systems: All 14 review of systems negative other than as stated above    Social History     Tobacco Use    Smoking status: Former     Packs/day: 5.00     Years: 30.00     Pack years: 150.00     Types: Cigarettes     Quit date: 10/1/2017     Years since quittin.4    Smokeless tobacco: Never    Tobacco comments:     Has been trying to quit since January   Vaping Use    Vaping Use: Never used   Substance Use Topics    Alcohol use: No     Alcohol/week: 0.0 standard drinks    Drug use: No         Past Medical History:   Diagnosis Date    ROCHELLE (acute kidney injury) ([facility] 2021    COPD exacerbation ([facility] 2022    Gastroesophageal reflux disease 6/10/2002    Hypothyroidism     Obesity, Class III, BMI 40-49.9 (morbid obesity) ([facility] 2016    Primary hypertension 2022    Tobacco abuse            Past Surgical History:   Procedure Laterality Date    APPENDECTOMY      CHOLECYSTECTOMY           Current Outpatient Medications on File Prior to Visit   Medication Sig Dispense Refill    metroNIDAZOLE (FLAGYL) 500 MG tablet Take 500 mg by mouth 3 times daily Indications: Infection      oxyCODONE HCl (OXY-IR) 10 MG immediate release tablet Take 10 mg by mouth every 8 hours as needed for Pain.      sodium chloride 0.9 % SOLN 250 mL with vancomycin 750 MG SOLR 750 mg Infuse 750 mg intravenously every 24 hours Indications: Infection with Dysregulated Inflammatory Response      lidocaine (XYLOCAINE) 5 % ointment Apply topically 3 times daily as needed for Pain Apply topically as needed.  240 g 0    amLODIPine (NORVASC) 5 MG tablet Take 2.5 mg by mouth daily Indications: High Blood Pressure Disorder      nystatin (MYCOSTATIN) 637698 UNIT/GM powder Apply topically 3 times daily Indications: Skin Infection due to Candida Yeast Apply topically 3 times daily. groin      SYNTHROID 150 MCG tablet Take 1 tablet by mouth Daily (Patient taking differently: Take 150 mcg by mouth Daily Indications: Underactive Thyroid) 90 tablet 0     No current facility-administered medications on file prior to visit. Allergies   Allergen Reactions    Benadryl [Diphenhydramine Hcl]     Cephalexin      Other reaction(s): Unknown  Tolerated ceftriaxone during 8/2021 admission    Cortisone Swelling    Codeine Rash    Pcn [Penicillins] Rash         Family History   Problem Relation Age of Onset    Kidney Disease Mother     High Blood Pressure Mother     Stroke Mother     Heart Disease Father          Physical Exam:      Physical Exam    not currently breastfeeding.       .   Lab Results   Component Value Date    WBC 5.8 03/09/2023    HGB 9.6 (L) 03/09/2023    HCT 30.8 (L) 03/09/2023    MCV 84.2 03/09/2023     03/09/2023     Lab Results   Component Value Date/Time     03/09/2023 07:00 AM    K 4.8 03/09/2023 07:00 AM    K 3.3 07/27/2021 05:23 AM     03/09/2023 07:00 AM    CO2 20 03/09/2023 07:00 AM    BUN 20 03/09/2023 07:00 AM    CREATININE 1.09 03/09/2023 07:00 AM    GLUCOSE 64 03/09/2023 07:00 AM    GLUCOSE 74 02/14/2023 05:34 AM    CALCIUM 8.7 03/09/2023 07:00 AM                ASSESSMENT:  Patient Active Problem List   Diagnosis    Hypothyroidism    Tobacco abuse    Atopic rhinitis    Gastroesophageal reflux disease    Hearing loss    Type 2 diabetes mellitus without complication, without long-term current use of insulin (HCC)    Family history of other specified malignant neoplasm    Venous stasis ulcer of right thigh with fat layer exposed without varicose veins (HCC)    Venous stasis ulcer of right calf with fat layer exposed ([address]    Venous stasis ulcer of left calf without varicose veins (HCC)    Edema    Acute kidney injury superimposed on CKD ([address]    Wounds, multiple open, lower extremity    Excessive use of nonsteroidal anti-inflammatory drugs (NSAIDs)    Medical non-compliance    COVID-19    COPD exacerbation (HCC)    Primary hypertension    Class 2 obesity due to excess calories with body mass index (BMI) of 39.0 to 39.9 in adult    Fatigue    Stage 3 chronic kidney disease (HCC)    Pseudomonas aeruginosa infection    Infected stasis ulcer ([facility]    Bilateral lower leg cellulitis    Acute embolism and thrombosis of deep vein of left lower extremity (HCC)    Hypertensive heart disease without heart failure    Other specified anemias         PLAN:   Diagnosis Orders   1. Venous stasis ulcer of left calf with fat layer exposed without varicose veins ([facility]              Please note orders entered on site at facility after discussion with appropriate facility nursing/therapy/ / nutritional staff. Current longstanding medical problems and acute medical issues addressed with staff. Available data and data elements in on site paper chart reviewed and analyzed. Current external consultant notes reviewed in on site chart. Ordered laboratory testing and imaging will be reviewed when available. This patient's need for psychiatric medication has been reviewed. Will consider further adjustment and possible further evaluations by mental health services.

## 2023-03-17 NOTE — PROGRESS NOTES
Nursing Home:  [address] [place] [address]    The patient is being seen today for follow-up after recent admission to this facility for:  Chief Complaint   Patient presents with    Follow-Up from Hospital         SUBJECTIVE:  Patient being seen today for follow-up after recent admission to this facility from the hospital with diagnoses of sepsis, BLE wound infections, ROCHELLE superimposed on CKD, Encephalopathy, Dementia with behavioral disturbance, COPD, DM II, Heart Failure, Anemia of chronic disease, and Frequent Falls. FAMILY AND SOCIAL HISTORY:  Refer to H&P.    Past Medical History:   Diagnosis Date    ROCHELLE (acute kidney injury) ([facility] 2021    COPD exacerbation ([facility] 2022    Gastroesophageal reflux disease 6/10/2002    Hypothyroidism     Obesity, Class III, BMI 40-49.9 (morbid obesity) ([facility] 2016    Primary hypertension 2022    Tobacco abuse      Family History   Problem Relation Age of Onset    Kidney Disease Mother     High Blood Pressure Mother     Stroke Mother     Heart Disease Father      Social History     Socioeconomic History    Marital status:      Spouse name: Not on file    Number of children: Not on file    Years of education: Not on file    Highest education level: Not on file   Occupational History    Not on file   Tobacco Use    Smoking status: Former     Packs/day: 5.00     Years: 30.00     Pack years: 150.00     Types: Cigarettes     Quit date: 10/1/2017     Years since quittin.4    Smokeless tobacco: Never    Tobacco comments:     Has been trying to quit since January   Vaping Use    Vaping Use: Never used   Substance and Sexual Activity    Alcohol use: No     Alcohol/week: 0.0 standard drinks    Drug use: No    Sexual activity: Not Currently   Other Topics Concern    Not on file   Social History Narrative    Not on file     Social Determinants of Health     Financial Resource Strain: Low Risk     Difficulty of Paying Living Expenses: Not hard at all Food Insecurity: No Food Insecurity    Worried About Running Out of Food in the Last Year: Never true    Ran Out of Food in the Last Year: Never true   Transportation Needs: Not on file   Physical Activity: Not on file   Stress: Not on file   Social Connections: Not on file   Intimate Partner Violence: Not on file   Housing Stability: Not on file     ALLERGIES:  Benadryl [diphenhydramine hcl], Cephalexin, Cortisone, Codeine, and Pcn [penicillins]    MEDICATIONS: Acetaminophen 325 mg oral tablet 2 tabs p.o. every 4 hours as needed for fever or pain, amlodipine besilate 2.5 mg oral tablet p.o. daily, apixaban oral tablet 5 mg tab p.o. twice daily, cefepime HCl intravenous solution 1 g per 50 mL use 50mL IV every 8 hours x14 days, Dulcolax suppository 10 mg rectal suppository daily as needed if no relief from milk of mag, ergocalciferol oral capsule 1.25 mg capsule give 50,000 units p.o. every Sunday, famotidine 20 mg oral tab p.o. twice daily, gentamicin sulfate external cream 0.1% topical cream applied to both legs topically every shift, heparin sodium lock flush IV solution 100 units/mL use 3 mL 4 times daily for PICC , levothyroxine sodium 150 mcg oral tab give 150 mcg p.o. daily, metronidazole oral tab 500 mg tab p.o. 3 times daily, milk of magnesia suspension 1200 mg per 15 mL give 30 mL p.o. daily as needed, oxycodone-acetaminophen oral solution 5-325 mg per 5 mL 1 tab p.o. every 4 as needed for moderate pain for 4-6 pain scale, sodium chloride flush IV solution 0.9% use 10 mL IV 4 times daily, vancomycin HCl IV solution reconstituted 750 mg IV daily. REVIEW OF SYSTEMS:  Resident denies any headache, dizziness, blurred vision. She denies any fever, chills, sore throat. She denies any rashes, bruises, or open areas other than the areas on her legs. She denies any chest pain, chest discomfort, or heart palpitations. She denies any shortness of breath or cough.   She denies any nausea, vomiting, or abdominal pain. She denies any urinary urgency, frequency, or dysuria. She denies any constipation or diarrhea. She states she is eating okay, sleeping okay, and she currently has no pain. Nursing staff report the patient has refused her dressing changes for today. Nurse reports patient will not allow nursing staff to place anything over her wounds on her legs other then the gentamicin cream and a nonadherent overlay. She refuses to have kerlex applied over the nonadherent dressing. Today the patient refused to have her wound care done. PHYSICAL EXAMINATION:  Most recent vital signs:  /68, temperature 97.8, heart rate 78, respirations 18, spo2 97% room air, weight 185lbs. Constitutional:  Resident is a 79 y.o. female alert, pleasant, and cooperative with exam.  Patient is in no apparent distress. Integument:  Pink, warm, dry. No visible, rashes, or open areas other then on her lower extremities. HEENT:  Normocephalic, atraumatic. External ears appear to be within normal limits. [place]. Conjunctivae pink. Sclerae nonicteric. Mucous membranes pink, moist.  No oropharyngeal exudate. Neck:  Supple. No cervical or clavicular lymphadenopathy. No masses noted. Cardiovascular:  Heart rate regular rate and rhythm. No murmurs, gallops, rubs noted. Respiratory:  Lung sounds Normal.  Respirations nonlabored. The patient is not using accessory muscles with breathing. Spo2 97% room air. Abdomen:  Soft, nontender, nondistended, normoactive bowel sounds. No masses noted. Musculoskeletal: No muscle tenderness. No joint tenderness. PV:  Peripheral pulses present. She  does have trace edema to BLE. Patient does have multiple wounds on BLE open to air. Psychological: Mood stable. Affect agitated. Speech normal rate and tone. ASSESSMENT AND PLAN:      Diagnosis Orders   1. Sepsis, due to unspecified organism, unspecified whether acute organ dysfunction present ([facility] [facility]        2. Wound infection        3. Acute kidney injury superimposed on CKD ([facility]        4. Encephalopathy        5. Dementia with other behavioral disturbance, unspecified dementia severity, unspecified dementia type        6. Chronic obstructive pulmonary disease, unspecified COPD type ([facility]        7. Type 2 diabetes mellitus without complication, without long-term current use of insulin ([facility]        8. Heart failure, unspecified HF chronicity, unspecified heart failure type ([facility]        9. Anemia of chronic disease        10. Frequent falls             Patient has remained afebrile since her admission to this facility. Her WBCs are trending downward she is now at 13.5. She is    being followed by infectious disease. 2.   Patient is tolerating her IV antibiotic therapy. She is followed by both infectious disease and wound care center. Compliant with her wound care currently. 3.   Most recent BUN 18, creatinine 1.19 within patient's range actually improved from prior. 4.   Patient remains at her baseline cognition since her admission to this facility. 5.   Patient's mood is stable. She has not had any further decrease in her cognition or increase in her behaviors. She is refusing wound care today. She is also refusing to have anything placed on top of her wounds other than the gentamicin cream and the nonadherent cover. 6.   Respiratory status stable. She has not had any hypoxic episodes, episodes of respiratory distress, or exacerbation of her symptoms. 7.   Most recent hemoglobin A1c 9.1.  8.   No signs or symptoms of fluid overload. 9.   Most recent Hgb 9.0, Hct 30.0. Within patients range. We will continue to monitor. 10. Patient has not had any falls since admission to this facility. I have reviewed this resident's medication and treatment plan as well as most recent lab work. PT/OT/ST as ordered. No further changes will be made at this time.       Return in about 1 week (around 2/27/2023), or if symptoms worsen or fail to improve. Please note this report is partially produced by using speech recognition hardware. It may contain errors related to the system, including grammar, punctuation and spelling as well as words and phrases that may seem inaccurate. For any questions or concerns feel free to contact me for clarification        Electronically Signed By: Marcos Starkey. Nathalie Medrano CNP on 3/16/23   ______________________________  Marcos Starkey.  Fawn WHALEN CNP

## 2023-03-24 NOTE — PROGRESS NOTES
using speech recognition hardware. It may contain errors related to the system, including grammar, punctuation and spelling as well as words and phrases that may seem inaccurate.   For any questions or concerns feel free to contact me for clarification

## 2023-03-29 ENCOUNTER — OFFICE VISIT (OUTPATIENT)
Dept: GERIATRIC MEDICINE | Age: 83
End: 2023-03-29

## 2023-03-29 DIAGNOSIS — T14.8XXA BLEEDING FROM WOUND: Primary | ICD-10-CM

## 2023-03-29 DIAGNOSIS — Z91.199 MEDICAL NON-COMPLIANCE: ICD-10-CM

## 2023-03-30 LAB
ANION GAP SERPL CALCULATED.3IONS-SCNC: 9 MEQ/L (ref 9–15)
BASOPHILS # BLD: 0 K/UL (ref 0–0.2)
BASOPHILS NFR BLD: 0.4 %
BUN SERPL-MCNC: 25 MG/DL (ref 8–23)
CALCIUM SERPL-MCNC: 9 MG/DL (ref 8.5–9.9)
CHLORIDE SERPL-SCNC: 106 MEQ/L (ref 95–107)
CO2 SERPL-SCNC: 24 MEQ/L (ref 20–31)
CREAT SERPL-MCNC: 1.16 MG/DL (ref 0.5–0.9)
EOSINOPHIL # BLD: 0.3 K/UL (ref 0–0.7)
EOSINOPHIL NFR BLD: 5.5 %
ERYTHROCYTE [DISTWIDTH] IN BLOOD BY AUTOMATED COUNT: 16.4 % (ref 11.5–14.5)
GLUCOSE SERPL-MCNC: 66 MG/DL (ref 70–99)
HCT VFR BLD AUTO: 30.7 % (ref 37–47)
HGB BLD-MCNC: 9.6 G/DL (ref 12–16)
LYMPHOCYTES # BLD: 1.3 K/UL (ref 1–4.8)
LYMPHOCYTES NFR BLD: 25.6 %
MCH RBC QN AUTO: 25.9 PG (ref 27–31.3)
MCHC RBC AUTO-ENTMCNC: 31.3 % (ref 33–37)
MCV RBC AUTO: 82.7 FL (ref 79.4–94.8)
MONOCYTES # BLD: 0.6 K/UL (ref 0.2–0.8)
MONOCYTES NFR BLD: 11.2 %
NEUTROPHILS # BLD: 2.9 K/UL (ref 1.4–6.5)
NEUTS SEG NFR BLD: 57.3 %
PLATELET # BLD AUTO: 239 K/UL (ref 130–400)
POTASSIUM SERPL-SCNC: 4.6 MEQ/L (ref 3.4–4.9)
RBC # BLD AUTO: 3.72 M/UL (ref 4.2–5.4)
SODIUM SERPL-SCNC: 139 MEQ/L (ref 135–144)
WBC # BLD AUTO: 5 K/UL (ref 4.8–10.8)

## 2023-03-31 ENCOUNTER — OFFICE VISIT (OUTPATIENT)
Dept: GERIATRIC MEDICINE | Age: 83
End: 2023-03-31

## 2023-03-31 DIAGNOSIS — E11.9 TYPE 2 DIABETES MELLITUS WITHOUT COMPLICATION, WITHOUT LONG-TERM CURRENT USE OF INSULIN (HCC): ICD-10-CM

## 2023-03-31 DIAGNOSIS — J44.9 CHRONIC OBSTRUCTIVE PULMONARY DISEASE, UNSPECIFIED COPD TYPE (HCC): Primary | ICD-10-CM

## 2023-03-31 DIAGNOSIS — I10 PRIMARY HYPERTENSION: ICD-10-CM

## 2023-04-03 DIAGNOSIS — R52 ACUTE PAIN: ICD-10-CM

## 2023-04-03 RX ORDER — OXYCODONE HYDROCHLORIDE AND ACETAMINOPHEN 5; 325 MG/1; MG/1
1 TABLET ORAL EVERY 4 HOURS PRN
Qty: 42 TABLET | Refills: 0 | Status: SHIPPED | OUTPATIENT
Start: 2023-04-03 | End: 2023-04-10

## 2023-04-04 ENCOUNTER — OFFICE VISIT (OUTPATIENT)
Dept: GERIATRIC MEDICINE | Age: 83
End: 2023-04-04
Payer: MEDICARE

## 2023-04-04 DIAGNOSIS — E11.9 TYPE 2 DIABETES MELLITUS WITHOUT COMPLICATION, WITHOUT LONG-TERM CURRENT USE OF INSULIN (HCC): ICD-10-CM

## 2023-04-04 DIAGNOSIS — J44.9 CHRONIC OBSTRUCTIVE PULMONARY DISEASE, UNSPECIFIED COPD TYPE (HCC): ICD-10-CM

## 2023-04-04 DIAGNOSIS — E03.9 ACQUIRED HYPOTHYROIDISM: Primary | ICD-10-CM

## 2023-04-04 LAB
ALBUMIN SERPL-MCNC: 3.7 G/DL (ref 3.5–4.6)
ALP SERPL-CCNC: 54 U/L (ref 40–130)
ALT SERPL-CCNC: 9 U/L (ref 0–33)
ANION GAP SERPL CALCULATED.3IONS-SCNC: 18 MEQ/L (ref 9–15)
AST SERPL-CCNC: 23 U/L (ref 0–35)
BASOPHILS # BLD: 0 K/UL (ref 0–0.2)
BASOPHILS NFR BLD: 0.6 %
BILIRUB SERPL-MCNC: <0.2 MG/DL (ref 0.2–0.7)
BUN SERPL-MCNC: 31 MG/DL (ref 8–23)
CALCIUM SERPL-MCNC: 9.7 MG/DL (ref 8.5–9.9)
CHLORIDE SERPL-SCNC: 107 MEQ/L (ref 95–107)
CO2 SERPL-SCNC: 17 MEQ/L (ref 20–31)
CREAT SERPL-MCNC: 1.09 MG/DL (ref 0.5–0.9)
EOSINOPHIL # BLD: 0.3 K/UL (ref 0–0.7)
EOSINOPHIL NFR BLD: 4.2 %
ERYTHROCYTE [DISTWIDTH] IN BLOOD BY AUTOMATED COUNT: 16.1 % (ref 11.5–14.5)
GLOBULIN SER CALC-MCNC: 3.1 G/DL (ref 2.3–3.5)
GLUCOSE SERPL-MCNC: 90 MG/DL (ref 70–99)
HCT VFR BLD AUTO: 34.3 % (ref 37–47)
HGB BLD-MCNC: 10.7 G/DL (ref 12–16)
LYMPHOCYTES # BLD: 1.3 K/UL (ref 1–4.8)
LYMPHOCYTES NFR BLD: 22.2 %
MCH RBC QN AUTO: 26.1 PG (ref 27–31.3)
MCHC RBC AUTO-ENTMCNC: 31.3 % (ref 33–37)
MCV RBC AUTO: 83.6 FL (ref 79.4–94.8)
MONOCYTES # BLD: 0.5 K/UL (ref 0.2–0.8)
MONOCYTES NFR BLD: 8.2 %
NEUTROPHILS # BLD: 3.9 K/UL (ref 1.4–6.5)
NEUTS SEG NFR BLD: 64.8 %
PLATELET # BLD AUTO: 236 K/UL (ref 130–400)
POTASSIUM SERPL-SCNC: 5.2 MEQ/L (ref 3.4–4.9)
PROT SERPL-MCNC: 6.8 G/DL (ref 6.3–8)
RBC # BLD AUTO: 4.1 M/UL (ref 4.2–5.4)
SODIUM SERPL-SCNC: 142 MEQ/L (ref 135–144)
TSH SERPL-MCNC: 1.03 UIU/ML (ref 0.44–3.86)
VITAMIN D 25-HYDROXY: 30.2 NG/ML
WBC # BLD AUTO: 6.1 K/UL (ref 4.8–10.8)

## 2023-04-04 PROCEDURE — 1123F ACP DISCUSS/DSCN MKR DOCD: CPT | Performed by: INTERNAL MEDICINE

## 2023-04-04 PROCEDURE — 99309 SBSQ NF CARE MODERATE MDM 30: CPT | Performed by: INTERNAL MEDICINE

## 2023-04-07 NOTE — PROGRESS NOTES
5.5    Smokeless tobacco: Never    Tobacco comments:     Has been trying to quit since January   Vaping Use    Vaping Use: Never used   Substance and Sexual Activity    Alcohol use: No     Alcohol/week: 0.0 standard drinks    Drug use: No    Sexual activity: Not Currently   Other Topics Concern    Not on file   Social History Narrative    Not on file     Social Determinants of Health     Financial Resource Strain: Low Risk     Difficulty of Paying Living Expenses: Not hard at all   Food Insecurity: No Food Insecurity    Worried About Running Out of Food in the Last Year: Never true    Ran Out of Food in the Last Year: Never true   Transportation Needs: Not on file   Physical Activity: Not on file   Stress: Not on file   Social Connections: Not on file   Intimate Partner Violence: Not on file   Housing Stability: Not on file       Allergies: Benadryl [diphenhydramine hcl], Cephalexin, Cortisone, Codeine, and Pcn [penicillins]  NF MEDICATIONS REVIEWED    ROS:   Constitutional: There are reports of behavioral issues, no change in appetite, fever, or increased weakness. No bleeding issues. Respiratory: denies SOB, dyspnea, dyspnea on exertion  Cardiovascular: denies CP, lightheadedness, palpitations, PND, orthopnea, or claudication. GI: No N/V/D. : no reports of dysuria, frequency or urgency  Extremities: No reports of pain issues, edema  Psych: at baseline cognition    Physical exam:   /60, temperature 98.3, heart rate 72, respirations 18, spo2 98%, weight 173lbs. Constitutional: Awake and alert sitting up in bed, general weakness  HEENT: Normocephalic, atraumatic,intact facial symmetry, conjunctiva pink, sclera non icteric,  buccal mucosa pink and moist  Speech clear. NECK: - no cervical or clavicular lymphadenopathy, euthyroid, no mass visualized  Cardiovascular: Regular rate, and rhythm. No murmurs, gallops or rubs noted.   Respiratory: LCTA, even unlabored respirations, no accessory muscle use

## 2023-04-13 ENCOUNTER — OFFICE VISIT (OUTPATIENT)
Dept: GERIATRIC MEDICINE | Age: 83
End: 2023-04-13

## 2023-04-13 DIAGNOSIS — I10 PRIMARY HYPERTENSION: ICD-10-CM

## 2023-04-13 DIAGNOSIS — N18.9 ACUTE KIDNEY INJURY SUPERIMPOSED ON CKD (HCC): ICD-10-CM

## 2023-04-13 DIAGNOSIS — N17.9 ACUTE KIDNEY INJURY SUPERIMPOSED ON CKD (HCC): ICD-10-CM

## 2023-04-13 DIAGNOSIS — J44.9 CHRONIC OBSTRUCTIVE PULMONARY DISEASE, UNSPECIFIED COPD TYPE (HCC): Primary | ICD-10-CM

## 2023-04-20 PROBLEM — T14.8XXA BLEEDING FROM WOUND: Status: ACTIVE | Noted: 2023-04-20

## 2023-04-20 NOTE — PROGRESS NOTES
Wounds BLE open to air, no bleeding noted. ASSESSMENT:     Diagnosis Orders   1. Bleeding from wound        2. Medical non-compliance            PLAN:  Pt/POA agrees with POC   No bleeding at the time of my visit  EMILIO MAJANO in am  Psych eval for competency    Return in about 1 week (around 4/5/2023), or if symptoms worsen or fail to improve. Please note this report is partially produced by using speech recognition hardware. It may contain errors related to the system, including grammar, punctuation and spelling as well as words and phrases that may seem inaccurate.   For any questions or concerns feel free to contact me for clarification       Georgia Glover, APRN - CNP

## 2023-04-28 NOTE — PROGRESS NOTES
SUBJECTIVE:  79-year-old woman seen follow-up visit for COPD hypertension diabetes patient had recent Change in bowel bladder habits recent acute bleeding abscess patient was given Bactrim patient had recent lightheadedness no bleeding diathesis patient oral intake has been good notable new hypoglycemic episodes mood status stable      ROS: Denies headaches fevers chills  The rest of the 14 point ROS negative    PHYSICAL EXAM: VSS per facility record  Pupils small oral mucosa moist chest no crackles or wheezing cardiovascular showed a regular rate abdomen soft tender EXTR trace refill skin no new rash    ASSESSMENT & PLAN:   Diagnosis Orders   1. Chronic obstructive pulmonary disease, unspecified COPD type ([facility]        2. Primary hypertension        3. Type 2 diabetes mellitus without complication, without long-term current use of insulin (HCC)            Follow-up A1c is pending BMP is pending.   Continue nutritional support continue after treatments goal medically functional stable          Past Medical History:   Diagnosis Date    ROCHELLE (acute kidney injury) ([facility] 7/25/2021    COPD exacerbation ([facility] 2/24/2022    Gastroesophageal reflux disease 6/10/2002    Hypothyroidism     Obesity, Class III, BMI 40-49.9 (morbid obesity) ([facility] 1/4/2016    Primary hypertension 2/24/2022    Tobacco abuse          Past Surgical History:   Procedure Laterality Date    APPENDECTOMY      CHOLECYSTECTOMY           Current Outpatient Medications on File Prior to Visit   Medication Sig Dispense Refill    metroNIDAZOLE (FLAGYL) 500 MG tablet Take 500 mg by mouth 3 times daily Indications: Infection      oxyCODONE HCl (OXY-IR) 10 MG immediate release tablet Take 10 mg by mouth every 8 hours as needed for Pain.      sodium chloride 0.9 % SOLN 250 mL with vancomycin 750 MG SOLR 750 mg Infuse 750 mg intravenously every 24 hours Indications: Infection with Dysregulated Inflammatory Response      lidocaine (XYLOCAINE) 5 % ointment Apply Left arm;

## 2023-04-28 NOTE — PROGRESS NOTES
[facility]  2023    Leanne Sanders  is a 79 y.o. in the  being seen for a f/u of   Chief Complaint   Patient presents with    1 Month Follow-Up       HPI lives in [address] [place] [address]  Being seen monthly for chronic illnesses.      Past Medical History:   Diagnosis Date    ROCHELLE (acute kidney injury) ([facility] 2021    COP[facility]ation ([facility] 2022    Gastroesophageal reflux disease 6/10/2002    Hypothyroidism     Obesity, Class III, BMI 40-49.9 (morbid obesity) ([facility] 2016    Primary hypertension 2022    Tobacco abuse      Past Surgical History:   Procedure Laterality Date    APPENDECTOMY      CHOLECYSTECTOMY       Family History   Problem Relation Age of Onset    Kidney Disease Mother     High Blood Pressure Mother     Stroke Mother     Heart Disease Father      Social History     Socioeconomic History    Marital status:      Spouse name: Not on file    Number of children: Not on file    Years of education: Not on file    Highest education level: Not on file   Occupational History    Not on file   Tobacco Use    Smoking status: Former     Packs/day: 5.00     Years: 30.00     Pack years: 150.00     Types: Cigarettes     Quit date: 10/1/2017     Years since quittin.5    Smokeless tobacco: Never    Tobacco comments:     Has been trying to quit since January   Vaping Use    Vaping Use: Never used   Substance and Sexual Activity    Alcohol use: No     Alcohol/week: 0.0 standard drinks    Drug use: No    Sexual activity: Not Currently   Other Topics Concern    Not on file   Social History Narrative    Not on file     Social Determinants of Health     Financial Resource Strain: Low Risk     Difficulty of Paying Living Expenses: Not hard at all   Food Insecurity: No Food Insecurity    Worried About Running Out of Food in the Last Year: Never true    Ran Out of Food in the Last Year: Never true   Transportation Needs: Not on file   Physical Activity: Not on file   Stress: Not on file   Social

## 2023-05-01 DIAGNOSIS — R52 ACUTE PAIN: ICD-10-CM

## 2023-05-02 RX ORDER — OXYCODONE HYDROCHLORIDE AND ACETAMINOPHEN 5; 325 MG/1; MG/1
1 TABLET ORAL EVERY 4 HOURS PRN
Qty: 42 TABLET | Refills: 0 | Status: SHIPPED | OUTPATIENT
Start: 2023-05-02 | End: 2023-05-09

## 2023-05-02 NOTE — PROGRESS NOTES
SUBJECTIVE:  79-year-old woman seen for follow-up visit patient has been at baseline no acute functional decline in cognition patient had recent nausea vomiting patient coughing really no recent change in bowel bladder habits no bleeding diathesis oral intake is been good no recent emesis fevers or chills no recent lightheadedness no acute pain crisis. ROS: Denies chest palpitations nausea vomiting  The rest of the 14 point ROS negative    PHYSICAL EXAM: VSS per facility record  Reactive oral mucosa moist  Crackles wheezing cardiovascular showed a regular rate abdomen soft nontender extremity trace edema skin no new rash    ASSESSMENT & PLAN:   Diagnosis Orders   1. Acquired hypothyroidism        2. Type 2 diabetes mellitus without complication, without long-term current use of insulin ([facility]        3. Chronic obstructive pulmonary disease, unspecified COPD type ([facility]            Continue her treatments follow-up A1c is pending. BMP is pending. Follow-up TSH in 6 months.   Can to monitor stressor status          Past Medical History:   Diagnosis Date    ROCHELLE (acute kidney injury) ([facility] 7/25/2021    COPD exacerbation ([facility] 2/24/2022    Gastroesophageal reflux disease 6/10/2002    Hypothyroidism     Obesity, Class III, BMI 40-49.9 (morbid obesity) ([facility] 1/4/2016    Primary hypertension 2/24/2022    Tobacco abuse          Past Surgical History:   Procedure Laterality Date    APPENDECTOMY      CHOLECYSTECTOMY           Current Outpatient Medications on File Prior to Visit   Medication Sig Dispense Refill    metroNIDAZOLE (FLAGYL) 500 MG tablet Take 500 mg by mouth 3 times daily Indications: Infection      oxyCODONE HCl (OXY-IR) 10 MG immediate release tablet Take 10 mg by mouth every 8 hours as needed for Pain.      sodium chloride 0.9 % SOLN 250 mL with vancomycin 750 MG SOLR 750 mg Infuse 750 mg intravenously every 24 hours Indications: Infection with Dysregulated Inflammatory Response      lidocaine (XYLOCAINE) 5

## 2023-05-15 DIAGNOSIS — R52 ACUTE PAIN: ICD-10-CM

## 2023-05-15 RX ORDER — OXYCODONE HYDROCHLORIDE AND ACETAMINOPHEN 5; 325 MG/1; MG/1
1 TABLET ORAL EVERY 4 HOURS PRN
Qty: 42 TABLET | Refills: 0 | Status: SHIPPED | OUTPATIENT
Start: 2023-05-15 | End: 2023-05-22

## 2023-05-16 ENCOUNTER — OFFICE VISIT (OUTPATIENT)
Dept: GERIATRIC MEDICINE | Age: 83
End: 2023-05-16
Payer: MEDICARE

## 2023-05-16 DIAGNOSIS — S81.809A MULTIPLE OPENS WOUND OF LOWER EXTREMITY, UNSPECIFIED LATERALITY, INITIAL ENCOUNTER: Primary | ICD-10-CM

## 2023-05-16 PROCEDURE — 99309 SBSQ NF CARE MODERATE MDM 30: CPT | Performed by: NURSE PRACTITIONER

## 2023-05-16 PROCEDURE — 1123F ACP DISCUSS/DSCN MKR DOCD: CPT | Performed by: NURSE PRACTITIONER

## 2023-05-19 LAB
ANION GAP SERPL CALCULATED.3IONS-SCNC: 10 MEQ/L (ref 9–15)
BASOPHILS # BLD: 0 K/UL (ref 0–0.2)
BASOPHILS NFR BLD: 0.5 %
BUN SERPL-MCNC: 32 MG/DL (ref 8–23)
CALCIUM SERPL-MCNC: 8.9 MG/DL (ref 8.5–9.9)
CHLORIDE SERPL-SCNC: 108 MEQ/L (ref 95–107)
CO2 SERPL-SCNC: 21 MEQ/L (ref 20–31)
CREAT SERPL-MCNC: 1.24 MG/DL (ref 0.5–0.9)
EOSINOPHIL # BLD: 0.2 K/UL (ref 0–0.7)
EOSINOPHIL NFR BLD: 3.5 %
ERYTHROCYTE [DISTWIDTH] IN BLOOD BY AUTOMATED COUNT: 13.8 % (ref 11.5–14.5)
GLUCOSE SERPL-MCNC: 87 MG/DL (ref 70–99)
HCT VFR BLD AUTO: 35.1 % (ref 37–47)
HGB BLD-MCNC: 11.2 G/DL (ref 12–16)
LYMPHOCYTES # BLD: 1.7 K/UL (ref 1–4.8)
LYMPHOCYTES NFR BLD: 29.7 %
MCH RBC QN AUTO: 25.8 PG (ref 27–31.3)
MCHC RBC AUTO-ENTMCNC: 32 % (ref 33–37)
MCV RBC AUTO: 80.6 FL (ref 79.4–94.8)
MONOCYTES # BLD: 0.4 K/UL (ref 0.2–0.8)
MONOCYTES NFR BLD: 7.4 %
NEUTROPHILS # BLD: 3.4 K/UL (ref 1.4–6.5)
NEUTS SEG NFR BLD: 58.9 %
PLATELET # BLD AUTO: 193 K/UL (ref 130–400)
POTASSIUM SERPL-SCNC: 5 MEQ/L (ref 3.4–4.9)
RBC # BLD AUTO: 4.35 M/UL (ref 4.2–5.4)
SODIUM SERPL-SCNC: 139 MEQ/L (ref 135–144)
WBC # BLD AUTO: 5.8 K/UL (ref 4.8–10.8)

## 2023-05-23 ENCOUNTER — OFFICE VISIT (OUTPATIENT)
Dept: GERIATRIC MEDICINE | Age: 83
End: 2023-05-23

## 2023-05-23 DIAGNOSIS — S81.809D MULTIPLE OPENS WOUND OF LOWER EXTREMITY, UNSPECIFIED LATERALITY, SUBSEQUENT ENCOUNTER: Primary | ICD-10-CM

## 2023-05-25 LAB
BACTERIA SPEC ANAEROBE+AEROBE CULT: ABNORMAL
BACTERIA SPEC ANAEROBE+AEROBE CULT: ABNORMAL
ORGANISM: ABNORMAL

## 2023-06-05 ENCOUNTER — OFFICE VISIT (OUTPATIENT)
Dept: GERIATRIC MEDICINE | Age: 83
End: 2023-06-05
Payer: MEDICARE

## 2023-06-05 DIAGNOSIS — E11.9 TYPE 2 DIABETES MELLITUS WITHOUT COMPLICATION, WITHOUT LONG-TERM CURRENT USE OF INSULIN (HCC): Primary | ICD-10-CM

## 2023-06-05 DIAGNOSIS — L97.202 VENOUS STASIS ULCER OF CALF WITH FAT LAYER EXPOSED, UNSPECIFIED LATERALITY, UNSPECIFIED WHETHER VARICOSE VEINS PRESENT (HCC): ICD-10-CM

## 2023-06-05 DIAGNOSIS — I83.002 VENOUS STASIS ULCER OF CALF WITH FAT LAYER EXPOSED, UNSPECIFIED LATERALITY, UNSPECIFIED WHETHER VARICOSE VEINS PRESENT (HCC): ICD-10-CM

## 2023-06-05 DIAGNOSIS — J44.9 CHRONIC OBSTRUCTIVE PULMONARY DISEASE, UNSPECIFIED COPD TYPE (HCC): ICD-10-CM

## 2023-06-05 DIAGNOSIS — F02.818 ALZHEIMER'S DEMENTIA WITH OTHER BEHAVIORAL DISTURBANCE, UNSPECIFIED DEMENTIA SEVERITY, UNSPECIFIED TIMING OF DEMENTIA ONSET (HCC): ICD-10-CM

## 2023-06-05 DIAGNOSIS — G30.9 ALZHEIMER'S DEMENTIA WITH OTHER BEHAVIORAL DISTURBANCE, UNSPECIFIED DEMENTIA SEVERITY, UNSPECIFIED TIMING OF DEMENTIA ONSET (HCC): ICD-10-CM

## 2023-06-05 PROCEDURE — 1123F ACP DISCUSS/DSCN MKR DOCD: CPT | Performed by: INTERNAL MEDICINE

## 2023-06-05 PROCEDURE — 99309 SBSQ NF CARE MODERATE MDM 30: CPT | Performed by: INTERNAL MEDICINE

## 2023-06-07 NOTE — PROGRESS NOTES
file     Social Determinants of Health     Financial Resource Strain: Low Risk     Difficulty of Paying Living Expenses: Not hard at all   Food Insecurity: No Food Insecurity    Worried About Running Out of Food in the Last Year: Never true    Ran Out of Food in the Last Year: Never true   Transportation Needs: Not on file   Physical Activity: Not on file   Stress: Not on file   Social Connections: Not on file   Intimate Partner Violence: Not on file   Housing Stability: Not on file       Allergies: Benadryl [diphenhydramine hcl], Cephalexin, Cortisone, Codeine, and Pcn [penicillins]  NF MEDICATIONS REVIEWED    ROS:   Constitutional: There are no reports of behavioral issues, change in appetite, fever, or increased weakness. No bleeding issues. Respiratory: denies SOB, dyspnea, dyspnea on exertion  Cardiovascular: denies CP, lightheadedness, palpitations, PND, orthopnea, or claudication. GI: No N/V/D. : no reports of dysuria, frequency or urgency  Extremities: No reports of pain issues, edema  Psych: at baseline cognition    Physical exam:   Blood pressure 148/78, temp 98.0, heart rate 69, respirations 19, pulse ox 98% on room air, weight 181.6 pounds. Constitutional: Awake and alert sitting up in bed, general weakness  HEENT: Normocephalic, hard of hearing,intact facial symmetry, conjunctiva pink, sclera non icteric,  buccal mucosa pink and moist  Speech clear. NECK: - no cervical or clavicular lymphadenopathy, euthyroid, no mass visualized  Cardiovascular: Regular rate, and rhythm. No murmurs, gallops or rubs noted. Respiratory: LCTA, even unlabored respirations, no accessory muscle use noted  GI: abdomen NT, +BS x 4 Q, ND  : no suprapubic tenderness  Extremities: no ankle edema, PPP. She is noted to have two superficial open areas on the outer lateral aspect of RLE and posterior aspect of LLE  SKELETAL: no redness, or swelling over joints.  Limited ROM but spontaneous movement x 4   Psych: pleasant and

## 2023-06-08 ENCOUNTER — OFFICE VISIT (OUTPATIENT)
Dept: GERIATRIC MEDICINE | Age: 83
End: 2023-06-08
Payer: MEDICARE

## 2023-06-08 DIAGNOSIS — F32.9 MAJOR DEPRESSIVE DISORDER, REMISSION STATUS UNSPECIFIED, UNSPECIFIED WHETHER RECURRENT: Primary | ICD-10-CM

## 2023-06-08 PROCEDURE — 1123F ACP DISCUSS/DSCN MKR DOCD: CPT | Performed by: NURSE PRACTITIONER

## 2023-06-08 PROCEDURE — 99308 SBSQ NF CARE LOW MDM 20: CPT | Performed by: NURSE PRACTITIONER

## 2023-06-10 NOTE — PROGRESS NOTES
contain errors related to the system, including grammar, punctuation and spelling as well as words and phrases that may seem inaccurate.   For any questions or concerns feel free to contact me for clarification

## 2023-06-14 ENCOUNTER — OFFICE VISIT (OUTPATIENT)
Dept: GERIATRIC MEDICINE | Age: 83
End: 2023-06-14

## 2023-06-14 DIAGNOSIS — E11.9 TYPE 2 DIABETES MELLITUS WITHOUT COMPLICATION, WITHOUT LONG-TERM CURRENT USE OF INSULIN (HCC): ICD-10-CM

## 2023-06-14 DIAGNOSIS — I50.9 HEART FAILURE, UNSPECIFIED HF CHRONICITY, UNSPECIFIED HEART FAILURE TYPE (HCC): Primary | ICD-10-CM

## 2023-06-14 DIAGNOSIS — F03.918 DEMENTIA WITH OTHER BEHAVIORAL DISTURBANCE, UNSPECIFIED DEMENTIA SEVERITY, UNSPECIFIED DEMENTIA TYPE (HCC): ICD-10-CM

## 2023-06-15 ENCOUNTER — OFFICE VISIT (OUTPATIENT)
Dept: GERIATRIC MEDICINE | Age: 83
End: 2023-06-15

## 2023-06-15 DIAGNOSIS — J44.9 CHRONIC OBSTRUCTIVE PULMONARY DISEASE, UNSPECIFIED COPD TYPE (HCC): Primary | ICD-10-CM

## 2023-06-15 DIAGNOSIS — N18.30 STAGE 3 CHRONIC KIDNEY DISEASE, UNSPECIFIED WHETHER STAGE 3A OR 3B CKD (HCC): ICD-10-CM

## 2023-06-21 NOTE — PROGRESS NOTES
[facility]  2023    Lyla Lundborg  is a 79 y.o. in the  being seen for a f/u of   Chief Complaint   Patient presents with    Depression       HPI   Being seen today for depression.      Past Medical History:   Diagnosis Date    ROCHELLE (acute kidney injury) ([facility] 2021    COPD exacerbation ([facility] 2022    Gastroesophageal reflux disease 6/10/2002    Hypothyroidism     Obesity, Class III, BMI 40-49.9 (morbid obesity) [facility] 2016    Primary hypertension 2022    Tobacco abuse      Past Surgical History:   Procedure Laterality Date    APPENDECTOMY      CHOLECYSTECTOMY       Family History   Problem Relation Age of Onset    Kidney Disease Mother     High Blood Pressure Mother     Stroke Mother     Heart Disease Father      Social History     Socioeconomic History    Marital status:      Spouse name: Not on file    Number of children: Not on file    Years of education: Not on file    Highest education level: Not on file   Occupational History    Not on file   Tobacco Use    Smoking status: Former     Packs/day: 5.00     Years: 30.00     Pack years: 150.00     Types: Cigarettes     Quit date: 10/1/2017     Years since quittin.7    Smokeless tobacco: Never    Tobacco comments:     Has been trying to quit since January   Vaping Use    Vaping Use: Never used   Substance and Sexual Activity    Alcohol use: No     Alcohol/week: 0.0 standard drinks    Drug use: No    Sexual activity: Not Currently   Other Topics Concern    Not on file   Social History Narrative    Not on file     Social Determinants of Health     Financial Resource Strain: Low Risk     Difficulty of Paying Living Expenses: Not hard at all   Food Insecurity: No Food Insecurity    Worried About Running Out of Food in the Last Year: Never true    Ran Out of Food in the Last Year: Never true   Transportation Needs: Not on file   Physical Activity: Not on file   Stress: Not on file   Social Connections: Not on file

## 2023-06-28 ENCOUNTER — OFFICE VISIT (OUTPATIENT)
Dept: GERIATRIC MEDICINE | Age: 83
End: 2023-06-28

## 2023-06-28 DIAGNOSIS — L97.222 VENOUS STASIS ULCER OF LEFT CALF WITH FAT LAYER EXPOSED WITHOUT VARICOSE VEINS (HCC): Primary | Chronic | ICD-10-CM

## 2023-06-28 DIAGNOSIS — I87.2 VENOUS STASIS ULCER OF LEFT CALF WITH FAT LAYER EXPOSED WITHOUT VARICOSE VEINS (HCC): Primary | Chronic | ICD-10-CM

## 2023-06-28 DIAGNOSIS — L97.212 VENOUS STASIS ULCER OF RIGHT CALF WITH FAT LAYER EXPOSED, UNSPECIFIED WHETHER VARICOSE VEINS PRESENT (HCC): Chronic | ICD-10-CM

## 2023-06-28 DIAGNOSIS — I83.012 VENOUS STASIS ULCER OF RIGHT CALF WITH FAT LAYER EXPOSED, UNSPECIFIED WHETHER VARICOSE VEINS PRESENT (HCC): Chronic | ICD-10-CM

## 2023-06-30 LAB
ANION GAP SERPL CALCULATED.3IONS-SCNC: 13 MEQ/L (ref 9–15)
BASOPHILS # BLD: 0 K/UL (ref 0–0.2)
BASOPHILS NFR BLD: 0.3 %
BUN SERPL-MCNC: 26 MG/DL (ref 8–23)
CALCIUM SERPL-MCNC: 9.1 MG/DL (ref 8.5–9.9)
CHLORIDE SERPL-SCNC: 108 MEQ/L (ref 95–107)
CO2 SERPL-SCNC: 20 MEQ/L (ref 20–31)
CREAT SERPL-MCNC: 1.23 MG/DL (ref 0.5–0.9)
CRP SERPL HS-MCNC: <3 MG/L (ref 0–5)
EOSINOPHIL # BLD: 0.3 K/UL (ref 0–0.7)
EOSINOPHIL NFR BLD: 4.5 %
ERYTHROCYTE [DISTWIDTH] IN BLOOD BY AUTOMATED COUNT: 17.2 % (ref 11.5–14.5)
GLUCOSE SERPL-MCNC: 62 MG/DL (ref 70–99)
HCT VFR BLD AUTO: 36.1 % (ref 37–47)
HGB BLD-MCNC: 11.3 G/DL (ref 12–16)
LYMPHOCYTES # BLD: 1.6 K/UL (ref 1–4.8)
LYMPHOCYTES NFR BLD: 26 %
MCH RBC QN AUTO: 24.9 PG (ref 27–31.3)
MCHC RBC AUTO-ENTMCNC: 31.2 % (ref 33–37)
MCV RBC AUTO: 79.9 FL (ref 79.4–94.8)
MONOCYTES # BLD: 0.5 K/UL (ref 0.2–0.8)
MONOCYTES NFR BLD: 8.1 %
NEUTROPHILS # BLD: 3.7 K/UL (ref 1.4–6.5)
NEUTS SEG NFR BLD: 61.1 %
PLATELET # BLD AUTO: 208 K/UL (ref 130–400)
POTASSIUM SERPL-SCNC: 5.3 MEQ/L (ref 3.4–4.9)
RBC # BLD AUTO: 4.52 M/UL (ref 4.2–5.4)
SODIUM SERPL-SCNC: 141 MEQ/L (ref 135–144)
WBC # BLD AUTO: 6 K/UL (ref 4.8–10.8)

## 2023-07-04 NOTE — PROGRESS NOTES
SUBJECTIVE:  This 79-year-old woman seen follow-up visit for diabetes COPD dementia. Patient is globally weak without evidence of nausea vomiting no recent change in her bowel bladder habits patient is pleasant interactive but confused at times pain-free      ROS: Pupils are reactive oral mucosa is moist  The rest of the 14 point ROS negative    PHYSICAL EXAM: VSS per facility record  Alert oriented x2 little unclear date normocephalic oral mucosa moist chest with no crackles or wheezing cardiovascular showed a regular rate abdomen soft nontender extremity positive pedal pulse    ASSESSMENT & PLAN:   Diagnosis Orders   1. Type 2 diabetes mellitus without complication, without long-term current use of insulin (HCC)        2. Venous stasis ulcer of calf with fat layer exposed, unspecified laterality, unspecified whether varicose veins present ([address])        3. Chronic obstructive pulmonary disease, unspecified COPD type ([address])        4. Alzheimer's dementia with other behavioral disturbance, unspecified dementia severity, unspecified timing of dementia onset ([address])          Continue respiratory treatments at this time. Functionally stable monitoring  Skin at this time. Has been on Synthroid has been on Norvasc function stable this time. No orthostasis.           Past Medical History:   Diagnosis Date    ROCHELLE (acute kidney injury) ([address]) 7/25/2021    COPD exacerbation ([address]) 2/24/2022    Gastroesophageal reflux disease 6/10/2002    Hypothyroidism     Obesity, Class III, BMI 40-49.9 (morbid obesity) ([address]) 1/4/2016    Primary hypertension 2/24/2022    Tobacco abuse          Past Surgical History:   Procedure Laterality Date    APPENDECTOMY      CHOLECYSTECTOMY           Current Outpatient Medications on File Prior to Visit   Medication Sig Dispense Refill    metroNIDAZOLE (FLAGYL) 500 MG tablet Take 500 mg by mouth 3 times daily Indications: Infection      oxyCODONE HCl (OXY-IR) 10 MG immediate release tablet Take 10 mg by   • Cancer Maternal Grandfather         colon        Social History     Socioeconomic History   • Marital status: /Civil Union     Spouse name: Not on file   • Number of children: Not on file   • Years of education: Not on file   • Highest education level: Not on file   Occupational History   • Not on file   Tobacco Use   • Smoking status: Current Every Day Smoker     Packs/day: 0.50     Types: Cigarettes   • Smokeless tobacco: Never Used   Substance and Sexual Activity   • Alcohol use: Yes     Alcohol/week: 28.0 standard drinks     Types: 28 Standard drinks or equivalent per week   • Drug use: No   • Sexual activity: Not on file   Other Topics Concern   • Not on file   Social History Narrative     at [employer]     

## 2023-07-08 NOTE — PROGRESS NOTES
[facility]  2023    Urvashi Cortes  is a 79 y.o. in the  being seen for a acute visit for   Chief Complaint   Patient presents with    Wound Check       HPI Patient being seen today for wound check. They are eating and drinking at their baseline per nursing. They have had no recent falls with injuries. They are not complaining of any un addressed pain issues. Have had no recent choking or dysphagia issues. NO agitation or unusual mental behavioral issues.      Past Medical History:   Diagnosis Date    ROCHELLE (acute kidney injury) ([facility]) 2021    COPD exacerbation ([facility]) 2022    Gastroesophageal reflux disease 6/10/2002    Hypothyroidism     Obesity, Class III, BMI 40-49.9 (morbid obesity) ([address] [place] [address]) 2016    Primary hypertension 2022    Tobacco abuse      Past Surgical History:   Procedure Laterality Date    APPENDECTOMY      CHOLECYSTECTOMY       Family History   Problem Relation Age of Onset    Kidney Disease Mother     High Blood Pressure Mother     Stroke Mother     Heart Disease Father      Social History     Socioeconomic History    Marital status:      Spouse name: Not on file    Number of children: Not on file    Years of education: Not on file    Highest education level: Not on file   Occupational History    Not on file   Tobacco Use    Smoking status: Former     Packs/day: 5.00     Years: 30.00     Pack years: 150.00     Types: Cigarettes     Quit date: 10/1/2017     Years since quittin.7    Smokeless tobacco: Never    Tobacco comments:     Has been trying to quit since January   Vaping Use    Vaping Use: Never used   Substance and Sexual Activity    Alcohol use: No     Alcohol/week: 0.0 standard drinks    Drug use: No    Sexual activity: Not Currently   Other Topics Concern    Not on file   Social History Narrative    Not on file     Social Determinants of Health     Financial Resource Strain: Low Risk     Difficulty of Paying Living Expenses: Not hard at all

## 2023-07-17 DIAGNOSIS — R52 ACUTE PAIN: ICD-10-CM

## 2023-07-17 RX ORDER — OXYCODONE HYDROCHLORIDE AND ACETAMINOPHEN 5; 325 MG/1; MG/1
1 TABLET ORAL EVERY 4 HOURS PRN
Qty: 42 TABLET | Refills: 0 | Status: SHIPPED | OUTPATIENT
Start: 2023-07-17 | End: 2023-07-24

## 2023-07-19 ENCOUNTER — OFFICE VISIT (OUTPATIENT)
Dept: GERIATRIC MEDICINE | Age: 83
End: 2023-07-19
Payer: MEDICARE

## 2023-07-19 VITALS
BODY MASS INDEX: 32.17 KG/M2 | DIASTOLIC BLOOD PRESSURE: 75 MMHG | HEART RATE: 79 BPM | OXYGEN SATURATION: 95 % | RESPIRATION RATE: 18 BRPM | TEMPERATURE: 97.9 F | SYSTOLIC BLOOD PRESSURE: 134 MMHG | WEIGHT: 181.6 LBS

## 2023-07-19 DIAGNOSIS — Z00.00 MEDICARE ANNUAL WELLNESS VISIT, SUBSEQUENT: Primary | ICD-10-CM

## 2023-07-19 PROCEDURE — 3078F DIAST BP <80 MM HG: CPT | Performed by: INTERNAL MEDICINE

## 2023-07-19 PROCEDURE — 1123F ACP DISCUSS/DSCN MKR DOCD: CPT | Performed by: INTERNAL MEDICINE

## 2023-07-19 PROCEDURE — G0439 PPPS, SUBSEQ VISIT: HCPCS | Performed by: INTERNAL MEDICINE

## 2023-07-19 PROCEDURE — 3075F SYST BP GE 130 - 139MM HG: CPT | Performed by: INTERNAL MEDICINE

## 2023-07-19 RX ORDER — ACETAMINOPHEN 325 MG/1
650 TABLET ORAL EVERY 4 HOURS PRN
COMMUNITY

## 2023-07-19 RX ORDER — BISACODYL 10 MG
10 SUPPOSITORY, RECTAL RECTAL DAILY PRN
COMMUNITY

## 2023-07-19 RX ORDER — ERGOCALCIFEROL 1.25 MG/1
50000 CAPSULE ORAL WEEKLY
COMMUNITY

## 2023-07-19 SDOH — ECONOMIC STABILITY: INCOME INSECURITY: HOW HARD IS IT FOR YOU TO PAY FOR THE VERY BASICS LIKE FOOD, HOUSING, MEDICAL CARE, AND HEATING?: NOT VERY HARD

## 2023-07-19 SDOH — ECONOMIC STABILITY: FOOD INSECURITY: WITHIN THE PAST 12 MONTHS, YOU WORRIED THAT YOUR FOOD WOULD RUN OUT BEFORE YOU GOT MONEY TO BUY MORE.: NEVER TRUE

## 2023-07-19 SDOH — ECONOMIC STABILITY: FOOD INSECURITY: WITHIN THE PAST 12 MONTHS, THE FOOD YOU BOUGHT JUST DIDN'T LAST AND YOU DIDN'T HAVE MONEY TO GET MORE.: NEVER TRUE

## 2023-07-19 ASSESSMENT — PATIENT HEALTH QUESTIONNAIRE - PHQ9
SUM OF ALL RESPONSES TO PHQ QUESTIONS 1-9: 0
SUM OF ALL RESPONSES TO PHQ QUESTIONS 1-9: 0
5. POOR APPETITE OR OVEREATING: 0
2. FEELING DOWN, DEPRESSED OR HOPELESS: 1
SUM OF ALL RESPONSES TO PHQ QUESTIONS 1-9: 2
4. FEELING TIRED OR HAVING LITTLE ENERGY: 1
6. FEELING BAD ABOUT YOURSELF - OR THAT YOU ARE A FAILURE OR HAVE LET YOURSELF OR YOUR FAMILY DOWN: 0
SUM OF ALL RESPONSES TO PHQ9 QUESTIONS 1 & 2: 0
7. TROUBLE CONCENTRATING ON THINGS, SUCH AS READING THE NEWSPAPER OR WATCHING TELEVISION: 0
SUM OF ALL RESPONSES TO PHQ QUESTIONS 1-9: 2
SUM OF ALL RESPONSES TO PHQ QUESTIONS 1-9: 2
1. LITTLE INTEREST OR PLEASURE IN DOING THINGS: 0
SUM OF ALL RESPONSES TO PHQ9 QUESTIONS 1 & 2: 1
SUM OF ALL RESPONSES TO PHQ QUESTIONS 1-9: 2
2. FEELING DOWN, DEPRESSED OR HOPELESS: 0
3. TROUBLE FALLING OR STAYING ASLEEP: 0
SUM OF ALL RESPONSES TO PHQ QUESTIONS 1-9: 0
1. LITTLE INTEREST OR PLEASURE IN DOING THINGS: 0
8. MOVING OR SPEAKING SO SLOWLY THAT OTHER PEOPLE COULD HAVE NOTICED. OR THE OPPOSITE, BEING SO FIGETY OR RESTLESS THAT YOU HAVE BEEN MOVING AROUND A LOT MORE THAN USUAL: 0
9. THOUGHTS THAT YOU WOULD BE BETTER OFF DEAD, OR OF HURTING YOURSELF: 0
SUM OF ALL RESPONSES TO PHQ QUESTIONS 1-9: 0

## 2023-07-19 ASSESSMENT — LIFESTYLE VARIABLES
HOW OFTEN DO YOU HAVE A DRINK CONTAINING ALCOHOL: NEVER
HOW MANY STANDARD DRINKS CONTAINING ALCOHOL DO YOU HAVE ON A TYPICAL DAY: PATIENT DOES NOT DRINK

## 2023-08-03 ENCOUNTER — OFFICE VISIT (OUTPATIENT)
Dept: GERIATRIC MEDICINE | Age: 83
End: 2023-08-03

## 2023-08-03 DIAGNOSIS — N18.9 ACUTE KIDNEY INJURY SUPERIMPOSED ON CKD (HCC): ICD-10-CM

## 2023-08-03 DIAGNOSIS — I50.9 HEART FAILURE, UNSPECIFIED HF CHRONICITY, UNSPECIFIED HEART FAILURE TYPE (HCC): Primary | ICD-10-CM

## 2023-08-03 DIAGNOSIS — N17.9 ACUTE KIDNEY INJURY SUPERIMPOSED ON CKD (HCC): ICD-10-CM

## 2023-08-03 DIAGNOSIS — J44.9 CHRONIC OBSTRUCTIVE PULMONARY DISEASE, UNSPECIFIED COPD TYPE (HCC): ICD-10-CM

## 2023-08-08 DIAGNOSIS — G89.4 CHRONIC PAIN SYNDROME: Primary | ICD-10-CM

## 2023-08-08 RX ORDER — OXYCODONE HYDROCHLORIDE AND ACETAMINOPHEN 5; 325 MG/1; MG/1
1 TABLET ORAL EVERY 4 HOURS PRN
Qty: 84 TABLET | Refills: 0 | Status: SHIPPED | OUTPATIENT
Start: 2023-08-08 | End: 2023-08-22

## 2023-08-12 ENCOUNTER — OFFICE VISIT (OUTPATIENT)
Dept: GERIATRIC MEDICINE | Age: 83
End: 2023-08-12

## 2023-08-12 DIAGNOSIS — G30.9 ALZHEIMER'S DEMENTIA WITH OTHER BEHAVIORAL DISTURBANCE, UNSPECIFIED DEMENTIA SEVERITY, UNSPECIFIED TIMING OF DEMENTIA ONSET (HCC): ICD-10-CM

## 2023-08-12 DIAGNOSIS — F02.818 ALZHEIMER'S DEMENTIA WITH OTHER BEHAVIORAL DISTURBANCE, UNSPECIFIED DEMENTIA SEVERITY, UNSPECIFIED TIMING OF DEMENTIA ONSET (HCC): ICD-10-CM

## 2023-08-12 DIAGNOSIS — J44.9 CHRONIC OBSTRUCTIVE PULMONARY DISEASE, UNSPECIFIED COPD TYPE (HCC): ICD-10-CM

## 2023-08-12 DIAGNOSIS — E03.9 ACQUIRED HYPOTHYROIDISM: Primary | ICD-10-CM

## 2023-09-08 NOTE — PROGRESS NOTES
History    Marital status:      Spouse name: Not on file    Number of children: Not on file    Years of education: Not on file    Highest education level: Not on file   Occupational History    Not on file   Tobacco Use    Smoking status: Former     Packs/day: 5.00     Years: 30.00     Pack years: 150.00     Types: Cigarettes     Quit date: 10/1/2017     Years since quittin.9    Smokeless tobacco: Never    Tobacco comments:     Has been trying to quit since January   Vaping Use    Vaping Use: Never used   Substance and Sexual Activity    Alcohol use: No     Alcohol/week: 0.0 standard drinks    Drug use: No    Sexual activity: Not Currently   Other Topics Concern    Not on file   Social History Narrative    Not on file     Social Determinants of Health     Financial Resource Strain: Low Risk     Difficulty of Paying Living Expenses: Not very hard   Food Insecurity: No Food Insecurity    Worried About Running Out of Food in the Last Year: Never true    [address] in the Last Year: Never true   Transportation Needs: Unknown    Lack of Transportation (Medical): Not on file    Lack of Transportation (Non-Medical):  No   Physical Activity: Insufficiently Active    Days of Exercise per Week: 3 days    Minutes of Exercise per Session: 10 min   Stress: Not on file   Social Connections: Not on file   Intimate Partner Violence: Not on file   Housing Stability: Unknown    Unable to Pay for Housing in the Last Year: Not on file    Number of Places Lived in the Last Year: Not on file    Unstable Housing in the Last Year: No         Lab Results   Component Value Date    LABA1C 9.1 2022     No results found for: EAG    Lab Results   Component Value Date/Time     2023 10:10 AM    K 5.3 2023 10:10 AM    K 3.3 2021 05:23 AM     2023 10:10 AM    CO2 20 2023 10:10 AM    BUN 26 2023 10:10 AM    CREATININE 1.23 2023 10:10 AM    GLUCOSE 62 2023 10:10 AM

## 2023-09-14 ENCOUNTER — OFFICE VISIT (OUTPATIENT)
Dept: GERIATRIC MEDICINE | Age: 83
End: 2023-09-14

## 2023-09-14 DIAGNOSIS — J44.9 CHRONIC OBSTRUCTIVE PULMONARY DISEASE, UNSPECIFIED COPD TYPE (HCC): Primary | ICD-10-CM

## 2023-09-14 DIAGNOSIS — N18.30 STAGE 3 CHRONIC KIDNEY DISEASE, UNSPECIFIED WHETHER STAGE 3A OR 3B CKD (HCC): ICD-10-CM

## 2023-09-14 DIAGNOSIS — R14.3 FLATULENCE: ICD-10-CM

## 2023-09-20 LAB
ALBUMIN SERPL-MCNC: 3.7 G/DL (ref 3.5–4.6)
ALP SERPL-CCNC: 90 U/L (ref 40–130)
ALT SERPL-CCNC: 7 U/L (ref 0–33)
ANION GAP SERPL CALCULATED.3IONS-SCNC: 12 MEQ/L (ref 9–15)
AST SERPL-CCNC: 11 U/L (ref 0–35)
BASOPHILS # BLD: 0 K/UL (ref 0–0.2)
BASOPHILS NFR BLD: 0.3 %
BILIRUB SERPL-MCNC: <0.2 MG/DL (ref 0.2–0.7)
BUN SERPL-MCNC: 34 MG/DL (ref 8–23)
CALCIUM SERPL-MCNC: 9.1 MG/DL (ref 8.5–9.9)
CHLORIDE SERPL-SCNC: 109 MEQ/L (ref 95–107)
CHOLEST SERPL-MCNC: 173 MG/DL (ref 0–199)
CO2 SERPL-SCNC: 19 MEQ/L (ref 20–31)
CREAT SERPL-MCNC: 1.3 MG/DL (ref 0.5–0.9)
EOSINOPHIL # BLD: 0.2 K/UL (ref 0–0.7)
EOSINOPHIL NFR BLD: 3 %
ERYTHROCYTE [DISTWIDTH] IN BLOOD BY AUTOMATED COUNT: 14.9 % (ref 11.5–14.5)
GLOBULIN SER CALC-MCNC: 3 G/DL (ref 2.3–3.5)
GLUCOSE SERPL-MCNC: 74 MG/DL (ref 70–99)
HCT VFR BLD AUTO: 36 % (ref 37–47)
HDLC SERPL-MCNC: 76 MG/DL (ref 40–59)
HGB BLD-MCNC: 10.7 G/DL (ref 12–16)
LDLC SERPL CALC-MCNC: 86 MG/DL (ref 0–129)
LYMPHOCYTES # BLD: 1.9 K/UL (ref 1–4.8)
LYMPHOCYTES NFR BLD: 26.9 %
MCH RBC QN AUTO: 25.4 PG (ref 27–31.3)
MCHC RBC AUTO-ENTMCNC: 29.7 % (ref 33–37)
MCV RBC AUTO: 85.5 FL (ref 79.4–94.8)
MONOCYTES # BLD: 0.5 K/UL (ref 0.2–0.8)
MONOCYTES NFR BLD: 7.5 %
NEUTROPHILS # BLD: 4.3 K/UL (ref 1.4–6.5)
NEUTS SEG NFR BLD: 61.5 %
PLATELET # BLD AUTO: 226 K/UL (ref 130–400)
PLATELET BLD QL SMEAR: ADEQUATE
POTASSIUM SERPL-SCNC: 4.8 MEQ/L (ref 3.4–4.9)
PROT SERPL-MCNC: 6.7 G/DL (ref 6.3–8)
RBC # BLD AUTO: 4.21 M/UL (ref 4.2–5.4)
SLIDE REVIEW: ABNORMAL
SODIUM SERPL-SCNC: 140 MEQ/L (ref 135–144)
TRIGL SERPL-MCNC: 54 MG/DL (ref 0–150)
WBC # BLD AUTO: 7.1 K/UL (ref 4.8–10.8)

## 2023-09-28 LAB
ANION GAP SERPL CALCULATED.3IONS-SCNC: 8 MEQ/L (ref 9–15)
BUN SERPL-MCNC: 27 MG/DL (ref 8–23)
CALCIUM SERPL-MCNC: 9 MG/DL (ref 8.5–9.9)
CHLORIDE SERPL-SCNC: 108 MEQ/L (ref 95–107)
CO2 SERPL-SCNC: 20 MEQ/L (ref 20–31)
CREAT SERPL-MCNC: 1.15 MG/DL (ref 0.5–0.9)
GLUCOSE SERPL-MCNC: 64 MG/DL (ref 70–99)
POTASSIUM SERPL-SCNC: 5.4 MEQ/L (ref 3.4–4.9)
SODIUM SERPL-SCNC: 136 MEQ/L (ref 135–144)

## 2023-09-29 PROBLEM — R14.3 FLATULENCE: Status: ACTIVE | Noted: 2023-09-29

## 2023-09-29 NOTE — PROGRESS NOTES
questions or concerns feel free to contact me for clarification   30 minutes spent on visit    Betsy Slade, APRN - CNP

## 2023-10-02 DIAGNOSIS — G89.4 CHRONIC PAIN SYNDROME: ICD-10-CM

## 2023-10-02 LAB
ANION GAP SERPL CALCULATED.3IONS-SCNC: 13 MEQ/L (ref 9–15)
BUN SERPL-MCNC: 31 MG/DL (ref 8–23)
CALCIUM SERPL-MCNC: 9 MG/DL (ref 8.5–9.9)
CHLORIDE SERPL-SCNC: 111 MEQ/L (ref 95–107)
CO2 SERPL-SCNC: 19 MEQ/L (ref 20–31)
CREAT SERPL-MCNC: 1.41 MG/DL (ref 0.5–0.9)
GLUCOSE SERPL-MCNC: 111 MG/DL (ref 70–99)
POTASSIUM SERPL-SCNC: 5.2 MEQ/L (ref 3.4–4.9)
SODIUM SERPL-SCNC: 143 MEQ/L (ref 135–144)

## 2023-10-02 RX ORDER — OXYCODONE HYDROCHLORIDE AND ACETAMINOPHEN 5; 325 MG/1; MG/1
1 TABLET ORAL EVERY 4 HOURS PRN
Qty: 28 TABLET | Refills: 0 | Status: SHIPPED | OUTPATIENT
Start: 2023-10-02 | End: 2023-10-09

## 2023-10-02 NOTE — TELEPHONE ENCOUNTER
Reji,  MsJake Henry was on Percocet 5/325 mg  q 4 hrs prn prior to this Endocet refill from 8/8/2023. Did you want to switch her back ?

## 2023-10-03 ENCOUNTER — OFFICE VISIT (OUTPATIENT)
Dept: GERIATRIC MEDICINE | Age: 83
End: 2023-10-03

## 2023-10-03 DIAGNOSIS — R63.5 ABNORMAL WEIGHT GAIN: Primary | ICD-10-CM

## 2023-10-07 LAB
ANISOCYTOSIS BLD QL SMEAR: ABNORMAL
BASOPHILS # BLD: 0 K/UL (ref 0–0.2)
BASOPHILS NFR BLD: 0.3 %
BURR CELLS: ABNORMAL
DACRYOCYTES BLD QL SMEAR: ABNORMAL
EOSINOPHIL # BLD: 0.2 K/UL (ref 0–0.7)
EOSINOPHIL NFR BLD: 2.4 %
ERYTHROCYTE [DISTWIDTH] IN BLOOD BY AUTOMATED COUNT: 14.5 % (ref 11.5–14.5)
HCT VFR BLD AUTO: 38.1 % (ref 37–47)
HGB BLD-MCNC: 11.4 G/DL (ref 12–16)
LYMPHOCYTES # BLD: 1.7 K/UL (ref 1–4.8)
LYMPHOCYTES NFR BLD: 25.3 %
MCH RBC QN AUTO: 25.6 PG (ref 27–31.3)
MCHC RBC AUTO-ENTMCNC: 29.9 % (ref 33–37)
MCV RBC AUTO: 85.4 FL (ref 79.4–94.8)
MONOCYTES # BLD: 0.4 K/UL (ref 0.2–0.8)
MONOCYTES NFR BLD: 6.5 %
NEUTROPHILS # BLD: 4.3 K/UL (ref 1.4–6.5)
NEUTS SEG NFR BLD: 65.2 %
OVALOCYTES BLD QL SMEAR: ABNORMAL
PLATELET # BLD AUTO: 228 K/UL (ref 130–400)
POIKILOCYTOSIS BLD QL SMEAR: ABNORMAL
RBC # BLD AUTO: 4.46 M/UL (ref 4.2–5.4)
SLIDE REVIEW: ABNORMAL
WBC # BLD AUTO: 6.6 K/UL (ref 4.8–10.8)

## 2023-10-09 LAB — TSH SERPL-MCNC: 2.43 UIU/ML (ref 0.44–3.86)

## 2023-10-22 PROBLEM — R63.5 ABNORMAL WEIGHT GAIN: Status: ACTIVE | Noted: 2023-10-22

## 2023-10-30 NOTE — PATIENT INSTRUCTIONS
Personalized Preventive Plan for Jorje Uribe - 7/19/2023  Medicare offers a range of preventive health benefits. Some of the tests and screenings are paid in full while other may be subject to a deductible, co-insurance, and/or copay. Some of these benefits include a comprehensive review of your medical history including lifestyle, illnesses that may run in your family, and various assessments and screenings as appropriate. After reviewing your medical record and screening and assessments performed today your provider may have ordered immunizations, labs, imaging, and/or referrals for you. A list of these orders (if applicable) as well as your Preventive Care list are included within your After Visit Summary for your review. Other Preventive Recommendations:    A preventive eye exam performed by an eye specialist is recommended every 1-2 years to screen for glaucoma; cataracts, macular degeneration, and other eye disorders. A preventive dental visit is recommended every 6 months. Try to get at least 150 minutes of exercise per week or 10,000 steps per day on a pedometer . Order or download the FREE \"Exercise & Physical Activity: Your Everyday Guide\" from [employer]. Call 4-698.454.4537 or search [employer] online. You need 7674-9240 mg of calcium and 3435-9664 IU of vitamin D per day. It is possible to meet your calcium requirement with diet alone, but a vitamin D supplement is usually necessary to meet this goal.  When exposed to the sun, use a sunscreen that protects against both UVA and UVB radiation with an SPF of 30 or greater. Reapply every 2 to 3 hours or after sweating, drying off with a towel, or swimming. Always wear a seat belt when traveling in a car. Always wear a helmet when riding a bicycle or motorcycle. Physical Therapy Visit    Visit Type: Daily Treatment Note  Visit: 3  Referring Provider: TIMMY Mayorga  Medical Diagnosis (from order): Diagnosis Information    Diagnosis  R26.9 (ICD-10-CM) - Abnormality of gait         SUBJECTIVE                                                                                                               No falls since the last session. His hand still feels sore from his fall last week. He did his home exercises 1x in the last week.     Pain / Symptoms  - Patient denies pain / symptoms.      OBJECTIVE                                                                                                                                    Treatment     Therapeutic Exercise  Nu Step Level 4 x 10 minutes    Discussion of HEP compliance: modify frequency to 4-5x/week for improved results    Stair negotiation by pharmacy steps x 1 rep with 1 UE support. Encouraged patient to use a step through pattern rather than a step-to pattern if pain levels are tolerable.       Neuromuscular Re-Education  Multidirectional stepping onto colored targets ~1-2 feet away from him; incorporating anterior, lateral, and posterior stepping  -stepping on each target 3x bilaterally  -add cane obstacle on the ground for A-P stepping, 3x bilaterally; one large LOB requiring PT support to prevent a fall  -add small block obstacle on the ground for A-P stepping, 3x bilaterally; frequently grazed the block with his foot but no LOB  -standing on foam square in center, no obstacles, 3x bilaterally with no LOB    Skilled input: verbal instruction/cues and posture correction    Writer verbally educated and received verbal consent for hand placement, positioning of patient, and techniques to be performed today from patient for therapist position for techniques as described above and how they are pertinent to the patient's plan of care.  Home Exercise Program  Access Code: [email or web address]  URL:  [email or web address]  Date: 10/24/2023  Prepared by: Rebeka Chavez    Exercises  - Sit to Stand with Arm Reach Toward Target  - 1 x daily - 5 x weekly - 4 sets - 5 reps  - Tandem Walking with Counter Support  - 1 x daily - 5 x weekly - 2 sets - 1 min  hold  - Wall Quarter Squat  - 1 x daily - 7 x weekly - 3 sets - 10 reps - 30 seconds to 1 minute hold  - Seated Knee Flexion with Anchored Resistance  - 1 x daily - 7 x weekly - 3 sets - 10 reps  - Seated Knee Extension with Resistance  - 1 x daily - 7 x weekly - 3 sets - 10 reps      ASSESSMENT                                                                                                            The patient demonstrates only one LOB episode with backwards stepping over a small obstacle. The patient reports intermittent knee pain, but this was not limiting to him for any of the exercises during today's session. The patient continues to struggle with HEP compliance, which may hinder his prognosis overall; PT will likely need to continue to reiterate the importance of HEP. Patient will benefit from continued skilled therapy to address balance and pain.    Education:   - Results of above outlined education: Verbalizes understanding and Needs reinforcement    PLAN                                                                                                                           Suggestions for next session as indicated: Progress per plan of care, LE strengthening, balance retraining, floor transfers?  Exercises for knee pain   PWR warm up   Circuit training  Balance training (ec, uneven surfaces)           Therapy procedure time and total treatment time can be found documented on the Time Entry flowsheet

## 2023-11-14 ENCOUNTER — OFFICE VISIT (OUTPATIENT)
Dept: GERIATRIC MEDICINE | Age: 83
End: 2023-11-14
Payer: COMMERCIAL

## 2023-11-14 DIAGNOSIS — R35.0 URINARY FREQUENCY: ICD-10-CM

## 2023-11-14 DIAGNOSIS — N17.9 ACUTE KIDNEY INJURY SUPERIMPOSED ON CKD (HCC): Primary | ICD-10-CM

## 2023-11-14 DIAGNOSIS — J44.9 CHRONIC OBSTRUCTIVE PULMONARY DISEASE, UNSPECIFIED COPD TYPE (HCC): ICD-10-CM

## 2023-11-14 DIAGNOSIS — N18.9 ACUTE KIDNEY INJURY SUPERIMPOSED ON CKD (HCC): Primary | ICD-10-CM

## 2023-11-14 PROCEDURE — 1123F ACP DISCUSS/DSCN MKR DOCD: CPT | Performed by: NURSE PRACTITIONER

## 2023-11-14 PROCEDURE — G8484 FLU IMMUNIZE NO ADMIN: HCPCS | Performed by: NURSE PRACTITIONER

## 2023-11-14 PROCEDURE — 99309 SBSQ NF CARE MODERATE MDM 30: CPT | Performed by: NURSE PRACTITIONER

## 2023-11-15 LAB
ANION GAP SERPL CALCULATED.3IONS-SCNC: 9 MEQ/L (ref 9–15)
BUN SERPL-MCNC: 31 MG/DL (ref 8–23)
CALCIUM SERPL-MCNC: 9.1 MG/DL (ref 8.5–9.9)
CHLORIDE SERPL-SCNC: 108 MEQ/L (ref 95–107)
CO2 SERPL-SCNC: 22 MEQ/L (ref 20–31)
CREAT SERPL-MCNC: 1.34 MG/DL (ref 0.5–0.9)
GLUCOSE SERPL-MCNC: 57 MG/DL (ref 70–99)
POTASSIUM SERPL-SCNC: 5 MEQ/L (ref 3.4–4.9)
SODIUM SERPL-SCNC: 139 MEQ/L (ref 135–144)

## 2023-11-20 DIAGNOSIS — G89.4 CHRONIC PAIN SYNDROME: ICD-10-CM

## 2023-11-20 RX ORDER — OXYCODONE HYDROCHLORIDE AND ACETAMINOPHEN 5; 325 MG/1; MG/1
1 TABLET ORAL EVERY 4 HOURS PRN
Qty: 180 TABLET | Refills: 0 | Status: SHIPPED | OUTPATIENT
Start: 2023-11-20 | End: 2023-12-20

## 2023-11-30 NOTE — PROGRESS NOTES
[facility]  2023    Katerin Meyer  is a 79 y.o. in the  being seen for a acute visit for   Chief Complaint   Patient presents with    1 Month Follow-Up       HPI Patient being seen today for monthly visit. Patient reports that she is having urinary frequency and she is concerned about her overall renal function because she has not had blood work in [employer] They are eating and drinking at their baseline per nursing. They have had no recent falls with injuries. They are not complaining of any un addressed pain issues. Have had no recent choking or dysphagia issues. NO agitation or unusual mental behavioral issues. Past Medical History:   Diagnosis Date    ROCHELLE (acute kidney injury) ([facility]) 2021    Chronic kidney disease     COPD exacerbation ([facility]) 2022    Gastroesophageal reflux disease 06/10/2002    Hypothyroidism     Obesity, Class III, BMI 40-49.9 (morbid obesity) ([facility]) 2016    Primary hypertension 2022    Tobacco abuse      Past Surgical History:   Procedure Laterality Date    APPENDECTOMY      CHOLECYSTECTOMY       Family History   Problem Relation Age of Onset    Kidney Disease Mother     High Blood Pressure Mother     Stroke Mother     Heart Disease Father      Social History     Socioeconomic History    Marital status:      Spouse name: Not on file    Number of children: Not on file    Years of education: Not on file    Highest education level: Not on file   Occupational History    Not on file   Tobacco Use    Smoking status: Former     Packs/day: 5.00     Years: 30.00     Additional pack years: 0.00     Total pack years: 150.00     Types: Cigarettes     Quit date: 10/1/2017     Years since quittin.1    Smokeless tobacco: Never    Tobacco comments:     Has been trying to quit since January   Vaping Use    Vaping Use: Never used   Substance and Sexual Activity    Alcohol use: No     Alcohol/week: 0.0 standard drinks of alcohol    Drug use:  No

## 2024-01-03 ENCOUNTER — HOSPITAL ENCOUNTER (INPATIENT)
Facility: HOSPITAL | Age: 84
LOS: 6 days | Discharge: NURSING, REHAB OR LONG-TERM CARE | DRG: 603 | End: 2024-01-09
Attending: EMERGENCY MEDICINE | Admitting: STUDENT IN AN ORGANIZED HEALTH CARE EDUCATION/TRAINING PROGRAM
Payer: MEDICARE

## 2024-01-03 DIAGNOSIS — I70.235 ATHEROSCLEROSIS OF NATIVE ARTERIES OF RIGHT LEG WITH ULCERATION OF OTHER PART OF FOOT (MULTI): ICD-10-CM

## 2024-01-03 DIAGNOSIS — I70.245 ATHEROSCLEROSIS OF NATIVE ARTERIES OF LEFT LEG WITH ULCERATION OF OTHER PART OF FOOT (MULTI): ICD-10-CM

## 2024-01-03 DIAGNOSIS — N18.9 ACUTE KIDNEY INJURY SUPERIMPOSED ON CHRONIC KIDNEY DISEASE (CMS-HCC): ICD-10-CM

## 2024-01-03 DIAGNOSIS — L97.911: ICD-10-CM

## 2024-01-03 DIAGNOSIS — N17.9 ACUTE KIDNEY INJURY SUPERIMPOSED ON CHRONIC KIDNEY DISEASE (CMS-HCC): ICD-10-CM

## 2024-01-03 DIAGNOSIS — I70.238 ATHEROSCLEROSIS OF NATIVE ARTERIES OF RIGHT LEG WITH ULCERATION OF OTHER PART OF LOWER LEG (MULTI): ICD-10-CM

## 2024-01-03 DIAGNOSIS — L03.119 CELLULITIS OF LOWER EXTREMITY, UNSPECIFIED LATERALITY: Primary | ICD-10-CM

## 2024-01-03 DIAGNOSIS — I70.248 ATHEROSCLEROSIS OF NATIVE ARTERIES OF LEFT LEG WITH ULCERATION OF OTHER PART OF LOWER LEG (MULTI): ICD-10-CM

## 2024-01-03 LAB
ALBUMIN SERPL BCP-MCNC: 4 G/DL (ref 3.4–5)
ALP SERPL-CCNC: 121 U/L (ref 33–136)
ALT SERPL W P-5'-P-CCNC: 16 U/L (ref 7–45)
ANION GAP SERPL CALC-SCNC: 15 MMOL/L (ref 10–20)
AST SERPL W P-5'-P-CCNC: 12 U/L (ref 9–39)
BASOPHILS # BLD AUTO: 0.03 X10*3/UL (ref 0–0.1)
BASOPHILS NFR BLD AUTO: 0.3 %
BILIRUB DIRECT SERPL-MCNC: 0.1 MG/DL (ref 0–0.3)
BILIRUB SERPL-MCNC: 0.3 MG/DL (ref 0–1.2)
BUN SERPL-MCNC: 46 MG/DL (ref 6–23)
CALCIUM SERPL-MCNC: 9.3 MG/DL (ref 8.6–10.3)
CHLORIDE SERPL-SCNC: 108 MMOL/L (ref 98–107)
CO2 SERPL-SCNC: 20 MMOL/L (ref 21–32)
CREAT SERPL-MCNC: 1.82 MG/DL (ref 0.5–1.05)
CRP SERPL-MCNC: 7.09 MG/DL
EOSINOPHIL # BLD AUTO: 0.14 X10*3/UL (ref 0–0.4)
EOSINOPHIL NFR BLD AUTO: 1.3 %
ERYTHROCYTE [DISTWIDTH] IN BLOOD BY AUTOMATED COUNT: 17.3 % (ref 11.5–14.5)
ERYTHROCYTE [SEDIMENTATION RATE] IN BLOOD BY WESTERGREN METHOD: 33 MM/H (ref 0–30)
GFR SERPL CREATININE-BSD FRML MDRD: 27 ML/MIN/1.73M*2
GLUCOSE SERPL-MCNC: 101 MG/DL (ref 74–99)
HCT VFR BLD AUTO: 35.5 % (ref 36–46)
HGB BLD-MCNC: 11.3 G/DL (ref 12–16)
IMM GRANULOCYTES # BLD AUTO: 0.07 X10*3/UL (ref 0–0.5)
IMM GRANULOCYTES NFR BLD AUTO: 0.6 % (ref 0–0.9)
LACTATE SERPL-SCNC: 1.1 MMOL/L (ref 0.4–2)
LYMPHOCYTES # BLD AUTO: 1.03 X10*3/UL (ref 0.8–3)
LYMPHOCYTES NFR BLD AUTO: 9.2 %
MCH RBC QN AUTO: 26.3 PG (ref 26–34)
MCHC RBC AUTO-ENTMCNC: 31.8 G/DL (ref 32–36)
MCV RBC AUTO: 83 FL (ref 80–100)
MONOCYTES # BLD AUTO: 0.55 X10*3/UL (ref 0.05–0.8)
MONOCYTES NFR BLD AUTO: 4.9 %
NEUTROPHILS # BLD AUTO: 9.38 X10*3/UL (ref 1.6–5.5)
NEUTROPHILS NFR BLD AUTO: 83.7 %
NRBC BLD-RTO: 0 /100 WBCS (ref 0–0)
PLATELET # BLD AUTO: 350 X10*3/UL (ref 150–450)
POTASSIUM SERPL-SCNC: 5.4 MMOL/L (ref 3.5–5.3)
PROT SERPL-MCNC: 8 G/DL (ref 6.4–8.2)
RBC # BLD AUTO: 4.3 X10*6/UL (ref 4–5.2)
SODIUM SERPL-SCNC: 138 MMOL/L (ref 136–145)
WBC # BLD AUTO: 11.2 X10*3/UL (ref 4.4–11.3)

## 2024-01-03 PROCEDURE — 2500000004 HC RX 250 GENERAL PHARMACY W/ HCPCS (ALT 636 FOR OP/ED): Performed by: EMERGENCY MEDICINE

## 2024-01-03 PROCEDURE — 85025 COMPLETE CBC W/AUTO DIFF WBC: CPT | Performed by: EMERGENCY MEDICINE

## 2024-01-03 PROCEDURE — 87040 BLOOD CULTURE FOR BACTERIA: CPT | Mod: ELYLAB | Performed by: EMERGENCY MEDICINE

## 2024-01-03 PROCEDURE — 36415 COLL VENOUS BLD VENIPUNCTURE: CPT | Performed by: EMERGENCY MEDICINE

## 2024-01-03 PROCEDURE — 83605 ASSAY OF LACTIC ACID: CPT | Performed by: EMERGENCY MEDICINE

## 2024-01-03 PROCEDURE — 87205 SMEAR GRAM STAIN: CPT | Mod: ELYLAB | Performed by: EMERGENCY MEDICINE

## 2024-01-03 PROCEDURE — 85652 RBC SED RATE AUTOMATED: CPT | Performed by: STUDENT IN AN ORGANIZED HEALTH CARE EDUCATION/TRAINING PROGRAM

## 2024-01-03 PROCEDURE — 96374 THER/PROPH/DIAG INJ IV PUSH: CPT

## 2024-01-03 PROCEDURE — 99223 1ST HOSP IP/OBS HIGH 75: CPT | Performed by: STUDENT IN AN ORGANIZED HEALTH CARE EDUCATION/TRAINING PROGRAM

## 2024-01-03 PROCEDURE — 2500000001 HC RX 250 WO HCPCS SELF ADMINISTERED DRUGS (ALT 637 FOR MEDICARE OP): Performed by: EMERGENCY MEDICINE

## 2024-01-03 PROCEDURE — 99285 EMERGENCY DEPT VISIT HI MDM: CPT | Performed by: EMERGENCY MEDICINE

## 2024-01-03 PROCEDURE — 1210000001 HC SEMI-PRIVATE ROOM DAILY

## 2024-01-03 PROCEDURE — 86140 C-REACTIVE PROTEIN: CPT | Performed by: STUDENT IN AN ORGANIZED HEALTH CARE EDUCATION/TRAINING PROGRAM

## 2024-01-03 PROCEDURE — 2500000001 HC RX 250 WO HCPCS SELF ADMINISTERED DRUGS (ALT 637 FOR MEDICARE OP): Performed by: STUDENT IN AN ORGANIZED HEALTH CARE EDUCATION/TRAINING PROGRAM

## 2024-01-03 PROCEDURE — 82248 BILIRUBIN DIRECT: CPT | Performed by: EMERGENCY MEDICINE

## 2024-01-03 RX ORDER — ACETAMINOPHEN 160 MG/5ML
650 SOLUTION ORAL EVERY 4 HOURS PRN
Status: DISCONTINUED | OUTPATIENT
Start: 2024-01-03 | End: 2024-01-10 | Stop reason: HOSPADM

## 2024-01-03 RX ORDER — VANCOMYCIN HYDROCHLORIDE 1 G/20ML
INJECTION, POWDER, LYOPHILIZED, FOR SOLUTION INTRAVENOUS DAILY PRN
Status: DISCONTINUED | OUTPATIENT
Start: 2024-01-03 | End: 2024-01-05

## 2024-01-03 RX ORDER — L. ACIDOPHILUS/L.BULGARICUS 1MM CELL
1 TABLET ORAL 2 TIMES DAILY
Status: DISCONTINUED | OUTPATIENT
Start: 2024-01-03 | End: 2024-01-10 | Stop reason: HOSPADM

## 2024-01-03 RX ORDER — VANCOMYCIN HYDROCHLORIDE 1 G/200ML
1000 INJECTION, SOLUTION INTRAVENOUS ONCE
Status: COMPLETED | OUTPATIENT
Start: 2024-01-03 | End: 2024-01-03

## 2024-01-03 RX ORDER — OXYCODONE AND ACETAMINOPHEN 5; 325 MG/1; MG/1
1 TABLET ORAL EVERY 4 HOURS PRN
Status: DISCONTINUED | OUTPATIENT
Start: 2024-01-03 | End: 2024-01-10 | Stop reason: HOSPADM

## 2024-01-03 RX ORDER — CLONIDINE HYDROCHLORIDE 0.1 MG/1
0.1 TABLET ORAL ONCE
Status: DISCONTINUED | OUTPATIENT
Start: 2024-01-03 | End: 2024-01-03

## 2024-01-03 RX ORDER — LABETALOL HYDROCHLORIDE 5 MG/ML
10 INJECTION, SOLUTION INTRAVENOUS EVERY 6 HOURS PRN
Status: DISCONTINUED | OUTPATIENT
Start: 2024-01-03 | End: 2024-01-10 | Stop reason: HOSPADM

## 2024-01-03 RX ORDER — LEVOTHYROXINE SODIUM 75 UG/1
150 TABLET ORAL
Status: DISCONTINUED | OUTPATIENT
Start: 2024-01-04 | End: 2024-01-10 | Stop reason: HOSPADM

## 2024-01-03 RX ORDER — HYDROCODONE BITARTRATE AND ACETAMINOPHEN 5; 325 MG/1; MG/1
1 TABLET ORAL ONCE
Status: COMPLETED | OUTPATIENT
Start: 2024-01-03 | End: 2024-01-03

## 2024-01-03 RX ORDER — HYDROCODONE BITARTRATE AND ACETAMINOPHEN 5; 325 MG/1; MG/1
1 TABLET ORAL EVERY 6 HOURS PRN
Status: DISCONTINUED | OUTPATIENT
Start: 2024-01-03 | End: 2024-01-04

## 2024-01-03 RX ORDER — HEPARIN SODIUM 5000 [USP'U]/ML
5000 INJECTION, SOLUTION INTRAVENOUS; SUBCUTANEOUS EVERY 8 HOURS
Status: DISCONTINUED | OUTPATIENT
Start: 2024-01-04 | End: 2024-01-03

## 2024-01-03 RX ORDER — FUROSEMIDE 40 MG/1
20 TABLET ORAL DAILY
Status: DISCONTINUED | OUTPATIENT
Start: 2024-01-03 | End: 2024-01-10 | Stop reason: HOSPADM

## 2024-01-03 RX ORDER — ACETAMINOPHEN 650 MG/1
650 SUPPOSITORY RECTAL EVERY 4 HOURS PRN
Status: DISCONTINUED | OUTPATIENT
Start: 2024-01-03 | End: 2024-01-10 | Stop reason: HOSPADM

## 2024-01-03 RX ORDER — LEVOTHYROXINE SODIUM 150 UG/1
150 TABLET ORAL
COMMUNITY

## 2024-01-03 RX ORDER — HYDRALAZINE HYDROCHLORIDE 20 MG/ML
5 INJECTION INTRAMUSCULAR; INTRAVENOUS EVERY 6 HOURS PRN
Status: DISCONTINUED | OUTPATIENT
Start: 2024-01-03 | End: 2024-01-10 | Stop reason: HOSPADM

## 2024-01-03 RX ORDER — ACETAMINOPHEN 325 MG/1
650 TABLET ORAL EVERY 4 HOURS PRN
Status: DISCONTINUED | OUTPATIENT
Start: 2024-01-03 | End: 2024-01-10 | Stop reason: HOSPADM

## 2024-01-03 RX ORDER — FUROSEMIDE 20 MG/1
20 TABLET ORAL DAILY
COMMUNITY

## 2024-01-03 RX ORDER — OXYCODONE AND ACETAMINOPHEN 5; 325 MG/1; MG/1
1 TABLET ORAL EVERY 4 HOURS PRN
Status: ON HOLD | COMMUNITY
End: 2024-01-09 | Stop reason: SDUPTHER

## 2024-01-03 RX ORDER — POLYETHYLENE GLYCOL 3350 17 G/17G
17 POWDER, FOR SOLUTION ORAL DAILY PRN
Status: DISCONTINUED | OUTPATIENT
Start: 2024-01-03 | End: 2024-01-10 | Stop reason: HOSPADM

## 2024-01-03 RX ADMIN — OXYCODONE HYDROCHLORIDE AND ACETAMINOPHEN 1 TABLET: 5; 325 TABLET ORAL at 23:36

## 2024-01-03 RX ADMIN — APIXABAN 5 MG: 5 TABLET, FILM COATED ORAL at 20:15

## 2024-01-03 RX ADMIN — CEFEPIME 1 G: 1 INJECTION, POWDER, FOR SOLUTION INTRAMUSCULAR; INTRAVENOUS at 14:20

## 2024-01-03 RX ADMIN — HYDROCODONE BITARTRATE AND ACETAMINOPHEN 1 TABLET: 5; 325 TABLET ORAL at 12:42

## 2024-01-03 RX ADMIN — VANCOMYCIN HYDROCHLORIDE 1000 MG: 1 INJECTION, SOLUTION INTRAVENOUS at 12:43

## 2024-01-03 RX ADMIN — HYDROCODONE BITARTRATE AND ACETAMINOPHEN 1 TABLET: 5; 325 TABLET ORAL at 18:18

## 2024-01-03 RX ADMIN — Medication 1 TABLET: at 20:15

## 2024-01-03 RX ADMIN — ACETAMINOPHEN 650 MG: 325 TABLET ORAL at 20:15

## 2024-01-03 SDOH — SOCIAL STABILITY: SOCIAL INSECURITY: ARE THERE ANY APPARENT SIGNS OF INJURIES/BEHAVIORS THAT COULD BE RELATED TO ABUSE/NEGLECT?: NO

## 2024-01-03 SDOH — HEALTH STABILITY: MENTAL HEALTH
STRESS IS WHEN SOMEONE FEELS TENSE, NERVOUS, ANXIOUS, OR CAN'T SLEEP AT NIGHT BECAUSE THEIR MIND IS TROUBLED. HOW STRESSED ARE YOU?: RATHER MUCH

## 2024-01-03 SDOH — SOCIAL STABILITY: SOCIAL INSECURITY: WERE YOU ABLE TO COMPLETE ALL THE BEHAVIORAL HEALTH SCREENINGS?: YES

## 2024-01-03 SDOH — SOCIAL STABILITY: SOCIAL INSECURITY: DOES ANYONE TRY TO KEEP YOU FROM HAVING/CONTACTING OTHER FRIENDS OR DOING THINGS OUTSIDE YOUR HOME?: YES

## 2024-01-03 SDOH — SOCIAL STABILITY: SOCIAL NETWORK: ARE YOU MARRIED, WIDOWED, DIVORCED, SEPARATED, NEVER MARRIED, OR LIVING WITH A PARTNER?: MARRIED

## 2024-01-03 SDOH — SOCIAL STABILITY: SOCIAL NETWORK: HOW OFTEN DO YOU ATTENT MEETINGS OF THE CLUB OR ORGANIZATION YOU BELONG TO?: NEVER

## 2024-01-03 SDOH — SOCIAL STABILITY: SOCIAL INSECURITY: WITHIN THE LAST YEAR, HAVE YOU BEEN AFRAID OF YOUR PARTNER OR EX-PARTNER?: YES

## 2024-01-03 SDOH — ECONOMIC STABILITY: FOOD INSECURITY: WITHIN THE PAST 12 MONTHS, YOU WORRIED THAT YOUR FOOD WOULD RUN OUT BEFORE YOU GOT MONEY TO BUY MORE.: NEVER TRUE

## 2024-01-03 SDOH — SOCIAL STABILITY: SOCIAL INSECURITY: ABUSE: ADULT

## 2024-01-03 SDOH — ECONOMIC STABILITY: HOUSING INSECURITY: IN THE LAST 12 MONTHS, HOW MANY PLACES HAVE YOU LIVED?: 2

## 2024-01-03 SDOH — SOCIAL STABILITY: SOCIAL INSECURITY: HAVE YOU HAD THOUGHTS OF HARMING ANYONE ELSE?: NO

## 2024-01-03 SDOH — HEALTH STABILITY: PHYSICAL HEALTH: ON AVERAGE, HOW MANY MINUTES DO YOU ENGAGE IN EXERCISE AT THIS LEVEL?: 0 MIN

## 2024-01-03 SDOH — ECONOMIC STABILITY: FOOD INSECURITY: WITHIN THE PAST 12 MONTHS, THE FOOD YOU BOUGHT JUST DIDN'T LAST AND YOU DIDN'T HAVE MONEY TO GET MORE.: NEVER TRUE

## 2024-01-03 SDOH — SOCIAL STABILITY: SOCIAL NETWORK: HOW OFTEN DO YOU GET TOGETHER WITH FRIENDS OR RELATIVES?: NEVER

## 2024-01-03 SDOH — ECONOMIC STABILITY: INCOME INSECURITY: HOW HARD IS IT FOR YOU TO PAY FOR THE VERY BASICS LIKE FOOD, HOUSING, MEDICAL CARE, AND HEATING?: NOT VERY HARD

## 2024-01-03 SDOH — ECONOMIC STABILITY: INCOME INSECURITY: IN THE LAST 12 MONTHS, WAS THERE A TIME WHEN YOU WERE NOT ABLE TO PAY THE MORTGAGE OR RENT ON TIME?: NO

## 2024-01-03 SDOH — SOCIAL STABILITY: SOCIAL INSECURITY: DO YOU FEEL ANYONE HAS EXPLOITED OR TAKEN ADVANTAGE OF YOU FINANCIALLY OR OF YOUR PERSONAL PROPERTY?: NO

## 2024-01-03 SDOH — HEALTH STABILITY: MENTAL HEALTH: EXPERIENCED ANY OF THE FOLLOWING LIFE EVENTS: OTHER (COMMENT)

## 2024-01-03 SDOH — SOCIAL STABILITY: SOCIAL INSECURITY: HAS ANYONE EVER THREATENED TO HURT YOUR FAMILY OR YOUR PETS?: YES

## 2024-01-03 SDOH — SOCIAL STABILITY: SOCIAL NETWORK: HOW OFTEN DO YOU ATTEND CHURCH OR RELIGIOUS SERVICES?: NEVER

## 2024-01-03 SDOH — SOCIAL STABILITY: SOCIAL INSECURITY: DO YOU FEEL UNSAFE GOING BACK TO THE PLACE WHERE YOU ARE LIVING?: YES

## 2024-01-03 SDOH — SOCIAL STABILITY: SOCIAL INSECURITY: ARE YOU OR HAVE YOU BEEN THREATENED OR ABUSED PHYSICALLY, EMOTIONALLY, OR SEXUALLY BY ANYONE?: NO

## 2024-01-03 SDOH — SOCIAL STABILITY: SOCIAL NETWORK: IN A TYPICAL WEEK, HOW MANY TIMES DO YOU TALK ON THE PHONE WITH FAMILY, FRIENDS, OR NEIGHBORS?: NEVER

## 2024-01-03 SDOH — SOCIAL STABILITY: SOCIAL INSECURITY: WITHIN THE LAST YEAR, HAVE YOU BEEN HUMILIATED OR EMOTIONALLY ABUSED IN OTHER WAYS BY YOUR PARTNER OR EX-PARTNER?: YES

## 2024-01-03 SDOH — SOCIAL STABILITY: SOCIAL INSECURITY: POSSIBLE ABUSE REPORTED TO:: SOCIAL SERVICES

## 2024-01-03 SDOH — HEALTH STABILITY: PHYSICAL HEALTH: ON AVERAGE, HOW MANY DAYS PER WEEK DO YOU ENGAGE IN MODERATE TO STRENUOUS EXERCISE (LIKE A BRISK WALK)?: 0 DAYS

## 2024-01-03 ASSESSMENT — COGNITIVE AND FUNCTIONAL STATUS - GENERAL
CLIMB 3 TO 5 STEPS WITH RAILING: TOTAL
PATIENT BASELINE BEDBOUND: NO
WALKING IN HOSPITAL ROOM: A LOT
DRESSING REGULAR UPPER BODY CLOTHING: A LOT
HELP NEEDED FOR BATHING: A LOT
MOVING TO AND FROM BED TO CHAIR: A LOT
CLIMB 3 TO 5 STEPS WITH RAILING: TOTAL
MOBILITY SCORE: 9
DRESSING REGULAR LOWER BODY CLOTHING: A LOT
STANDING UP FROM CHAIR USING ARMS: TOTAL
TOILETING: A LITTLE
DAILY ACTIVITIY SCORE: 13
HELP NEEDED FOR BATHING: A LOT
DRESSING REGULAR UPPER BODY CLOTHING: A LITTLE
PERSONAL GROOMING: A LOT
WALKING IN HOSPITAL ROOM: TOTAL
TOILETING: A LOT
MOVING TO AND FROM BED TO CHAIR: A LOT
DRESSING REGULAR LOWER BODY CLOTHING: A LOT
EATING MEALS: A LITTLE
MOBILITY SCORE: 14
STANDING UP FROM CHAIR USING ARMS: A LOT
TURNING FROM BACK TO SIDE WHILE IN FLAT BAD: A LOT
DAILY ACTIVITIY SCORE: 18
MOVING FROM LYING ON BACK TO SITTING ON SIDE OF FLAT BED WITH BEDRAILS: A LOT
TURNING FROM BACK TO SIDE WHILE IN FLAT BAD: A LITTLE

## 2024-01-03 ASSESSMENT — LIFESTYLE VARIABLES
HAVE YOU EVER FELT YOU SHOULD CUT DOWN ON YOUR DRINKING: NO
SUBSTANCE_ABUSE_PAST_12_MONTHS: NO
AUDIT-C TOTAL SCORE: 0
SKIP TO QUESTIONS 9-10: 1
REASON UNABLE TO ASSESS: NO
EVER HAD A DRINK FIRST THING IN THE MORNING TO STEADY YOUR NERVES TO GET RID OF A HANGOVER: NO
HOW OFTEN DO YOU HAVE 6 OR MORE DRINKS ON ONE OCCASION: NEVER
EVER FELT BAD OR GUILTY ABOUT YOUR DRINKING: NO
HAVE PEOPLE ANNOYED YOU BY CRITICIZING YOUR DRINKING: NO
HOW MANY STANDARD DRINKS CONTAINING ALCOHOL DO YOU HAVE ON A TYPICAL DAY: PATIENT DOES NOT DRINK
HOW OFTEN DO YOU HAVE A DRINK CONTAINING ALCOHOL: NEVER
AUDIT-C TOTAL SCORE: 0
PRESCIPTION_ABUSE_PAST_12_MONTHS: NO

## 2024-01-03 ASSESSMENT — ACTIVITIES OF DAILY LIVING (ADL)
FEEDING YOURSELF: INDEPENDENT
PATIENT'S MEMORY ADEQUATE TO SAFELY COMPLETE DAILY ACTIVITIES?: YES
JUDGMENT_ADEQUATE_SAFELY_COMPLETE_DAILY_ACTIVITIES: YES
HEARING - LEFT EAR: FUNCTIONAL
FEEDING YOURSELF: INDEPENDENT
HEARING - RIGHT EAR: DIFFICULTY WITH NOISE
TOILETING: NEEDS ASSISTANCE
DRESSING YOURSELF: NEEDS ASSISTANCE
GROOMING: NEEDS ASSISTANCE
HEARING - RIGHT EAR: FUNCTIONAL
ADEQUATE_TO_COMPLETE_ADL: YES
ADEQUATE_TO_COMPLETE_ADL: YES
HEARING - LEFT EAR: DIFFICULTY WITH NOISE
JUDGMENT_ADEQUATE_SAFELY_COMPLETE_DAILY_ACTIVITIES: YES
PATIENT'S MEMORY ADEQUATE TO SAFELY COMPLETE DAILY ACTIVITIES?: YES
BATHING: NEEDS ASSISTANCE
GROOMING: NEEDS ASSISTANCE
LACK_OF_TRANSPORTATION: NO
WALKS IN HOME: NEEDS ASSISTANCE
WALKS IN HOME: NEEDS ASSISTANCE
DRESSING YOURSELF: NEEDS ASSISTANCE

## 2024-01-03 ASSESSMENT — COLUMBIA-SUICIDE SEVERITY RATING SCALE - C-SSRS
6. HAVE YOU EVER DONE ANYTHING, STARTED TO DO ANYTHING, OR PREPARED TO DO ANYTHING TO END YOUR LIFE?: NO
2. HAVE YOU ACTUALLY HAD ANY THOUGHTS OF KILLING YOURSELF?: NO
1. IN THE PAST MONTH, HAVE YOU WISHED YOU WERE DEAD OR WISHED YOU COULD GO TO SLEEP AND NOT WAKE UP?: NO

## 2024-01-03 ASSESSMENT — PAIN - FUNCTIONAL ASSESSMENT
PAIN_FUNCTIONAL_ASSESSMENT: 0-10

## 2024-01-03 ASSESSMENT — PAIN SCALES - GENERAL
PAINLEVEL_OUTOF10: 10 - WORST POSSIBLE PAIN
PAINLEVEL_OUTOF10: 0 - NO PAIN
PAINLEVEL_OUTOF10: 0 - NO PAIN
PAINLEVEL_OUTOF10: 3
PAINLEVEL_OUTOF10: 6

## 2024-01-03 ASSESSMENT — PATIENT HEALTH QUESTIONNAIRE - PHQ9
SUM OF ALL RESPONSES TO PHQ9 QUESTIONS 1 & 2: 2
2. FEELING DOWN, DEPRESSED OR HOPELESS: SEVERAL DAYS
1. LITTLE INTEREST OR PLEASURE IN DOING THINGS: SEVERAL DAYS

## 2024-01-03 ASSESSMENT — PAIN DESCRIPTION - LOCATION: LOCATION: LEG

## 2024-01-03 NOTE — PROGRESS NOTES
"Vancomycin Dosing by Pharmacy- INITIAL    Cheryl Elena is a 83 y.o. year old female who Pharmacy has been consulted for vancomycin dosing for cellulitis, skin and soft tissue/sepsis. Based on the patient's indication and renal status this patient will be dosed based on a goal trough/random level of 15-20.   trough/random level of 15-20  Renal function is currently declining.    Visit Vitals  /83 (BP Location: Right arm, Patient Position: Lying)   Pulse 104   Temp 36.6 °C (97.9 °F) (Temporal)   Resp 18        Lab Results   Component Value Date    CREATININE 1.82 (H) 01/03/2024    CREATININE 1.19 (H) 02/14/2023    CREATININE 1.21 (H) 02/13/2023    CREATININE 1.25 (H) 02/12/2023    CREATININE 1.33 (H) 02/11/2023        Patient weight is No results found for: \"PTWEIGHT\"    No results found for: \"CULTURE\"     No intake/output data recorded.  [unfilled]    No results found for: \"PATIENTTEMP\"       Assessment/Plan     Patient has already been given a loading dose of 1000 mg.  Will initiate vancomycin maintenance based on 24 hr level.    Follow-up level will be ordered on 1/4/24 at 1200 unless clinically indicated sooner.  Will continue to monitor renal function daily while on vancomycin and order serum creatinine at least every 48 hours if not already ordered.  Follow for continued vancomycin needs, clinical response, and signs/symptoms of toxicity.       Paulina Garcia, PharmD       "

## 2024-01-03 NOTE — CARE PLAN
The patient's goals for the shift include med compliance and safety    The clinical goals for the shift include safety, med compliance

## 2024-01-03 NOTE — H&P
Medical Group History and Physical  ASSESSMENT & PLAN:     Bilateral lower extremity wounds and cellulitis  - Presented from home with worsening lesions and pain  - Images as seen below on physical exam and in the media section from today 1/3  - Ordered ESR and CRP for add-on  - Continue vancomycin and cefepime  - ID consulted  - Follow-up wound cultures collected by ED  - Follow blood cultures    CKD stage III  Hyperkalemia  - Creatinine 1.87 today (baseline approximately 1.3-1.5)  - Potassium 5.4 today, will give one-time dose of Lokelma  - Continue to trend renal function, holding any medications causing hyperkalemia once reconciled    Essential hypertension  Hyperlipidemia  Diastolic CHF, compensated  COPD, not in exacerbation  Hypothyroidism  Personal history of DVT  Long-term anticoagulation use  - Resume medications once reconciled    Anemia of chronic disease  - Hemoglobin 11.3 today (baseline approximately 9)    VTE Prophylaxis: Home apixaban once reconciled      ---Of note, this documentation is completed using the Dragon Dictation system (voice recognition software). There may be spelling and/or grammatical errors that were not corrected prior to final submission.---    Conner Tan MD    HISTORY OF PRESENT ILLNESS:   Chief Complaint: Bilateral lower extremity wounds and pain    History Of Present Illness:    Cheryl Elena is a 83 y.o. female with a significant past medical history of hypertension, hyperlipidemia, hypothyroidism, COPD, DVT long-term anticoagulation with apixaban presented from home with worsening bilateral lower extremity pain and wounds.  She states that she was at [facility] from 2/16/2023 until 12/15/2023 for rehabilitation.  She states that towards the end of her admission there she had noticed worsening blisters on her legs which did not improve.  She is does have white drainage however denies fevers or chills.  Does have significant erythema in bilateral feet going up  "to her lower legs.  She states that these wounds are clinically significant..    ED course:  - Vitals: Temperature 97.7, HR 94, /97, RR 20 Satcher 98% on room air  - Labs: Creatinine 1.8, potassium 5.4 otherwise unremarkable CMP.  Unremarkable CBC.  Blood cultures and tissue wound cultures reviewed collected.  - Interventions: Vancomycin and cefepime along with clonidine, Norco provided.     Review of systems: 10 point review of systems is otherwise negative except as mentioned above.    PAST HISTORIES:     Past Medical History: Hypertension, hyperlipidemia, CKD stage III, aortic stenosis, diastolic CHF, COPD, hypothyroidism, anemia of chronic disease, DVT, long-term anticoagulation on apixaban     Past Surgical History: Cholecystectomy, appendectomy, laparoscopic duodenal ulcer repair with Dimas patch      Social History: Previous tobacco smoker, quit 8 years ago, unknown pack-year history.  Denies current alcohol and illicit drug use.  Currently resides at home with her ex-.  Requires a wheelchair for mobility, is able to stand for transfers.    Family History: Patient unable to provide.     Allergies: Cephalexin, Cortisone, Diphenhydramine hcl, Prednisone, Codeine, and Penicillins    OBJECTIVE:     Last Recorded Vitals:  Vitals:    01/03/24 1158 01/03/24 1326 01/03/24 1525   BP: (!) 187/77 171/72 141/83   BP Location: Left arm Right arm Right arm   Patient Position: Sitting Lying Lying   Pulse: 94 90 104   Resp: 20 20 18   Temp: 36.5 °C (97.7 °F)  36.6 °C (97.9 °F)   TempSrc: Temporal  Temporal   SpO2: 99% 99% 98%   Weight: 91.6 kg (202 lb)  90.6 kg (199 lb 11.8 oz)   Height: 1.6 m (5' 3\")  1.6 m (5' 2.99\")     Last I/O:  No intake/output data recorded.    Physical Exam  Vitals reviewed.   Constitutional:       Appearance: Normal appearance.   HENT:      Head: Normocephalic and atraumatic.      Nose: Nose normal.      Mouth/Throat:      Mouth: Mucous membranes are moist.      Comments: Dentures " present.  Eyes:      Extraocular Movements: Extraocular movements intact.      Conjunctiva/sclera: Conjunctivae normal.      Pupils: Pupils are equal, round, and reactive to light.   Cardiovascular:      Rate and Rhythm: Normal rate and regular rhythm.      Pulses: Normal pulses.      Heart sounds: Normal heart sounds.   Pulmonary:      Effort: Pulmonary effort is normal. No respiratory distress.      Breath sounds: Normal breath sounds. No wheezing.   Abdominal:      General: Bowel sounds are normal.      Palpations: Abdomen is soft.      Tenderness: There is no abdominal tenderness. There is no guarding.   Musculoskeletal:         General: Swelling present. Normal range of motion.      Cervical back: Normal range of motion and neck supple.      Right lower leg: Edema present.      Left lower leg: Edema present.   Skin:     Findings: Erythema and rash present.      Comments: See below for images of the lower extremity wounds.   Neurological:      General: No focal deficit present.      Mental Status: She is alert and oriented to person, place, and time. Mental status is at baseline.   Psychiatric:         Mood and Affect: Mood normal.         Behavior: Behavior normal.         Thought Content: Thought content normal.     Left lower extremity 1/3/24      Right lower extremity 1/3/24    Scheduled Medications  [START ON 1/4/2024] cefepime, 1 g, intravenous, q12h  lactobacillus acidophilus, 1 capsule, oral, BID    PRN Medications  PRN medications: acetaminophen **OR** acetaminophen **OR** acetaminophen, hydrALAZINE, HYDROcodone-acetaminophen, labetaloL, polyethylene glycol  Continuous Medications     Outpatient Medications:  Prior to Admission medications    Not on File     LABS AND IMAGING:     Labs:  Results from last 7 days   Lab Units 01/03/24  1225   WBC AUTO x10*3/uL 11.2   RBC AUTO x10*6/uL 4.30   HEMOGLOBIN g/dL 11.3*   HEMATOCRIT % 35.5*   MCV fL 83   MCH pg 26.3   MCHC g/dL 31.8*   RDW % 17.3*   PLATELETS  AUTO x10*3/uL 350     Results from last 7 days   Lab Units 01/03/24  1225   SODIUM mmol/L 138   POTASSIUM mmol/L 5.4*   CHLORIDE mmol/L 108*   CO2 mmol/L 20*   BUN mg/dL 46*   CREATININE mg/dL 1.82*   GLUCOSE mg/dL 101*   PROTEIN TOTAL g/dL 8.0   CALCIUM mg/dL 9.3   BILIRUBIN TOTAL mg/dL 0.3   ALK PHOS U/L 121   AST U/L 12   ALT U/L 16     Imaging:  Electrocardiogram 12 Lead  Sinus tachycardia  Otherwise normal ECG  No previous ECGs available  Confirmed by DELMAR HAN MD [id number]) on 9/19/2021 8:08:31 PM  Electrocardiogram 12 Lead  Normal sinus rhythm  Cannot rule out Anterior infarct , age undetermined  Abnormal ECG  No previous ECGs available  Confirmed by CAROL JENNINGS MD [id number]) on 9/28/2021 7:22:52 PM  Electrocardiogram 12 Lead  Sinus rhythm with 1st degree AV block  Otherwise normal ECG  When compared with ECG of 28-SEP-2021 12:38,  MT interval has increased  Confirmed by CAROL JENNINGS MD [id number]) on 10/1/2021 12:46:00 PM  Electrocardiogram 12 Lead  Sinus rhythm with 2nd degree AV block (Mobitz I)  Abnormal ECG  When compared with ECG of 02-OCT-2021 08:23,  Previous ECG has undetermined rhythm, needs review  Confirmed by CAROL JENNINGS MD [id number]) on 10/2/2021 10:16:28 AM  Electrocardiogram 12 Lead  Normal sinus rhythm  Anterior infarct , age undetermined  T wave abnormality, consider lateral ischemia  Abnormal ECG  When compared with ECG of 02-OCT-2021 08:23,  Sinus rhythm is no longer with 2nd degree AV block (Mobitz I)  Anterior infarct is now present  Non-specific change in ST segment in Inferior leads  T wave inversion now evident in Lateral leads  QT has lengthened  Confirmed by MEDHAT ECHOLS MD ([id number]) on 10/5/2021 9:00:21 PM  Electrocardiogram 12 Lead  Sinus rhythm with occasional premature ventricular complexes  Low voltage QRS  Cannot rule out Anterior infarct (cited on or before 05-OCT-2021)  ST & T wave abnormality, consider lateral ischemia  Abnormal ECG  When compared with ECG of 05-OCT-2021  18:06,  premature ventricular complexes are now present  Serial changes of Anterior infarct present  Confirmed by MEDHAT ECHOLS MD ([id number]) on 10/7/2021 12:12:43 PM

## 2024-01-03 NOTE — ED PROVIDER NOTES
HPI   Chief Complaint   Patient presents with    Wound Infection       Patient is a 83-year-old female brought in by EMS because of foul-smelling drainage and open sores in both the legs and the feet.  Patient has history of hypertension hyperlipidemia chronic kidney disease lower venous stasis said that she was just released from a nursing home few weeks ago and was doing fine and then within 5 days of getting home started having open wounds started draining and now is foul-smelling drainage coming home both the lower legs and the feet.  No fevers no chills no nausea no vomiting                          [place] Coma Scale Score: 15                  Patient History   No past medical history on file.  No past surgical history on file.  No family history on file.  Social History     Tobacco Use    Smoking status: Not on file    Smokeless tobacco: Not on file   Substance Use Topics    Alcohol use: Not on file    Drug use: Not on file       Physical Exam   ED Triage Vitals [01/03/24 1158]   Temp Heart Rate Resp BP   36.5 °C (97.7 °F) 94 20 (!) 187/77      SpO2 Temp Source Heart Rate Source Patient Position   99 % Temporal Monitor Sitting      BP Location FiO2 (%)     Left arm --       Physical Exam  Vitals and nursing note reviewed.   HENT:      Head: Normocephalic.   Cardiovascular:      Rate and Rhythm: Normal rate and regular rhythm.   Pulmonary:      Effort: Pulmonary effort is normal.   Musculoskeletal:         General: Swelling and tenderness present.      Cervical back: Normal range of motion.   Skin:     Findings: Erythema present.      Comments: Both the lower extremities below the knee are warm erythematous indurated tender 1+ edema, open wounds with foul-smelling drainage, both the feet on the dorsum open sores on the toes and the top of the feet with granulation tissue foul-smelling drainage   Neurological:      Mental Status: She is alert.         ED Course & MDM   Diagnoses as of 01/03/24 1410   Cellulitis  of lower extremity, unspecified laterality   Acute kidney injury superimposed on chronic kidney disease (CMS/[facility])       Medical Decision Making  My differential diagnosis  Cellulitis, wound infection, electrolyte imbalance  Ordered CBC chemistries lactate blood cultures and reviewed the patient's old records.  I have discharged the patient on antibiotics and further workup and management and based upon the results of the test ordered  EKG interpreted by me shows a normal sinus rhythm rate of 78 normal QRS and normal ST segments    Labs Reviewed  CBC WITH AUTO DIFFERENTIAL - Abnormal     WBC                           11.2                   nRBC                          0.0                    RBC                           4.30                   Hemoglobin                    11.3 (*)               Hematocrit                    35.5 (*)               MCV                           83                     MCH                           26.3                   MCHC                          31.8 (*)               RDW                           17.3 (*)               Platelets                     350                    Neutrophils %                 83.7                   Immature Granulocytes %, Automated   0.6                    Lymphocytes %                 9.2                    Monocytes %                   4.9                    Eosinophils %                 1.3                    Basophils %                   0.3                    Neutrophils Absolute          9.38 (*)               Immature Granulocytes Absolute, Au*   0.07                   Lymphocytes Absolute          1.03                   Monocytes Absolute            0.55                   Eosinophils Absolute          0.14                   Basophils Absolute            0.03                BASIC METABOLIC PANEL - Abnormal     Glucose                       101 (*)                Sodium                        138                    Potassium                     5.4  (*)                Chloride                      108 (*)                Bicarbonate                   20 (*)                 Anion Gap                     15                     Urea Nitrogen                 46 (*)                 Creatinine                    1.82 (*)               eGFR                          27 (*)                 Calcium                       9.3                 HEPATIC FUNCTION PANEL - Normal     Albumin                       4.0                    Bilirubin, Total              0.3                    Bilirubin, Direct             0.1                    Alkaline Phosphatase          121                    ALT                           16                     AST                           12                     Total Protein                 8.0                 LACTATE - Normal     Lactate                       1.1                      Narrative: Venipuncture immediately after or during the administration of Metamizole may lead to falsely low results. Testing should be performed immediately                prior to Metamizole dosing.  BLOOD CULTURE  BLOOD CULTURE  TISSUE/WOUND CULTURE/SMEAR     No orders to display      At this time patient was evaluated by me, has significant cellulitis of both lower extremities in the feet is not have any follow-up with any physicians I did not discuss with him an admission initially she was reluctant but I was able to explain to her the reasons for admitting her family was at the bedside at this time patient admitted to the hospital.  After reviewing the old records patient was on cefepime and vancomycin that was ordered for her also.        Procedure  Procedures     Ricardo Power MD  01/03/24 [id number]

## 2024-01-03 NOTE — PROGRESS NOTES
Pharmacy Medication History Review    Cheryl Elena is a 83 y.o. female admitted for Cellulitis of lower extremity, unspecified laterality. Pharmacy reviewed the patient's [id number] medications and allergies for accuracy.    The list below reflectives the updated PTA list. Please review each medication in order reconciliation for additional clarification and justification.  Medications Prior to Admission   Medication Sig Dispense Refill Last Dose    apixaban (Eliquis) 5 mg tablet Take 1 tablet (5 mg) by mouth 2 times a day.   1/3/2024 at am    furosemide (Lasix) 20 mg tablet Take 1 tablet (20 mg) by mouth once daily.       levothyroxine (Synthroid, Levoxyl) 150 mcg tablet Take 1 tablet (150 mcg) by mouth once daily in the morning. Take before meals.   1/3/2024 at am    oxyCODONE-acetaminophen (Percocet) 5-325 mg tablet Take 1 tablet by mouth every 4 hours if needed for severe pain (7 - 10).   Unknown        The list below reflectives the updated allergy list. Please review each documented allergy for additional clarification and justification.  Allergies  Reviewed by Armando Loyd RN on 1/3/2024        Severity Reactions Comments    Cephalexin Not Specified Other Other reaction(s): Unknown   Tolerated ceftriaxone during 8/2021 admission    Cortisone Not Specified Swelling     Diphenhydramine Hcl Not Specified Other     Prednisone Not Specified Other     Codeine Low Other, Rash     Penicillins Low Other, Rash             Below are additional concerns with the patient's PTA list.  Patient states only takes  3 meds    Keila Velazquez CPhT

## 2024-01-03 NOTE — NURSING NOTE
Pt admitted for ABT for wounds on bilateral legs. Pt VS obtained and assessment, admission completed. Orientation to room and surrounding. Care plan completed. Pt is non compliant with med regime at home per family. Education provided on importance of taking prescribed medications. On going education will  be needed.

## 2024-01-03 NOTE — Clinical Note
ED  Review Not Complete    [unfilled]      
I have reviewed and confirmed nurses' notes for patient's medications, allergies, medical history, and surgical history.

## 2024-01-04 LAB
ANION GAP SERPL CALC-SCNC: 12 MMOL/L (ref 10–20)
BASOPHILS # BLD AUTO: 0.02 X10*3/UL (ref 0–0.1)
BASOPHILS NFR BLD AUTO: 0.2 %
BUN SERPL-MCNC: 41 MG/DL (ref 6–23)
CALCIUM SERPL-MCNC: 8.5 MG/DL (ref 8.6–10.3)
CHLORIDE SERPL-SCNC: 112 MMOL/L (ref 98–107)
CO2 SERPL-SCNC: 18 MMOL/L (ref 21–32)
CREAT SERPL-MCNC: 1.7 MG/DL (ref 0.5–1.05)
EOSINOPHIL # BLD AUTO: 0.19 X10*3/UL (ref 0–0.4)
EOSINOPHIL NFR BLD AUTO: 1.6 %
ERYTHROCYTE [DISTWIDTH] IN BLOOD BY AUTOMATED COUNT: 16.8 % (ref 11.5–14.5)
GFR SERPL CREATININE-BSD FRML MDRD: 29 ML/MIN/1.73M*2
GLUCOSE SERPL-MCNC: 99 MG/DL (ref 74–99)
HCT VFR BLD AUTO: 33.9 % (ref 36–46)
HGB BLD-MCNC: 10.7 G/DL (ref 12–16)
HOLD SPECIMEN: NORMAL
IMM GRANULOCYTES # BLD AUTO: 0.06 X10*3/UL (ref 0–0.5)
IMM GRANULOCYTES NFR BLD AUTO: 0.5 % (ref 0–0.9)
LYMPHOCYTES # BLD AUTO: 1.47 X10*3/UL (ref 0.8–3)
LYMPHOCYTES NFR BLD AUTO: 12.7 %
MCH RBC QN AUTO: 26.6 PG (ref 26–34)
MCHC RBC AUTO-ENTMCNC: 31.6 G/DL (ref 32–36)
MCV RBC AUTO: 84 FL (ref 80–100)
MONOCYTES # BLD AUTO: 1.1 X10*3/UL (ref 0.05–0.8)
MONOCYTES NFR BLD AUTO: 9.5 %
NEUTROPHILS # BLD AUTO: 8.7 X10*3/UL (ref 1.6–5.5)
NEUTROPHILS NFR BLD AUTO: 75.5 %
NRBC BLD-RTO: 0 /100 WBCS (ref 0–0)
PLATELET # BLD AUTO: 312 X10*3/UL (ref 150–450)
POTASSIUM SERPL-SCNC: 4.9 MMOL/L (ref 3.5–5.3)
RBC # BLD AUTO: 4.02 X10*6/UL (ref 4–5.2)
SODIUM SERPL-SCNC: 137 MMOL/L (ref 136–145)
VANCOMYCIN SERPL-MCNC: 9.6 UG/ML (ref 5–20)
WBC # BLD AUTO: 11.5 X10*3/UL (ref 4.4–11.3)

## 2024-01-04 PROCEDURE — 80048 BASIC METABOLIC PNL TOTAL CA: CPT | Performed by: STUDENT IN AN ORGANIZED HEALTH CARE EDUCATION/TRAINING PROGRAM

## 2024-01-04 PROCEDURE — 36415 COLL VENOUS BLD VENIPUNCTURE: CPT | Performed by: STUDENT IN AN ORGANIZED HEALTH CARE EDUCATION/TRAINING PROGRAM

## 2024-01-04 PROCEDURE — 99232 SBSQ HOSP IP/OBS MODERATE 35: CPT | Performed by: STUDENT IN AN ORGANIZED HEALTH CARE EDUCATION/TRAINING PROGRAM

## 2024-01-04 PROCEDURE — 36415 COLL VENOUS BLD VENIPUNCTURE: CPT

## 2024-01-04 PROCEDURE — 2500000004 HC RX 250 GENERAL PHARMACY W/ HCPCS (ALT 636 FOR OP/ED): Performed by: PHARMACIST

## 2024-01-04 PROCEDURE — 85025 COMPLETE CBC W/AUTO DIFF WBC: CPT | Performed by: STUDENT IN AN ORGANIZED HEALTH CARE EDUCATION/TRAINING PROGRAM

## 2024-01-04 PROCEDURE — 2500000004 HC RX 250 GENERAL PHARMACY W/ HCPCS (ALT 636 FOR OP/ED): Performed by: STUDENT IN AN ORGANIZED HEALTH CARE EDUCATION/TRAINING PROGRAM

## 2024-01-04 PROCEDURE — 97161 PT EVAL LOW COMPLEX 20 MIN: CPT | Mod: GP | Performed by: PHYSICAL THERAPIST

## 2024-01-04 PROCEDURE — 80202 ASSAY OF VANCOMYCIN: CPT

## 2024-01-04 PROCEDURE — 2500000001 HC RX 250 WO HCPCS SELF ADMINISTERED DRUGS (ALT 637 FOR MEDICARE OP): Performed by: STUDENT IN AN ORGANIZED HEALTH CARE EDUCATION/TRAINING PROGRAM

## 2024-01-04 PROCEDURE — 97165 OT EVAL LOW COMPLEX 30 MIN: CPT | Mod: GO

## 2024-01-04 PROCEDURE — 1210000001 HC SEMI-PRIVATE ROOM DAILY

## 2024-01-04 RX ADMIN — SODIUM ZIRCONIUM CYCLOSILICATE 10 G: 10 POWDER, FOR SUSPENSION ORAL at 11:16

## 2024-01-04 RX ADMIN — VANCOMYCIN HYDROCHLORIDE 1250 MG: 1.25 INJECTION, POWDER, LYOPHILIZED, FOR SOLUTION INTRAVENOUS at 14:44

## 2024-01-04 RX ADMIN — OXYCODONE HYDROCHLORIDE AND ACETAMINOPHEN 1 TABLET: 5; 325 TABLET ORAL at 19:22

## 2024-01-04 RX ADMIN — OXYCODONE HYDROCHLORIDE AND ACETAMINOPHEN 1 TABLET: 5; 325 TABLET ORAL at 07:45

## 2024-01-04 RX ADMIN — OXYCODONE HYDROCHLORIDE AND ACETAMINOPHEN 1 TABLET: 5; 325 TABLET ORAL at 13:47

## 2024-01-04 RX ADMIN — CEFEPIME 1 G: 1 INJECTION, POWDER, FOR SOLUTION INTRAMUSCULAR; INTRAVENOUS at 01:58

## 2024-01-04 RX ADMIN — LEVOTHYROXINE SODIUM 150 MCG: 75 TABLET ORAL at 06:19

## 2024-01-04 RX ADMIN — Medication 1 TABLET: at 07:45

## 2024-01-04 RX ADMIN — APIXABAN 5 MG: 5 TABLET, FILM COATED ORAL at 07:44

## 2024-01-04 RX ADMIN — CEFEPIME 1 G: 1 INJECTION, POWDER, FOR SOLUTION INTRAMUSCULAR; INTRAVENOUS at 13:44

## 2024-01-04 RX ADMIN — FUROSEMIDE 20 MG: 40 TABLET ORAL at 07:44

## 2024-01-04 RX ADMIN — APIXABAN 5 MG: 5 TABLET, FILM COATED ORAL at 21:46

## 2024-01-04 ASSESSMENT — COGNITIVE AND FUNCTIONAL STATUS - GENERAL
MOBILITY SCORE: 9
MOVING FROM LYING ON BACK TO SITTING ON SIDE OF FLAT BED WITH BEDRAILS: A LOT
WALKING IN HOSPITAL ROOM: A LOT
DAILY ACTIVITIY SCORE: 12
DAILY ACTIVITIY SCORE: 18
PERSONAL GROOMING: A LITTLE
DRESSING REGULAR UPPER BODY CLOTHING: A LITTLE
STANDING UP FROM CHAIR USING ARMS: A LOT
HELP NEEDED FOR BATHING: A LOT
STANDING UP FROM CHAIR USING ARMS: TOTAL
MOBILITY SCORE: 14
DRESSING REGULAR LOWER BODY CLOTHING: A LOT
TURNING FROM BACK TO SIDE WHILE IN FLAT BAD: A LOT
TOILETING: A LITTLE
DRESSING REGULAR UPPER BODY CLOTHING: A LOT
WALKING IN HOSPITAL ROOM: TOTAL
HELP NEEDED FOR BATHING: TOTAL
CLIMB 3 TO 5 STEPS WITH RAILING: TOTAL
CLIMB 3 TO 5 STEPS WITH RAILING: TOTAL
MOVING TO AND FROM BED TO CHAIR: A LOT
MOVING TO AND FROM BED TO CHAIR: A LOT
TURNING FROM BACK TO SIDE WHILE IN FLAT BAD: A LITTLE
TOILETING: TOTAL
DRESSING REGULAR LOWER BODY CLOTHING: TOTAL

## 2024-01-04 ASSESSMENT — PAIN SCALES - GENERAL
PAINLEVEL_OUTOF10: 9
PAINLEVEL_OUTOF10: 7
PAINLEVEL_OUTOF10: 9
PAINLEVEL_OUTOF10: 9
PAINLEVEL_OUTOF10: 7

## 2024-01-04 ASSESSMENT — PAIN DESCRIPTION - LOCATION
LOCATION: LEG

## 2024-01-04 ASSESSMENT — ACTIVITIES OF DAILY LIVING (ADL)
ADL_ASSISTANCE: INDEPENDENT
BATHING_ASSISTANCE: MAXIMAL
ADL_ASSISTANCE: INDEPENDENT
EFFECT OF PAIN ON DAILY ACTIVITIES: LIMITING

## 2024-01-04 ASSESSMENT — PAIN - FUNCTIONAL ASSESSMENT
PAIN_FUNCTIONAL_ASSESSMENT: 0-10
PAIN_FUNCTIONAL_ASSESSMENT: 0-10

## 2024-01-04 ASSESSMENT — PAIN DESCRIPTION - ORIENTATION
ORIENTATION: RIGHT;LEFT

## 2024-01-04 NOTE — PROGRESS NOTES
01/04/24 1123   Discharge Planning   Living Arrangements Spouse/significant other   Support Systems Spouse/significant other   Assistance Needed wound care   Type of Residence Skilled nursing facility   Home or Post Acute Services Post acute facilities (Rehab/SNF/etc)   Type of Post Acute Facility Services Skilled nursing   Patient expects to be discharged to: TBD   Does the patient need discharge transport arranged? Yes   RoundTrip coordination needed? Yes   Has discharge transport been arranged? No   What day is the transport expected? 01/08/24   Patient Choice   Provider Choice list and CMS website (https://medicare.gov/care-compare#search) for post-acute Quality and Resource Measure Data were provided and reviewed with: Family     Spoke with patient's brother Uvaldo who is POA, and also patient afterwards. Patient is from home, had only been home for a couple weeks and is now back for atiya lower extremity wounds. Patient was at [facility], had Medicaid and her son was granted immediate guardianship last fall. Son had patient placed at [facility], she was there for almost 8 or 9 months. Patient's son lost guardianship early December of 23, so patient signed herself out of [facility] and went back home. According to Uvaldo, patient is going through a divorce, house is going to be sold. Asked patient what her plan was if the house gets sold, she states she will take care of things on her own. Patient is non-ambulatory, wheelchair bound. Will advise PCP to see if they want another psyche consult to declare patient unable to make sound choices for medical care. Patient states she is going home and no one is stopping her. Patient's brother Uvaldo states he would like her to go to [facility] which PT/OT are also recommending.

## 2024-01-04 NOTE — CONSULTS
Consults  Referred by Dr. Dominick Bourgeois MD: No Assigned PCP Generic Provider, MD    Reason For Consult  Leg cellulitis    History Of Present Illness  Cheryl Elena is a 83 y.o. female with past medical history of chronic bilateral legs nonhealing ulcers with recurrent bacterial infection, COPD hypertension hearing loss, history of left second toe osteomyelitis, history of spilling hot butter on the legs more than 1 year ago, was admitted with increased bilateral legs redness, worsening ulcerations with purulent drainage involving the feet and the toes, associated with severe pain and secondary anxiety.  Patient denies any fevers .   Positive chills   Patient was last seen by me/ID was last April.  She follows up by Dr. Reyes  Patient has history of Pseudomonas aeruginosa infection of wounds  She reports decreased appetite and generalized weakness    Past Medical History  She has a past medical history of COPD (chronic obstructive pulmonary disease) (CMS/[facility]) and Hypertension.    Surgical History  She has no past surgical history on file.     Social History     Occupational History    Not on file   Tobacco Use    Smoking status: Never    Smokeless tobacco: Never   Substance and Sexual Activity    Alcohol use: Not on file    Drug use: Not on file    Sexual activity: Not on file     Travel History   Travel since 12/04/23    No documented travel since 12/04/23              Family History  No family history on file.  Allergies  Cephalexin, Cortisone, Diphenhydramine hcl, Prednisone, Codeine, and Penicillins       There is no immunization history on file for this patient.  Medications  Home medications:  Medications Prior to Admission   Medication Sig Dispense Refill Last Dose    apixaban (Eliquis) 5 mg tablet Take 1 tablet (5 mg) by mouth 2 times a day.   1/3/2024 at am    furosemide (Lasix) 20 mg tablet Take 1 tablet (20 mg) by mouth once daily.       levothyroxine (Synthroid, Levoxyl) 150 mcg tablet Take 1 tablet  "(150 mcg) by mouth once daily in the morning. Take before meals.   1/3/2024 at am    oxyCODONE-acetaminophen (Percocet) 5-325 mg tablet Take 1 tablet by mouth every 4 hours if needed for severe pain (7 - 10).   Unknown     Current medications:  Scheduled medications  apixaban, 5 mg, oral, BID  cefepime, 1 g, intravenous, q12h  furosemide, 20 mg, oral, Daily  lactobacillus acidophilus, 1 tablet, oral, BID  levothyroxine, 150 mcg, oral, Daily before breakfast  vancomycin, 1,250 mg, intravenous, Once      Continuous medications     PRN medications  PRN medications: acetaminophen **OR** acetaminophen **OR** acetaminophen, hydrALAZINE, labetaloL, oxyCODONE-acetaminophen, polyethylene glycol, vancomycin    Review of Systems  14 system review is negative otherwise    Objective  Range of Vitals (last 24 hours)  Heart Rate:  []   Temp:  [36 °C (96.8 °F)-36.6 °C (97.9 °F)]   Resp:  [17]   BP: (140-152)/(64-72)   Height:  [160 cm (5' 2.99\")]   Weight:  [90.6 kg (199 lb 11.8 oz)]   SpO2:  [96 %-98 %]   Daily Weight  01/04/24 : 90.6 kg (199 lb 11.8 oz)    Body mass index is 35.39 kg/m².     Physical Exam  Vitals:    01/03/24 1926 01/03/24 2245 01/04/24 0747 01/04/24 1342   BP: 148/69 151/72 152/67 140/64   BP Location:       Patient Position:       Pulse: 97 (!) 119 90 84   Resp: 17 17     Temp: 36.2 °C (97.2 °F) 36 °C (96.8 °F) 36.6 °C (97.9 °F) 36.4 °C (97.5 °F)   TempSrc:    Temporal   SpO2: 97% 97% 96% 98%   Weight:    90.6 kg (199 lb 11.8 oz)   Height:    1.6 m (5' 2.99\")     General Appearance: alert and oriented to person, place and time, well-developed and well-nourished, in no acute distress  On room air  Hard of hearing  Was tachycardic last night  Skin: warm and dry, no rash.   Head: normocephalic and atraumatic  Eyes: anicteric sclerae  ENT:  normal mucous membranes. No oral thrush  Lungs: normal respiratory effort, clear lungs, diminished breath sounds bilateral lung fields  Heart distant heart " sounds  Abdomen: soft, no tenderness  No leg edema  Bilateral legs and feet erythema, severe tenderness, extensive necrotic ulcer with brown drainage  Surrounding punctate lesions  Left second and third toe and right second toe with necrotic ulcers  Weak peripheral pulses           Labs  Results from last 72 hours   Lab Units 01/04/24  0420 01/03/24  1225   WBC AUTO x10*3/uL 11.5* 11.2   HEMOGLOBIN g/dL 10.7* 11.3*   HEMATOCRIT % 33.9* 35.5*   PLATELETS AUTO x10*3/uL 312 350   NEUTROS PCT AUTO % 75.5 83.7   LYMPHS PCT AUTO % 12.7 9.2   MONOS PCT AUTO % 9.5 4.9   EOS PCT AUTO % 1.6 1.3     Results from last 72 hours   Lab Units 01/04/24  0420 01/03/24  1225   SODIUM mmol/L 137 138   POTASSIUM mmol/L 4.9 5.4*   CHLORIDE mmol/L 112* 108*   CO2 mmol/L 18* 20*   BUN mg/dL 41* 46*   CREATININE mg/dL 1.70* 1.82*   GLUCOSE mg/dL 99 101*   CALCIUM mg/dL 8.5* 9.3   ANION GAP mmol/L 12 15   EGFR mL/min/1.73m*2 29* 27*     Results from last 72 hours   Lab Units 01/03/24  1225   ALK PHOS U/L 121   BILIRUBIN TOTAL mg/dL 0.3   BILIRUBIN DIRECT mg/dL 0.1   PROTEIN TOTAL g/dL 8.0   ALT U/L 16   AST U/L 12   ALBUMIN g/dL 4.0     Estimated Creatinine Clearance: 26.3 mL/min (A) (by C-G formula based on SCr of 1.7 mg/dL (H)).  C-Reactive Protein   Date Value Ref Range Status   01/03/2024 7.09 (H) <1.00 mg/dL Final     CRP   Date Value Ref Range Status   02/07/2023 9.80 (A) mg/dL Final     Comment:     REF VALUE  < 1.00     09/07/2022 1.21 (A) mg/dL Final     Comment:     REF VALUE  < 1.00       Sedimentation Rate   Date Value Ref Range Status   01/03/2024 33 (H) 0 - 30 mm/h Final   02/07/2023 39 (H) 0 - 30 mm/h Final     Comment:     Please note new reference ranges as of 5/9/2022.   09/13/2022 45 (H) 0 - 30 mm/h Final     Comment:     Please note new reference ranges as of 5/9/2022.  Ran on alternate instrument  Reference Ranges: Males: 0-15                    Females: 0-20                    Children under 18: 0-10       No results  "found for: \"HIV1X2\", \"HIVCONF\", \"SXQHLI8GQ\"  No results found for: \"HEPCABINIT\", \"HEPCAB\", \"HCVPCRQUANT\"  Microbiology  Susceptibility data from last 90 days.  Collected Specimen Info Organism   01/03/24 Tissue/Biopsy from Skin Lesion Pseudomonas aeruginosa   Imaging  Arterial duplex done September 2022 was reviewed  No evidence of significant stenosis.  It was an incomplete exam because of severe pain  Blood cultures done from this admission are pending  Sed rate of 33  Mild leukocytosis noted with white blood cell count of 11.5  CRP is elevated at 7.09  Patient was in acute kidney injury on chronic kidney disease on admission  Wound culture done yesterday with Pseudomonas aeruginosa pending susceptibility    Assessment/Plan       IMPRESSION:    Infected stasis ulcers of bilateral legs with Pseudomonas aeruginosa  Bilateral leg cellulitis  Toe gangrene bilateral feet  Acute kidney injury on chronic kidney disease  Chronic bilateral legs nonhealing wounds  MRSA carrier    PLAN:  Continue IV cefepime for now  Check culture and sensitivity and accordingly adjust antibiotics  May DC vancomycin if there is no evidence of MRSA on cultures  Patient is going to require prolonged course of IV antibiotics on discharge based on sensitivity  Local wound care and follow-up by wound care team and Dr. Reyes  Follow-up CBC BMP  Consult vascular surgery for vascular insufficiency      Jeny Jara MD  "

## 2024-01-04 NOTE — PROGRESS NOTES
Per saw genao pt. Recently d/c'd from [facility], home alone, unable to care for self and readmitted.  She has bilateral leg wounds, cellulitis , unable to walk. Per brother/dpoa-hc her son had guardianship over here while she was at [facility] and then was overturned and pt. Left [facility] about 3 weeks ago, went home, has no pcp or homecare.  Met with pt. Along with marissa  pt. Is a/o x 3, states she is planning on return home,  plans on finding a pcp and she states she has a new ins./[employer] medicare and will get hc set up from there.  She states she is in the middle of a divorce and selling their home, needs to return home to start packing boxes.  Per marissa brother/dpoa-hc would like her to go to [facility].  Pt. States she is going home not sure she is fully understanding her limitations due to her medical condition.  Message left for [facility] to see if she is an open case with them. Will continue to follow.    Update:  received return call from [facility] vipul duong [phone] states pt. Has been active with [facility] on and off, currently not an open case.   She states pt. Has refused services with them; but that if needed we can  make referral on discharge if going home.

## 2024-01-04 NOTE — CONSULTS
"Nutrition Initial Assessment:   Nutrition Assessment    Reason for Assessment: Admission nursing screening (MST, wounds)    Patient is a 83 y.o. female presenting with:  Cellulitis of lower extremity      Nutrition History:  Food and Nutrient History: Attempted to meet with pt x3 today, pt sleeping at each attempted visit. RDN consulted for MST score of 3, wounds. Pt with cellulitis and blisters to bilateral legs. No recent wt available in EMR. WIll add Giuliano with breakfast and dinner to help aid in wound healing - discussed with RN. Will monitor po intake and follow for the need for any dietary changes.  Food Allergies/Intolerances:  None  GI Symptoms: None  Oral Problems: None       Anthropometrics:  Height: 160 cm (5' 2.99\")   Weight: 90.6 kg (199 lb 11.8 oz)   BMI (Calculated): 35.39  IBW/kg (Dietitian Calculated): 52.3 kg  Percent of IBW: 173 %       Weight History:   Wt Readings from Last 10 Encounters:   01/04/24 90.6 kg (199 lb 11.8 oz)   12/08/21 89.4 kg (197 lb)      Weight Change %:  Weight History / % Weight Change: Unsure of recent wt changes, no recent wt available in EMR    Nutrition Focused Physical Exam Findings:  defer: Pt sleeping at time of visit - visually no signs of muscle wasting or fat loss  Subcutaneous Fat Loss:      Muscle Wasting:     Edema:     Physical Findings:       Nutrition Significant Labs:  CBC Trend:   Results from last 7 days   Lab Units 01/04/24  0420 01/03/24  1225   WBC AUTO x10*3/uL 11.5* 11.2   RBC AUTO x10*6/uL 4.02 4.30   HEMOGLOBIN g/dL 10.7* 11.3*   HEMATOCRIT % 33.9* 35.5*   MCV fL 84 83   PLATELETS AUTO x10*3/uL 312 350    , BMP Trend:   Results from last 7 days   Lab Units 01/04/24  0420 01/03/24  1225   GLUCOSE mg/dL 99 101*   CALCIUM mg/dL 8.5* 9.3   SODIUM mmol/L 137 138   POTASSIUM mmol/L 4.9 5.4*   CO2 mmol/L 18* 20*   CHLORIDE mmol/L 112* 108*   BUN mg/dL 41* 46*   CREATININE mg/dL 1.70* 1.82*    , Renal Lab Trend:   Results from last 7 days   Lab Units " 01/04/24  0420 01/03/24  1225   POTASSIUM mmol/L 4.9 5.4*   SODIUM mmol/L 137 138   EGFR mL/min/1.73m*2 29* 27*   BUN mg/dL 41* 46*   CREATININE mg/dL 1.70* 1.82*        Nutrition Specific Medications:  reviewed    I/O:    ;          Dietary Orders (From admission, onward)       Start     Ordered    01/03/24 1516  Adult diet Cardiac; 70 gm fat; 2 - 3 grams Sodium  Diet effective now        Question Answer Comment   Diet type Cardiac    Fat restriction: 70 gm fat    Sodium restriction: 2 - 3 grams Sodium        01/03/24 1515                     Estimated Needs:   Total Energy Estimated Needs (kCal): 2265 kCal  Method for Estimating Needs: ABW  Total Protein Estimated Needs (g): 109 g  Method for Estimating Needs: ABW  Total Fluid Estimated Needs (mL): 2265 mL  Method for Estimating Needs: ABW        Nutrition Diagnosis   Malnutrition Diagnosis  Patient has Malnutrition Diagnosis: No    Nutrition Diagnosis  Patient has Nutrition Diagnosis: Yes  Diagnosis Status (1): New  Nutrition Diagnosis 1: Increased nutrient needs  Related to (1): healing  As Evidenced by (1): blisters and cellulitis to BLE       Nutrition Interventions/Recommendations         Nutrition Prescription:  Individualized Nutrition Prescription Provided for : Continue Cardiac diet, Giuliano BID to help with healing        Nutrition Interventions:   Food and/or Nutrient Delivery Interventions  Interventions: Meals and snacks, Medical food supplement  Meals and Snacks: General healthful diet, Protein-modified diet, Diets modified for specific foods or ingredients  Goal: Consumes 3 meals per day  Medical Food Supplement: Commercial beverage  Goal: Giuliano BID (provides 90 kcal and 2.5 g protein per packet).    Coordination of Nutrition Care by a Nutrition Professional  Collaboration and Referral of Nutrition Care: Collaboration by nutrition professional with other providers    Nutrition Education:   Not appropriate at this time       Nutrition Monitoring and  Evaluation   Food/Nutrient Related History Monitoring  Monitoring and Evaluation Plan: Energy intake, Amount of food  Energy Intake: Estimated energy intake  Criteria: Pt meets >75% of estimated energy needs  Amount of Food: Estimated amout of food, Medical food intake  Criteria: Pt consumes >75% of meals and supplements    Body Composition/Growth/Weight History  Monitoring and Evaluation Plan: Weight  Weight: Measured weight  Criteria: Maintains stable weight         Nutrition Focused Physical Findings  Monitoring and Evaluation Plan: Skin  Criteria: signs/symptoms of healing       Time Spent/Follow-up Reminder:   Time Spent (min): 45 minutes  Last Date of Nutrition Visit: 01/04/24  Nutrition Follow-Up Needed?: 3-8 days  Follow up Comment: Giuliano BID - pt sleeping unable to obtan wt hx/NFPE

## 2024-01-04 NOTE — PROGRESS NOTES
"Vancomycin Dosing by Pharmacy- FOLLOW UP    Cheryl Elena is a 83 y.o. year old female who Pharmacy has been consulted for vancomycin dosing for cellulitis, skin and soft tissue. Based on the patient's indication and renal status this patient is being dosed based on a goal trough/random level of 15-20.     Renal function is currently stable.    Current vancomycin dose: 1000 mg given once 1/3 1243    Most recent trough level: 9.6 mcg/mL    Visit Vitals  /67   Pulse 90   Temp 36.6 °C (97.9 °F)   Resp 17        Lab Results   Component Value Date    CREATININE 1.70 (H) 01/04/2024    CREATININE 1.82 (H) 01/03/2024    CREATININE 1.19 (H) 02/14/2023    CREATININE 1.21 (H) 02/13/2023    CREATININE 1.25 (H) 02/12/2023    CREATININE 1.33 (H) 02/11/2023        Patient weight is No results found for: \"PTWEIGHT\"    No results found for: \"CULTURE\"     I/O last 3 completed shifts:  In: 50 (0.6 mL/kg) [IV Piggyback:50]  Out: 800 (8.8 mL/kg) [Urine:800 (0.2 mL/kg/hr)]  Weight: 90.6 kg   [unfilled]    No results found for: \"PATIENTTEMP\"     Assessment/Plan    Below level results.  Will redose Vancomycin 1250 mg once at 1/4 1500    The next level will be obtained on 1/5 at 1400. May be obtained sooner if clinically indicated.   Will continue to monitor renal function daily while on vancomycin and order serum creatinine at least every 48 hours if not already ordered.  Follow for continued vancomycin needs, clinical response, and signs/symptoms of toxicity.       Landy León, [facility]           "

## 2024-01-04 NOTE — PROGRESS NOTES
Medical Group Progress Note  ASSESSMENT & PLAN:     Bilateral lower extremity wounds and cellulitis  - Images as in the media section from 1/3, slightly improved erythema  - ESR and CRP reviewed  - Continue vancomycin and cefepime  - ID consulted  - Plastic surgery consulted  - Follow-up wound cultures collected by ED  - Follow blood cultures    Anemia of chronic disease  - Hemoglobin 10.7 today (baseline approximately 9)     CKD stage III  Hyperkalemia  - Creatinine 1.7 today (baseline approximately 1.3-1.5)  - Potassium 5.4 today, will give one-time dose of Lokelma  - Continue to trend renal function, holding any medications causing hyperkalemia once reconciled     Essential hypertension  Hyperlipidemia  Diastolic CHF, compensated  COPD, not in exacerbation  Hypothyroidism  Personal history of DVT  Long-term anticoagulation use  - Resume medications as reconciled     There are concerns that patient lacks capacity, she states that she will be wanting to go home on discharge with no she cannot ambulate.  She was long-term care previously at [facility] herself out after guardianship from her son was removed.  Per [facility]'s, they spoke with patient's family and would like her to be discharged to a facility for continued care, they do not feel that she is safe at home.  As a result we will consult psychiatry to assist us with this evaluation.  Patient did apparently previously require [facility] psych hospitalization as well.    VTE Prophylaxis: Home apixaban    Disposition/Daily update: See above for capacity concerns from family and staff.  Continue antibiotics as per above.  Appreciate recommendations from infectious diseases and plastic surgery along with wound care team.      ---Of note, this documentation is completed using the Dragon Dictation system (voice recognition software). There may be spelling and/or grammatical errors that were not corrected prior to final submission.---    Conner Tan MD    SUBJECTIVE  "    Patient was seen and examined bedside this morning.  States that she was having pain in her lower extremities but the Percocet is helping her.    OBJECTIVE:     Last Recorded Vitals:  Vitals:    01/03/24 1525 01/03/24 1926 01/03/24 2245 01/04/24 0747   BP: 141/83 148/69 151/72 152/67   BP Location: Right arm      Patient Position: Lying      Pulse: 104 97 (!) 119 90   Resp: 18 17 17    Temp: 36.6 °C (97.9 °F) 36.2 °C (97.2 °F) 36 °C (96.8 °F) 36.6 °C (97.9 °F)   TempSrc: Temporal      SpO2: 98% 97% 97% 96%   Weight: 90.6 kg (199 lb 11.8 oz)      Height: 1.6 m (5' 2.99\")        Last I/O:  I/O last 3 completed shifts:  In: 50 (0.6 mL/kg) [IV Piggyback:50]  Out: 800 (8.8 mL/kg) [Urine:800 (0.2 mL/kg/hr)]  Weight: 90.6 kg     Physical Exam  Vitals and nursing note reviewed.   Constitutional:       General: She is not in acute distress.     Appearance: Normal appearance. She is not toxic-appearing.   HENT:      Head: Normocephalic and atraumatic.   Eyes:      Extraocular Movements: Extraocular movements intact.      Conjunctiva/sclera: Conjunctivae normal.   Cardiovascular:      Rate and Rhythm: Normal rate and regular rhythm.      Pulses: Normal pulses.      Heart sounds: Normal heart sounds.   Pulmonary:      Effort: Pulmonary effort is normal. No respiratory distress.      Breath sounds: Normal breath sounds. No wheezing.   Abdominal:      General: Bowel sounds are normal.      Palpations: Abdomen is soft.      Tenderness: There is no abdominal tenderness.   Musculoskeletal:         General: Swelling present. Normal range of motion.      Cervical back: Normal range of motion and neck supple.   Skin:     Findings: Erythema, lesion and rash present.      Comments: Bilateral lower extremities with improved erythema, wound still present unchanged.   Neurological:      Mental Status: She is alert.     Inpatient Medications:  apixaban, 5 mg, oral, BID  cefepime, 1 g, intravenous, q12h  furosemide, 20 mg, oral, " Daily  lactobacillus acidophilus, 1 tablet, oral, BID  levothyroxine, 150 mcg, oral, Daily before breakfast    PRN Medications  PRN medications: acetaminophen **OR** acetaminophen **OR** acetaminophen, hydrALAZINE, labetaloL, oxyCODONE-acetaminophen, polyethylene glycol, vancomycin  Continuous Medications:     LABS AND IMAGING:     Labs:  Results from last 7 days   Lab Units 01/04/24  0420 01/03/24  1225   WBC AUTO x10*3/uL 11.5* 11.2   RBC AUTO x10*6/uL 4.02 4.30   HEMOGLOBIN g/dL 10.7* 11.3*   HEMATOCRIT % 33.9* 35.5*   MCV fL 84 83   MCH pg 26.6 26.3   MCHC g/dL 31.6* 31.8*   RDW % 16.8* 17.3*   PLATELETS AUTO x10*3/uL 312 350     Results from last 7 days   Lab Units 01/04/24  0420 01/03/24  1225   SODIUM mmol/L 137 138   POTASSIUM mmol/L 4.9 5.4*   CHLORIDE mmol/L 112* 108*   CO2 mmol/L 18* 20*   BUN mg/dL 41* 46*   CREATININE mg/dL 1.70* 1.82*   GLUCOSE mg/dL 99 101*   PROTEIN TOTAL g/dL  --  8.0   CALCIUM mg/dL 8.5* 9.3   BILIRUBIN TOTAL mg/dL  --  0.3   ALK PHOS U/L  --  121   AST U/L  --  12   ALT U/L  --  16             Imaging:  Electrocardiogram 12 Lead  Sinus tachycardia  Otherwise normal ECG  No previous ECGs available  Confirmed by DELMAR HAN MD ([id number]) on 9/19/2021 8:08:31 PM  Electrocardiogram 12 Lead  Normal sinus rhythm  Cannot rule out Anterior infarct , age undetermined  Abnormal ECG  No previous ECGs available  Confirmed by CAROL JENNINGS MD [id number]) on 9/28/2021 7:22:52 PM  Electrocardiogram 12 Lead  Sinus rhythm with 1st degree AV block  Otherwise normal ECG  When compared with ECG of 28-SEP-2021 12:38,  NE interval has increased  Confirmed by CAROL JENNINGS MD [id number]) on 10/1/2021 12:46:00 PM  Electrocardiogram 12 Lead  Sinus rhythm with 2nd degree AV block (Mobitz I)  Abnormal ECG  When compared with ECG of 02-OCT-2021 08:23,  Previous ECG has undetermined rhythm, needs review  Confirmed by CAROL JENNINGS MD ([id number]) on 10/2/2021 10:16:28 AM  Electrocardiogram 12 Lead  Normal sinus rhythm  Anterior  infarct , age undetermined  T wave abnormality, consider lateral ischemia  Abnormal ECG  When compared with ECG of 02-OCT-2021 08:23,  Sinus rhythm is no longer with 2nd degree AV block (Mobitz I)  Anterior infarct is now present  Non-specific change in ST segment in Inferior leads  T wave inversion now evident in Lateral leads  QT has lengthened  Confirmed by MEDHAT ECHOLS MD ([id number]) on 10/5/2021 9:00:21 PM  Electrocardiogram 12 Lead  Sinus rhythm with occasional premature ventricular complexes  Low voltage QRS  Cannot rule out Anterior infarct (cited on or before 05-OCT-2021)  ST & T wave abnormality, consider lateral ischemia  Abnormal ECG  When compared with ECG of 05-OCT-2021 18:06,  premature ventricular complexes are now present  Serial changes of Anterior infarct present  Confirmed by MEDHAT ECHOLS MD [id number]) on 10/7/2021 12:12:43 PM

## 2024-01-04 NOTE — PROGRESS NOTES
Occupational Therapy    Evaluation    Patient Name: Cheryl Elena  MRN: [id number]  Today's Date: 1/4/2024  Time Calculation  Start Time: 1029  Stop Time: 1051  Time Calculation (min): 22 min      Assessment:  Prognosis: Good  Evaluation/Treatment Tolerance: Patient limited by pain, Patient limited by fatigue  Medical Staff Made Aware: Yes  End of Session Communication: Bedside nurse  End of Session Patient Position: Bed, 3 rail up, Alarm on  OT Assessment Results: Decreased ADL status, Decreased endurance, Decreased IADLs, Decreased functional mobility    Plan:  Treatment Interventions: ADL retraining, Functional transfer training, Endurance training, Patient/family training, Equipment evaluation/education, Neuromuscular reeducation, Compensatory technique education  OT Frequency: 2 times per week  OT Discharge Recommendations: Moderate intensity level of continued care  OT - OK to Discharge: Yes (when medically able)  Treatment Interventions: ADL retraining, Functional transfer training, Endurance training, Patient/family training, Equipment evaluation/education, Neuromuscular reeducation, Compensatory technique education    Subjective   Current Problem:  1. Cellulitis of lower extremity, unspecified laterality        2. Acute kidney injury superimposed on chronic kidney disease (CMS/HCC)          General:  General  Reason for Referral: Decline in self care performance; Brought in by EMS because of foul-smelling drainage and open sores in both the legs and the feet. +cellulitis. Pt. noted to have wounds B LE's and dorsum of feet. Pt. agreeable to participate with encouragement.  Referred By: Conner Tan MD  Past Medical History Relevant to Rehab: history of hypertension hyperlipidemia chronic kidney disease lower venous stasis, DVT  Prior to Session Communication: Bedside nurse  Patient Position Received: Bed, 3 rail up  General Comment: Pt agreeable to OT with encouragement; cleared to participate by nursing;  "noted BLE edemous with seeping / sores.  Precautions:  Medical Precautions: Fall precautions     Pain:  Pain Assessment  Pain Assessment: 0-10  Pain Score: 9  Pain Location: Leg  Pain Orientation: Right, Left  Pain Frequency: Constant/continuous  Effect of Pain on Daily Activities: limiting  Pain Interventions: Elevated    Objective   Cognition:  Overall Cognitive Status: Within Functional Limits  Arousal/Alertness: Appropriate responses to stimuli  Orientation Level: Oriented X4  Following Commands: Follows all commands and directions without difficulty  Attention: Within Functional Limits  Insight: Mild  Processing Speed: Within funtional limits       Home Living:  Type of Home: House (Pt. recently released from SNF ~ 2 weeks ago.)  Lives With: Alone (support from brother but no other family. Pt. stated in the middle of divorce.)  Home Adaptive Equipment: Walker rolling or standard, Wheelchair-manual  Home Layout: One level  Home Access: Stairs to enter with rails, Ramped entrance  Entrance Stairs-Number of Steps: 5 (Pt. is carried down 5 steps to ramp to enter/exit home. Pt reports she calls/ hires service ([employer]) to assist.)  Bathroom Shower/Tub:  (Pt. sponge bathes. Able to stand ~ 1 min. for washing lower body.)  Bathroom Toilet: Handicapped height    Prior Function:  Level of [place]: Independent with ADLs and functional transfers, Independent with homemaking with wheelchair  ADL Assistance: Independent  Homemaking Assistance: Independent  Ambulatory Assistance:  (Pt. reports non-ambulatory x 1 year. Self propells WC with B UE/LE's)  Prior Function Comments: -driving: uses provide a ride. Grocery delivery; pt states she had not walked in over a year and \"I don't plan on doing that today.\"     ADL:  Eating Assistance: Independent  Grooming Assistance: Stand by  Grooming Deficit: Setup (seated; unable to stand)  Bathing Assistance: Maximal (sponge)  UE Dressing Assistance: Minimal  LE Dressing " Assistance: Total  Toileting Assistance with Device: Total  Functional Assistance: Maximal  Functional Deficit: Increased time to complete, Supervision/safety, Verbal cueing, Setup (unable to stand)  Activity Tolerance:  Endurance: Decreased tolerance for upright activites  Bed Mobility/Transfers: Bed Mobility  Bed Mobility: Yes  Bed Mobility 1  Bed Mobility 1: Supine to sitting, Sitting to supine  Level of Assistance 1: Maximum assistance, Moderate verbal cues, Moderate tactile cues    Transfers  Transfer: No (Pt tolerate ~1 min sitting EOB and participation in scooting; pt c/o increased fatigue and inablility to stand; requesting to return back to supine.)    Sitting Balance:    Fair/Fair-    Sensation:   No sensory or proprioceptive deficits noted BUE.    Strength:   BUE AROM WFL; strength MMT 4/5    Hand Function:  Gross Grasp: Functional  Coordination: Functional    Outcome Measures:  [facility] Daily Activity  Putting on and taking off regular lower body clothing: Total  Bathing (including washing, rinsing, drying): Total  Putting on and taking off regular upper body clothing: A lot  Toileting, which includes using toilet, bedpan or urinal: Total  Taking care of personal grooming such as brushing teeth: A little  Eating Meals: None  Daily Activity - Total Score: 12    Education Documentation  ADL Training, taught by Bria Matthews OT at 1/4/2024  3:31 PM.  Learner: Patient  Readiness: Acceptance  Method: Explanation, Demonstration  Response: Needs Reinforcement    Goals:  Encounter Problems       Encounter Problems (Active)       OT Goals       Pt demo good sit balance with <5 min functional endurance for increased independence hygiene and self care tasks.  (Progressing)       Start:  01/04/24    Expected End:  01/18/24            Pt demo sit to stand with min A, >30 second stand endurance for BADL. (Progressing)       Start:  01/04/24    Expected End:  01/18/24            Pt increase BUE strength >1 mm grade to  increase  independence with transfers and ADLs.  (Progressing)       Start:  01/04/24    Expected End:  01/18/24            Pt demo min A bed mobility. (Progressing)       Start:  01/04/24    Expected End:  01/18/24

## 2024-01-04 NOTE — PROGRESS NOTES
Patient well known to me from previous hospital admissions. Patient refused to have any wound care done to BLE. BLE are open to air and there are multiple open areas with dark red drainage. There is adaptic with ABD dressing laying in pace lateral right lower extremity. Localized edema present, Pedals are faint, and extremities bilaterally are dark red.   Wound Care Progress Note     Visit Date: 1/4/2024      Patient Name: Cheryl Elena         MRN: [id number]                Reason for Visit: Wound Care BLE        Wound History: Chronic     Pertinent Labs:   Albumin   Date Value Ref Range Status   01/03/2024 4.0 3.4 - 5.0 g/dL Final     ALBUMIN (MG/L) IN URINE   Date Value Ref Range Status   09/21/2021 34.0 Not Established mg/L Final           Wound Assessment:           NEW                                            Wound Team Plan: Continue with current dressing changes until seen by Dr. Reyes Alex Zelnik, CATRINA  1/4/2024  1:41 PM

## 2024-01-04 NOTE — PROGRESS NOTES
Physical Therapy    Physical Therapy Evaluation    Patient Name: Cheryl Elena  MRN: [id number]  Today's Date: 1/4/2024   Time Calculation  Start Time: 1028  Stop Time: 1051  Time Calculation (min): 23 min    Assessment/Plan   PT Assessment  PT Assessment Results: Decreased strength, Decreased endurance, Impaired balance, Decreased mobility, Pain  Rehab Prognosis: Fair  Evaluation/Treatment Tolerance: Patient limited by pain  End of Session Communication: Bedside nurse  Assessment Comment: Pt. is an 84 y/o F admitted with cellulitis B LE's. Pt. presents with pain, weakness, impaired balance, decreased endurance, and difficulty with functional mobility compared to baseline. pt. would benefit from skilled PT while IP to address these deficits.  End of Session Patient Position: Bed, 3 rail up, Alarm on  IP OR SWING BED PT PLAN  Inpatient or Swing Bed: Inpatient  PT Plan  Treatment/Interventions: Bed mobility, Transfer training, Balance training, Strengthening, Endurance training, Therapeutic exercise, Therapeutic activity, Home exercise program  PT Plan: Skilled PT  PT Frequency: 3 times per week  PT Discharge Recommendations: Moderate intensity level of continued care  PT Recommended Transfer Status: Assist x2  PT - OK to Discharge: Yes (PT evaluation complete and rehab. recommendations made.)    Subjective         General Visit Information:  General  Reason for Referral: impaired mobility  Referred By: Conner Tan MD  Past Medical History Relevant to Rehab: history of hypertension hyperlipidemia chronic kidney disease lower venous stasis, DVT  General Comment: Brought in by EMS because of foul-smelling drainage and open sores in both the legs and the feet. +cellulitis. Pt. noted to have wounds B LE's and dorsum of feet. Pt. agreeable to participate with encouragement.    Home Living:  Home Living  Type of Home: House (Pt. recently released from SNF ~ 2 weeks ago.)  Lives With: Alone (support from brother but no  other family. Pt. stated in the middle of divorce.)  Home Adaptive Equipment: Walker rolling or standard, Wheelchair-manual  Home Layout: One level  Home Access: Stairs to enter with rails, Ramped entrance  Entrance Stairs-Number of Steps: 5 (Pt. is carried down 5 steps to ramp to enter/exit home.)  Bathroom Shower/Tub:  (P{t. sponge bathes. Able to stand ~ 1 min. for washing lower body.)    Prior Level of Function:  Prior Function Per Pt/Caregiver Report  Level of [place]: Independent with ADLs and functional transfers  ADL Assistance: Independent  Homemaking Assistance: Independent  Ambulatory Assistance:  (Pt. reports non-ambulatory x 1 year. Self propells WC with B UE/LE's)  Prior Function Comments: -driving: uses provide a ride. Grocery delivery    Precautions:  Precautions  Medical Precautions: Fall precautions         Objective     Pain:  Pain Assessment  Pain Assessment: 0-10  Pain Score: 9  Pain Location: Leg  Pain Orientation: Right, Left  Pain Frequency: Constant/continuous    Cognition:  Cognition  Overall Cognitive Status: Within Functional Limits    General Assessments:      Activity Tolerance  Endurance: Decreased tolerance for upright activites  Sensation  Light Touch: No apparent deficits              Static Sitting Balance  Static Sitting-Balance Support: Feet supported  Static Sitting-Level of Assistance: Close supervision  Static Sitting-Comment/Number of Minutes: limited tolerance for sitting EOB secondary to pain.  Dynamic Sitting Balance  Dynamic Sitting-Balance Support: Feet supported  Dynamic Sitting-Balance:  (CGA for scooting toward HOB)       Functional Assessments:     Bed Mobility  Bed Mobility: Yes  Bed Mobility 1  Bed Mobility 1: Supine to sitting, Sitting to supine  Level of Assistance 1: Maximum assistance (x2)  Bed Mobility Comments 1: increased time and cueing secondary to increased pain with mobility  Transfers  Transfer: No (Pt. deferred secondary to pain)  Ambulation/Gait  Training  Ambulation/Gait Training Performed: No (non-ambulatory)          Extremity/Trunk Assessments:        RLE   RLE : Exceptions to WFL  Strength RLE  RLE Overall Strength: Greater than or equal to 3/5 as evidenced by functional mobility  LLE   LLE : Exceptions to WFL  Strength LLE  LLE Overall Strength: Greater than or equal to 3/5 as evidenced by functional mobility    Outcome Measures:  [facility] Basic Mobility  Turning from your back to your side while in a flat bed without using bedrails: A lot  Moving from lying on your back to sitting on the side of a flat bed without using bedrails: A lot  Moving to and from bed to chair (including a wheelchair): A lot  Standing up from a chair using your arms (e.g. wheelchair or bedside chair): Total  To walk in hospital room: Total  Climbing 3-5 steps with railing: Total  Basic Mobility - Total Score: 9                            Goals:  Encounter Problems       Encounter Problems (Active)       PT Problem       min A x 1 for bed mobility        Start:  01/04/24    Expected End:  01/18/24            min A x 1 with transfers with RW        Start:  01/04/24    Expected End:  01/18/24            Pt. will self propel WC > 50 ft. with supervision to safely negotiate household        Start:  01/04/24    Expected End:  01/18/24            SBA for static/dynamic balance to safely participate in ADLs        Start:  01/04/24    Expected End:  01/18/24               Pain - Adult            Education Documentation  Body Mechanics, taught by Ora Hartmann, PT at 1/4/2024 12:48 PM.  Learner: Patient  Readiness: Acceptance  Method: Explanation  Response: Verbalizes Understanding    Mobility Training, taught by Ora Hartmann, PT at 1/4/2024 12:48 PM.  Learner: Patient  Readiness: Acceptance  Method: Explanation  Response: Verbalizes Understanding

## 2024-01-05 ENCOUNTER — APPOINTMENT (OUTPATIENT)
Dept: RADIOLOGY | Facility: HOSPITAL | Age: 84
DRG: 603 | End: 2024-01-05
Payer: MEDICARE

## 2024-01-05 LAB
ANION GAP SERPL CALC-SCNC: 13 MMOL/L (ref 10–20)
BACTERIA SPEC CULT: ABNORMAL
BACTERIA SPEC CULT: ABNORMAL
BASOPHILS # BLD AUTO: 0.02 X10*3/UL (ref 0–0.1)
BASOPHILS NFR BLD AUTO: 0.2 %
BUN SERPL-MCNC: 31 MG/DL (ref 6–23)
CALCIUM SERPL-MCNC: 7.9 MG/DL (ref 8.6–10.3)
CHLORIDE SERPL-SCNC: 109 MMOL/L (ref 98–107)
CO2 SERPL-SCNC: 19 MMOL/L (ref 21–32)
CREAT SERPL-MCNC: 1.56 MG/DL (ref 0.5–1.05)
EOSINOPHIL # BLD AUTO: 0.28 X10*3/UL (ref 0–0.4)
EOSINOPHIL NFR BLD AUTO: 2.8 %
ERYTHROCYTE [DISTWIDTH] IN BLOOD BY AUTOMATED COUNT: 16.9 % (ref 11.5–14.5)
GFR SERPL CREATININE-BSD FRML MDRD: 33 ML/MIN/1.73M*2
GLUCOSE SERPL-MCNC: 107 MG/DL (ref 74–99)
GRAM STN SPEC: ABNORMAL
GRAM STN SPEC: ABNORMAL
HCT VFR BLD AUTO: 29.8 % (ref 36–46)
HGB BLD-MCNC: 9.5 G/DL (ref 12–16)
HOLD SPECIMEN: NORMAL
IMM GRANULOCYTES # BLD AUTO: 0.04 X10*3/UL (ref 0–0.5)
IMM GRANULOCYTES NFR BLD AUTO: 0.4 % (ref 0–0.9)
LYMPHOCYTES # BLD AUTO: 1.47 X10*3/UL (ref 0.8–3)
LYMPHOCYTES NFR BLD AUTO: 14.8 %
MCH RBC QN AUTO: 26.4 PG (ref 26–34)
MCHC RBC AUTO-ENTMCNC: 31.9 G/DL (ref 32–36)
MCV RBC AUTO: 83 FL (ref 80–100)
MONOCYTES # BLD AUTO: 0.9 X10*3/UL (ref 0.05–0.8)
MONOCYTES NFR BLD AUTO: 9 %
NEUTROPHILS # BLD AUTO: 7.25 X10*3/UL (ref 1.6–5.5)
NEUTROPHILS NFR BLD AUTO: 72.8 %
NRBC BLD-RTO: 0 /100 WBCS (ref 0–0)
PLATELET # BLD AUTO: 288 X10*3/UL (ref 150–450)
POTASSIUM SERPL-SCNC: 4.5 MMOL/L (ref 3.5–5.3)
RBC # BLD AUTO: 3.6 X10*6/UL (ref 4–5.2)
SODIUM SERPL-SCNC: 136 MMOL/L (ref 136–145)
WBC # BLD AUTO: 10 X10*3/UL (ref 4.4–11.3)

## 2024-01-05 PROCEDURE — 99232 SBSQ HOSP IP/OBS MODERATE 35: CPT | Performed by: STUDENT IN AN ORGANIZED HEALTH CARE EDUCATION/TRAINING PROGRAM

## 2024-01-05 PROCEDURE — 99223 1ST HOSP IP/OBS HIGH 75: CPT | Performed by: NURSE PRACTITIONER

## 2024-01-05 PROCEDURE — 93922 UPR/L XTREMITY ART 2 LEVELS: CPT | Performed by: STUDENT IN AN ORGANIZED HEALTH CARE EDUCATION/TRAINING PROGRAM

## 2024-01-05 PROCEDURE — 36415 COLL VENOUS BLD VENIPUNCTURE: CPT | Performed by: STUDENT IN AN ORGANIZED HEALTH CARE EDUCATION/TRAINING PROGRAM

## 2024-01-05 PROCEDURE — 80048 BASIC METABOLIC PNL TOTAL CA: CPT | Performed by: STUDENT IN AN ORGANIZED HEALTH CARE EDUCATION/TRAINING PROGRAM

## 2024-01-05 PROCEDURE — 1210000001 HC SEMI-PRIVATE ROOM DAILY

## 2024-01-05 PROCEDURE — 99221 1ST HOSP IP/OBS SF/LOW 40: CPT | Performed by: PLASTIC SURGERY

## 2024-01-05 PROCEDURE — 93922 UPR/L XTREMITY ART 2 LEVELS: CPT

## 2024-01-05 PROCEDURE — 2500000004 HC RX 250 GENERAL PHARMACY W/ HCPCS (ALT 636 FOR OP/ED): Performed by: STUDENT IN AN ORGANIZED HEALTH CARE EDUCATION/TRAINING PROGRAM

## 2024-01-05 PROCEDURE — 2500000001 HC RX 250 WO HCPCS SELF ADMINISTERED DRUGS (ALT 637 FOR MEDICARE OP): Performed by: STUDENT IN AN ORGANIZED HEALTH CARE EDUCATION/TRAINING PROGRAM

## 2024-01-05 PROCEDURE — 85025 COMPLETE CBC W/AUTO DIFF WBC: CPT | Performed by: STUDENT IN AN ORGANIZED HEALTH CARE EDUCATION/TRAINING PROGRAM

## 2024-01-05 PROCEDURE — 2500000001 HC RX 250 WO HCPCS SELF ADMINISTERED DRUGS (ALT 637 FOR MEDICARE OP): Performed by: PLASTIC SURGERY

## 2024-01-05 RX ORDER — SILVER SULFADIAZINE 10 G/1000G
CREAM TOPICAL 2 TIMES DAILY
Status: DISCONTINUED | OUTPATIENT
Start: 2024-01-05 | End: 2024-01-06

## 2024-01-05 RX ORDER — MENTHOL AND ZINC OXIDE .44; 20.625 G/100G; G/100G
1 OINTMENT TOPICAL 4 TIMES DAILY PRN
Status: DISCONTINUED | OUTPATIENT
Start: 2024-01-05 | End: 2024-01-10 | Stop reason: HOSPADM

## 2024-01-05 RX ADMIN — LEVOTHYROXINE SODIUM 150 MCG: 75 TABLET ORAL at 05:26

## 2024-01-05 RX ADMIN — OXYCODONE HYDROCHLORIDE AND ACETAMINOPHEN 1 TABLET: 5; 325 TABLET ORAL at 16:43

## 2024-01-05 RX ADMIN — APIXABAN 5 MG: 5 TABLET, FILM COATED ORAL at 20:26

## 2024-01-05 RX ADMIN — OXYCODONE HYDROCHLORIDE AND ACETAMINOPHEN 1 TABLET: 5; 325 TABLET ORAL at 02:37

## 2024-01-05 RX ADMIN — APIXABAN 5 MG: 5 TABLET, FILM COATED ORAL at 08:51

## 2024-01-05 RX ADMIN — CEFEPIME 1 G: 1 INJECTION, POWDER, FOR SOLUTION INTRAMUSCULAR; INTRAVENOUS at 13:29

## 2024-01-05 RX ADMIN — SILVER SULFADIAZINE: 10 CREAM TOPICAL at 09:37

## 2024-01-05 RX ADMIN — CEFEPIME 1 G: 1 INJECTION, POWDER, FOR SOLUTION INTRAMUSCULAR; INTRAVENOUS at 02:47

## 2024-01-05 RX ADMIN — OXYCODONE HYDROCHLORIDE AND ACETAMINOPHEN 1 TABLET: 5; 325 TABLET ORAL at 22:11

## 2024-01-05 ASSESSMENT — COGNITIVE AND FUNCTIONAL STATUS - GENERAL
TOILETING: A LOT
WALKING IN HOSPITAL ROOM: A LITTLE
MOBILITY SCORE: 16
DRESSING REGULAR LOWER BODY CLOTHING: TOTAL
PERSONAL GROOMING: A LITTLE
TOILETING: A LOT
DAILY ACTIVITIY SCORE: 12
MOBILITY SCORE: 16
CLIMB 3 TO 5 STEPS WITH RAILING: A LOT
STANDING UP FROM CHAIR USING ARMS: A LOT
CLIMB 3 TO 5 STEPS WITH RAILING: A LOT
MOVING TO AND FROM BED TO CHAIR: A LOT
TURNING FROM BACK TO SIDE WHILE IN FLAT BAD: A LITTLE
TURNING FROM BACK TO SIDE WHILE IN FLAT BAD: A LITTLE
PERSONAL GROOMING: A LITTLE
DRESSING REGULAR UPPER BODY CLOTHING: A LOT
EATING MEALS: A LITTLE
EATING MEALS: A LITTLE
STANDING UP FROM CHAIR USING ARMS: A LOT
DAILY ACTIVITIY SCORE: 12
HELP NEEDED FOR BATHING: TOTAL
HELP NEEDED FOR BATHING: TOTAL
DRESSING REGULAR LOWER BODY CLOTHING: TOTAL
WALKING IN HOSPITAL ROOM: A LITTLE
MOVING TO AND FROM BED TO CHAIR: A LOT
DRESSING REGULAR UPPER BODY CLOTHING: A LOT

## 2024-01-05 ASSESSMENT — PAIN SCALES - GENERAL
PAINLEVEL_OUTOF10: 9
PAINLEVEL_OUTOF10: 8
PAINLEVEL_OUTOF10: 8
PAINLEVEL_OUTOF10: 0 - NO PAIN

## 2024-01-05 ASSESSMENT — PAIN - FUNCTIONAL ASSESSMENT: PAIN_FUNCTIONAL_ASSESSMENT: 0-10

## 2024-01-05 ASSESSMENT — PAIN DESCRIPTION - LOCATION
LOCATION: LEG
LOCATION: LEG

## 2024-01-05 ASSESSMENT — ENCOUNTER SYMPTOMS: ARTHRALGIAS: 1

## 2024-01-05 ASSESSMENT — PAIN DESCRIPTION - ORIENTATION
ORIENTATION: LOWER;RIGHT;LEFT
ORIENTATION: LEFT;RIGHT

## 2024-01-05 NOTE — CARE PLAN
Problem: Skin  Goal: Decreased wound size/increased tissue granulation at next dressing change  Outcome: Progressing  Goal: Participates in plan/prevention/treatment measures  Outcome: Progressing  Goal: Prevent/manage excess moisture  Outcome: Progressing  Goal: Prevent/minimize sheer/friction injuries  Outcome: Progressing  Goal: Promote/optimize nutrition  Outcome: Progressing  Goal: Promote skin healing  Outcome: Progressing  Flowsheets (Taken 1/5/2024 8126)  Promote skin healing: Turn/reposition every 2 hours/use positioning/transfer devices     Problem: Fall/Injury  Goal: Not fall by end of shift  Outcome: Progressing  Goal: Be free from injury by end of the shift  Outcome: Progressing  Goal: Verbalize understanding of personal risk factors for fall in the hospital  Outcome: Progressing  Goal: Verbalize understanding of risk factor reduction measures to prevent injury from fall in the home  Outcome: Progressing  Goal: Use assistive devices by end of the shift  Outcome: Progressing  Goal: Pace activities to prevent fatigue by end of the shift  Outcome: Progressing     Problem: Pain  Goal: My pain/discomfort is manageable  Outcome: Progressing

## 2024-01-05 NOTE — PROGRESS NOTES
Per physician pt. Will need long term iv antibiotics.  Met with pt. Along with marissa to discuss need for iv's and snf setting.  Pt. Continures to refuse snf, states she was locked up in a nh for 8 mo. And it's not happening again.  Explained that homecare can not be set up for her as she does not have a pcp.  She will need to go to a snf setting setting to receive  the iv's.  She continues to refuse.  Will inform physician as pysc . Consult may be needed to determine  mental capacity  for medical decision making.

## 2024-01-05 NOTE — CONSULTS
PLASTIC  SURGERY  CONSULTATION        Reason for consultation:       Bilateral lower extremity wound      History of present illness:      83-year-old female with bilateral lower extremity wounds that are worsening with left worse than right        Past medical history:      Hypertension  Chronic kidney disease  Hyperlipidemia  Aortic stenosis  COPD  Hypothyroidism  DVT        Past surgical history:      Cholecystectomy  Appendectomy  Gastric surgery      Medications:       Eliquis  Clonidine  Lasix  Norco  Synthroid    ALLERGIES:      Keflex  Cortisone  Diphenhydramine  Prednisone  Codeine  Penicillin        Social history:      Former smoker, nondrinker      Family history:      Reviewed            Review of systems:  At least 10 systems reviewed and are otherwise negative except for as noted in the history of present illness                PHYSICAL  EXAMINATION       Height:    5 foot 2 inches                weight:     199 pounds                     Vital signs:   Temperature 36.7, pulse 75, blood pressure 138/61    General: Well-developed,   female          in no acute distress, alert and oriented ×3    HEENT:   Within normal limits; hard of hearing    Neck:   Supple, no observable masses    Lungs: Clear to auscultation    Heart: Regular rate and rhythm    Abdomen: Soft, nontender    Extremities: Full range of motion; bilateral lower extremities with areas of partial and full-thickness skin loss with drainage, no purulence    Neurological: Grossly within normal limits                Impression:      Multiple open wounds bilateral lower extremities left worse than right      Recommendation:      Start Silvadene cream dressing changes        Thank you for the referral.    Roland Reyes, MD        *These notes are being done using Dragon voice recognition technology and may include unintended errors with respect to translation of words, typographical errors or grammar errors which may not have been identified  prior to finalization of the chart note.

## 2024-01-05 NOTE — CONSULTS
Vancomycin Dosing by Pharmacy- Cessation of Therapy    Consult to pharmacy for vancomycin dosing has been discontinued by the prescriber, pharmacy will sign off at this time.    Please call pharmacy if there are further questions or re-enter a consult if vancomycin is resumed.     Jackelyn Wood, PharmD

## 2024-01-05 NOTE — CONSULTS
Inpatient consult to Vascular Surgery  Consult performed by: Gabriela Jeffery, BENEDICT-CNP  Consult ordered by: Jeny Jara MD          Reason For Consult  Eval for vascular insufficiency; B/L LE PVD    History Of Present Illness  Cheryl Elena is a 83 y.o. female presenting with a significant past medical history of hypertension, hyperlipidemia, hypothyroidism, COPD, DVT long-term anticoagulation with apixaban presented from home with worsening bilateral lower extremity pain and wounds. She states that she was at [facility] from 2/16/2023 until 12/15/2023 for rehabilitation. In ED temperature 97.7, HR 94, /97, RR 20, 98% on room air. Labs: Creatinine 1.8, potassium 5.4 otherwise unremarkable CMP. Unremarkable CBC. Blood cultures and tissue wound cultures collected. She is noted to have significant cellulitis of both lower extremities and feet L >R.   Vascular surgery was consulted for evaluation of vascular insufficiency.     At the time of consultation patient is tearful and upset because she does not want to got back to nursing home. She remains afebrile and HDS. She is maintaining adequate oxygen saturation on RA. States she does not ambulate and uses a wheelchair to get around. On exam bilateral lower extremities with erythema and swelling. Left 2-3rd toes with scabbed blisters.  B/L DP pulse with triphasic signal. EUGENIA ordered. Dr. Palacios updated, imaging reviewed, urgent vascular intervention is indicated at this time. Recommendation to ace wrap/ elevate b/l extremities, wound care consult and would benefit from outpt venous reflux study. Vascular surgery will s/o. Thank you for the consult.       Past Medical History  She has a past medical history of COPD (chronic obstructive pulmonary disease) (CMS/[facility]) and Hypertension.    Surgical History  She has no past surgical history on file.     Social History  She reports that she has never smoked. She has never used smokeless tobacco. No history  "on file for alcohol use and drug use.    Family History  No family history on file.     Allergies  Cephalexin, Cortisone, Diphenhydramine hcl, Prednisone, Codeine, and Penicillins    Review of Systems   Musculoskeletal:  Positive for arthralgias and gait problem.   All other systems reviewed and are negative.       Physical Exam  Vitals and nursing note reviewed.   Constitutional:       General: She is not in acute distress.     Appearance: She is obese. She is ill-appearing.   HENT:      Head: Normocephalic and atraumatic.   Eyes:      Pupils: Pupils are equal, round, and reactive to light. Pupils are equal.   Cardiovascular:      Rate and Rhythm: Normal rate and regular rhythm.      Pulses:           Dorsalis pedis pulses are 3+ on the right side and 3+ on the left side.        Posterior tibial pulses are 3+ on the right side and 3+ on the left side.   Pulmonary:      Effort: Pulmonary effort is normal.      Breath sounds: Normal breath sounds.   Abdominal:      General: Abdomen is flat.   Musculoskeletal:        Feet:    Feet:      Right foot:      Skin integrity: Ulcer, skin breakdown, erythema and dry skin present.      Left foot:      Skin integrity: Ulcer, skin breakdown, erythema and dry skin present.      Comments: B/l + triphasic signal on exam   Neurological:      General: No focal deficit present.      Mental Status: She is alert and oriented to person, place, and time.   Psychiatric:         Mood and Affect: Mood is anxious. Affect is tearful.         Speech: Speech normal.         Behavior: Behavior is agitated.          Last Recorded Vitals  Blood pressure 158/71, pulse 85, temperature 37.1 °C (98.8 °F), resp. rate 16, height 1.6 m (5' 2.99\"), weight 90.6 kg (199 lb 11.8 oz), SpO2 99 %.    Relevant Results    Current Facility-Administered Medications:     acetaminophen (Tylenol) tablet 650 mg, 650 mg, oral, q4h PRN, 650 mg at 01/03/24 2015 **OR** acetaminophen (Tylenol) oral liquid 650 mg, 650 mg, " nasogastric tube, q4h PRN **OR** acetaminophen (Tylenol) suppository 650 mg, 650 mg, rectal, q4h PRN, Conner Tan MD    apixaban (Eliquis) tablet 5 mg, 5 mg, oral, BID, Conner Tan MD, 5 mg at 01/05/24 0851    cefepime (Maxipime) 1 g in dextrose 5 % 50 mL IV, 1 g, intravenous, q12h, Conner Tan MD, Last Rate: 100 mL/hr at 01/05/24 1329, 1 g at 01/05/24 1329    furosemide (Lasix) tablet 20 mg, 20 mg, oral, Daily, Conner Tan MD, 20 mg at 01/04/24 0744    hydrALAZINE (Apresoline) injection 5 mg, 5 mg, intravenous, q6h PRN, Conner Tan MD    labetaloL (Normodyne,Trandate) injection 10 mg, 10 mg, intravenous, q6h PRN, Conner Tan MD    lactobacillus acidophilus tablet 1 tablet, 1 tablet, oral, BID, Conner Tan MD, 1 tablet at 01/04/24 0745    levothyroxine (Synthroid, Levoxyl) tablet 150 mcg, 150 mcg, oral, Daily before breakfast, Conner Tan MD, 150 mcg at 01/05/24 0526    menthol-zinc oxide (Calmoseptine - Risamine) 0.44-20.6 % ointment 1 Application, 1 Application, Topical, 4x daily PRN, Conner Tan MD    oxyCODONE-acetaminophen (Percocet) 5-325 mg per tablet 1 tablet, 1 tablet, oral, q4h PRN, Conner Tan MD, 1 tablet at 01/05/24 0237    polyethylene glycol (Glycolax, Miralax) packet 17 g, 17 g, oral, Daily PRN, Conner Tan MD    silver sulfADIAZINE (Silvadene) 1 % cream, , Topical, BID, Roland J Reyes, MD, Given at 01/05/24 0937   Results for orders placed or performed during the hospital encounter of 01/03/24 (from the past 24 hour(s))   SST TOP   Result Value Ref Range    Extra Tube Hold for add-ons.    CBC and Auto Differential   Result Value Ref Range    WBC 10.0 4.4 - 11.3 x10*3/uL    nRBC 0.0 0.0 - 0.0 /100 WBCs    RBC 3.60 (L) 4.00 - 5.20 x10*6/uL    Hemoglobin 9.5 (L) 12.0 - 16.0 g/dL    Hematocrit 29.8 (L) 36.0 - 46.0 %    MCV 83 80 - 100 fL    MCH 26.4 26.0 - 34.0 pg    MCHC 31.9 (L) 32.0 - 36.0 g/dL    RDW 16.9 (H) 11.5 - 14.5 %    Platelets 288 150 - 450 x10*3/uL     Neutrophils % 72.8 40.0 - 80.0 %    Immature Granulocytes %, Automated 0.4 0.0 - 0.9 %    Lymphocytes % 14.8 13.0 - 44.0 %    Monocytes % 9.0 2.0 - 10.0 %    Eosinophils % 2.8 0.0 - 6.0 %    Basophils % 0.2 0.0 - 2.0 %    Neutrophils Absolute 7.25 (H) 1.60 - 5.50 x10*3/uL    Immature Granulocytes Absolute, Automated 0.04 0.00 - 0.50 x10*3/uL    Lymphocytes Absolute 1.47 0.80 - 3.00 x10*3/uL    Monocytes Absolute 0.90 (H) 0.05 - 0.80 x10*3/uL    Eosinophils Absolute 0.28 0.00 - 0.40 x10*3/uL    Basophils Absolute 0.02 0.00 - 0.10 x10*3/uL   Basic Metabolic Panel   Result Value Ref Range    Glucose 107 (H) 74 - 99 mg/dL    Sodium 136 136 - 145 mmol/L    Potassium 4.5 3.5 - 5.3 mmol/L    Chloride 109 (H) 98 - 107 mmol/L    Bicarbonate 19 (L) 21 - 32 mmol/L    Anion Gap 13 10 - 20 mmol/L    Urea Nitrogen 31 (H) 6 - 23 mg/dL    Creatinine 1.56 (H) 0.50 - 1.05 mg/dL    eGFR 33 (L) >60 mL/min/1.73m*2    Calcium 7.9 (L) 8.6 - 10.3 mg/dL   Vascular US ankle brachial index (EUGENIA) without exercise   Result Value Ref Range    BSA 2.01 m2    Vascular US ankle brachial index (EUGENIA) without exercise    Result Date: 1/5/2024  Preliminary Cardiology Report             [place] [address]     Tel [phone] Fax [phone]            Preliminary Vascular Lab Report  [facility] US ANKLE BRACHIAL INDEX (EUGENIA) WITHOUT EXERCISE  Patient Name:     MIKE Hill Physician:  [id number] Kaur Muñoz MD Study Date:       1/5/2024        Ordering Provider:  [id number] ROSALBA DE LA VEGA MRN/PID:          [id number]        Fellow: Accession#:       [id number]    Technologist:       Stephanie Reaves RDMS, T YOB: 1940       Technologist 2: Gender:           F               Encounter#:         [id number] Admission Status: Inpatient       Location Performed: [facility]  Diagnosis/ICD: Atherosclerosis of native arteries of  left leg with ulceration of                other part of lower left leg-I70.248; Atherosclerosis of native                arteries of left leg with ulceration of other part of                foot-I70.245; Atherosclerosis of native arteries of right leg                with ulceration of other part of lower right leg-I70.238;                Atherosclerosis of native arteries of right leg with ulceration                of other part of foot-I70.235 CPT Codes:     86270 Peripheral artery EUGENIA Only  Smoker:            Former. Pertinent History: Leg pain, Obesity, Cellulitus and LE Edema.  PRELIMINARY CONCLUSIONS:  Right Lower PVR: No evidence of arterial occlusive disease in the right lower extremity at rest. Normal digital perfusion noted. Monophasic flow is noted in the right common femoral artery. Biphasic flow is noted in the right dorsalis pedis artery. Triphasic flow is noted in the right posterior tibial artery. Left Lower PVR: Decreased digital perfusion noted. Monophasic flow is noted in the left common femoral artery. Triphasic flow is noted in the left posterior tibial artery and left dorsalis pedis artery. Dampened flow left femoral.  Imaging & Doppler Findings:  RIGHT Lower PVR                Pressures Ratios Right Posterior Tibial (Ankle) 174 mmHg  1.18 Right Dorsalis Pedis (Ankle)   163 mmHg  1.11 Right Digit (Great Toe)        111 mmHg  0.76   LEFT Lower PVR                Pressures Ratios Left Posterior Tibial (Ankle) 157 mmHg  1.07 Left Dorsalis Pedis (Ankle)   174 mmHg  1.18 Left Digit (Great Toe)        85 mmHg   0.58                      Right     Left Brachial Pressure 147 mmHg 143 mmHg   VASCULAR PRELIMINARY REPORT completed by Stephanie Reaves RDMS on 1/5/2024 at 11:43:41 AM  ** Final **        Assessment/Plan   Bilateral lower extremity wounds and cellulitis   -This is a 82 yo female with significant past medical history of hypertension, hyperlipidemia, hypothyroidism, COPD, DVT long-term  anticoagulation with apixaban. Presented with worsening bilateral lower extremity pain and wounds. She was just discharged from [facility] on 12/15/23. States he was there for ~ 10 months.  Pt B/L DP pulse with triphasic signal. EUGENIA obtained and reviewed with Dr. Palacios. There is no indication for urgent vascular intervention at this time. Vascular surgery to sign off.   -EUGENIA- 1.18  -Wrap and elevate b/l lower extremities   -Continue wound care   - Continue vancomycin and cefepime   - ID consulted  -Would benefit from outpatient venous reflux study  -Vascular surgery to sign off. Thank you for the consult.     CKD stage III  - Creatinine 1.56 today (baseline approximately 1.3-1.5)    I spent 60 minutes in the professional and overall care of this patient.

## 2024-01-05 NOTE — PROGRESS NOTES
"Physical Therapy                 Therapy Communication Note    Patient Name: Cheryl Elena  MRN: [id number]  Today's Date: 1/5/2024     Discipline: Physical Therapy    Missed Visit Reason:      Missed Time: Attempt    Comment: Pt refusing states she is in too much pain and just had dressing changes. Pt states \"I'm not doing any therapy today.\" Pt is agitated.   "

## 2024-01-05 NOTE — PROGRESS NOTES
Medical Group Progress Note  ASSESSMENT & PLAN:     Bilateral lower extremity wounds and cellulitis  Pseudomonas wounds  - Images as in the media section from 1/3, slightly improved erythema  - ESR and CRP reviewed  - Continue vancomycin and cefepime  - ID consulted  - Plastic surgery consulted  - Follow-up wound cultures, preliminary growing Pseudomonas  - Follow-up blood cultures    Anemia of chronic disease  - Hemoglobin 9.5 today (baseline approximately 9)     CKD stage III  Hyperkalemia, resolved  - Creatinine 1.56 today (baseline approximately 1.3-1.5)     Essential hypertension  Hyperlipidemia  Diastolic CHF, compensated  COPD, not in exacerbation  Hypothyroidism  Personal history of DVT  Long-term anticoagulation use  - Resume medications as reconciled     VTE Prophylaxis: Home apixaban    Disposition/Daily update: This morning, had a lengthy discussion with patient regarding discharge planning and ability to take care of her wounds as well as antibiotics.  We have instructed nursing staff to instruct patient and show how to perform wound care, patient is aware that she will be performing her own wound care today to demonstrate she is able to.  Patient would benefit immensely with SNF on discharge.      ---Of note, this documentation is completed using the Dragon Dictation system (voice recognition software). There may be spelling and/or grammatical errors that were not corrected prior to final submission.---    Conner Tan MD    SUBJECTIVE     Patient was seen and examined bedside this morning.  Had a long discussion this morning about discharge planning.  We discussed wound care, ability for patient to provide her own wound care as well as need for treatment with antibiotics.  She was adamant on being discharged home, does not only be discharged to SNF.    OBJECTIVE:     Last Recorded Vitals:  Vitals:    01/05/24 0012 01/05/24 0351 01/05/24 0734 01/05/24 1201   BP: 134/61 120/57 138/61 158/71    Patient Position:  Lying     Pulse: 92 84 75 85   Resp: 16 16     Temp: 36.1 °C (97 °F) 35.7 °C (96.3 °F) 36.7 °C (98.1 °F) 37.1 °C (98.8 °F)   TempSrc: Temporal Temporal     SpO2: 96% 97% 96% 99%   Weight:       Height:         Last I/O:  I/O last 3 completed shifts:  In: 50 (0.6 mL/kg) [IV Piggyback:50]  Out: 800 (8.8 mL/kg) [Urine:800 (0.2 mL/kg/hr)]  Weight: 90.6 kg     Physical Exam  Vitals and nursing note reviewed.   Constitutional:       General: She is not in acute distress.     Appearance: Normal appearance. She is not toxic-appearing.   HENT:      Head: Normocephalic and atraumatic.   Eyes:      Extraocular Movements: Extraocular movements intact.      Conjunctiva/sclera: Conjunctivae normal.   Cardiovascular:      Rate and Rhythm: Normal rate and regular rhythm.      Pulses: Normal pulses.      Heart sounds: Normal heart sounds.   Pulmonary:      Effort: Pulmonary effort is normal. No respiratory distress.      Breath sounds: Normal breath sounds. No wheezing.   Abdominal:      General: Bowel sounds are normal.      Palpations: Abdomen is soft.      Tenderness: There is no abdominal tenderness.   Musculoskeletal:         General: Swelling present. Normal range of motion.      Cervical back: Normal range of motion and neck supple.   Skin:     Findings: Erythema, lesion and rash present.      Comments: Bilateral lower extremities with improved erythema, wound still present unchanged.   Neurological:      Mental Status: She is alert.     Inpatient Medications:  apixaban, 5 mg, oral, BID  cefepime, 1 g, intravenous, q12h  furosemide, 20 mg, oral, Daily  lactobacillus acidophilus, 1 tablet, oral, BID  levothyroxine, 150 mcg, oral, Daily before breakfast  silver sulfADIAZINE, , Topical, BID    PRN Medications  PRN medications: acetaminophen **OR** acetaminophen **OR** acetaminophen, hydrALAZINE, labetaloL, menthol-zinc oxide, oxyCODONE-acetaminophen, polyethylene glycol  Continuous Medications:     LABS AND  IMAGING:     Labs:  Results from last 7 days   Lab Units 01/05/24  0532 01/04/24  0420 01/03/24  1225   WBC AUTO x10*3/uL 10.0 11.5* 11.2   RBC AUTO x10*6/uL 3.60* 4.02 4.30   HEMOGLOBIN g/dL 9.5* 10.7* 11.3*   HEMATOCRIT % 29.8* 33.9* 35.5*   MCV fL 83 84 83   MCH pg 26.4 26.6 26.3   MCHC g/dL 31.9* 31.6* 31.8*   RDW % 16.9* 16.8* 17.3*   PLATELETS AUTO x10*3/uL 288 312 350       Results from last 7 days   Lab Units 01/05/24  0532 01/04/24  0420 01/03/24  1225   SODIUM mmol/L 136 137 138   POTASSIUM mmol/L 4.5 4.9 5.4*   CHLORIDE mmol/L 109* 112* 108*   CO2 mmol/L 19* 18* 20*   BUN mg/dL 31* 41* 46*   CREATININE mg/dL 1.56* 1.70* 1.82*   GLUCOSE mg/dL 107* 99 101*   PROTEIN TOTAL g/dL  --   --  8.0   CALCIUM mg/dL 7.9* 8.5* 9.3   BILIRUBIN TOTAL mg/dL  --   --  0.3   ALK PHOS U/L  --   --  121   AST U/L  --   --  12   ALT U/L  --   --  16               Imaging:  Vascular US ankle brachial index (EUGENIA) without exercise  Preliminary Cardiology Report                  [place] [address]      Tel [phone] Fax [phone]                 Preliminary Vascular Lab Report     [facility] US ANKLE BRACHIAL INDEX (EUGENIA) WITHOUT EXERCISE       Patient Name:     MIKE Hill Physician:  [id number] Kaur Muñoz MD  Study Date:       1/5/2024        Ordering Provider:  [id number] ROSALBA DE LA VEGA  MRN/PID:          [id number]        Fellow:  Accession#:       [id number]    Technologist:       Stephanie Reaves RDMS, RVT  YOB: 1940       Technologist 2:  Gender:           F               Encounter#:         [id number]  Admission Status: Inpatient       Location Performed: [facility]       Diagnosis/ICD: Atherosclerosis of native arteries of left leg with ulceration of                 other part of lower left leg-I70.248; Atherosclerosis of native                 arteries of left leg with ulceration  of other part of                 foot-I70.245; Atherosclerosis of native arteries of right leg                 with ulceration of other part of lower right leg-I70.238;                 Atherosclerosis of native arteries of right leg with ulceration                 of other part of foot-I70.235  CPT Codes:     49781 Peripheral artery EUGENIA Only       Smoker:            Former.  Pertinent History: Leg pain, Obesity, Cellulitus and LE Edema.       PRELIMINARY CONCLUSIONS:     Right Lower PVR: No evidence of arterial occlusive disease in the right lower extremity at rest. Normal digital perfusion noted. Monophasic flow is noted in the right common femoral artery. Biphasic flow is noted in the right dorsalis pedis artery. Triphasic flow is noted in the right posterior tibial artery.  Left Lower PVR: Decreased digital perfusion noted. Monophasic flow is noted in the left common femoral artery. Triphasic flow is noted in the left posterior tibial artery and left dorsalis pedis artery. Dampened flow left femoral.     Imaging & Doppler Findings:     RIGHT Lower PVR                Pressures Ratios  Right Posterior Tibial (Ankle) 174 mmHg  1.18  Right Dorsalis Pedis (Ankle)   163 mmHg  1.11  Right Digit (Great Toe)        111 mmHg  0.76          LEFT Lower PVR                Pressures Ratios  Left Posterior Tibial (Ankle) 157 mmHg  1.07  Left Dorsalis Pedis (Ankle)   174 mmHg  1.18  Left Digit (Great Toe)        85 mmHg   0.58                             Right     Left  Brachial Pressure 147 mmHg 143 mmHg          VASCULAR PRELIMINARY REPORT  completed by Stephanie Reaves RDMS on 1/5/2024 at 11:43:41 AM       ** Final **

## 2024-01-06 LAB
ANION GAP SERPL CALC-SCNC: 12 MMOL/L (ref 10–20)
BASOPHILS # BLD AUTO: 0.02 X10*3/UL (ref 0–0.1)
BASOPHILS NFR BLD AUTO: 0.2 %
BUN SERPL-MCNC: 25 MG/DL (ref 6–23)
CALCIUM SERPL-MCNC: 8.2 MG/DL (ref 8.6–10.3)
CHLORIDE SERPL-SCNC: 110 MMOL/L (ref 98–107)
CO2 SERPL-SCNC: 20 MMOL/L (ref 21–32)
CREAT SERPL-MCNC: 1.45 MG/DL (ref 0.5–1.05)
EOSINOPHIL # BLD AUTO: 0.42 X10*3/UL (ref 0–0.4)
EOSINOPHIL NFR BLD AUTO: 5 %
ERYTHROCYTE [DISTWIDTH] IN BLOOD BY AUTOMATED COUNT: 17.1 % (ref 11.5–14.5)
GFR SERPL CREATININE-BSD FRML MDRD: 36 ML/MIN/1.73M*2
GLUCOSE SERPL-MCNC: 82 MG/DL (ref 74–99)
HCT VFR BLD AUTO: 32.6 % (ref 36–46)
HGB BLD-MCNC: 10.1 G/DL (ref 12–16)
IMM GRANULOCYTES # BLD AUTO: 0.08 X10*3/UL (ref 0–0.5)
IMM GRANULOCYTES NFR BLD AUTO: 0.9 % (ref 0–0.9)
LYMPHOCYTES # BLD AUTO: 1.67 X10*3/UL (ref 0.8–3)
LYMPHOCYTES NFR BLD AUTO: 19.8 %
MCH RBC QN AUTO: 26.2 PG (ref 26–34)
MCHC RBC AUTO-ENTMCNC: 31 G/DL (ref 32–36)
MCV RBC AUTO: 85 FL (ref 80–100)
MONOCYTES # BLD AUTO: 0.76 X10*3/UL (ref 0.05–0.8)
MONOCYTES NFR BLD AUTO: 9 %
NEUTROPHILS # BLD AUTO: 5.49 X10*3/UL (ref 1.6–5.5)
NEUTROPHILS NFR BLD AUTO: 65.1 %
NRBC BLD-RTO: 0 /100 WBCS (ref 0–0)
PLATELET # BLD AUTO: 271 X10*3/UL (ref 150–450)
POTASSIUM SERPL-SCNC: 4.7 MMOL/L (ref 3.5–5.3)
RBC # BLD AUTO: 3.85 X10*6/UL (ref 4–5.2)
SODIUM SERPL-SCNC: 137 MMOL/L (ref 136–145)
WBC # BLD AUTO: 8.4 X10*3/UL (ref 4.4–11.3)

## 2024-01-06 PROCEDURE — 80048 BASIC METABOLIC PNL TOTAL CA: CPT | Performed by: STUDENT IN AN ORGANIZED HEALTH CARE EDUCATION/TRAINING PROGRAM

## 2024-01-06 PROCEDURE — 1210000001 HC SEMI-PRIVATE ROOM DAILY

## 2024-01-06 PROCEDURE — 2500000001 HC RX 250 WO HCPCS SELF ADMINISTERED DRUGS (ALT 637 FOR MEDICARE OP): Performed by: PLASTIC SURGERY

## 2024-01-06 PROCEDURE — 2500000004 HC RX 250 GENERAL PHARMACY W/ HCPCS (ALT 636 FOR OP/ED): Performed by: STUDENT IN AN ORGANIZED HEALTH CARE EDUCATION/TRAINING PROGRAM

## 2024-01-06 PROCEDURE — 2500000001 HC RX 250 WO HCPCS SELF ADMINISTERED DRUGS (ALT 637 FOR MEDICARE OP): Performed by: STUDENT IN AN ORGANIZED HEALTH CARE EDUCATION/TRAINING PROGRAM

## 2024-01-06 PROCEDURE — 99232 SBSQ HOSP IP/OBS MODERATE 35: CPT | Performed by: STUDENT IN AN ORGANIZED HEALTH CARE EDUCATION/TRAINING PROGRAM

## 2024-01-06 PROCEDURE — 85025 COMPLETE CBC W/AUTO DIFF WBC: CPT | Performed by: STUDENT IN AN ORGANIZED HEALTH CARE EDUCATION/TRAINING PROGRAM

## 2024-01-06 PROCEDURE — 36415 COLL VENOUS BLD VENIPUNCTURE: CPT | Performed by: STUDENT IN AN ORGANIZED HEALTH CARE EDUCATION/TRAINING PROGRAM

## 2024-01-06 RX ORDER — ACETAMINOPHEN 325 MG/1
325 TABLET ORAL ONCE
Status: COMPLETED | OUTPATIENT
Start: 2024-01-06 | End: 2024-01-06

## 2024-01-06 RX ORDER — MUPIROCIN 20 MG/G
OINTMENT TOPICAL 2 TIMES DAILY
Status: DISCONTINUED | OUTPATIENT
Start: 2024-01-06 | End: 2024-01-10 | Stop reason: HOSPADM

## 2024-01-06 RX ADMIN — CEFEPIME 1 G: 1 INJECTION, POWDER, FOR SOLUTION INTRAMUSCULAR; INTRAVENOUS at 00:17

## 2024-01-06 RX ADMIN — ACETAMINOPHEN 325 MG: 325 TABLET ORAL at 21:05

## 2024-01-06 RX ADMIN — MUPIROCIN: 20 OINTMENT TOPICAL at 13:24

## 2024-01-06 RX ADMIN — LEVOTHYROXINE SODIUM 150 MCG: 75 TABLET ORAL at 06:32

## 2024-01-06 RX ADMIN — OXYCODONE HYDROCHLORIDE AND ACETAMINOPHEN 1 TABLET: 5; 325 TABLET ORAL at 06:51

## 2024-01-06 RX ADMIN — APIXABAN 5 MG: 5 TABLET, FILM COATED ORAL at 09:00

## 2024-01-06 RX ADMIN — CEFEPIME 1 G: 1 INJECTION, POWDER, FOR SOLUTION INTRAMUSCULAR; INTRAVENOUS at 13:35

## 2024-01-06 RX ADMIN — APIXABAN 5 MG: 5 TABLET, FILM COATED ORAL at 21:43

## 2024-01-06 RX ADMIN — OXYCODONE HYDROCHLORIDE AND ACETAMINOPHEN 1 TABLET: 5; 325 TABLET ORAL at 20:27

## 2024-01-06 ASSESSMENT — COGNITIVE AND FUNCTIONAL STATUS - GENERAL
HELP NEEDED FOR BATHING: TOTAL
DRESSING REGULAR UPPER BODY CLOTHING: TOTAL
DRESSING REGULAR LOWER BODY CLOTHING: TOTAL
DAILY ACTIVITIY SCORE: 12
TOILETING: A LOT
CLIMB 3 TO 5 STEPS WITH RAILING: TOTAL
MOVING FROM LYING ON BACK TO SITTING ON SIDE OF FLAT BED WITH BEDRAILS: A LOT
STANDING UP FROM CHAIR USING ARMS: TOTAL
MOVING FROM LYING ON BACK TO SITTING ON SIDE OF FLAT BED WITH BEDRAILS: A LOT
HELP NEEDED FOR BATHING: TOTAL
DRESSING REGULAR LOWER BODY CLOTHING: TOTAL
MOVING TO AND FROM BED TO CHAIR: TOTAL
WALKING IN HOSPITAL ROOM: TOTAL
TOILETING: TOTAL
MOBILITY SCORE: 9
CLIMB 3 TO 5 STEPS WITH RAILING: TOTAL
DRESSING REGULAR UPPER BODY CLOTHING: A LOT
WALKING IN HOSPITAL ROOM: TOTAL
DAILY ACTIVITIY SCORE: 10
STANDING UP FROM CHAIR USING ARMS: TOTAL
PERSONAL GROOMING: A LOT
PERSONAL GROOMING: A LITTLE
MOBILITY SCORE: 8
TURNING FROM BACK TO SIDE WHILE IN FLAT BAD: A LOT
EATING MEALS: A LITTLE
TURNING FROM BACK TO SIDE WHILE IN FLAT BAD: A LOT
MOVING TO AND FROM BED TO CHAIR: A LOT

## 2024-01-06 ASSESSMENT — PAIN DESCRIPTION - ORIENTATION: ORIENTATION: RIGHT;LEFT

## 2024-01-06 ASSESSMENT — PAIN - FUNCTIONAL ASSESSMENT: PAIN_FUNCTIONAL_ASSESSMENT: 0-10

## 2024-01-06 ASSESSMENT — PAIN SCALES - GENERAL
PAINLEVEL_OUTOF10: 10 - WORST POSSIBLE PAIN
PAINLEVEL_OUTOF10: 3

## 2024-01-06 ASSESSMENT — PAIN DESCRIPTION - LOCATION: LOCATION: LEG

## 2024-01-06 NOTE — PROGRESS NOTES
Medical Group Progress Note  ASSESSMENT & PLAN:     Bilateral lower extremity wounds and cellulitis  Pseudomonas wounds  - Images as in the media section from 1/3, slightly improved erythema  - Continue cefepime (vancomycin discontinued 1/5)  - ID following  - Plastic surgery following  - Follow-up wound cultures, growing Pseudomonas  - Follow-up blood cultures    Anemia of chronic disease  - Hemoglobin 10.1 today (baseline approximately 9)     CKD stage III  Hyperkalemia, resolved  - Creatinine 1.45 today (baseline approximately 1.3-1.5)     Essential hypertension  Hyperlipidemia  Diastolic CHF, compensated  COPD, not in exacerbation  Hypothyroidism  Personal history of DVT  Long-term anticoagulation use  - Resume medications as reconciled     VTE Prophylaxis: Home apixaban    Disposition/Daily update: Continue cefepime for Pseudomonas wound infection as per above.  Patient continues to refuse placement to SNF, states that she will be able to take care of herself.  Have instructed nursing staff to allow for patient to dress her own wounds, discharge planning to begin once we have final ID recommendations.      ---Of note, this documentation is completed using the Dragon Dictation system (voice recognition software). There may be spelling and/or grammatical errors that were not corrected prior to final submission.---    Conner Tan MD    SUBJECTIVE     Patient was seen and examined bedside this morning.  She states that the silver sulfadiazine cream caused burning in her legs yesterday therefore this was stopped.  Is continuing to be adamant against discharge to SNF.    OBJECTIVE:     Last Recorded Vitals:  Vitals:    01/06/24 0311 01/06/24 0724 01/06/24 0826 01/06/24 0852   BP: 136/61  144/73    BP Location:       Patient Position:       Pulse: 71  79    Resp:       Temp:    37.2 °C (98.9 °F)   TempSrc:       SpO2: 97%  97%    Weight:  95.7 kg (210 lb 15.7 oz)     Height:         Last I/O:  I/O last 3  completed shifts:  In: 525 (5.8 mL/kg) [P.O.:375; IV Piggyback:150]  Out: - (0 mL/kg)   Weight: 90.6 kg     Physical Exam  Vitals and nursing note reviewed.   Constitutional:       General: She is not in acute distress.     Appearance: Normal appearance. She is not toxic-appearing.   HENT:      Head: Normocephalic and atraumatic.   Eyes:      Extraocular Movements: Extraocular movements intact.      Conjunctiva/sclera: Conjunctivae normal.   Cardiovascular:      Rate and Rhythm: Normal rate and regular rhythm.      Pulses: Normal pulses.      Heart sounds: Normal heart sounds.   Pulmonary:      Effort: Pulmonary effort is normal. No respiratory distress.      Breath sounds: Normal breath sounds. No wheezing.   Abdominal:      General: Bowel sounds are normal.      Palpations: Abdomen is soft.      Tenderness: There is no abdominal tenderness.   Musculoskeletal:         General: Swelling present. Normal range of motion.      Cervical back: Normal range of motion and neck supple.   Skin:     Findings: Erythema, lesion and rash present.      Comments: Bilateral lower extremities with improved erythema, wound still present unchanged.   Neurological:      Mental Status: She is alert.     Inpatient Medications:  apixaban, 5 mg, oral, BID  cefepime, 1 g, intravenous, q12h  furosemide, 20 mg, oral, Daily  lactobacillus acidophilus, 1 tablet, oral, BID  levothyroxine, 150 mcg, oral, Daily before breakfast  mupirocin, , Topical, BID    PRN Medications  PRN medications: acetaminophen **OR** acetaminophen **OR** acetaminophen, hydrALAZINE, labetaloL, menthol-zinc oxide, oxyCODONE-acetaminophen, polyethylene glycol  Continuous Medications:     LABS AND IMAGING:     Labs:  Results from last 7 days   Lab Units 01/06/24  0726 01/05/24  0532 01/04/24  0420   WBC AUTO x10*3/uL 8.4 10.0 11.5*   RBC AUTO x10*6/uL 3.85* 3.60* 4.02   HEMOGLOBIN g/dL 10.1* 9.5* 10.7*   HEMATOCRIT % 32.6* 29.8* 33.9*   MCV fL 85 83 84   MCH pg 26.2 26.4  26.6   MCHC g/dL 31.0* 31.9* 31.6*   RDW % 17.1* 16.9* 16.8*   PLATELETS AUTO x10*3/uL 271 288 312       Results from last 7 days   Lab Units 01/06/24  0726 01/05/24  0532 01/04/24  0420 01/03/24  1225   SODIUM mmol/L 137 136 137 138   POTASSIUM mmol/L 4.7 4.5 4.9 5.4*   CHLORIDE mmol/L 110* 109* 112* 108*   CO2 mmol/L 20* 19* 18* 20*   BUN mg/dL 25* 31* 41* 46*   CREATININE mg/dL 1.45* 1.56* 1.70* 1.82*   GLUCOSE mg/dL 82 107* 99 101*   PROTEIN TOTAL g/dL  --   --   --  8.0   CALCIUM mg/dL 8.2* 7.9* 8.5* 9.3   BILIRUBIN TOTAL mg/dL  --   --   --  0.3   ALK PHOS U/L  --   --   --  121   AST U/L  --   --   --  12   ALT U/L  --   --   --  16               Imaging:  Vascular US ankle brachial index (EUGENIA) without exercise               [place] [address]      Tel [phone] Fax [phone]       Vascular Lab Report     [facility] US ANKLE BRACHIAL INDEX (EUGENIA) WITHOUT EXERCISE    Patient Name:      MIKE Hill Physician:  [id number] Kaur Muñoz MD  Study Date:        1/5/2024             Ordering Provider:  [id number] ROSALBA DE LA VEGA  MRN/PID:           [id number]             Fellow:  Accession#:        [id number]         Technologist:       Stephanie Reaves RDMS,                                                              RVT  Date of Birth/Age: 1940 / 83 years Technologist 2:  Gender:            F                    Encounter#:         [id number]  Admission Status:  Inpatient            Location Performed: [facility]       Diagnosis/ICD: Atherosclerosis of native arteries of left leg with ulceration of                 other part of lower left leg-I70.248; Atherosclerosis of native                 arteries of left leg with ulceration of other part of                 foot-I70.245; Atherosclerosis of native arteries of right  leg                 with ulceration of other part of lower right leg-I70.238;                 Atherosclerosis of native arteries of right leg with ulceration                 of other part of foot-I70.235  CPT Codes:     92405 Peripheral artery EUGENIA Only       Smoker:            Former.  Pertinent History: Leg pain, Obesity, Cellulitus and LE Edema.       CONCLUSIONS:  Right Lower PVR: Normal digital perfusion noted. Monophasic flow is noted in the right common femoral artery. Biphasic flow is noted in the right dorsalis pedis artery. Triphasic flow is noted in the right posterior tibial artery. Normal ankle-brachial index, but mild disease by waveforms.  Left Lower PVR: Decreased digital perfusion noted. Monophasic flow is noted in the left common femoral artery. Triphasic flow is noted in the left posterior tibial artery and left dorsalis pedis artery. Normal ankle-brachial index, mild disease by waveforms, and decreased digital perfusion suggestive of distal disease.     Imaging & Doppler Findings:     RIGHT Lower PVR                Pressures Ratios  Right Posterior Tibial (Ankle) 174 mmHg  1.18  Right Dorsalis Pedis (Ankle)   163 mmHg  1.11  Right Digit (Great Toe)        111 mmHg  0.76          LEFT Lower PVR                Pressures Ratios  Left Posterior Tibial (Ankle) 157 mmHg  1.07  Left Dorsalis Pedis (Ankle)   174 mmHg  1.18  Left Digit (Great Toe)        85 mmHg   0.58                             Right     Left  Brachial Pressure 147 mmHg 143 mmHg          [id number] Kaur Muñoz MD  Electronically signed by [id number] Kaur Muñoz MD on 1/5/2024 at 6:25:47 PM       ** Final **

## 2024-01-06 NOTE — NURSING NOTE
"Pt asked that dressings to BLE be removed and legs washed due to severe itching and burning. Pt states \"silver cream\" allergy which is now added to allergy list. Dr Reyes made aware via secure text.  "

## 2024-01-06 NOTE — PROGRESS NOTES
Cheryl Elena is a 83 y.o. female on day 2 of admission presenting with Cellulitis of lower extremity, unspecified laterality.    Subjective   Interval History: Pt feeling same no fevers  Review of Systems    Objective   Range of Vitals (last 24 hours)  Heart Rate:  [75-92]   Temp:  [35.7 °C (96.3 °F)-37.2 °C (99 °F)]   Resp:  [16]   BP: (120-158)/(57-71)   SpO2:  [96 %-99 %]   Daily Weight  01/04/24 : 90.6 kg (199 lb 11.8 oz)    Body mass index is 35.39 kg/m².    Physical Exam    General Appearance: alert and oriented to person, place and time, well-developed and well-nourished, in no acute distress  On room air  Hard of hearing  Was tachycardic last night  Skin: warm and dry, no rash.   Head: normocephalic and atraumatic  ilateral lung fields  Heart distant heart sounds  Abdomen: soft, no tenderness  No leg edema  Bilateral legs and feet erythema, severe tenderness, extensive necrotic ulcer with brown drainage  Surrounding punctate lesions  Left second and third toe and right second toe with necrotic ulcers    Antibiotics  cefepime (Maxipime) 1 g in dextrose 5 % 50 mL IV  vancomycin (Vancocin) 1,000 mg in dextrose 5% water 200 mL  HYDROcodone-acetaminophen (Norco) 5-325 mg per tablet 1 tablet  cloNIDine (Catapres) tablet 0.1 mg  heparin (porcine) injection 5,000 Units  acetaminophen (Tylenol) tablet 650 mg  acetaminophen (Tylenol) oral liquid 650 mg  acetaminophen (Tylenol) suppository 650 mg  polyethylene glycol (Glycolax, Miralax) packet 17 g  cefepime (Maxipime) 1 g in dextrose 5 % 50 mL IV  HYDROcodone-acetaminophen (Norco) 5-325 mg per tablet 1 tablet  lactobacillus acidophilus tablet 1 tablet  hydrALAZINE (Apresoline) injection 5 mg  labetaloL (Normodyne,Trandate) injection 10 mg  vancomycin (Vancocin) placeholder    levothyroxine (Synthroid, Levoxyl) tablet  apixaban (Eliquis) tablet  furosemide (Lasix) tablet  apixaban (Eliquis) tablet 5 mg  furosemide (Lasix) tablet 20 mg  levothyroxine (Synthroid,  Levoxyl) tablet 150 mcg  oxyCODONE-acetaminophen (Percocet) 5-325 mg per tablet 1 tablet  sodium zirconium cyclosilicate (Lokelma) packet 10 g  vancomycin (Vancocin) in dextrose 5 % water (D5W) 250 mL IV 1,250 mg  menthol-zinc oxide (Calmoseptine - Risamine) 0.44-20.6 % ointment 1 Application  silver sulfADIAZINE (Silvadene) 1 % cream      Relevant Results  Labs  Results from last 72 hours   Lab Units 01/05/24  0532 01/04/24  0420 01/03/24  1225   WBC AUTO x10*3/uL 10.0 11.5* 11.2   HEMOGLOBIN g/dL 9.5* 10.7* 11.3*   HEMATOCRIT % 29.8* 33.9* 35.5*   PLATELETS AUTO x10*3/uL 288 312 350   NEUTROS PCT AUTO % 72.8 75.5 83.7   LYMPHS PCT AUTO % 14.8 12.7 9.2   MONOS PCT AUTO % 9.0 9.5 4.9   EOS PCT AUTO % 2.8 1.6 1.3     Results from last 72 hours   Lab Units 01/05/24  0532 01/04/24  0420 01/03/24  1225   SODIUM mmol/L 136 137 138   POTASSIUM mmol/L 4.5 4.9 5.4*   CHLORIDE mmol/L 109* 112* 108*   CO2 mmol/L 19* 18* 20*   BUN mg/dL 31* 41* 46*   CREATININE mg/dL 1.56* 1.70* 1.82*   GLUCOSE mg/dL 107* 99 101*   CALCIUM mg/dL 7.9* 8.5* 9.3   ANION GAP mmol/L 13 12 15   EGFR mL/min/1.73m*2 33* 29* 27*     Results from last 72 hours   Lab Units 01/03/24  1225   ALK PHOS U/L 121   BILIRUBIN TOTAL mg/dL 0.3   BILIRUBIN DIRECT mg/dL 0.1   PROTEIN TOTAL g/dL 8.0   ALT U/L 16   AST U/L 12   ALBUMIN g/dL 4.0     Estimated Creatinine Clearance: 28.7 mL/min (A) (by C-G formula based on SCr of 1.56 mg/dL (H)).  C-Reactive Protein   Date Value Ref Range Status   01/03/2024 7.09 (H) <1.00 mg/dL Final     CRP   Date Value Ref Range Status   02/07/2023 9.80 (A) mg/dL Final     Comment:     REF VALUE  < 1.00     09/07/2022 1.21 (A) mg/dL Final     Comment:     REF VALUE  < 1.00       Microbiology  Susceptibility data from last 14 days.  Collected Specimen Info Organism Aztreonam Cefepime Ceftazidime Ceftolozane/Tazobactam Ciprofloxacin Gentamicin Imipenem Levofloxacin Meropenem Piperacillin/Tazobactam Tobramycin   01/03/24 Tissue/Biopsy  from Skin Lesion Pseudomonas aeruginosa I S S S S S R S I S S     Mixed Gram-Positive and Gram-Negative Bacteria                     Assessment/Plan         IMPRESSION:    Infected stasis ulcers of bilateral legs with Pseudomonas aeruginosa  Bilateral leg cellulitis  Toe gangrene bilateral feet  Acute kidney injury on chronic kidney disease  Chronic bilateral legs nonhealing wounds  MRSA carrier     PLAN:  Continue IV cefepime for now  Patient is going to require prolonged course of IV antibiotics on discharge   Local wound care and follow-up by wound care team and Dr. Reyes  Follow-up [facility] BMP         Perfecto Mcduffie MD

## 2024-01-07 LAB
ANION GAP SERPL CALC-SCNC: 10 MMOL/L (ref 10–20)
BACTERIA BLD CULT: NORMAL
BACTERIA BLD CULT: NORMAL
BASOPHILS # BLD AUTO: 0.02 X10*3/UL (ref 0–0.1)
BASOPHILS NFR BLD AUTO: 0.3 %
BUN SERPL-MCNC: 24 MG/DL (ref 6–23)
CALCIUM SERPL-MCNC: 8 MG/DL (ref 8.6–10.3)
CHLORIDE SERPL-SCNC: 111 MMOL/L (ref 98–107)
CO2 SERPL-SCNC: 20 MMOL/L (ref 21–32)
CREAT SERPL-MCNC: 1.29 MG/DL (ref 0.5–1.05)
EOSINOPHIL # BLD AUTO: 0.38 X10*3/UL (ref 0–0.4)
EOSINOPHIL NFR BLD AUTO: 5.4 %
ERYTHROCYTE [DISTWIDTH] IN BLOOD BY AUTOMATED COUNT: 16.9 % (ref 11.5–14.5)
GFR SERPL CREATININE-BSD FRML MDRD: 41 ML/MIN/1.73M*2
GLUCOSE SERPL-MCNC: 98 MG/DL (ref 74–99)
HCT VFR BLD AUTO: 30.9 % (ref 36–46)
HGB BLD-MCNC: 9.4 G/DL (ref 12–16)
IMM GRANULOCYTES # BLD AUTO: 0.04 X10*3/UL (ref 0–0.5)
IMM GRANULOCYTES NFR BLD AUTO: 0.6 % (ref 0–0.9)
LYMPHOCYTES # BLD AUTO: 1.37 X10*3/UL (ref 0.8–3)
LYMPHOCYTES NFR BLD AUTO: 19.3 %
MCH RBC QN AUTO: 26.3 PG (ref 26–34)
MCHC RBC AUTO-ENTMCNC: 30.4 G/DL (ref 32–36)
MCV RBC AUTO: 86 FL (ref 80–100)
MONOCYTES # BLD AUTO: 0.69 X10*3/UL (ref 0.05–0.8)
MONOCYTES NFR BLD AUTO: 9.7 %
NEUTROPHILS # BLD AUTO: 4.59 X10*3/UL (ref 1.6–5.5)
NEUTROPHILS NFR BLD AUTO: 64.7 %
NRBC BLD-RTO: 0 /100 WBCS (ref 0–0)
PLATELET # BLD AUTO: 213 X10*3/UL (ref 150–450)
POTASSIUM SERPL-SCNC: 4.7 MMOL/L (ref 3.5–5.3)
RBC # BLD AUTO: 3.58 X10*6/UL (ref 4–5.2)
SODIUM SERPL-SCNC: 136 MMOL/L (ref 136–145)
WBC # BLD AUTO: 7.1 X10*3/UL (ref 4.4–11.3)

## 2024-01-07 PROCEDURE — 80048 BASIC METABOLIC PNL TOTAL CA: CPT | Performed by: STUDENT IN AN ORGANIZED HEALTH CARE EDUCATION/TRAINING PROGRAM

## 2024-01-07 PROCEDURE — 2500000004 HC RX 250 GENERAL PHARMACY W/ HCPCS (ALT 636 FOR OP/ED): Performed by: STUDENT IN AN ORGANIZED HEALTH CARE EDUCATION/TRAINING PROGRAM

## 2024-01-07 PROCEDURE — 2500000001 HC RX 250 WO HCPCS SELF ADMINISTERED DRUGS (ALT 637 FOR MEDICARE OP): Performed by: STUDENT IN AN ORGANIZED HEALTH CARE EDUCATION/TRAINING PROGRAM

## 2024-01-07 PROCEDURE — 1210000001 HC SEMI-PRIVATE ROOM DAILY

## 2024-01-07 PROCEDURE — 36415 COLL VENOUS BLD VENIPUNCTURE: CPT | Performed by: STUDENT IN AN ORGANIZED HEALTH CARE EDUCATION/TRAINING PROGRAM

## 2024-01-07 PROCEDURE — 99232 SBSQ HOSP IP/OBS MODERATE 35: CPT | Performed by: STUDENT IN AN ORGANIZED HEALTH CARE EDUCATION/TRAINING PROGRAM

## 2024-01-07 PROCEDURE — 85025 COMPLETE CBC W/AUTO DIFF WBC: CPT | Performed by: STUDENT IN AN ORGANIZED HEALTH CARE EDUCATION/TRAINING PROGRAM

## 2024-01-07 RX ADMIN — OXYCODONE HYDROCHLORIDE AND ACETAMINOPHEN 1 TABLET: 5; 325 TABLET ORAL at 16:18

## 2024-01-07 RX ADMIN — CEFEPIME 1 G: 1 INJECTION, POWDER, FOR SOLUTION INTRAMUSCULAR; INTRAVENOUS at 01:21

## 2024-01-07 RX ADMIN — ACETAMINOPHEN 650 MG: 325 TABLET ORAL at 08:00

## 2024-01-07 RX ADMIN — CEFEPIME 1 G: 1 INJECTION, POWDER, FOR SOLUTION INTRAMUSCULAR; INTRAVENOUS at 13:14

## 2024-01-07 RX ADMIN — MUPIROCIN: 20 OINTMENT TOPICAL at 08:45

## 2024-01-07 RX ADMIN — OXYCODONE HYDROCHLORIDE AND ACETAMINOPHEN 1 TABLET: 5; 325 TABLET ORAL at 08:00

## 2024-01-07 RX ADMIN — ACETAMINOPHEN 650 MG: 325 TABLET ORAL at 16:18

## 2024-01-07 RX ADMIN — APIXABAN 5 MG: 5 TABLET, FILM COATED ORAL at 20:31

## 2024-01-07 RX ADMIN — APIXABAN 5 MG: 5 TABLET, FILM COATED ORAL at 08:44

## 2024-01-07 RX ADMIN — LEVOTHYROXINE SODIUM 150 MCG: 75 TABLET ORAL at 05:14

## 2024-01-07 ASSESSMENT — COGNITIVE AND FUNCTIONAL STATUS - GENERAL
TOILETING: TOTAL
PERSONAL GROOMING: A LOT
DAILY ACTIVITIY SCORE: 10
MOVING TO AND FROM BED TO CHAIR: TOTAL
DRESSING REGULAR UPPER BODY CLOTHING: TOTAL
MOBILITY SCORE: 8
CLIMB 3 TO 5 STEPS WITH RAILING: TOTAL
STANDING UP FROM CHAIR USING ARMS: TOTAL
WALKING IN HOSPITAL ROOM: TOTAL
TURNING FROM BACK TO SIDE WHILE IN FLAT BAD: A LOT
HELP NEEDED FOR BATHING: TOTAL
MOVING FROM LYING ON BACK TO SITTING ON SIDE OF FLAT BED WITH BEDRAILS: A LOT
DRESSING REGULAR LOWER BODY CLOTHING: TOTAL

## 2024-01-07 ASSESSMENT — PAIN - FUNCTIONAL ASSESSMENT: PAIN_FUNCTIONAL_ASSESSMENT: 0-10

## 2024-01-07 ASSESSMENT — PAIN SCALES - GENERAL
PAINLEVEL_OUTOF10: 6
PAINLEVEL_OUTOF10: 8

## 2024-01-07 NOTE — PROGRESS NOTES
"Daily progress note    Cheryl Elena is 83 y.o. female with PMH of hypertension, hyperlipidemia, hypothyroidism, COPD, DVT long term anticoagulation, with eliquis, presented with bilateral lower extremity pain and wound.  Admitted for bilateral chronic lower extremity wound with cellulitis, culture growing Pseudomonas.    Subjective  Patient was seen and examined at bedside  Still reports bilateral lower extremity wound with no redness, multiple wound over the shin bilaterally also over the left second and third toe with crusting,  No fever or leukocytosis          Vital signs in last 24 hours:  Temp:  [36.1 °C (97 °F)-38.3 °C (100.9 °F)] 36.1 °C (97 °F)  Heart Rate:  [72-89] 72  Resp:  [18-22] 18  BP: (134-168)/(60-70) 135/60  /60 (BP Location: Right arm, Patient Position: Lying)   Pulse 72   Temp 36.1 °C (97 °F) (Temporal)   Resp 18   Ht 1.6 m (5' 2.99\")   Wt 95.7 kg (210 lb 15.7 oz)   SpO2 97%   BMI 37.38 kg/m²    Intake/Output last 3 shifts:  I/O last 3 completed shifts:  In: 300 (3.1 mL/kg) [I.V.:300 (3.1 mL/kg)]  Out: 900 (9.4 mL/kg) [Urine:900 (0.3 mL/kg/hr)]  Weight: 95.7 kg   Intake/Output this shift:  No intake/output data recorded.    Physical Exam  Constitutional:       General: She is not in acute distress.     Appearance: Normal appearance. She is not ill-appearing, toxic-appearing or diaphoretic.   HENT:      Head: Normocephalic and atraumatic.      Mouth/Throat:      Mouth: Mucous membranes are moist.      Pharynx: No oropharyngeal exudate.   Eyes:      Extraocular Movements: Extraocular movements intact.      Pupils: Pupils are equal, round, and reactive to light.   Cardiovascular:      Rate and Rhythm: Normal rate and regular rhythm.      Heart sounds: No murmur heard.  Pulmonary:      Effort: Pulmonary effort is normal. No respiratory distress.      Breath sounds: Normal breath sounds. No wheezing.   Abdominal:      General: Abdomen is flat. Bowel sounds are normal. There is no " distension.      Tenderness: There is no abdominal tenderness. There is no guarding or rebound.   Musculoskeletal:      Right lower leg: Edema present.      Left lower leg: Edema present.   Skin:     Findings: Erythema and lesion present. No rash.      Comments: Bilateral lower extremity wound over the shin, some yellowish discharge from the right shin, yellowish crusted lesion over the shin bilaterally or so healing crusted lesion over the second and third left toes, erythema over bilateral shin   Neurological:      General: No focal deficit present.      Mental Status: She is alert and oriented to person, place, and time.      Cranial Nerves: No cranial nerve deficit.      Motor: No weakness.   Psychiatric:         Mood and Affect: Mood normal.         Behavior: Behavior normal.         Current medication   Current Facility-Administered Medications   Medication Dose Route Frequency Provider Last Rate Last Admin    acetaminophen (Tylenol) tablet 650 mg  650 mg oral q4h PRN Conner Tan MD   650 mg at 01/07/24 0800    Or    acetaminophen (Tylenol) oral liquid 650 mg  650 mg nasogastric tube q4h PRN Conner Tan MD        Or    acetaminophen (Tylenol) suppository 650 mg  650 mg rectal q4h PRN Conner Tan MD        apixaban (Eliquis) tablet 5 mg  5 mg oral BID Conner Tan MD   5 mg at 01/07/24 0844    cefepime (Maxipime) 1 g in dextrose 5 % 50 mL IV  1 g intravenous q12h Conner Tan MD   Stopped at 01/07/24 0151    furosemide (Lasix) tablet 20 mg  20 mg oral Daily Conner Tan MD   20 mg at 01/04/24 0744    hydrALAZINE (Apresoline) injection 5 mg  5 mg intravenous q6h PRN Conner Tan MD        labetaloL (Normodyne,Trandate) injection 10 mg  10 mg intravenous q6h PRN Conner Tan MD        lactobacillus acidophilus tablet 1 tablet  1 tablet oral BID Conner Tan MD   1 tablet at 01/04/24 0745    levothyroxine (Synthroid, Levoxyl) tablet 150 mcg  150 mcg oral Daily before breakfast Conner Tan  MD   150 mcg at 01/07/24 0514    menthol-zinc oxide (Calmoseptine - Risamine) 0.44-20.6 % ointment 1 Application  1 Application Topical 4x daily PRN Conner Tan MD        mupirocin (Bactroban) 2 % ointment   Topical BID Roland J Reyes, MD   Given at 01/07/24 0845    oxyCODONE-acetaminophen (Percocet) 5-325 mg per tablet 1 tablet  1 tablet oral q4h PRN Conner Tan MD   1 tablet at 01/07/24 0800    polyethylene glycol (Glycolax, Miralax) packet 17 g  17 g oral Daily PRN Conner Tan MD            Labs  Lab Results   Component Value Date    WBC 7.1 01/07/2024    HGB 9.4 (L) 01/07/2024    HCT 30.9 (L) 01/07/2024    MCV 86 01/07/2024     01/07/2024     Lab Results   Component Value Date    HGBA1C 5.7 09/19/2022     Lab Results   Component Value Date    GLUCOSE 98 01/07/2024    CALCIUM 8.0 (L) 01/07/2024     01/07/2024    K 4.7 01/07/2024    CO2 20 (L) 01/07/2024     (H) 01/07/2024    BUN 24 (H) 01/07/2024    CREATININE 1.29 (H) 01/07/2024           Principal Problem:    Cellulitis of lower extremity, unspecified laterality      Assessment and Plan   Bilateral lower extremity wounds and cellulitis  Pseudomonas infection    - Images as in the media section from 1/3, slightly improved erythema  - Continue cefepime (vancomycin discontinued 1/5)  - ID following  - Plastic surgery following  - Follow-up wound cultures, growing Pseudomonas  - Follow-up blood cultures     Anemia of chronic disease  H&H stable, continue monitoring    CKD stage III  Hyperkalemia, resolved  - Creatinine 1.45 today (baseline approximately 1.3-1.5)     Essential hypertension  Hyperlipidemia  Diastolic CHF, compensated  COPD, not in exacerbation  Hypothyroidism  Personal history of DVT  Long-term anticoagulation use  Continue home medications     VTE Prophylaxis: Home apixaban  Disposition, pending final recommendation for antibiotics by ID, patient is adamant about not going to SNF, will discuss with ID if oral antibiotics  are would be an option since patient does not have a PCP to have wound care services as well   LOS: 4 days

## 2024-01-07 NOTE — CARE PLAN
The patient's goals for the shift include med compliance and safety    The clinical goals for the shift include compliance    Problem: Skin  Goal: Decreased wound size/increased tissue granulation at next dressing change  Outcome: Progressing  Goal: Participates in plan/prevention/treatment measures  Outcome: Progressing  Goal: Prevent/manage excess moisture  Outcome: Progressing  Goal: Prevent/minimize sheer/friction injuries  Outcome: Progressing  Goal: Promote/optimize nutrition  Outcome: Progressing  Goal: Promote skin healing  Outcome: Progressing     Problem: Nutrition  Goal: Oral intake greater than 50%  Outcome: Progressing  Goal: Oral intake greater 75%  Outcome: Progressing  Goal: Consume prescribed supplement  Outcome: Progressing  Goal: Adequate PO fluid intake  Outcome: Progressing  Goal: Nutrition support goals are met within 48 hrs  Outcome: Progressing  Goal: Nutrition support is meeting 75% of nutrient needs  Outcome: Progressing  Goal: BG  mg/dL  Outcome: Progressing  Goal: Lab values WNL  Outcome: Progressing  Goal: Electrolytes WNL  Outcome: Progressing  Goal: Promote healing  Outcome: Progressing  Goal: Maintain stable weight  Outcome: Progressing  Goal: Reduce weight from edema/fluid  Outcome: Progressing  Goal: Gradual weight gain  Outcome: Progressing     Problem: Fall/Injury  Goal: Not fall by end of shift  Outcome: Progressing  Goal: Be free from injury by end of the shift  Outcome: Progressing  Goal: Verbalize understanding of personal risk factors for fall in the hospital  Outcome: Progressing  Goal: Verbalize understanding of risk factor reduction measures to prevent injury from fall in the home  Outcome: Progressing  Goal: Use assistive devices by end of the shift  Outcome: Progressing  Goal: Pace activities to prevent fatigue by end of the shift  Outcome: Progressing     Problem: Pain  Goal: My pain/discomfort is manageable  Outcome: Progressing     Problem: Safety  Goal: Patient  will be injury free during hospitalization  Outcome: Progressing  Goal: I will remain free of falls  Outcome: Progressing     Problem: Daily Care  Goal: Daily care needs are met  Outcome: Progressing     Problem: Psychosocial Needs  Goal: Demonstrates ability to cope with hospitalization/illness  Outcome: Progressing  Goal: Collaborate with me, my family, and caregiver to identify my specific goals  Outcome: Progressing     Problem: Discharge Barriers  Goal: My discharge needs are met  Outcome: Progressing     Problem: Pain  Goal: Takes deep breaths with improved pain control throughout the shift  Outcome: Progressing  Goal: Turns in bed with improved pain control throughout the shift  Outcome: Progressing  Goal: Walks with improved pain control throughout the shift  Outcome: Progressing  Goal: Performs ADL's with improved pain control throughout shift  Outcome: Progressing  Goal: Free from opioid side effects throughout the shift  Outcome: Progressing  Goal: Free from acute confusion related to pain meds throughout the shift  Outcome: Progressing     Problem: Pain - Adult  Goal: Verbalizes/displays adequate comfort level or baseline comfort level  Outcome: Progressing     Problem: Safety - Adult  Goal: Free from fall injury  Outcome: Progressing     Problem: Discharge Planning  Goal: Discharge to home or other facility with appropriate resources  Outcome: Progressing     Problem: Chronic Conditions and Co-morbidities  Goal: Patient's chronic conditions and co-morbidity symptoms are monitored and maintained or improved  Outcome: Progressing

## 2024-01-08 ENCOUNTER — APPOINTMENT (OUTPATIENT)
Dept: RADIOLOGY | Facility: HOSPITAL | Age: 84
DRG: 603 | End: 2024-01-08
Payer: MEDICARE

## 2024-01-08 LAB
ANION GAP SERPL CALC-SCNC: 8 MMOL/L (ref 10–20)
BASOPHILS # BLD AUTO: 0.02 X10*3/UL (ref 0–0.1)
BASOPHILS NFR BLD AUTO: 0.3 %
BUN SERPL-MCNC: 26 MG/DL (ref 6–23)
CALCIUM SERPL-MCNC: 8.3 MG/DL (ref 8.6–10.3)
CHLORIDE SERPL-SCNC: 110 MMOL/L (ref 98–107)
CO2 SERPL-SCNC: 23 MMOL/L (ref 21–32)
CREAT SERPL-MCNC: 1.45 MG/DL (ref 0.5–1.05)
EOSINOPHIL # BLD AUTO: 0.41 X10*3/UL (ref 0–0.4)
EOSINOPHIL NFR BLD AUTO: 5.8 %
ERYTHROCYTE [DISTWIDTH] IN BLOOD BY AUTOMATED COUNT: 17.1 % (ref 11.5–14.5)
GFR SERPL CREATININE-BSD FRML MDRD: 36 ML/MIN/1.73M*2
GLUCOSE SERPL-MCNC: 90 MG/DL (ref 74–99)
HCT VFR BLD AUTO: 29.4 % (ref 36–46)
HGB BLD-MCNC: 9 G/DL (ref 12–16)
HOLD SPECIMEN: NORMAL
IMM GRANULOCYTES # BLD AUTO: 0.05 X10*3/UL (ref 0–0.5)
IMM GRANULOCYTES NFR BLD AUTO: 0.7 % (ref 0–0.9)
LYMPHOCYTES # BLD AUTO: 1.13 X10*3/UL (ref 0.8–3)
LYMPHOCYTES NFR BLD AUTO: 16 %
MCH RBC QN AUTO: 26.1 PG (ref 26–34)
MCHC RBC AUTO-ENTMCNC: 30.6 G/DL (ref 32–36)
MCV RBC AUTO: 85 FL (ref 80–100)
MONOCYTES # BLD AUTO: 0.63 X10*3/UL (ref 0.05–0.8)
MONOCYTES NFR BLD AUTO: 8.9 %
NEUTROPHILS # BLD AUTO: 4.82 X10*3/UL (ref 1.6–5.5)
NEUTROPHILS NFR BLD AUTO: 68.3 %
NRBC BLD-RTO: 0 /100 WBCS (ref 0–0)
PLATELET # BLD AUTO: 278 X10*3/UL (ref 150–450)
POTASSIUM SERPL-SCNC: 5.3 MMOL/L (ref 3.5–5.3)
RBC # BLD AUTO: 3.45 X10*6/UL (ref 4–5.2)
SODIUM SERPL-SCNC: 136 MMOL/L (ref 136–145)
WBC # BLD AUTO: 7.1 X10*3/UL (ref 4.4–11.3)

## 2024-01-08 PROCEDURE — 97530 THERAPEUTIC ACTIVITIES: CPT | Mod: GO,CO

## 2024-01-08 PROCEDURE — 36415 COLL VENOUS BLD VENIPUNCTURE: CPT | Performed by: STUDENT IN AN ORGANIZED HEALTH CARE EDUCATION/TRAINING PROGRAM

## 2024-01-08 PROCEDURE — 73630 X-RAY EXAM OF FOOT: CPT | Mod: LT

## 2024-01-08 PROCEDURE — 99231 SBSQ HOSP IP/OBS SF/LOW 25: CPT | Performed by: PLASTIC SURGERY

## 2024-01-08 PROCEDURE — 85025 COMPLETE CBC W/AUTO DIFF WBC: CPT | Performed by: STUDENT IN AN ORGANIZED HEALTH CARE EDUCATION/TRAINING PROGRAM

## 2024-01-08 PROCEDURE — 2500000001 HC RX 250 WO HCPCS SELF ADMINISTERED DRUGS (ALT 637 FOR MEDICARE OP): Performed by: STUDENT IN AN ORGANIZED HEALTH CARE EDUCATION/TRAINING PROGRAM

## 2024-01-08 PROCEDURE — 1210000001 HC SEMI-PRIVATE ROOM DAILY

## 2024-01-08 PROCEDURE — 99232 SBSQ HOSP IP/OBS MODERATE 35: CPT | Performed by: STUDENT IN AN ORGANIZED HEALTH CARE EDUCATION/TRAINING PROGRAM

## 2024-01-08 PROCEDURE — 73630 X-RAY EXAM OF FOOT: CPT | Mod: LEFT SIDE | Performed by: RADIOLOGY

## 2024-01-08 PROCEDURE — 97530 THERAPEUTIC ACTIVITIES: CPT | Mod: GP,CQ

## 2024-01-08 PROCEDURE — 97535 SELF CARE MNGMENT TRAINING: CPT | Mod: GO,CO

## 2024-01-08 PROCEDURE — 2500000004 HC RX 250 GENERAL PHARMACY W/ HCPCS (ALT 636 FOR OP/ED): Performed by: STUDENT IN AN ORGANIZED HEALTH CARE EDUCATION/TRAINING PROGRAM

## 2024-01-08 PROCEDURE — 80048 BASIC METABOLIC PNL TOTAL CA: CPT | Performed by: STUDENT IN AN ORGANIZED HEALTH CARE EDUCATION/TRAINING PROGRAM

## 2024-01-08 RX ORDER — LIDOCAINE HYDROCHLORIDE 10 MG/ML
5 INJECTION INFILTRATION; PERINEURAL ONCE
Status: DISCONTINUED | OUTPATIENT
Start: 2024-01-08 | End: 2024-01-10 | Stop reason: HOSPADM

## 2024-01-08 RX ORDER — SODIUM CHLORIDE 0.9 % (FLUSH) 0.9 %
10 SYRINGE (ML) INJECTION AS NEEDED
Status: CANCELLED | OUTPATIENT
Start: 2024-01-08

## 2024-01-08 RX ORDER — SODIUM CHLORIDE 0.9 % (FLUSH) 0.9 %
10 SYRINGE (ML) INJECTION EVERY 12 HOURS
Status: CANCELLED | OUTPATIENT
Start: 2024-01-08

## 2024-01-08 RX ADMIN — OXYCODONE HYDROCHLORIDE AND ACETAMINOPHEN 1 TABLET: 5; 325 TABLET ORAL at 21:11

## 2024-01-08 RX ADMIN — APIXABAN 5 MG: 5 TABLET, FILM COATED ORAL at 08:35

## 2024-01-08 RX ADMIN — OXYCODONE HYDROCHLORIDE AND ACETAMINOPHEN 1 TABLET: 5; 325 TABLET ORAL at 05:20

## 2024-01-08 RX ADMIN — CEFEPIME 1 G: 1 INJECTION, POWDER, FOR SOLUTION INTRAMUSCULAR; INTRAVENOUS at 01:40

## 2024-01-08 RX ADMIN — OXYCODONE HYDROCHLORIDE AND ACETAMINOPHEN 1 TABLET: 5; 325 TABLET ORAL at 09:42

## 2024-01-08 RX ADMIN — LEVOTHYROXINE SODIUM 150 MCG: 75 TABLET ORAL at 05:20

## 2024-01-08 RX ADMIN — APIXABAN 5 MG: 5 TABLET, FILM COATED ORAL at 20:57

## 2024-01-08 RX ADMIN — ACETAMINOPHEN 650 MG: 325 TABLET ORAL at 06:29

## 2024-01-08 RX ADMIN — CEFEPIME 1 G: 1 INJECTION, POWDER, FOR SOLUTION INTRAMUSCULAR; INTRAVENOUS at 14:43

## 2024-01-08 RX ADMIN — ACETAMINOPHEN 650 MG: 325 TABLET ORAL at 21:13

## 2024-01-08 RX ADMIN — MUPIROCIN: 20 OINTMENT TOPICAL at 09:00

## 2024-01-08 RX ADMIN — MUPIROCIN: 20 OINTMENT TOPICAL at 20:57

## 2024-01-08 ASSESSMENT — COGNITIVE AND FUNCTIONAL STATUS - GENERAL
DRESSING REGULAR UPPER BODY CLOTHING: A LITTLE
STANDING UP FROM CHAIR USING ARMS: A LITTLE
CLIMB 3 TO 5 STEPS WITH RAILING: TOTAL
MOBILITY SCORE: 13
TOILETING: A LITTLE
TURNING FROM BACK TO SIDE WHILE IN FLAT BAD: A LITTLE
DAILY ACTIVITIY SCORE: 18
TOILETING: A LITTLE
MOVING FROM LYING ON BACK TO SITTING ON SIDE OF FLAT BED WITH BEDRAILS: A LITTLE
DAILY ACTIVITIY SCORE: 20
PERSONAL GROOMING: A LITTLE
WALKING IN HOSPITAL ROOM: TOTAL
DRESSING REGULAR UPPER BODY CLOTHING: A LITTLE
MOBILITY SCORE: 14
DRESSING REGULAR LOWER BODY CLOTHING: TOTAL
DAILY ACTIVITIY SCORE: 15
HELP NEEDED FOR BATHING: A LITTLE
WALKING IN HOSPITAL ROOM: TOTAL
TOILETING: A LOT
MOVING TO AND FROM BED TO CHAIR: A LITTLE
HELP NEEDED FOR BATHING: A LOT
MOVING FROM LYING ON BACK TO SITTING ON SIDE OF FLAT BED WITH BEDRAILS: A LITTLE
CLIMB 3 TO 5 STEPS WITH RAILING: TOTAL
PERSONAL GROOMING: A LITTLE
TURNING FROM BACK TO SIDE WHILE IN FLAT BAD: A LITTLE
DRESSING REGULAR LOWER BODY CLOTHING: A LITTLE
STANDING UP FROM CHAIR USING ARMS: A LOT
HELP NEEDED FOR BATHING: A LOT
DRESSING REGULAR UPPER BODY CLOTHING: A LITTLE
MOVING TO AND FROM BED TO CHAIR: A LITTLE
DRESSING REGULAR LOWER BODY CLOTHING: A LITTLE

## 2024-01-08 ASSESSMENT — PAIN - FUNCTIONAL ASSESSMENT
PAIN_FUNCTIONAL_ASSESSMENT: 0-10

## 2024-01-08 ASSESSMENT — PAIN DESCRIPTION - LOCATION
LOCATION: LEG
LOCATION: LEG

## 2024-01-08 ASSESSMENT — PAIN SCALES - GENERAL
PAINLEVEL_OUTOF10: 6
PAINLEVEL_OUTOF10: 0 - NO PAIN
PAINLEVEL_OUTOF10: 6
PAINLEVEL_OUTOF10: 3
PAINLEVEL_OUTOF10: 7

## 2024-01-08 ASSESSMENT — PAIN SCALES - WONG BAKER: WONGBAKER_NUMERICALRESPONSE: HURTS WHOLE LOT

## 2024-01-08 ASSESSMENT — PAIN DESCRIPTION - ORIENTATION
ORIENTATION: RIGHT;LEFT
ORIENTATION: RIGHT;LEFT

## 2024-01-08 ASSESSMENT — ACTIVITIES OF DAILY LIVING (ADL): HOME_MANAGEMENT_TIME_ENTRY: 15

## 2024-01-08 NOTE — PROGRESS NOTES
Infectious Diseases Inpatient Progress Note        HISTORY OF PRESENT ILLNESS:  Follow up bilateral lower extremity infected ulcers with extensive skin necrosis, multidrug-resistant Pseudomonas infection on IV cefepime, well tolerated.  Patient reports pain to be controlled.  She is concerned about toes and left foot.   Has persistent extensive wounds with necrosis and drainage.   Resolved fevers.  Improving appetite.  Persistent generalized weakness.   Patient is agreeable to get midline and IV antibiotics on discharge.  She is not sure about going to a skilled nursing facility.    Current Medications:    apixaban, 5 mg, oral, BID  cefepime, 1 g, intravenous, q12h  furosemide, 20 mg, oral, Daily  lactobacillus acidophilus, 1 tablet, oral, BID  levothyroxine, 150 mcg, oral, Daily before breakfast  lidocaine, 5 mL, infiltration, Once  mupirocin, , Topical, BID        Allergies:  Silver (bulk), Cephalexin, Cortisone, Diphenhydramine hcl, Prednisone, Codeine, and Penicillins      Review of Systems  14 system review is negative other than HPI    Physical Exam    Heart Rate:  [69-81]   Temp:  [36 °C (96.8 °F)-37.4 °C (99.3 °F)]   Resp:  [12-18]   BP: (102-177)/(60-80)   SpO2:  [95 %-98 %]    Vitals:    01/08/24 0726 01/08/24 0800 01/08/24 1200 01/08/24 1253   BP: 130/63 136/60 147/80 102/62   BP Location:  Right arm Right arm Right arm   Patient Position:  Lying Lying Lying   Pulse: 69 75 69 72   Resp:  18 18 18   Temp: 37.4 °C (99.3 °F) 37.2 °C (99 °F) 36.2 °C (97.2 °F) 36 °C (96.8 °F)   TempSrc:  Temporal Temporal Temporal   SpO2: 96% 97% 98% 98%   Weight:       Height:         General Appearance: alert and oriented to person, place and time, well-developed and well-nourished, in no acute distress  Skin: warm and dry, no rash.   Head: normocephalic and atraumatic  Eyes: anicteric sclerae  ENT:  normal mucous membranes. No oral thrush  Lungs: normal respiratory effort  Abdomen: soft, no tenderness  Bilateral legs with  "extensive necrotic ulcers involving the left second and third toe, left foot dorsal aspect  Decreased erythema  Positive severe tenderness  Positive thick yellow drainage was found to the right calf area  DATA:    Lab Results   Component Value Date    WBC 7.1 01/08/2024    HGB 9.0 (L) 01/08/2024    HCT 29.4 (L) 01/08/2024    MCV 85 01/08/2024     01/08/2024     Lab Results   Component Value Date    CREATININE 1.45 (H) 01/08/2024    BUN 26 (H) 01/08/2024     01/08/2024    K 5.3 01/08/2024     (H) 01/08/2024    CO2 23 01/08/2024       Hepatic Function Panel:No results found for: \"ALKPHOS\", \"ALT\", \"AST\", \"PROT\", \"BILITOT\", \"BILIDIR\"    Microbiology:   Susceptibility data from last 90 days.  Collected Specimen Info Organism Aztreonam Cefepime Ceftazidime Ceftolozane/Tazobactam Ciprofloxacin Gentamicin Imipenem Levofloxacin Meropenem Piperacillin/Tazobactam Tobramycin   01/03/24 Tissue/Biopsy from Skin Lesion Pseudomonas aeruginosa I S S S S S R S I S S     Mixed Gram-Positive and Gram-Negative Bacteria                  IMPRESSION:    Infected stasis ulcers of bilateral legs with Pseudomonas aeruginosa, slightly improved  Bilateral leg cellulitis, improving slowly  Toe gangrene bilateral feet, possible underlying osteomyelitis, unchanged  Acute kidney injury on chronic kidney disease, improving  Chronic bilateral legs nonhealing wounds    Patient Active Problem List   Diagnosis    Cellulitis of lower extremity, unspecified laterality       PLAN:  Left foot x-ray since it was not done during this admission and since patient is concerned about her toes  Follow-up with vascular surgery for outpatient venous reflux study  Continue IV cefepime for 3 to 4 weeks  Follow-up CBC BMP weekly postdischarge  CRP in 3 weeks  Local wound care and follow-up with Dr. Reyes Midline and IV antibiotics on discharge as discussed with the patient who is agreeable with current plan    Discussed with patient and otherDr " Brynn Jara MD

## 2024-01-08 NOTE — PROGRESS NOTES
"Daily progress note     Cheryl Elena is 83 y.o. female with PMH of hypertension, hyperlipidemia, hypothyroidism, COPD, DVT long term anticoagulation, with eliquis, presented with bilateral lower extremity pain and wound.  Admitted for bilateral chronic lower extremity wound with cellulitis, culture growing Pseudomonas.  Patient has bilateral lower extremity ulcers infected history of multidrug-resistant Pseudomonas     Subjective  Patient was seen and examined at bedside  Patient still has significant lower extremity wounds, some erythema and swelling,      Vital signs in last 24 hours:  Temp:  [36.1 °C (97 °F)-37.4 °C (99.3 °F)] 37.2 °C (99 °F)  Heart Rate:  [69-81] 75  Resp:  [12-18] 18  BP: (130-177)/(60-74) 136/60  /60 (BP Location: Right arm, Patient Position: Lying)   Pulse 75   Temp 37.2 °C (99 °F) (Temporal)   Resp 18   Ht 1.6 m (5' 2.99\")   Wt 95.7 kg (210 lb 15.7 oz)   SpO2 97%   BMI 37.38 kg/m²    Intake/Output last 3 shifts:  I/O last 3 completed shifts:  In: 300 (3.1 mL/kg) [I.V.:300 (3.1 mL/kg)]  Out: 900 (9.4 mL/kg) [Urine:900 (0.3 mL/kg/hr)]  Weight: 95.7 kg   Intake/Output this shift:  No intake/output data recorded.    Physical Exam  Constitutional:       General: She is not in acute distress.     Appearance: Normal appearance. She is not ill-appearing, toxic-appearing or diaphoretic.   HENT:      Head: Normocephalic and atraumatic.      Mouth/Throat:      Mouth: Mucous membranes are moist.      Pharynx: No oropharyngeal exudate.   Eyes:      Extraocular Movements: Extraocular movements intact.      Pupils: Pupils are equal, round, and reactive to light.   Cardiovascular:      Rate and Rhythm: Normal rate and regular rhythm.      Heart sounds: No murmur heard.  Pulmonary:      Effort: Pulmonary effort is normal. No respiratory distress.      Breath sounds: Normal breath sounds. No wheezing.   Abdominal:      General: Abdomen is flat. Bowel sounds are normal. There is no distension. "      Tenderness: There is no abdominal tenderness. There is no guarding or rebound.   Musculoskeletal:         General: Swelling present.      Right lower leg: Edema present.      Left lower leg: Edema present.   Skin:     Findings: Lesion present. No rash.      Comments: Multiple necrotic wounds over bilateral lower extremity with some yellowish discharge,   Neurological:      General: No focal deficit present.      Mental Status: She is alert and oriented to person, place, and time.      Cranial Nerves: No cranial nerve deficit.      Motor: No weakness.   Psychiatric:         Mood and Affect: Mood normal.         Behavior: Behavior normal.         Current medication   Current Facility-Administered Medications   Medication Dose Route Frequency Provider Last Rate Last Admin    acetaminophen (Tylenol) tablet 650 mg  650 mg oral q4h PRN Conner Tan MD   650 mg at 01/08/24 0629    Or    acetaminophen (Tylenol) oral liquid 650 mg  650 mg nasogastric tube q4h PRN Conner Tan MD        Or    acetaminophen (Tylenol) suppository 650 mg  650 mg rectal q4h PRN Conner Tan MD        apixaban (Eliquis) tablet 5 mg  5 mg oral BID Conner Tan MD   5 mg at 01/08/24 0835    cefepime (Maxipime) 1 g in dextrose 5 % 50 mL IV  1 g intravenous q12h Conner Tan MD   Stopped at 01/08/24 0210    furosemide (Lasix) tablet 20 mg  20 mg oral Daily Conner Tan MD   20 mg at 01/04/24 0744    hydrALAZINE (Apresoline) injection 5 mg  5 mg intravenous q6h PRN Conner Tan MD        labetaloL (Normodyne,Trandate) injection 10 mg  10 mg intravenous q6h PRN Conner Tan MD        lactobacillus acidophilus tablet 1 tablet  1 tablet oral BID Conner Tan MD   1 tablet at 01/04/24 0745    levothyroxine (Synthroid, Levoxyl) tablet 150 mcg  150 mcg oral Daily before breakfast Conner Tan MD   150 mcg at 01/08/24 0520    menthol-zinc oxide (Calmoseptine - Risamine) 0.44-20.6 % ointment 1 Application  1 Application Topical 4x  daily PRN Conner Tan MD        mupirocin (Bactroban) 2 % ointment   Topical BID Roland J Reyes, MD   Given at 01/07/24 0845    oxyCODONE-acetaminophen (Percocet) 5-325 mg per tablet 1 tablet  1 tablet oral q4h PRN Conner Tan MD   1 tablet at 01/08/24 0520    polyethylene glycol (Glycolax, Miralax) packet 17 g  17 g oral Daily PRN Conner Tan MD            Labs  Lab Results   Component Value Date    WBC 7.1 01/08/2024    HGB 9.0 (L) 01/08/2024    HCT 29.4 (L) 01/08/2024    MCV 85 01/08/2024     01/08/2024     Lab Results   Component Value Date    HGBA1C 5.7 09/19/2022     Lab Results   Component Value Date    GLUCOSE 90 01/08/2024    CALCIUM 8.3 (L) 01/08/2024     01/08/2024    K 5.3 01/08/2024    CO2 23 01/08/2024     (H) 01/08/2024    BUN 26 (H) 01/08/2024    CREATININE 1.45 (H) 01/08/2024           Principal Problem:    Cellulitis of lower extremity, unspecified laterality      Assessment and Plan   #Multiple bilateral lower extremity ulcers  #Infection and due to Pseudomonas aeruginosa  #Bilateral lower extremity cellulitis  #Toe gangrene bilaterally       - Continue cefepime (vancomycin discontinued 1/5)  - ID following, recommended midline and cefepime for 3 to 4 weeks  - Plastic surgery following  Continue local wound care  Wound culture growing Pseudomonas  Blood culture negative so far     Anemia of chronic disease  Stable  Continue monitoring     CKD stage III  Hyperkalemia, resolved  Creatinine is at baseline  Continue monitoring  Hyperkalemia has resolved       Essential hypertension  Hyperlipidemia  Diastolic CHF, compensated  COPD, not in exacerbation  Hypothyroidism  Personal history of DVT  Long-term anticoagulation use  Continue home medications     VTE Prophylaxis: Home apixaban    Disposition, patient will need 3 to 4 weeks of IV antibiotics through midline, needs placement to SNF  Patient still refusing SNF, will discuss again regarding the necessity of going to a  facility to complete antibiotics and for proper wound care and physical therapy     LOS: 5 days

## 2024-01-08 NOTE — PROGRESS NOTES
Physical Therapy    Physical Therapy Treatment    Patient Name: Cheryl Elena  MRN: [id number]  Today's Date: 1/8/2024  Time Calculation  Start Time: 0910  Stop Time: 0935  Time Calculation (min): 25 min       Assessment/Plan   PT Assessment  PT Assessment Results: Decreased strength, Decreased endurance, Impaired balance, Decreased mobility, Pain  Rehab Prognosis: Fair  End of Session Communication: Bedside nurse  Assessment Comment: Pt agreeable to treatment this session with max encouragement. Pt would benefit from continued PT services to progress functional transfers and strength when cooperative.  End of Session Patient Position: Bed, 3 rail up, Alarm on  PT Plan  Inpatient/Swing Bed or Outpatient: Inpatient  PT Plan  Treatment/Interventions: Bed mobility, Transfer training, Strengthening  PT Plan: Skilled PT  PT Frequency: 3 times per week  PT Discharge Recommendations: Moderate intensity level of continued care  PT Recommended Transfer Status: Assist x2    General Visit Information:   PT  Visit  PT Received On: 01/08/24  General  Reason for Referral: Impaired mobility  Referred By: Conner Tan MD  Past Medical History Relevant to Rehab: history of hypertension hyperlipidemia chronic kidney disease lower venous stasis, DVT  Co-Treatment: OT  Co-Treatment Reason: Maximize patient safety and participation  Prior to Session Communication: Bedside nurse  Patient Position Received: Bed, 3 rail up  General Comment: Pt agreeable to PT treatement with max encouragement from staff.    Subjective   Precautions:  Precautions  LE Weight Bearing Status: Weight Bearing as Tolerated  Medical Precautions: Fall precautions  Vital Signs:       Objective   Pain:  Pain Assessment  Pain Assessment: 0-10  Pain Score:  (Does not quantify with a number but states R>L)  Pain Type: Acute pain  Pain Location: Leg  Pain Orientation: Left, Right  Cognition:  Cognition  Overall Cognitive Status: Within Functional Limits  Postural  "Control:     Extremity/Trunk Assessments:        Activity Tolerance:  Activity Tolerance  Endurance: Decreased tolerance for upright activites  Treatments:  Therapeutic Exercise  Therapeutic Exercise Performed: Yes (Seated AP, LAQ and marches x 5-10 ea BLE but attempts to rush through them using momentum vs focusing on technique.)    Bed Mobility  Bed Mobility: Yes (Min A with use of bed rails to roll side to side. Sup>sit with min A x 2 and use of bed rails with HOB elevated. Pt states \"I'll do it my way\".)    Transfers  Transfer:  (Sit<>stand trasnfers at EOB with WW and max A x 2 with pt again stating she will do it her way. Pt demos a FF posture and needs to place hands on WW to assist with transfer. Bed height elevated. Limited tolerance for stands lasting ~30 ea x 3 trials.)    Outcome Measures:  [facility] Basic Mobility  Turning from your back to your side while in a flat bed without using bedrails: A little  Moving from lying on your back to sitting on the side of a flat bed without using bedrails: A little  Moving to and from bed to chair (including a wheelchair): A little  Standing up from a chair using your arms (e.g. wheelchair or bedside chair): A lot  To walk in hospital room: Total  Climbing 3-5 steps with railing: Total  Basic Mobility - Total Score: 13    Education Documentation  Body Mechanics, taught by Milagros Altamirano PTA at 1/8/2024  2:34 PM.  Learner: Patient  Readiness: Acceptance  Method: Explanation  Response: Verbalizes Understanding, Needs Reinforcement    Mobility Training, taught by Milagros Altamirano PTA at 1/8/2024  2:34 PM.  Learner: Patient  Readiness: Acceptance  Method: Explanation  Response: Verbalizes Understanding, Needs Reinforcement    Education Comments  No comments found.        EDUCATION:  Outpatient Education  Individual(s) Educated: Patient  Education Provided: Body Mechanics, Home Exercise Program  Education Comment: Pt educated in transfers and ther ex.    Encounter Problems "       Encounter Problems (Active)       PT Problem       min A x 1 for bed mobility  (Progressing)       Start:  01/04/24    Expected End:  01/18/24            min A x 1 with transfers with RW  (Progressing)       Start:  01/04/24    Expected End:  01/18/24            Pt. will self propel WC > 50 ft. with supervision to safely negotiate household  (Progressing)       Start:  01/04/24    Expected End:  01/18/24            SBA for static/dynamic balance to safely participate in ADLs  (Progressing)       Start:  01/04/24    Expected End:  01/18/24               Pain - Adult

## 2024-01-08 NOTE — PROGRESS NOTES
Plastic Surgery Note    Afebrile, vital signs stable  Bilateral lower extremity wounds desiccated with minimal drainage  Cultures positive for Pseudomonas  Suspect patient is allergic to sulfa and silver  Impression: Bilateral lower extremity open wounds with some improvement  Continue Bactroban ointment dressing changes

## 2024-01-08 NOTE — PROGRESS NOTES
"Occupational Therapy    OT Treatment    Patient Name: Cheryl Elena  MRN: [id number]  Today's Date: 1/8/2024  Time Calculation  Start Time: 0857  Stop Time: 0937  Time Calculation (min): 40 min         Assessment:  OT Assessment: Pt seen for skilled OT to address impairments with strength, endurance, adl's, balance, and functional transfers. Pt self limiting but also limited by pain in b/l LE's.  Multiple open wounds with c/o \"horrible pain\".  Pt would benefit from continued OT to address set POC.  Prognosis: Good  Evaluation/Treatment Tolerance: Patient limited by pain  Medical Staff Made Aware: Yes  End of Session Communication: Bedside nurse  End of Session Patient Position: Bed, 3 rail up, Alarm on (All needs met, call light within reach.)  OT Assessment Results: Decreased ADL status, Decreased endurance, Decreased IADLs, Decreased functional mobility  Prognosis: Good  Evaluation/Treatment Tolerance: Patient limited by pain  Medical Staff Made Aware: Yes  Plan:  Treatment Interventions: ADL retraining, Functional transfer training, UE strengthening/ROM, Endurance training, Patient/family training, Neuromuscular reeducation, Compensatory technique education  OT Frequency: 2 times per week  OT Discharge Recommendations: Moderate intensity level of continued care  OT Recommended Transfer Status: Maximum assist, Assist of 2 (sit to stand transfer)    Treatment Interventions: ADL retraining, Functional transfer training, UE strengthening/ROM, Endurance training, Patient/family training, Neuromuscular reeducation, Compensatory technique education    Subjective   Previous Visit Info:  OT Last Visit  OT Received On: 01/08/24  General:  General  Co-Treatment: PT  Co-Treatment Reason: Maximize patient safety and participation  Prior to Session Communication: Bedside nurse (cleared by RN for OT tx)  Patient Position Received: Bed, 3 rail up, Alarm off, not on at start of session  General Comment: Pt agreeable to OT with " "max encouragement.  Pt limited by BLE pain.  Pt gets frustrated easily, resistive to education and calming techniques.  Pt stating during transfer training \" I am going to do it the way that works for me, i'm not listening to what you say\". (pt with external cath; left leg actively bleeding during tx, rn aware.  Pt with multiple open sores on legs.)  Precautions:  LE Weight Bearing Status: Weight Bearing as Tolerated  Medical Precautions: Fall precautions  Vital Signs:     Pain:  Pain Assessment  Pain Assessment: 0-10  Pain Score:  (Pt did not rate despite being asked multiple times)  Hamm-Baker FACES Pain Rating: Hurts whole lot  Pain Type: Acute pain  Pain Location:  (BLE; R>L per pt)    Objective    Cognition:  Cognition  Overall Cognitive Status: Within Functional Limits  Arousal/Alertness: Appropriate responses to stimuli    Activities of Daily Living:      Grooming  Grooming Level of Assistance: Setup  Grooming Where Assessed: Edge of bed  Grooming Comments: Pt brushed hair while seated at EOB with fair+ dyn sit balance        UE Dressing  UE Dressing Level of Assistance: Modified independent  UE Dressing Where Assessed: Edge of bed  UE Dressing Comments: Pt donning gown independently besides buttoning of gown.  Pt able to tie gown behind neck and thread UE's      Bed Mobility/Transfers: Bed Mobility  Bed Mobility: Yes  Bed Mobility 1  Bed Mobility 1: Supine to sitting  Level of Assistance 1: Minimum assistance  Bed Mobility Comments 1: HOB ~35 degrees, use of bed rails, increased time to complete.  Pt able to advance LE's off of bed without assist.  Min assist to elevate trunk  Bed Mobility 2  Bed Mobility  2: Sitting to supine  Level of Assistance 2: Minimum assistance  Bed Mobility Comments 2: HOB <10 degrees. Pt able to scoot self to top of bed prior to returning to supine position.  Use of bed rails to facilitate lifting LE's and lowering trunk.  Assist to guide trunk and reposition. Pt needing assist to " "boost to top of bed d/t b/l LE pain and refusal to bend knees to push through feet to boost    Transfers  Transfer: Yes  Transfer 1  Technique 1: Sit to stand, Stand to sit  Transfer Device 1: Walker  Transfer Level of Assistance 1: Maximum assistance, +2  Trials/Comments 1: Education provided on safe transfer techniques with poor carryover from pt.  Pt resistive to education.  x2 sit to stand trials performed.  First trial, pt pulled self up using ww.  NBOS, left foot slides once in stance, pt unable to correct.  Pt with forward flexed posture, cues to correct provided.  2nd stand attempt, pt pushed from bed with count to 3 but quickly moves hands to walker and needs max x 2 assist to boost and maintain balance.  Stand tolerance x 30 seconds with each standing trial.         Sitting Balance:  Static Sitting Balance  Static Sitting-Comment/Number of Minutes: G-  Dynamic Sitting Balance  Dynamic Sitting-Comments: F+ (EOB sitting with static/dyn sitting activity x 18 min)  Standing Balance:  Static Standing Balance  Static Standing-Comment/Number of Minutes: Poor       Therapy/Activity: Therapeutic Exercise  Therapeutic Exercise Performed:  (pt declined this date.  Pt stating\" my legs hurt worse now that therapy came in and I won't get pain medication.  I'm done\".  RN aware of pt's pain and request for pain medication)         Outcome Measures:[facility] Daily Activity  Putting on and taking off regular lower body clothing: Total  Bathing (including washing, rinsing, drying): A lot  Putting on and taking off regular upper body clothing: A little  Toileting, which includes using toilet, bedpan or urinal: A lot  Taking care of personal grooming such as brushing teeth: A little  Eating Meals: None  Daily Activity - Total Score: 15        Education Documentation  ADL Training, taught by Jennifer L Felty, OTA at 1/8/2024 10:37 AM.  Learner: Patient  Readiness: Nonacceptance  Method: Explanation, Demonstration  Response: Needs " Reinforcement    Education Comments  No comments found.        OP EDUCATION:  Education  Individual(s) Educated: Patient  Education Provided: Diagnosis & Precautions, Symptom management, Ergonomics and postural realignment, Joint protection and energy conservation, Fall precautons  Patient Response to Education:  (Pt resistive to education)  Education Comment: Pt educated on bed mobility techniques, sit to stand transfer techniques; pain management and relaxation techniques    Goals:  Encounter Problems       Encounter Problems (Active)       OT Goals       Pt demo good sit balance with <5 min functional endurance for increased independence hygiene and self care tasks.  (Progressing)       Start:  01/04/24    Expected End:  01/18/24            Pt demo sit to stand with min A, >30 second stand endurance for BADL. (Progressing)       Start:  01/04/24    Expected End:  01/18/24            Pt increase BUE strength >1 mm grade to increase  independence with transfers and ADLs.  (Progressing)       Start:  01/04/24    Expected End:  01/18/24            Pt demo min A bed mobility. (Progressing)       Start:  01/04/24    Expected End:  01/18/24

## 2024-01-08 NOTE — CARE PLAN
The patient's goals for the shift include med compliance and safety    The clinical goals for the shift include Compliance with medication and creams.    Problem: Skin  Goal: Decreased wound size/increased tissue granulation at next dressing change  Outcome: Progressing  Goal: Participates in plan/prevention/treatment measures  Outcome: Progressing  Goal: Prevent/manage excess moisture  Outcome: Progressing  Goal: Prevent/minimize sheer/friction injuries  Outcome: Progressing  Goal: Promote/optimize nutrition  Outcome: Progressing  Goal: Promote skin healing  Outcome: Progressing     Problem: Nutrition  Goal: Oral intake greater than 50%  Outcome: Progressing  Goal: Oral intake greater 75%  Outcome: Progressing  Goal: Consume prescribed supplement  Outcome: Progressing  Goal: Adequate PO fluid intake  Outcome: Progressing  Goal: Nutrition support goals are met within 48 hrs  Outcome: Progressing  Goal: Nutrition support is meeting 75% of nutrient needs  Outcome: Progressing  Goal: BG  mg/dL  Outcome: Progressing  Goal: Lab values WNL  Outcome: Progressing  Goal: Electrolytes WNL  Outcome: Progressing  Goal: Promote healing  Outcome: Progressing  Goal: Maintain stable weight  Outcome: Progressing  Goal: Reduce weight from edema/fluid  Outcome: Progressing  Goal: Gradual weight gain  Outcome: Progressing     Problem: Fall/Injury  Goal: Not fall by end of shift  Outcome: Progressing  Goal: Be free from injury by end of the shift  Outcome: Progressing  Goal: Verbalize understanding of personal risk factors for fall in the hospital  Outcome: Progressing  Goal: Verbalize understanding of risk factor reduction measures to prevent injury from fall in the home  Outcome: Progressing  Goal: Use assistive devices by end of the shift  Outcome: Progressing  Goal: Pace activities to prevent fatigue by end of the shift  Outcome: Progressing     Problem: Pain  Goal: My pain/discomfort is manageable  Outcome: Progressing      Problem: Safety  Goal: Patient will be injury free during hospitalization  Outcome: Progressing  Goal: I will remain free of falls  Outcome: Progressing     Problem: Daily Care  Goal: Daily care needs are met  Outcome: Progressing     Problem: Psychosocial Needs  Goal: Demonstrates ability to cope with hospitalization/illness  Outcome: Progressing  Goal: Collaborate with me, my family, and caregiver to identify my specific goals  Outcome: Progressing     Problem: Discharge Barriers  Goal: My discharge needs are met  Outcome: Progressing     Problem: Pain  Goal: Takes deep breaths with improved pain control throughout the shift  Outcome: Progressing  Goal: Turns in bed with improved pain control throughout the shift  Outcome: Progressing  Goal: Walks with improved pain control throughout the shift  Outcome: Progressing  Goal: Performs ADL's with improved pain control throughout shift  Outcome: Progressing  Goal: Free from opioid side effects throughout the shift  Outcome: Progressing  Goal: Free from acute confusion related to pain meds throughout the shift  Outcome: Progressing     Problem: Pain - Adult  Goal: Verbalizes/displays adequate comfort level or baseline comfort level  Outcome: Progressing     Problem: Safety - Adult  Goal: Free from fall injury  Outcome: Progressing     Problem: Discharge Planning  Goal: Discharge to home or other facility with appropriate resources  Outcome: Progressing     Problem: Chronic Conditions and Co-morbidities  Goal: Patient's chronic conditions and co-morbidity symptoms are monitored and maintained or improved  Outcome: Progressing     Problem: Wound Care  Goal: Area will decrease in size .2x.2 cm per week  Outcome: Progressing  Goal: Area will remain free of signs and symptoms of infection over the next quarter  Outcome: Progressing

## 2024-01-08 NOTE — CARE PLAN
The patient's goals for the shift include med compliance and safety    The clinical goals for the shift include compliance

## 2024-01-09 ENCOUNTER — APPOINTMENT (OUTPATIENT)
Dept: RADIOLOGY | Facility: HOSPITAL | Age: 84
DRG: 603 | End: 2024-01-09
Payer: MEDICARE

## 2024-01-09 VITALS
SYSTOLIC BLOOD PRESSURE: 176 MMHG | TEMPERATURE: 98.4 F | HEIGHT: 63 IN | OXYGEN SATURATION: 97 % | BODY MASS INDEX: 37.38 KG/M2 | DIASTOLIC BLOOD PRESSURE: 74 MMHG | RESPIRATION RATE: 18 BRPM | WEIGHT: 210.98 LBS | HEART RATE: 70 BPM

## 2024-01-09 LAB
ANION GAP SERPL CALC-SCNC: 9 MMOL/L (ref 10–20)
BASOPHILS # BLD AUTO: 0.02 X10*3/UL (ref 0–0.1)
BASOPHILS NFR BLD AUTO: 0.3 %
BUN SERPL-MCNC: 28 MG/DL (ref 6–23)
CALCIUM SERPL-MCNC: 8.3 MG/DL (ref 8.6–10.3)
CHLORIDE SERPL-SCNC: 110 MMOL/L (ref 98–107)
CO2 SERPL-SCNC: 24 MMOL/L (ref 21–32)
CREAT SERPL-MCNC: 1.61 MG/DL (ref 0.5–1.05)
EGFRCR SERPLBLD CKD-EPI 2021: 31 ML/MIN/1.73M*2
EOSINOPHIL # BLD AUTO: 0.41 X10*3/UL (ref 0–0.4)
EOSINOPHIL NFR BLD AUTO: 5.7 %
ERYTHROCYTE [DISTWIDTH] IN BLOOD BY AUTOMATED COUNT: 17 % (ref 11.5–14.5)
GLUCOSE SERPL-MCNC: 91 MG/DL (ref 74–99)
HCT VFR BLD AUTO: 29.1 % (ref 36–46)
HGB BLD-MCNC: 9.1 G/DL (ref 12–16)
HOLD SPECIMEN: NORMAL
IMM GRANULOCYTES # BLD AUTO: 0.05 X10*3/UL (ref 0–0.5)
IMM GRANULOCYTES NFR BLD AUTO: 0.7 % (ref 0–0.9)
LYMPHOCYTES # BLD AUTO: 1.41 X10*3/UL (ref 0.8–3)
LYMPHOCYTES NFR BLD AUTO: 19.5 %
MCH RBC QN AUTO: 26.1 PG (ref 26–34)
MCHC RBC AUTO-ENTMCNC: 31.3 G/DL (ref 32–36)
MCV RBC AUTO: 83 FL (ref 80–100)
MONOCYTES # BLD AUTO: 0.72 X10*3/UL (ref 0.05–0.8)
MONOCYTES NFR BLD AUTO: 10 %
NEUTROPHILS # BLD AUTO: 4.61 X10*3/UL (ref 1.6–5.5)
NEUTROPHILS NFR BLD AUTO: 63.8 %
NRBC BLD-RTO: 0 /100 WBCS (ref 0–0)
PLATELET # BLD AUTO: 278 X10*3/UL (ref 150–450)
POTASSIUM SERPL-SCNC: 5.7 MMOL/L (ref 3.5–5.3)
RBC # BLD AUTO: 3.49 X10*6/UL (ref 4–5.2)
SODIUM SERPL-SCNC: 137 MMOL/L (ref 136–145)
WBC # BLD AUTO: 7.2 X10*3/UL (ref 4.4–11.3)

## 2024-01-09 PROCEDURE — 2500000004 HC RX 250 GENERAL PHARMACY W/ HCPCS (ALT 636 FOR OP/ED): Performed by: STUDENT IN AN ORGANIZED HEALTH CARE EDUCATION/TRAINING PROGRAM

## 2024-01-09 PROCEDURE — 1210000001 HC SEMI-PRIVATE ROOM DAILY

## 2024-01-09 PROCEDURE — 99231 SBSQ HOSP IP/OBS SF/LOW 25: CPT | Performed by: PLASTIC SURGERY

## 2024-01-09 PROCEDURE — 82374 ASSAY BLOOD CARBON DIOXIDE: CPT | Performed by: STUDENT IN AN ORGANIZED HEALTH CARE EDUCATION/TRAINING PROGRAM

## 2024-01-09 PROCEDURE — 99239 HOSP IP/OBS DSCHRG MGMT >30: CPT | Performed by: STUDENT IN AN ORGANIZED HEALTH CARE EDUCATION/TRAINING PROGRAM

## 2024-01-09 PROCEDURE — 2500000001 HC RX 250 WO HCPCS SELF ADMINISTERED DRUGS (ALT 637 FOR MEDICARE OP): Performed by: STUDENT IN AN ORGANIZED HEALTH CARE EDUCATION/TRAINING PROGRAM

## 2024-01-09 PROCEDURE — 36415 COLL VENOUS BLD VENIPUNCTURE: CPT | Performed by: STUDENT IN AN ORGANIZED HEALTH CARE EDUCATION/TRAINING PROGRAM

## 2024-01-09 PROCEDURE — 85025 COMPLETE CBC W/AUTO DIFF WBC: CPT | Performed by: STUDENT IN AN ORGANIZED HEALTH CARE EDUCATION/TRAINING PROGRAM

## 2024-01-09 PROCEDURE — C1751 CATH, INF, PER/CENT/MIDLINE: HCPCS

## 2024-01-09 PROCEDURE — 2780000003 HC OR 278 NO HCPCS

## 2024-01-09 PROCEDURE — 36410 VNPNXR 3YR/> PHY/QHP DX/THER: CPT

## 2024-01-09 RX ORDER — L. ACIDOPHILUS/L.BULGARICUS 1MM CELL
1 TABLET ORAL 2 TIMES DAILY
Qty: 60 TABLET | Refills: 0 | Status: SHIPPED | OUTPATIENT
Start: 2024-01-09 | End: 2024-02-08

## 2024-01-09 RX ORDER — OXYCODONE AND ACETAMINOPHEN 5; 325 MG/1; MG/1
1 TABLET ORAL EVERY 4 HOURS PRN
Qty: 5 TABLET | Refills: 0 | Status: SHIPPED | OUTPATIENT
Start: 2024-01-09 | End: 2024-01-10 | Stop reason: SDUPTHER

## 2024-01-09 RX ADMIN — OXYCODONE HYDROCHLORIDE AND ACETAMINOPHEN 1 TABLET: 5; 325 TABLET ORAL at 01:37

## 2024-01-09 RX ADMIN — OXYCODONE HYDROCHLORIDE AND ACETAMINOPHEN 1 TABLET: 5; 325 TABLET ORAL at 10:07

## 2024-01-09 RX ADMIN — OXYCODONE HYDROCHLORIDE AND ACETAMINOPHEN 1 TABLET: 5; 325 TABLET ORAL at 17:54

## 2024-01-09 RX ADMIN — APIXABAN 5 MG: 5 TABLET, FILM COATED ORAL at 08:35

## 2024-01-09 RX ADMIN — LEVOTHYROXINE SODIUM 150 MCG: 75 TABLET ORAL at 05:53

## 2024-01-09 RX ADMIN — CEFEPIME 1 G: 1 INJECTION, POWDER, FOR SOLUTION INTRAMUSCULAR; INTRAVENOUS at 16:04

## 2024-01-09 RX ADMIN — CEFEPIME 1 G: 1 INJECTION, POWDER, FOR SOLUTION INTRAMUSCULAR; INTRAVENOUS at 01:31

## 2024-01-09 ASSESSMENT — COGNITIVE AND FUNCTIONAL STATUS - GENERAL
MOVING TO AND FROM BED TO CHAIR: A LITTLE
TOILETING: A LITTLE
HELP NEEDED FOR BATHING: A LITTLE
DRESSING REGULAR LOWER BODY CLOTHING: A LITTLE
STANDING UP FROM CHAIR USING ARMS: A LITTLE
DRESSING REGULAR UPPER BODY CLOTHING: A LITTLE
WALKING IN HOSPITAL ROOM: TOTAL
MOVING FROM LYING ON BACK TO SITTING ON SIDE OF FLAT BED WITH BEDRAILS: A LITTLE
TURNING FROM BACK TO SIDE WHILE IN FLAT BAD: A LITTLE
CLIMB 3 TO 5 STEPS WITH RAILING: TOTAL
MOBILITY SCORE: 14
DAILY ACTIVITIY SCORE: 20

## 2024-01-09 ASSESSMENT — PAIN SCALES - GENERAL
PAINLEVEL_OUTOF10: 7
PAINLEVEL_OUTOF10: 9
PAINLEVEL_OUTOF10: 6

## 2024-01-09 ASSESSMENT — PAIN DESCRIPTION - LOCATION
LOCATION: LEG

## 2024-01-09 ASSESSMENT — PAIN - FUNCTIONAL ASSESSMENT: PAIN_FUNCTIONAL_ASSESSMENT: 0-10

## 2024-01-09 ASSESSMENT — PAIN DESCRIPTION - ORIENTATION
ORIENTATION: RIGHT;LEFT

## 2024-01-09 NOTE — PROGRESS NOTES
Physical Therapy                 Therapy Communication Note    Patient Name: Cheryl Elena  MRN: [id number]  Today's Date: 1/9/2024     Discipline: Physical Therapy    Missed Visit Reason:      Missed Time: Attempt    Comment: Multiple attempts and pt continues to refuse PT services.

## 2024-01-09 NOTE — NURSING NOTE
I messaged ANNE MARIE Mckenzie and CINDY Ty and they state they are trying to get patient to agree to go to a SNF and that patient and family are in agreement to have [facility] meet with them and for precert to be started. No plan for DC today as precert not received.

## 2024-01-09 NOTE — PROGRESS NOTES
Infectious Diseases Inpatient Progress Note        HISTORY OF PRESENT ILLNESS:  Follow up bilateral lower extremity infected ulcers with extensive skin necrosis, multidrug-resistant Pseudomonas infection on IV cefepime, well tolerated.  Patient reports occasional severe pain B legs.  She is concerned about toes in left foot.   Has persistent extensive wounds with necrosis, decreased drainage.   Resolved fevers.  Improving appetite.  Persistent generalized weakness.   Patient is agreeable to get midline and IV antibiotics on discharge.    Patient is upset about going to a skilled nursing facility.  She lives by herself.  She wants to stay here for 4 weeks to finish her treatment.   I had a lengthy discussion with the patient explaining her condition  Current Medications:    apixaban, 5 mg, oral, BID  cefepime, 1 g, intravenous, q12h  furosemide, 20 mg, oral, Daily  lactobacillus acidophilus, 1 tablet, oral, BID  levothyroxine, 150 mcg, oral, Daily before breakfast  lidocaine, 5 mL, infiltration, Once  lidocaine, 5 mL, infiltration, Once  mupirocin, , Topical, BID        Allergies:  Silver (bulk), Cephalexin, Cortisone, Diphenhydramine hcl, Prednisone, Codeine, and Penicillins      Review of Systems  14 system review is negative other than HPI    Physical Exam    Heart Rate:  [69-75]   Temp:  [36.1 °C (97 °F)-36.9 °C (98.4 °F)]   Resp:  [17-18]   BP: (152-192)/(66-79)   SpO2:  [95 %-99 %]    Vitals:    01/08/24 1900 01/09/24 0300 01/09/24 0810 01/09/24 1122   BP: 152/66 154/66 (!) 192/79 176/74   BP Location: Left arm  Left arm Left arm   Patient Position: Lying Lying Lying Lying   Pulse: 75 69 71 70   Resp: 17 18 18 18   Temp: 36.5 °C (97.7 °F) 36.1 °C (97 °F) 36.5 °C (97.7 °F) 36.9 °C (98.4 °F)   TempSrc: Temporal Temporal Temporal Temporal   SpO2: 98% 95% 99% 97%   Weight:       Height:         General Appearance: alert and oriented to person, place and time, well-developed and well-nourished, in no acute  "distress  Skin: warm and dry, no rash.   Head: normocephalic and atraumatic  Eyes: anicteric sclerae  ENT:  normal mucous membranes. No oral thrush  Lungs: normal respiratory effort  Abdomen: soft, no tenderness  Bilateral legs with extensive large ulcers, FTSL, exposed muscle R leg   left second and third toe, B feet dorsal aspect dry scabbed ulcers  Decreased erythema  Positive severe tenderness  Positive thick yellow drainage was found to the right calf area  DATA:    Lab Results   Component Value Date    WBC 7.2 01/09/2024    HGB 9.1 (L) 01/09/2024    HCT 29.1 (L) 01/09/2024    MCV 83 01/09/2024     01/09/2024     Lab Results   Component Value Date    CREATININE 1.61 (H) 01/09/2024    BUN 28 (H) 01/09/2024     01/09/2024    K 5.7 (H) 01/09/2024     (H) 01/09/2024    CO2 24 01/09/2024       Hepatic Function Panel:No results found for: \"ALKPHOS\", \"ALT\", \"AST\", \"PROT\", \"BILITOT\", \"BILIDIR\"    Microbiology:   Susceptibility data from last 90 days.  Collected Specimen Info Organism Aztreonam Cefepime Ceftazidime Ceftolozane/Tazobactam Ciprofloxacin Gentamicin Imipenem Levofloxacin Meropenem Piperacillin/Tazobactam Tobramycin   01/03/24 Tissue/Biopsy from Skin Lesion Pseudomonas aeruginosa I S S S S S R S I S S     Mixed Gram-Positive and Gram-Negative Bacteria                    IMPRESSION:    Infected stasis ulcers of bilateral legs with Pseudomonas aeruginosa, slightly improved  Bilateral leg cellulitis, improving slowly  Toe gangrene bilateral feet, possible underlying osteomyelitis, unchanged  Acute kidney injury on chronic kidney disease, unstable  Chronic bilateral legs nonhealing wounds    Patient Active Problem List   Diagnosis    Cellulitis of lower extremity, unspecified laterality       PLAN:  Check Left foot x-ray   Follow-up with vascular surgery for outpatient venous reflux study  Arrange for discharge on IV cefepime for 3 to 4 weeks  Follow-up CBC BMP weekly postdischarge  CRP in 3 " weeks  Follow-up in 3 to 4 weeks  Local wound care and follow-up with Dr. Reyes Midline     Discussed with patient and other     Jeny aJra MD

## 2024-01-09 NOTE — PROGRESS NOTES
Had long discussion with patient who after talking with her finally agreed to speak to a liaison from [facility]. Cassie from [facility] was on site, she is willing to speak with patient about their facility. Sending over referral to [facility], if they accept and patient willing, will start precert. Once precert obtained patient will need to have midline placed for IV antibiotics. Will continue to follow for discharge planning.     UPDATE: Received notice from insurance, we have precert, patient can discharge this afternoon. Patient to have midline placed then can go. Patient is aware and so his her brother and Uvaldo SAUNDERS they are in agreement.

## 2024-01-09 NOTE — DISCHARGE SUMMARY
Discharge Diagnosis  Cellulitis of lower extremity, unspecified laterality  1. Cellulitis of lower extremity, unspecified laterality    2. Acute kidney injury superimposed on chronic kidney disease (CMS/HCC)    3. Non-pressure chronic ulcer of unspecified part of right lower leg limited to breakdown of skin (CMS/HCC)    4. Atherosclerosis of native arteries of left leg with ulceration of other part of lower leg (CMS/HCC)    5. Atherosclerosis of native arteries of left leg with ulceration of other part of foot (CMS/HCC)    6. Atherosclerosis of native arteries of right leg with ulceration of other part of lower leg (CMS/HCC)    7. Atherosclerosis of native arteries of right leg with ulceration of other part of foot (CMS/HCC)       Issues Requiring Follow-Up      Discharge Meds     Your medication list        ASK your doctor about these medications        Instructions Last Dose Given Next Dose Due   Eliquis 5 mg tablet  Generic drug: apixaban           furosemide 20 mg tablet  Commonly known as: Lasix           levothyroxine 150 mcg tablet  Commonly known as: Synthroid, Levoxyl           Percocet 5-325 mg tablet  Generic drug: oxyCODONE-acetaminophen                    Test Results Pending At Discharge  Pending Labs       No current pending labs.            Hospital Course    Cheryl Elena is 83 y.o. female with PMH of hypertension, hyperlipidemia, hypothyroidism, COPD, DVT long term anticoagulation, with eliquis, CKD stage III, presented with bilateral lower extremity pain and wound.  Patient had prolonged stay at nursing facility and was recently discharged.  Patient has had bilateral lower extremity wounds and cellulitis, was initially started on vancomycin and cefepime, ID was consulted, wound culture grew Pseudomonas.  Plastic surgery was consulted and patient is getting local wound care.  Patient has left foot x-ray done pending result.  ID recommended to continue IV antibiotics for at least 3 to 4 weeks.   Patient has longstanding stasis of bilateral lower extremity with infection with Pseudomonas, cellulitis, toe gangrene possible underlying osteomyelitis.  Creatinine is currently at baseline.  Patient to be discharged to SNF today on cefepime for 4 weeks.  Patient to follow-up with ID and Dr. Reyes, CBC BMP weekly and CRP in 3 weeks.  Patient is in agreement with discharge planning and total discharge time spent 35 minutes.    Pertinent Physical Exam At Time of Discharge  Physical Exam  Constitutional:       General: She is not in acute distress.     Appearance: Normal appearance. She is not ill-appearing, toxic-appearing or diaphoretic.   HENT:      Head: Normocephalic and atraumatic.      Mouth/Throat:      Mouth: Mucous membranes are moist.      Pharynx: No oropharyngeal exudate.   Eyes:      Extraocular Movements: Extraocular movements intact.      Pupils: Pupils are equal, round, and reactive to light.   Cardiovascular:      Rate and Rhythm: Normal rate and regular rhythm.      Heart sounds: No murmur heard.  Pulmonary:      Effort: Pulmonary effort is normal. No respiratory distress.      Breath sounds: Normal breath sounds. No wheezing.   Abdominal:      General: Abdomen is flat. Bowel sounds are normal. There is no distension.      Tenderness: There is no abdominal tenderness. There is no guarding or rebound.   Musculoskeletal:         General: Swelling present.      Right lower leg: Edema present.      Left lower leg: Edema present.   Skin:     Findings: Erythema and lesion present. No rash.      Comments: Ulcer over bilateral lower extremity, some erythema,   Neurological:      General: No focal deficit present.      Mental Status: She is alert and oriented to person, place, and time.      Cranial Nerves: No cranial nerve deficit.      Motor: No weakness.   Psychiatric:         Mood and Affect: Mood normal.         Behavior: Behavior normal.         Outpatient Follow-Up  No future appointments.  No  follow-up provider specified.  Jeny Jara MD  [address]  [facility] [id number]  [phone]    Schedule an appointment as soon as possible for a visit in 3 week(s)          Kristin Swain MD

## 2024-01-09 NOTE — PROGRESS NOTES
Plastic Surgery Note    Afebrile, vital signs stable  Areas of full-thickness and partial-thickness skin loss bilateral lower extremities and left foot  Wound scabbed over, minimal drainage  Impression: Full-thickness and partial-thickness wounds bilateral lower extremities and left foot  Continue current management

## 2024-01-09 NOTE — PROGRESS NOTES
Pt. Now open to snf discussion, agreeable to [facility]. Referral sent, they have accepted, hospital ins. Team to obtain precert/anthem medicare.    Update:  ins. Precert has been obtained.  Pt. Will discharge this date to [facility] snf at 4:00 pm via [facility].  Pt. Aware and in agreement to transfer. Brother informed.

## 2024-01-10 ENCOUNTER — NURSING HOME VISIT (OUTPATIENT)
Dept: POST ACUTE CARE | Facility: EXTERNAL LOCATION | Age: 84
End: 2024-01-10
Payer: MEDICARE

## 2024-01-10 DIAGNOSIS — L03.119 CELLULITIS OF LOWER EXTREMITY, UNSPECIFIED LATERALITY: ICD-10-CM

## 2024-01-10 DIAGNOSIS — E66.01 OBESITY, MORBID (MULTI): ICD-10-CM

## 2024-01-10 DIAGNOSIS — A49.8 PSEUDOMONAS AERUGINOSA INFECTION: ICD-10-CM

## 2024-01-10 DIAGNOSIS — I82.402 ACUTE EMBOLISM AND THROMBOSIS OF DEEP VEIN OF LEFT LOWER EXTREMITY (MULTI): ICD-10-CM

## 2024-01-10 DIAGNOSIS — J44.9 CHRONIC OBSTRUCTIVE PULMONARY DISEASE, UNSPECIFIED COPD TYPE (MULTI): ICD-10-CM

## 2024-01-10 DIAGNOSIS — I83.209: ICD-10-CM

## 2024-01-10 DIAGNOSIS — E11.9 TYPE 2 DIABETES MELLITUS WITHOUT COMPLICATION, WITHOUT LONG-TERM CURRENT USE OF INSULIN (MULTI): ICD-10-CM

## 2024-01-10 DIAGNOSIS — N18.32 STAGE 3B CHRONIC KIDNEY DISEASE (MULTI): ICD-10-CM

## 2024-01-10 DIAGNOSIS — L97.902: ICD-10-CM

## 2024-01-10 DIAGNOSIS — I73.9 PAD (PERIPHERAL ARTERY DISEASE) (CMS-HCC): ICD-10-CM

## 2024-01-10 DIAGNOSIS — L03.115 BILATERAL LOWER LEG CELLULITIS: Primary | ICD-10-CM

## 2024-01-10 DIAGNOSIS — L03.116 BILATERAL LOWER LEG CELLULITIS: Primary | ICD-10-CM

## 2024-01-10 DIAGNOSIS — E03.9 ACQUIRED HYPOTHYROIDISM: ICD-10-CM

## 2024-01-10 DIAGNOSIS — I87.2: ICD-10-CM

## 2024-01-10 DIAGNOSIS — L97.909: ICD-10-CM

## 2024-01-10 PROBLEM — F32.A DEPRESSIVE DISORDER: Status: ACTIVE | Noted: 2024-01-10

## 2024-01-10 PROBLEM — I83.012: Chronic | Status: ACTIVE | Noted: 2021-05-25

## 2024-01-10 PROBLEM — K76.82 HEPATIC ENCEPHALOPATHY (MULTI): Status: ACTIVE | Noted: 2024-01-10

## 2024-01-10 PROBLEM — N18.9 CHRONIC KIDNEY DISEASE: Status: RESOLVED | Noted: 2024-01-10 | Resolved: 2024-01-10

## 2024-01-10 PROBLEM — N18.9 CHRONIC KIDNEY DISEASE: Status: ACTIVE | Noted: 2024-01-10

## 2024-01-10 PROBLEM — D63.8 ANEMIA OF CHRONIC DISEASE: Status: ACTIVE | Noted: 2023-03-16

## 2024-01-10 PROBLEM — L97.229: Chronic | Status: ACTIVE | Noted: 2021-05-25

## 2024-01-10 PROBLEM — L97.112: Status: ACTIVE | Noted: 2021-05-25

## 2024-01-10 PROBLEM — L97.212: Chronic | Status: ACTIVE | Noted: 2021-05-25

## 2024-01-10 PROBLEM — K72.00 ISCHEMIC HEPATITIS (HHS-HCC): Status: ACTIVE | Noted: 2024-01-10

## 2024-01-10 PROBLEM — I83.009 VENOUS STASIS ULCERS (MULTI): Status: ACTIVE | Noted: 2021-08-07

## 2024-01-10 PROBLEM — I10 BENIGN ESSENTIAL HYPERTENSION: Status: ACTIVE | Noted: 2024-01-10

## 2024-01-10 PROCEDURE — 99306 1ST NF CARE HIGH MDM 50: CPT | Performed by: FAMILY MEDICINE

## 2024-01-10 RX ORDER — OXYCODONE AND ACETAMINOPHEN 5; 325 MG/1; MG/1
1 TABLET ORAL EVERY 4 HOURS PRN
Qty: 42 TABLET | Refills: 0 | Status: SHIPPED | OUTPATIENT
Start: 2024-01-10 | End: 2024-01-20

## 2024-01-10 NOTE — ASSESSMENT & PLAN NOTE
This is currently reported is diet controlled.  No recent A1c has been done so we will perform this on the next blood draw

## 2024-01-10 NOTE — ASSESSMENT & PLAN NOTE
Lab Results   Component Value Date    GLUCOSE 91 01/09/2024    CALCIUM 8.3 (L) 01/09/2024     01/09/2024    K 5.7 (H) 01/09/2024    CO2 24 01/09/2024     (H) 01/09/2024    BUN 28 (H) 01/09/2024    CREATININE 1.61 (H) 01/09/2024     To be a baseline.  We will continue to monitor weekly and avoid nephrotoxins

## 2024-01-10 NOTE — LETTER
Patient: Cheryl Elena  : 1940    Encounter Date: 01/10/2024    Visit  Note   Subjective  Cheryl Elena is a 83 y.o. female who is being seen for an admission H&P.  Nursing notes, vital signs, and labs were reviewed in the local facility chart and she  is being evaluated for multiple medical problems.     HPI     This patient was admitted to the hospital in January with cellulitis bilateral lower extremities secondary to nonhealing venous stasis ulcer.  Wound cultures showed multidrug-resistant Pseudomonas and infectious disease was consulted.  Antibiotic was switched to cefepime and intravenous and the plan is to treat her for 4 weeks and then follow-up with infectious disease.  She comes here to receiving intravenous medication, wound care, and supportive care.  Today she is complaining of severe itching and pain of both lower extremities.  She is very resistant to care and is refusing oral medications immediately upon admission here.  Specifically she does not want to take the Lasix.  She denies the fact that she has artery disease and feels that no one is telling her once along with her legs even though she has been dealing with this problem for 5 years.  Records indicate that she has been in multiple long-term care facilities and had many hospital admissions for this problem in the past several years.    Objective  There were no vitals taken for this visit.  Physical Exam  Constitutional:       Appearance: Normal appearance.   HENT:      Head: Normocephalic.   Pulmonary:      Effort: Pulmonary effort is normal.   Musculoskeletal:      Cervical back: Neck supple.   Skin:     General: Skin is warm and dry.      Comments: Legs: There is 3+ pitting edema from the knees down bilaterally.  There are many venous ulcerations of irregular shape on the anterior and lateral shins bilaterally and on the dorsal toes on the left side.  There is some erythema surrounding and all lesions are oozing a small amount of  blood and serous discharge.   Psychiatric:         Mood and Affect: Mood normal.       Assessment/Plan  This 83-year-old female has a lengthy history of venous stasis ulcers which are currently infected with multidrug resistant Pseudomonas.  The course is complicated by peripheral artery disease documented on arterial ultrasound last week, severe venous stasis edema, and infection.  Prognosis is extremely poor given these complicating factors and the length of time the ulcers have been present.  We will continue aggressive management with 4 weeks of IV cefepime, wound care consult, Lasix for treatment of the edema, elevation of legs, and Eliquis to prevent DVT or embolism.  She is very resistant to treatment and is refusing medications here.  I carefully explained to her the critical importance of medicines like Lasix to improve the chances that these wounds will heal.  Even despite this maximum therapy prognosis remains extremely poor given the severity, extent, and complicating factors of these wounds.  I did review an x-ray performed prior to discharge from the hospital which did not show evidence of osteomyelitis of the left toes.  Problem List Items Addressed This Visit       Acquired hypothyroidism     Lab Results   Component Value Date    TSH 1.42 02/08/2023   This is A we will continue her levothyroxine supplement at 150 mcg         Acute embolism and thrombosis of deep vein of left lower extremity (CMS/HCC)     We will continue her long-term prophylaxis and treatment with Eliquis twice daily 5 mg         Chronic obstructive pulmonary disease (CMS/HCC)    Infected stasis ulcer (CMS/HCC)    Venous stasis ulcers (CMS/HCC)     She will keep follow up with plastic surgery for wound treatment options as well as ID.  We will continue lasix to reduce edema and consult our wound care team here as well for guidance.  Will monitor BMP weekly         Pseudomonas aeruginosa infection    Stage 3b chronic kidney disease  (CMS/HCC)     Lab Results   Component Value Date    GLUCOSE 91 01/09/2024    CALCIUM 8.3 (L) 01/09/2024     01/09/2024    K 5.7 (H) 01/09/2024    CO2 24 01/09/2024     (H) 01/09/2024    BUN 28 (H) 01/09/2024    CREATININE 1.61 (H) 01/09/2024   To be a baseline.  We will continue to monitor weekly and avoid nephrotoxins         Type 2 diabetes mellitus without complication, without long-term current use of insulin (CMS/HCC)     This is currently reported is diet controlled.  No recent A1c has been done so we will perform this on the next blood draw         Bilateral lower leg cellulitis - Primary     Multidrug resistant pseudomonas.  ID consulted and recommended 4 weeks of IV cefipime ending on 2/6/2024.  Percocet prn for pain  Wound care consult         Obesity, morbid (CMS/HCC)    PAD (peripheral artery disease) (CMS/HCC)     Other Visit Diagnoses       Cellulitis of lower extremity, unspecified laterality        Relevant Medications    oxyCODONE-acetaminophen (Percocet) 5-325 mg tablet               Electronically Signed By: Yuriy Aguillon MD   1/10/24  9:40 AM

## 2024-01-10 NOTE — ASSESSMENT & PLAN NOTE
She will keep follow up with plastic surgery for wound treatment options as well as ID.  We will continue lasix to reduce edema and consult our wound care team here as well for guidance.  Will monitor BMP weekly

## 2024-01-10 NOTE — ASSESSMENT & PLAN NOTE
Lab Results   Component Value Date    TSH 1.42 02/08/2023     This is A we will continue her levothyroxine supplement at 150 mcg

## 2024-01-10 NOTE — PROGRESS NOTES
Visit  Note   Subjective   Cheryl Elena is a 83 y.o. female who is being seen for an admission H&P.  Nursing notes, vital signs, and labs were reviewed in the local facility chart and she  is being evaluated for multiple medical problems.     HPI     This patient was admitted to the hospital in January with cellulitis bilateral lower extremities secondary to nonhealing venous stasis ulcer.  Wound cultures showed multidrug-resistant Pseudomonas and infectious disease was consulted.  Antibiotic was switched to cefepime and intravenous and the plan is to treat her for 4 weeks and then follow-up with infectious disease.  She comes here to receiving intravenous medication, wound care, and supportive care.  Today she is complaining of severe itching and pain of both lower extremities.  She is very resistant to care and is refusing oral medications immediately upon admission here.  Specifically she does not want to take the Lasix.  She denies the fact that she has artery disease and feels that no one is telling her once along with her legs even though she has been dealing with this problem for 5 years.  Records indicate that she has been in multiple long-term care facilities and had many hospital admissions for this problem in the past several years.    Objective   There were no vitals taken for this visit.  Physical Exam  Constitutional:       Appearance: Normal appearance.   HENT:      Head: Normocephalic.   Pulmonary:      Effort: Pulmonary effort is normal.   Musculoskeletal:      Cervical back: Neck supple.   Skin:     General: Skin is warm and dry.      Comments: Legs: There is 3+ pitting edema from the knees down bilaterally.  There are many venous ulcerations of irregular shape on the anterior and lateral shins bilaterally and on the dorsal toes on the left side.  There is some erythema surrounding and all lesions are oozing a small amount of blood and serous discharge.   Psychiatric:         Mood and Affect:  Mood normal.       Assessment/Plan   This 83-year-old female has a lengthy history of venous stasis ulcers which are currently infected with multidrug resistant Pseudomonas.  The course is complicated by peripheral artery disease documented on arterial ultrasound last week, severe venous stasis edema, and infection.  Prognosis is extremely poor given these complicating factors and the length of time the ulcers have been present.  We will continue aggressive management with 4 weeks of IV cefepime, wound care consult, Lasix for treatment of the edema, elevation of legs, and Eliquis to prevent DVT or embolism.  She is very resistant to treatment and is refusing medications here.  I carefully explained to her the critical importance of medicines like Lasix to improve the chances that these wounds will heal.  Even despite this maximum therapy prognosis remains extremely poor given the severity, extent, and complicating factors of these wounds.  I did review an x-ray performed prior to discharge from the hospital which did not show evidence of osteomyelitis of the left toes.  Problem List Items Addressed This Visit       Acquired hypothyroidism     Lab Results   Component Value Date    TSH 1.42 02/08/2023   This is A we will continue her levothyroxine supplement at 150 mcg         Acute embolism and thrombosis of deep vein of left lower extremity (CMS/HCC)     We will continue her long-term prophylaxis and treatment with Eliquis twice daily 5 mg         Chronic obstructive pulmonary disease (CMS/HCC)    Infected stasis ulcer (CMS/HCC)    Venous stasis ulcers (CMS/HCC)     She will keep follow up with plastic surgery for wound treatment options as well as ID.  We will continue lasix to reduce edema and consult our wound care team here as well for guidance.  Will monitor BMP weekly         Pseudomonas aeruginosa infection    Stage 3b chronic kidney disease (CMS/HCC)     Lab Results   Component Value Date    GLUCOSE 91  01/09/2024    CALCIUM 8.3 (L) 01/09/2024     01/09/2024    K 5.7 (H) 01/09/2024    CO2 24 01/09/2024     (H) 01/09/2024    BUN 28 (H) 01/09/2024    CREATININE 1.61 (H) 01/09/2024   To be a baseline.  We will continue to monitor weekly and avoid nephrotoxins         Type 2 diabetes mellitus without complication, without long-term current use of insulin (CMS/[facility])     This is currently reported is diet controlled.  No recent A1c has been done so we will perform this on the next blood draw         Bilateral lower leg cellulitis - Primary     Multidrug resistant pseudomonas.  ID consulted and recommended 4 weeks of IV cefipime ending on 2/6/2024.  Percocet prn for pain  Wound care consult         Obesity, morbid (CMS/[facility])    PAD (peripheral artery disease) (CMS/[facility])     Other Visit Diagnoses       Cellulitis of lower extremity, unspecified laterality        Relevant Medications    oxyCODONE-acetaminophen (Percocet) 5-325 mg tablet

## 2024-01-10 NOTE — ASSESSMENT & PLAN NOTE
Multidrug resistant pseudomonas.  ID consulted and recommended 4 weeks of IV cefipime ending on 2/6/2024.  Percocet prn for pain  Wound care consult

## 2024-01-14 ENCOUNTER — HOSPITAL ENCOUNTER (EMERGENCY)
Facility: HOSPITAL | Age: 84
Discharge: HOME | End: 2024-01-15
Attending: EMERGENCY MEDICINE
Payer: MEDICARE

## 2024-01-14 ENCOUNTER — APPOINTMENT (OUTPATIENT)
Dept: CARDIOLOGY | Facility: HOSPITAL | Age: 84
End: 2024-01-14
Payer: MEDICARE

## 2024-01-14 DIAGNOSIS — M79.89 OTHER SPECIFIED SOFT TISSUE DISORDERS: ICD-10-CM

## 2024-01-14 DIAGNOSIS — M79.602 PAIN IN LEFT ARM: ICD-10-CM

## 2024-01-14 DIAGNOSIS — M79.602 PAIN OF LEFT UPPER EXTREMITY: Primary | ICD-10-CM

## 2024-01-14 LAB
BASOPHILS # BLD AUTO: 0.02 X10*3/UL (ref 0–0.1)
BASOPHILS NFR BLD AUTO: 0.2 %
EOSINOPHIL # BLD AUTO: 0.42 X10*3/UL (ref 0–0.4)
EOSINOPHIL NFR BLD AUTO: 4.4 %
ERYTHROCYTE [DISTWIDTH] IN BLOOD BY AUTOMATED COUNT: 16.4 % (ref 11.5–14.5)
HCT VFR BLD AUTO: 31.2 % (ref 36–46)
HGB BLD-MCNC: 9.1 G/DL (ref 12–16)
IMM GRANULOCYTES # BLD AUTO: 0.06 X10*3/UL (ref 0–0.5)
IMM GRANULOCYTES NFR BLD AUTO: 0.6 % (ref 0–0.9)
LYMPHOCYTES # BLD AUTO: 1.47 X10*3/UL (ref 0.8–3)
LYMPHOCYTES NFR BLD AUTO: 15.4 %
MCH RBC QN AUTO: 25.8 PG (ref 26–34)
MCHC RBC AUTO-ENTMCNC: 29.2 G/DL (ref 32–36)
MCV RBC AUTO: 88 FL (ref 80–100)
MONOCYTES # BLD AUTO: 0.71 X10*3/UL (ref 0.05–0.8)
MONOCYTES NFR BLD AUTO: 7.4 %
NEUTROPHILS # BLD AUTO: 6.89 X10*3/UL (ref 1.6–5.5)
NEUTROPHILS NFR BLD AUTO: 72 %
NRBC BLD-RTO: 0 /100 WBCS (ref 0–0)
PLATELET # BLD AUTO: 349 X10*3/UL (ref 150–450)
RBC # BLD AUTO: 3.53 X10*6/UL (ref 4–5.2)
WBC # BLD AUTO: 9.6 X10*3/UL (ref 4.4–11.3)

## 2024-01-14 PROCEDURE — 93971 EXTREMITY STUDY: CPT

## 2024-01-14 PROCEDURE — 84075 ASSAY ALKALINE PHOSPHATASE: CPT | Performed by: EMERGENCY MEDICINE

## 2024-01-14 PROCEDURE — 36415 COLL VENOUS BLD VENIPUNCTURE: CPT | Performed by: EMERGENCY MEDICINE

## 2024-01-14 PROCEDURE — 87040 BLOOD CULTURE FOR BACTERIA: CPT | Mod: STJLAB | Performed by: EMERGENCY MEDICINE

## 2024-01-14 PROCEDURE — 99284 EMERGENCY DEPT VISIT MOD MDM: CPT | Mod: 25 | Performed by: EMERGENCY MEDICINE

## 2024-01-14 PROCEDURE — 85025 COMPLETE CBC W/AUTO DIFF WBC: CPT | Performed by: EMERGENCY MEDICINE

## 2024-01-14 PROCEDURE — 93971 EXTREMITY STUDY: CPT | Performed by: INTERNAL MEDICINE

## 2024-01-14 ASSESSMENT — LIFESTYLE VARIABLES
EVER HAD A DRINK FIRST THING IN THE MORNING TO STEADY YOUR NERVES TO GET RID OF A HANGOVER: NO
EVER FELT BAD OR GUILTY ABOUT YOUR DRINKING: NO
HAVE YOU EVER FELT YOU SHOULD CUT DOWN ON YOUR DRINKING: NO
REASON UNABLE TO ASSESS: NO
HAVE PEOPLE ANNOYED YOU BY CRITICIZING YOUR DRINKING: NO

## 2024-01-14 ASSESSMENT — PAIN SCALES - GENERAL: PAINLEVEL_OUTOF10: 8

## 2024-01-14 ASSESSMENT — PAIN DESCRIPTION - ORIENTATION: ORIENTATION: RIGHT;LEFT

## 2024-01-14 ASSESSMENT — PAIN - FUNCTIONAL ASSESSMENT: PAIN_FUNCTIONAL_ASSESSMENT: 0-10

## 2024-01-14 ASSESSMENT — PAIN DESCRIPTION - LOCATION: LOCATION: LEG

## 2024-01-14 ASSESSMENT — COLUMBIA-SUICIDE SEVERITY RATING SCALE - C-SSRS
6. HAVE YOU EVER DONE ANYTHING, STARTED TO DO ANYTHING, OR PREPARED TO DO ANYTHING TO END YOUR LIFE?: NO
1. IN THE PAST MONTH, HAVE YOU WISHED YOU WERE DEAD OR WISHED YOU COULD GO TO SLEEP AND NOT WAKE UP?: NO
2. HAVE YOU ACTUALLY HAD ANY THOUGHTS OF KILLING YOURSELF?: NO

## 2024-01-14 ASSESSMENT — PAIN DESCRIPTION - PAIN TYPE: TYPE: ACUTE PAIN

## 2024-01-15 ENCOUNTER — NURSING HOME VISIT (OUTPATIENT)
Dept: POST ACUTE CARE | Facility: EXTERNAL LOCATION | Age: 84
End: 2024-01-15
Payer: MEDICARE

## 2024-01-15 VITALS
DIASTOLIC BLOOD PRESSURE: 73 MMHG | RESPIRATION RATE: 22 BRPM | TEMPERATURE: 98.3 F | WEIGHT: 210 LBS | HEART RATE: 75 BPM | SYSTOLIC BLOOD PRESSURE: 183 MMHG | OXYGEN SATURATION: 95 % | HEIGHT: 63 IN | BODY MASS INDEX: 37.21 KG/M2

## 2024-01-15 DIAGNOSIS — L03.116 BILATERAL LOWER LEG CELLULITIS: Primary | ICD-10-CM

## 2024-01-15 DIAGNOSIS — L03.115 BILATERAL LOWER LEG CELLULITIS: Primary | ICD-10-CM

## 2024-01-15 LAB
ALBUMIN SERPL BCP-MCNC: 3.1 G/DL (ref 3.4–5)
ALP SERPL-CCNC: 78 U/L (ref 33–136)
ALT SERPL W P-5'-P-CCNC: 13 U/L (ref 7–45)
ANION GAP SERPL CALC-SCNC: 11 MMOL/L (ref 10–20)
AST SERPL W P-5'-P-CCNC: 16 U/L (ref 9–39)
BILIRUB SERPL-MCNC: 0.2 MG/DL (ref 0–1.2)
BUN SERPL-MCNC: 28 MG/DL (ref 6–23)
CALCIUM SERPL-MCNC: 8.3 MG/DL (ref 8.6–10.3)
CHLORIDE SERPL-SCNC: 107 MMOL/L (ref 98–107)
CO2 SERPL-SCNC: 26 MMOL/L (ref 21–32)
CREAT SERPL-MCNC: 1.21 MG/DL (ref 0.5–1.05)
EGFRCR SERPLBLD CKD-EPI 2021: 44 ML/MIN/1.73M*2
GLUCOSE SERPL-MCNC: 88 MG/DL (ref 74–99)
POTASSIUM SERPL-SCNC: 4.7 MMOL/L (ref 3.5–5.3)
PROT SERPL-MCNC: 6.2 G/DL (ref 6.4–8.2)
SODIUM SERPL-SCNC: 139 MMOL/L (ref 136–145)

## 2024-01-15 PROCEDURE — 99308 SBSQ NF CARE LOW MDM 20: CPT

## 2024-01-15 PROCEDURE — 2500000001 HC RX 250 WO HCPCS SELF ADMINISTERED DRUGS (ALT 637 FOR MEDICARE OP): Performed by: STUDENT IN AN ORGANIZED HEALTH CARE EDUCATION/TRAINING PROGRAM

## 2024-01-15 RX ORDER — OXYCODONE AND ACETAMINOPHEN 5; 325 MG/1; MG/1
1 TABLET ORAL ONCE
Status: COMPLETED | OUTPATIENT
Start: 2024-01-15 | End: 2024-01-15

## 2024-01-15 RX ADMIN — OXYCODONE HYDROCHLORIDE AND ACETAMINOPHEN 1 TABLET: 5; 325 TABLET ORAL at 04:50

## 2024-01-15 NOTE — ED PROVIDER NOTES
HPI   Chief Complaint   Patient presents with    Pt sent from NH to check Picc Line for blood clot      Pt has a PICC line in her left upper arm. Area around line is red and warm to the touch. Nursing home is requesting an ultrasound to check for clots.       HPI                    [place] Coma Scale Score: 15                  Patient History   Past Medical History:   Diagnosis Date    COPD (chronic obstructive pulmonary disease) (CMS/[facility])     Hypertension      History reviewed. No pertinent surgical history.  No family history on file.  Social History     Tobacco Use    Smoking status: Former     Types: Cigarettes     Quit date:      Years since quittin.0    Smokeless tobacco: Never   Substance Use Topics    Alcohol use: Not on file    Drug use: Not on file       Physical Exam   ED Triage Vitals [24 2235]   Temp Heart Rate Resp BP   36.7 °C (98 °F) 78 22 179/79      SpO2 Temp Source Heart Rate Source Patient Position   97 % Tympanic Monitor Sitting      BP Location FiO2 (%)     Right arm --       Physical Exam    ED Course & MDM   Diagnoses as of 01/15/24 0401   Pain of left upper extremity       Medical Decision Making      Procedure  Procedures     Valdo Murillo DO  01/15/24 0401

## 2024-01-15 NOTE — DISCHARGE INSTRUCTIONS
Please follow-up with your primary care provider to discuss your ER visit.  If you develop any lethargy, shortness of breath, or if you have any other concerns, please return to the ER for further care.

## 2024-01-15 NOTE — LETTER
Patient: Cheryl Elena  : 1940    Encounter Date: 01/15/2024    Visit  Note   Subjective  Cheryl Elena is a 83 y.o. female who is being seen and evaluated for multiple medical problems. Nursing notes, vital signs, and labs were reviewed in the local facility chart.    Patient was evaluated today after she was sent out to the hospital for concerns with her PICC. She states the PICC is mildly uncomfortable however, it is not painful with instillation of medications. She feels like her arm is swollen and complaining of pain at this insertion site.        Objective  There were no vitals taken for this visit.  Physical Exam  Vitals reviewed.   Constitutional:       Appearance: Normal appearance.   HENT:      Head: Normocephalic.   Cardiovascular:      Rate and Rhythm: Normal rate and regular rhythm.   Pulmonary:      Breath sounds: Normal breath sounds.   Musculoskeletal:      Cervical back: Neck supple.   Skin:     General: Skin is warm and dry.      Comments: Left upper arm PICC site has surrounding mild erythema. No excessive warmth or drainage surrounding site. No palpable masses    BLE are scabbed lesions; legs are bilaterally mild erythema no purulent drainage noted   Neurological:      Mental Status: She is alert.       Assessment/Plan  Based on description of previous examination, her BLE cellulitis appears to be improving. On ED findings, the catheter is working appropriately and I do not have any concerns for infiltration as ultrasound did not demonstrate any residual fluid accumulation, DVT, nor does the patient demonstrate pain with instillation. It appears the catheter may be uncomfortable with the surrounding mild erythema, possibly irritation to the adhesive dressing. I do not recommend changing it at this point and continue its use as it appears to be functioning properly. If pain persists or changes, we can explore new placement of a line.   Problem List Items Addressed This Visit        Bilateral lower leg cellulitis - Primary     Multidrug resistant pseudomonas.  ID consulted and recommended 4 weeks of IV cefipime ending on 2/6/2024.  Percocet prn for pain  Wound care consult               Electronically Signed By: BENEDICT Nieto-CNP   1/16/24  3:37 PM

## 2024-01-16 ENCOUNTER — NURSING HOME VISIT (OUTPATIENT)
Dept: POST ACUTE CARE | Facility: EXTERNAL LOCATION | Age: 84
End: 2024-01-16
Payer: MEDICARE

## 2024-01-16 DIAGNOSIS — F03.90 DEMENTIA WITHOUT BEHAVIORAL DISTURBANCE, PSYCHOTIC DISTURBANCE, MOOD DISTURBANCE, OR ANXIETY, UNSPECIFIED DEMENTIA SEVERITY, UNSPECIFIED DEMENTIA TYPE (MULTI): Primary | ICD-10-CM

## 2024-01-16 DIAGNOSIS — L97.902: ICD-10-CM

## 2024-01-16 DIAGNOSIS — I87.2: ICD-10-CM

## 2024-01-16 DIAGNOSIS — I42.9 CARDIOMYOPATHY, UNSPECIFIED TYPE (MULTI): ICD-10-CM

## 2024-01-16 PROBLEM — K76.82 HEPATIC ENCEPHALOPATHY (MULTI): Status: RESOLVED | Noted: 2024-01-10 | Resolved: 2024-01-16

## 2024-01-16 PROCEDURE — 99309 SBSQ NF CARE MODERATE MDM 30: CPT | Performed by: FAMILY MEDICINE

## 2024-01-16 NOTE — PROGRESS NOTES
Visit  Note   Subjective   Cheryl Elena is a 83 y.o. female who is being seen and evaluated for multiple medical problems. Nursing notes, vital signs, and labs were reviewed in the local facility chart.    Patient was evaluated today after she was sent out to the hospital for concerns with her PICC. She states the PICC is mildly uncomfortable however, it is not painful with instillation of medications. She feels like her arm is swollen and complaining of pain at this insertion site.        Objective   There were no vitals taken for this visit.  Physical Exam  Vitals reviewed.   Constitutional:       Appearance: Normal appearance.   HENT:      Head: Normocephalic.   Cardiovascular:      Rate and Rhythm: Normal rate and regular rhythm.   Pulmonary:      Breath sounds: Normal breath sounds.   Musculoskeletal:      Cervical back: Neck supple.   Skin:     General: Skin is warm and dry.      Comments: Left upper arm PICC site has surrounding mild erythema. No excessive warmth or drainage surrounding site. No palpable masses    BLE are scabbed lesions; legs are bilaterally mild erythema no purulent drainage noted   Neurological:      Mental Status: She is alert.       Assessment/Plan   Based on description of previous examination, her BLE cellulitis appears to be improving. On ED findings, the catheter is working appropriately and I do not have any concerns for infiltration as ultrasound did not demonstrate any residual fluid accumulation, DVT, nor does the patient demonstrate pain with instillation. It appears the catheter may be uncomfortable with the surrounding mild erythema, possibly irritation to the adhesive dressing. I do not recommend changing it at this point and continue its use as it appears to be functioning properly. If pain persists or changes, we can explore new placement of a line.   Problem List Items Addressed This Visit       Bilateral lower leg cellulitis - Primary     Multidrug resistant  pseudomonas.  ID consulted and recommended 4 weeks of IV cefipime ending on 2/6/2024.  Percocet prn for pain  Wound care consult

## 2024-01-16 NOTE — PROGRESS NOTES
Visit  Note   Subjective   Cheryl Elena is a 83 y.o. female who is being seen and evaluated for multiple medical problems. Nursing notes, vital signs, and labs were reviewed in the local facility chart.  I was asked to see this patient due to concern regarding her left upper arm PICC line site.  Last week she was concerned that it was reddish in color and the infusion was painful.  She was sent to the hospital for urgent evaluation where a CBC did not show leukocytosis, she remained afebrile, and a duplex ultrasound did not show any vascular obstruction or clot around the area.  She continues to complain of pain at the site but there is no redness today.  The infusion is being administered without difficulty  HPI   Objective   There were no vitals taken for this visit.  Physical Exam  Constitutional:       Appearance: Normal appearance.   HENT:      Head: Normocephalic.   Pulmonary:      Effort: Pulmonary effort is normal.   Musculoskeletal:      Cervical back: Neck supple.   Skin:     General: Skin is warm and dry.      Comments: There is interval improvement since my last visit of the multiple wounds of the legs.  There is now very minimal pink discoloration but no erythema.  All of the wounds have hard brown scab eschars with minimal slightly bloody discharge.  There is no purulence.   Psychiatric:         Mood and Affect: Mood normal.       Assessment/Plan   Problem List Items Addressed This Visit       Venous stasis ulcers (CMS/HCC)     This 83-year-old female continues on her month-long treatment with IV antibiotics for multidrug-resistant cellulitis and infected venous stasis ulcers on both legs.  Clinically on exam today the infection is significantly improved.  There was concern of a secondary infection or clot around her PICC line site in the left upper arm but it looks pale and pink today without ecchymosis or warmth.  Her recent trip to the hospital revealed a CBC that did not show leukocytosis, and a  negative ultrasound of the upper extremity showing no evidence of clot and good venous blood flow.  Blood cultures x 2 were performed and were negative for any additional infection.  I do not believe this is infected and I think the pain that she is having with infusion or just due to the general irritation from the catheter.  If this is tolerable I would recommend that she continue the PICC line as is.  If this does become worse and intolerable we could remove the PICC line and replace it in the upper arm.         Dementia without behavioral disturbance, psychotic disturbance, mood disturbance, or anxiety, unspecified dementia severity, unspecified dementia type (CMS/HCC) - Primary    Cardiomyopathy, unspecified type (CMS/HCC)

## 2024-01-16 NOTE — ASSESSMENT & PLAN NOTE
This 83-year-old female continues on her month-long treatment with IV antibiotics for multidrug-resistant cellulitis and infected venous stasis ulcers on both legs.  Clinically on exam today the infection is significantly improved.  There was concern of a secondary infection or clot around her PICC line site in the left upper arm but it looks pale and pink today without ecchymosis or warmth.  Her recent trip to the hospital revealed a CBC that did not show leukocytosis, and a negative ultrasound of the upper extremity showing no evidence of clot and good venous blood flow.  Blood cultures x 2 were performed and were negative for any additional infection.  I do not believe this is infected and I think the pain that she is having with infusion or just due to the general irritation from the catheter.  If this is tolerable I would recommend that she continue the PICC line as is.  If this does become worse and intolerable we could remove the PICC line and replace it in the upper arm.

## 2024-01-16 NOTE — LETTER
Patient: Cheryl Elena  : 1940    Encounter Date: 2024    Visit  Note   Subjective  Cheryl Elena is a 83 y.o. female who is being seen and evaluated for multiple medical problems. Nursing notes, vital signs, and labs were reviewed in the local facility chart.  I was asked to see this patient due to concern regarding her left upper arm PICC line site.  Last week she was concerned that it was reddish in color and the infusion was painful.  She was sent to the hospital for urgent evaluation where a CBC did not show leukocytosis, she remained afebrile, and a duplex ultrasound did not show any vascular obstruction or clot around the area.  She continues to complain of pain at the site but there is no redness today.  The infusion is being administered without difficulty  HPI   Objective  There were no vitals taken for this visit.  Physical Exam  Constitutional:       Appearance: Normal appearance.   HENT:      Head: Normocephalic.   Pulmonary:      Effort: Pulmonary effort is normal.   Musculoskeletal:      Cervical back: Neck supple.   Skin:     General: Skin is warm and dry.      Comments: There is interval improvement since my last visit of the multiple wounds of the legs.  There is now very minimal pink discoloration but no erythema.  All of the wounds have hard brown scab eschars with minimal slightly bloody discharge.  There is no purulence.   Psychiatric:         Mood and Affect: Mood normal.       Assessment/Plan  Problem List Items Addressed This Visit       Venous stasis ulcers (CMS/HCC)     This 83-year-old female continues on her month-long treatment with IV antibiotics for multidrug-resistant cellulitis and infected venous stasis ulcers on both legs.  Clinically on exam today the infection is significantly improved.  There was concern of a secondary infection or clot around her PICC line site in the left upper arm but it looks pale and pink today without ecchymosis or warmth.  Her recent trip  to the hospital revealed a CBC that did not show leukocytosis, and a negative ultrasound of the upper extremity showing no evidence of clot and good venous blood flow.  Blood cultures x 2 were performed and were negative for any additional infection.  I do not believe this is infected and I think the pain that she is having with infusion or just due to the general irritation from the catheter.  If this is tolerable I would recommend that she continue the PICC line as is.  If this does become worse and intolerable we could remove the PICC line and replace it in the upper arm.         Dementia without behavioral disturbance, psychotic disturbance, mood disturbance, or anxiety, unspecified dementia severity, unspecified dementia type (CMS/HCC) - Primary    Cardiomyopathy, unspecified type (CMS/HCC)          Electronically Signed By: Yuriy Aguillon MD   1/16/24 12:15 PM

## 2024-01-19 LAB
BACTERIA BLD CULT: NORMAL
BACTERIA BLD CULT: NORMAL

## 2024-01-24 ENCOUNTER — LAB REQUISITION (OUTPATIENT)
Dept: LAB | Facility: HOSPITAL | Age: 84
End: 2024-01-24
Payer: MEDICARE

## 2024-01-24 DIAGNOSIS — A41.9 SEPSIS, UNSPECIFIED ORGANISM (MULTI): ICD-10-CM

## 2024-01-24 LAB
ANION GAP SERPL CALC-SCNC: 12 MMOL/L (ref 10–20)
BUN SERPL-MCNC: 23 MG/DL (ref 6–23)
CALCIUM SERPL-MCNC: 8.5 MG/DL (ref 8.6–10.3)
CHLORIDE SERPL-SCNC: 109 MMOL/L (ref 98–107)
CO2 SERPL-SCNC: 23 MMOL/L (ref 21–32)
CREAT SERPL-MCNC: 1.19 MG/DL (ref 0.5–1.05)
EGFRCR SERPLBLD CKD-EPI 2021: 45 ML/MIN/1.73M*2
GLUCOSE SERPL-MCNC: 107 MG/DL (ref 74–99)
POTASSIUM SERPL-SCNC: 6.3 MMOL/L (ref 3.5–5.3)
SODIUM SERPL-SCNC: 138 MMOL/L (ref 136–145)

## 2024-01-24 PROCEDURE — 80048 BASIC METABOLIC PNL TOTAL CA: CPT

## 2024-01-26 ENCOUNTER — NURSING HOME VISIT (OUTPATIENT)
Dept: POST ACUTE CARE | Facility: EXTERNAL LOCATION | Age: 84
End: 2024-01-26
Payer: MEDICARE

## 2024-01-26 DIAGNOSIS — M79.605 BILATERAL LEG AND FOOT PAIN: ICD-10-CM

## 2024-01-26 DIAGNOSIS — L03.115 BILATERAL LOWER LEG CELLULITIS: Primary | ICD-10-CM

## 2024-01-26 DIAGNOSIS — N18.32 STAGE 3B CHRONIC KIDNEY DISEASE (MULTI): ICD-10-CM

## 2024-01-26 DIAGNOSIS — M79.604 BILATERAL LEG AND FOOT PAIN: ICD-10-CM

## 2024-01-26 DIAGNOSIS — L03.116 BILATERAL LOWER LEG CELLULITIS: Primary | ICD-10-CM

## 2024-01-26 DIAGNOSIS — M79.671 BILATERAL LEG AND FOOT PAIN: ICD-10-CM

## 2024-01-26 DIAGNOSIS — M79.672 BILATERAL LEG AND FOOT PAIN: ICD-10-CM

## 2024-01-26 DIAGNOSIS — I10 BENIGN ESSENTIAL HYPERTENSION: ICD-10-CM

## 2024-01-26 DIAGNOSIS — E11.9 TYPE 2 DIABETES MELLITUS WITHOUT COMPLICATION, WITHOUT LONG-TERM CURRENT USE OF INSULIN (MULTI): ICD-10-CM

## 2024-01-26 PROCEDURE — 99309 SBSQ NF CARE MODERATE MDM 30: CPT

## 2024-01-26 NOTE — LETTER
Patient: Cheryl Elena  : 1940    Encounter Date: 2024    Visit  Note   Subjective  Cheryl Elena is a 83 y.o. female who is being seen and evaluated for multiple medical problems. Nursing notes, vital signs, and labs were reviewed in the local facility chart.    Patient requested evaluation today and has questions as to why her legs and feet are so painful. She is endorsing increased pain to BLE and the feet explaining the feet are going numb periodically. We discussed her current pain regimen and she feels she is lacking relief and requested pain medications to be a higher dosage as she has taken a higher dose in the past and it provided relief. She denies any chest pains, shortness of breath on examination.        Objective  There were no vitals taken for this visit.  Physical Exam  Vitals reviewed.   Constitutional:       Appearance: Normal appearance.   HENT:      Head: Normocephalic.   Cardiovascular:      Rate and Rhythm: Normal rate and regular rhythm.      Pulses: Normal pulses.           Dorsalis pedis pulses are 2+ on the right side and 2+ on the left side.   Pulmonary:      Effort: Pulmonary effort is normal.      Breath sounds: Normal breath sounds. No stridor. No wheezing, rhonchi or rales.   Musculoskeletal:      Cervical back: Neck supple.      Right foot: Decreased range of motion.      Left foot: Decreased range of motion.   Feet:      Right foot:      Skin integrity: Skin breakdown and dry skin present.      Left foot:      Skin integrity: Skin breakdown and dry skin present.      Comments: Dried crusted areas between toes bilaterally no noticeable open areas between toes or purulent drainage present  Skin:     General: Skin is warm and dry.      Comments: Healing ridge on bilateral lower extremity wounds surrounding scabbed healing areas scattered anterior tibial region. No purulent drainage noted. DP Pulses +2 bilaterally   Neurological:      Mental Status: She is alert.        Assessment/Plan  We discussed rationale for her pain however she does not believe the reasons we discussed are causing her pain, therefore searching for a different answer to her pain. Reviewed her ultrasound of her legs with her, discussed age-related changes for which she was not receptive to. Upon record review, MR bilateral feet were completed 02/2023 explaining age related changes with muscle atrophy, and osteopenic vs. Osteoporosis appearing bone structures as there is no bone remineralization happening in her feet. I would suggest a follow up DEXA scan when she is more receptive to receiving information. Discussed the increase in her pain regimen with attending physician and agreed we can increase her percocet to 10/325mg tablets. Will check a vitamin D level while she is here. She is on 2000IU daily however, it is unclear if she is taking this supplement.  Problem List Items Addressed This Visit       Benign essential hypertension     Blood pressure has been consistently elevated; started patient on prinzide daily. She is refusing her lasix at this time.          Stage 3b chronic kidney disease (CMS/[facility])     We will continue to avoid nephrotoxic agents and monitor with weekly BMP; she had an episode of hyperkalemia and continues to refuse lasix at this time; we will used sodium polystyrene sulfonate for hyperkalemia. Most recently 01/26/2024, potassium came down to 5.3; creat 1.1 gfr 47 which is baseline for her         Type 2 diabetes mellitus without complication, without long-term current use of insulin (CMS/[facility])     This is diet controlled; her fasting glucose has been < 100 on her lab results. A1C is pending.         Bilateral lower leg cellulitis - Primary     Multidrug resistant pseudomonas.  ID consulted and recommended 4 weeks of IV cefipime ending on 2/6/2024.  Percocet increased this week to 10/325 Q8 prn for pain  Wound care consult; it is noted she is refusing wound care treatments                Electronically Signed By: BENEDICT Nieto-CNP   1/27/24  9:07 AM

## 2024-01-27 NOTE — ASSESSMENT & PLAN NOTE
Multidrug resistant pseudomonas.  ID consulted and recommended 4 weeks of IV cefipime ending on 2/6/2024.  Percocet increased this week to 10/325 Q8 prn for pain  Wound care consult; it is noted she is refusing wound care treatments

## 2024-01-27 NOTE — PROGRESS NOTES
Visit  Note   Subjective   Cheryl Elena is a 83 y.o. female who is being seen and evaluated for multiple medical problems. Nursing notes, vital signs, and labs were reviewed in the local facility chart.    Patient requested evaluation today and has questions as to why her legs and feet are so painful. She is endorsing increased pain to BLE and the feet explaining the feet are going numb periodically. We discussed her current pain regimen and she feels she is lacking relief and requested pain medications to be a higher dosage as she has taken a higher dose in the past and it provided relief. She denies any chest pains, shortness of breath on examination.        Objective   There were no vitals taken for this visit.  Physical Exam  Vitals reviewed.   Constitutional:       Appearance: Normal appearance.   HENT:      Head: Normocephalic.   Cardiovascular:      Rate and Rhythm: Normal rate and regular rhythm.      Pulses: Normal pulses.           Dorsalis pedis pulses are 2+ on the right side and 2+ on the left side.   Pulmonary:      Effort: Pulmonary effort is normal.      Breath sounds: Normal breath sounds. No stridor. No wheezing, rhonchi or rales.   Musculoskeletal:      Cervical back: Neck supple.      Right foot: Decreased range of motion.      Left foot: Decreased range of motion.   Feet:      Right foot:      Skin integrity: Skin breakdown and dry skin present.      Left foot:      Skin integrity: Skin breakdown and dry skin present.      Comments: Dried crusted areas between toes bilaterally no noticeable open areas between toes or purulent drainage present  Skin:     General: Skin is warm and dry.      Comments: Healing ridge on bilateral lower extremity wounds surrounding scabbed healing areas scattered anterior tibial region. No purulent drainage noted. DP Pulses +2 bilaterally   Neurological:      Mental Status: She is alert.       Assessment/Plan   We discussed rationale for her pain however she does  not believe the reasons we discussed are causing her pain, therefore searching for a different answer to her pain. Reviewed her ultrasound of her legs with her, discussed age-related changes for which she was not receptive to. Upon record review, MR bilateral feet were completed 02/2023 explaining age related changes with muscle atrophy, and osteopenic vs. Osteoporosis appearing bone structures as there is no bone remineralization happening in her feet. I would suggest a follow up DEXA scan when she is more receptive to receiving information. Discussed the increase in her pain regimen with attending physician and agreed we can increase her percocet to 10/325mg tablets. Will check a vitamin D level while she is here. She is on 2000IU daily however, it is unclear if she is taking this supplement.  Problem List Items Addressed This Visit       Benign essential hypertension     Blood pressure has been consistently elevated; started patient on prinzide daily. She is refusing her lasix at this time.          Stage 3b chronic kidney disease (CMS/[facility])     We will continue to avoid nephrotoxic agents and monitor with weekly BMP; she had an episode of hyperkalemia and continues to refuse lasix at this time; we will used sodium polystyrene sulfonate for hyperkalemia. Most recently 01/26/2024, potassium came down to 5.3; creat 1.1 gfr 47 which is baseline for her         Type 2 diabetes mellitus without complication, without long-term current use of insulin (CMS/[facility])     This is diet controlled; her fasting glucose has been < 100 on her lab results. A1C is pending.         Bilateral lower leg cellulitis - Primary     Multidrug resistant pseudomonas.  ID consulted and recommended 4 weeks of IV cefipime ending on 2/6/2024.  Percocet increased this week to 10/325 Q8 prn for pain  Wound care consult; it is noted she is refusing wound care treatments          Other Visit Diagnoses       Bilateral leg and foot pain

## 2024-01-27 NOTE — ASSESSMENT & PLAN NOTE
We will continue to avoid nephrotoxic agents and monitor with weekly BMP; she had an episode of hyperkalemia and continues to refuse lasix at this time; we will used sodium polystyrene sulfonate for hyperkalemia. Most recently 01/26/2024, potassium came down to 5.3; creat 1.1 gfr 47 which is baseline for her

## 2024-01-27 NOTE — ASSESSMENT & PLAN NOTE
Blood pressure has been consistently elevated; started patient on prinzide daily. She is refusing her lasix at this time.

## 2024-01-27 NOTE — ASSESSMENT & PLAN NOTE
We will continue her long-term prophylaxis and treatment with Eliquis twice daily 5 mg; she is taking this medication

## 2024-01-29 DIAGNOSIS — L03.119 CELLULITIS OF LOWER EXTREMITY, UNSPECIFIED LATERALITY: ICD-10-CM

## 2024-01-29 RX ORDER — OXYCODONE AND ACETAMINOPHEN 10; 325 MG/1; MG/1
1 TABLET ORAL EVERY 8 HOURS PRN
Qty: 42 TABLET | Refills: 0 | Status: SHIPPED | OUTPATIENT
Start: 2024-01-29 | End: 2024-02-10 | Stop reason: SDUPTHER

## 2024-02-02 ENCOUNTER — NURSING HOME VISIT (OUTPATIENT)
Dept: POST ACUTE CARE | Facility: EXTERNAL LOCATION | Age: 84
End: 2024-02-02
Payer: MEDICARE

## 2024-02-02 DIAGNOSIS — I82.402 ACUTE EMBOLISM AND THROMBOSIS OF DEEP VEIN OF LEFT LOWER EXTREMITY (MULTI): ICD-10-CM

## 2024-02-02 DIAGNOSIS — L03.116 BILATERAL LOWER LEG CELLULITIS: Primary | ICD-10-CM

## 2024-02-02 DIAGNOSIS — E11.9 TYPE 2 DIABETES MELLITUS WITHOUT COMPLICATION, WITHOUT LONG-TERM CURRENT USE OF INSULIN (MULTI): ICD-10-CM

## 2024-02-02 DIAGNOSIS — N18.32 STAGE 3B CHRONIC KIDNEY DISEASE (MULTI): ICD-10-CM

## 2024-02-02 DIAGNOSIS — F03.90 DEMENTIA WITHOUT BEHAVIORAL DISTURBANCE, PSYCHOTIC DISTURBANCE, MOOD DISTURBANCE, OR ANXIETY, UNSPECIFIED DEMENTIA SEVERITY, UNSPECIFIED DEMENTIA TYPE (MULTI): ICD-10-CM

## 2024-02-02 DIAGNOSIS — L03.115 BILATERAL LOWER LEG CELLULITIS: Primary | ICD-10-CM

## 2024-02-02 PROCEDURE — 99309 SBSQ NF CARE MODERATE MDM 30: CPT

## 2024-02-02 NOTE — LETTER
Patient: Cheryl Elena  : 1940    Encounter Date: 2024    Visit  Note   Subjective  Cheryl Elena is a 84 y.o. female who is being seen and evaluated for multiple medical problems. Nursing notes, vital signs, and labs were reviewed in the local facility chart.    Patient was evaluated for general examination today. She continues to complain of her feet being numb and has deferred her therapy at this time. Had a very long discussion with this patient today.       Objective  There were no vitals taken for this visit.  Physical Exam  Vitals reviewed.   Constitutional:       Appearance: Normal appearance.   HENT:      Head: Normocephalic.   Cardiovascular:      Rate and Rhythm: Normal rate.      Pulses: Normal pulses.      Heart sounds: Murmur heard.   Pulmonary:      Effort: Pulmonary effort is normal. No respiratory distress.      Breath sounds: Normal breath sounds. No stridor. No wheezing, rhonchi or rales.   Musculoskeletal:      Cervical back: Neck supple.      Comments: Dp/PT pulses +2 bilaterally   Skin:     General: Skin is warm and dry.      Comments: BLE wounds are scabbed; minimal pink tissue surrounding scabbed areas.    Neurological:      Mental Status: She is alert and oriented to person, place, and time.   Psychiatric:      Comments: unreceptive       Assessment/Plan  Offered explanation for patients continued numbness in her feet; she keeps demanding answers however, is refusing any further work-up; discussed XR of the feet to assess any fractures and she refuses XR of the feet. She has underlying bone depletion as evidenced by her previous MR of her feet attempted to discuss and she refused; Previously refused Dexa scans as well. A vitamin D level is pending at this time. I did discuss with her the importance of taking vitamin D and she was agreeable to taking this supplement. She is refusing blood pressure medications at this time as well and was educated on the risks of heart  attack/stroke/death due to hypertension. She is 140s-170s SBP and her Diastolic is 60s-80s.  Problem List Items Addressed This Visit       Acute embolism and thrombosis of deep vein of left lower extremity (CMS/HCC)     We will continue her long-term prophylaxis and treatment with Eliquis twice daily 5 mg; she is taking this medication         Stage 3b chronic kidney disease (CMS/[facility])     We will continue to avoid nephrotoxic agents and monitor with weekly BMP; she had an episode of hyperkalemia and continues to refuse lasix at this time; we will use sodium polystyrene sulfonate for hyperkalemia when indicated. Most recently 01/31/2024, potassium came down to 4.4         Type 2 diabetes mellitus without complication, without long-term current use of insulin (CMS/[facility])     This is diet controlled; her fasting glucose has been < 100 on her lab results. A1C 4.8% on 1/31/2024.         Bilateral lower leg cellulitis - Primary     Multidrug resistant pseudomonas.  ID consulted and recommended 4 weeks of IV cefipime ending on 2/6/2024.  Percocet continued with 10/325 Q8 prn for pain  Wound care consult; it is noted she is refusing wound care treatments         Dementia without behavioral disturbance, psychotic disturbance, mood disturbance, or anxiety, unspecified dementia severity, unspecified dementia type (CMS/[facility])     Unreceptive to medical explanations and treatment. She continues to refuse wound care treatment and is selective with which medications she will take. Currently she is agreeable to take her vitamin D although there is periodic refusals noted in her MAR.                Electronically Signed By: TAMAR Nieto   2/3/24  8:38 AM

## 2024-02-03 NOTE — ASSESSMENT & PLAN NOTE
Unreceptive to medical explanations and treatment. She continues to refuse wound care treatment and is selective with which medications she will take. Currently she is agreeable to take her vitamin D although there is periodic refusals noted in her MAR.

## 2024-02-03 NOTE — ASSESSMENT & PLAN NOTE
This is diet controlled; her fasting glucose has been < 100 on her lab results. A1C 4.8% on 1/31/2024.

## 2024-02-03 NOTE — ASSESSMENT & PLAN NOTE
We will continue to avoid nephrotoxic agents and monitor with weekly BMP; she had an episode of hyperkalemia and continues to refuse lasix at this time; we will use sodium polystyrene sulfonate for hyperkalemia when indicated. Most recently 01/31/2024, potassium came down to 4.4

## 2024-02-03 NOTE — ASSESSMENT & PLAN NOTE
Multidrug resistant pseudomonas.  ID consulted and recommended 4 weeks of IV cefipime ending on 2/6/2024.  Percocet continued with 10/325 Q8 prn for pain  Wound care consult; it is noted she is refusing wound care treatments

## 2024-02-03 NOTE — PROGRESS NOTES
Visit  Note   Subjective   Cheryl Elena is a 84 y.o. female who is being seen and evaluated for multiple medical problems. Nursing notes, vital signs, and labs were reviewed in the local facility chart.    Patient was evaluated for general examination today. She continues to complain of her feet being numb and has deferred her therapy at this time. Had a very long discussion with this patient today.       Objective   There were no vitals taken for this visit.  Physical Exam  Vitals reviewed.   Constitutional:       Appearance: Normal appearance.   HENT:      Head: Normocephalic.   Cardiovascular:      Rate and Rhythm: Normal rate.      Pulses: Normal pulses.      Heart sounds: Murmur heard.   Pulmonary:      Effort: Pulmonary effort is normal. No respiratory distress.      Breath sounds: Normal breath sounds. No stridor. No wheezing, rhonchi or rales.   Musculoskeletal:      Cervical back: Neck supple.      Comments: Dp/PT pulses +2 bilaterally   Skin:     General: Skin is warm and dry.      Comments: BLE wounds are scabbed; minimal pink tissue surrounding scabbed areas.    Neurological:      Mental Status: She is alert and oriented to person, place, and time.   Psychiatric:      Comments: unreceptive       Assessment/Plan   Offered explanation for patients continued numbness in her feet; she keeps demanding answers however, is refusing any further work-up; discussed XR of the feet to assess any fractures and she refuses XR of the feet. She has underlying bone depletion as evidenced by her previous MR of her feet attempted to discuss and she refused; Previously refused Dexa scans as well. A vitamin D level is pending at this time. I did discuss with her the importance of taking vitamin D and she was agreeable to taking this supplement. She is refusing blood pressure medications at this time as well and was educated on the risks of heart attack/stroke/death due to hypertension. She is 140s-170s SBP and her  Diastolic is 60s-80s.  Problem List Items Addressed This Visit       Acute embolism and thrombosis of deep vein of left lower extremity (CMS/HCC)     We will continue her long-term prophylaxis and treatment with Eliquis twice daily 5 mg; she is taking this medication         Stage 3b chronic kidney disease (CMS/[facility])     We will continue to avoid nephrotoxic agents and monitor with weekly BMP; she had an episode of hyperkalemia and continues to refuse lasix at this time; we will use sodium polystyrene sulfonate for hyperkalemia when indicated. Most recently 01/31/2024, potassium came down to 4.4         Type 2 diabetes mellitus without complication, without long-term current use of insulin (CMS/[facility])     This is diet controlled; her fasting glucose has been < 100 on her lab results. A1C 4.8% on 1/31/2024.         Bilateral lower leg cellulitis - Primary     Multidrug resistant pseudomonas.  ID consulted and recommended 4 weeks of IV cefipime ending on 2/6/2024.  Percocet continued with 10/325 Q8 prn for pain  Wound care consult; it is noted she is refusing wound care treatments         Dementia without behavioral disturbance, psychotic disturbance, mood disturbance, or anxiety, unspecified dementia severity, unspecified dementia type (CMS/[facility])     Unreceptive to medical explanations and treatment. She continues to refuse wound care treatment and is selective with which medications she will take. Currently she is agreeable to take her vitamin D although there is periodic refusals noted in her MAR.

## 2024-02-07 ENCOUNTER — NURSING HOME VISIT (OUTPATIENT)
Dept: POST ACUTE CARE | Facility: EXTERNAL LOCATION | Age: 84
End: 2024-02-07
Payer: MEDICARE

## 2024-02-07 DIAGNOSIS — J44.9 CHRONIC OBSTRUCTIVE PULMONARY DISEASE, UNSPECIFIED COPD TYPE (MULTI): ICD-10-CM

## 2024-02-07 DIAGNOSIS — L03.116 BILATERAL LOWER LEG CELLULITIS: Primary | ICD-10-CM

## 2024-02-07 DIAGNOSIS — L03.115 BILATERAL LOWER LEG CELLULITIS: Primary | ICD-10-CM

## 2024-02-07 DIAGNOSIS — I83.209: ICD-10-CM

## 2024-02-07 DIAGNOSIS — M79.671 BILATERAL FOOT PAIN: ICD-10-CM

## 2024-02-07 DIAGNOSIS — F03.90 DEMENTIA WITHOUT BEHAVIORAL DISTURBANCE, PSYCHOTIC DISTURBANCE, MOOD DISTURBANCE, OR ANXIETY, UNSPECIFIED DEMENTIA SEVERITY, UNSPECIFIED DEMENTIA TYPE (MULTI): ICD-10-CM

## 2024-02-07 DIAGNOSIS — L97.909: ICD-10-CM

## 2024-02-07 DIAGNOSIS — E11.9 TYPE 2 DIABETES MELLITUS WITHOUT COMPLICATION, WITHOUT LONG-TERM CURRENT USE OF INSULIN (MULTI): ICD-10-CM

## 2024-02-07 DIAGNOSIS — N18.32 STAGE 3B CHRONIC KIDNEY DISEASE (MULTI): ICD-10-CM

## 2024-02-07 DIAGNOSIS — I82.402 ACUTE EMBOLISM AND THROMBOSIS OF DEEP VEIN OF LEFT LOWER EXTREMITY (MULTI): ICD-10-CM

## 2024-02-07 DIAGNOSIS — M79.672 BILATERAL FOOT PAIN: ICD-10-CM

## 2024-02-07 DIAGNOSIS — I10 BENIGN ESSENTIAL HYPERTENSION: ICD-10-CM

## 2024-02-07 PROCEDURE — 99309 SBSQ NF CARE MODERATE MDM 30: CPT

## 2024-02-07 NOTE — LETTER
Patient: Cheryl Elena  : 1940    Encounter Date: 2024    Visit  Note   Subjective  Cheryl Elena is a 84 y.o. female who is being seen and evaluated for multiple medical problems. Nursing notes, vital signs, and labs were reviewed in the local facility chart.    Patient evaluated today for general examination. We had the same conversation as the last visit. She continues to complain of bilateral foot pain/numbness. She is planned to finish her last dosing of antibiotics tomorrow.        Objective  There were no vitals taken for this visit.  Physical Exam  Vitals reviewed.   Constitutional:       Appearance: Normal appearance.   HENT:      Head: Normocephalic.   Cardiovascular:      Rate and Rhythm: Normal rate and regular rhythm.   Pulmonary:      Breath sounds: Normal breath sounds.   Musculoskeletal:      Cervical back: Neck supple.   Skin:     General: Skin is warm and dry.      Comments: Wounds are scabbed; pink tissue surrounding scabs bilaterally. No drainage.    Neurological:      Mental Status: She is alert.       Assessment/Plan  Episode of increased foot pain a few weeks ago when she began to stand with therapy; she is finally agreeable to XR the feet; Order was placed for bilateral foot Xrs.   She is refusing medication for cough so that will be deferred at this time.   Her discharge plan is pending at this time as she has not made a decision on where/what she is doing after her stay here.  Rest of the plan as follows below:    Problem List Items Addressed This Visit       Acute embolism and thrombosis of deep vein of left lower extremity (CMS/HCC)     We will continue her long-term prophylaxis and treatment with Eliquis twice daily 5 mg; she is taking this medication         Chronic obstructive pulmonary disease (CMS/HCC)     Patient is complaining of a cough today; covid testing has been negative. Discussed options for medications for the cough and she refused those as well and I did  not order any further treatments due to refusal.          Infected stasis ulcer (CMS/HCC) - Primary     Currently finishing treatment as directed by ID specialist. She recently cancelled her ID follow up appointment; recent labs were faxed to ID office.          Benign essential hypertension     Blood pressure has been consistently elevated; started patient on prinzide daily and she is refusing that as well as refusing lasix.          Stage 3b chronic kidney disease (CMS/[facility])     We will continue to avoid nephrotoxic agents and monitor with weekly BMP         Type 2 diabetes mellitus without complication, without long-term current use of insulin (CMS/[facility])     This is diet controlled; her fasting glucose has been < 100 on her lab results. A1C 4.8% on 1/31/2024.         Bilateral lower leg cellulitis     Multidrug resistant pseudomonas.  ID consulted and recommended 4 weeks of IV cefipime ending on 2/6/2024.  Percocet continued with 10/325 Q8 prn for pain  Wound care consult; it is noted she is refusing wound care treatments         Dementia without behavioral disturbance, psychotic disturbance, mood disturbance, or anxiety, unspecified dementia severity, unspecified dementia type (CMS/[facility])     Unreceptive to medical explanations and treatment. She continues to refuse wound care treatment and is selective with which medications she will take. Currently she is agreeable to take her vitamin D although there is periodic refusals noted in her MAR.                Electronically Signed By: TAMAR Nieto   2/8/24  9:33 PM

## 2024-02-08 NOTE — PROGRESS NOTES
Visit  Note   Subjective   Cheryl Elena is a 84 y.o. female who is being seen and evaluated for multiple medical problems. Nursing notes, vital signs, and labs were reviewed in the local facility chart.    Patient evaluated today for general examination. We had the same conversation as the last visit. She continues to complain of bilateral foot pain/numbness. She is planned to finish her last dosing of antibiotics tomorrow.        Objective   There were no vitals taken for this visit.  Physical Exam  Vitals reviewed.   Constitutional:       Appearance: Normal appearance.   HENT:      Head: Normocephalic.   Cardiovascular:      Rate and Rhythm: Normal rate and regular rhythm.   Pulmonary:      Breath sounds: Normal breath sounds.   Musculoskeletal:      Cervical back: Neck supple.   Skin:     General: Skin is warm and dry.      Comments: Wounds are scabbed; pink tissue surrounding scabs bilaterally. No drainage.    Neurological:      Mental Status: She is alert.       Assessment/Plan   Episode of increased foot pain a few weeks ago when she began to stand with therapy; she is finally agreeable to XR the feet; Order was placed for bilateral foot Xrs.   She is refusing medication for cough so that will be deferred at this time.   Her discharge plan is pending at this time as she has not made a decision on where/what she is doing after her stay here.  Rest of the plan as follows below:    Problem List Items Addressed This Visit       Acute embolism and thrombosis of deep vein of left lower extremity (CMS/HCC)     We will continue her long-term prophylaxis and treatment with Eliquis twice daily 5 mg; she is taking this medication         Chronic obstructive pulmonary disease (CMS/HCC)     Patient is complaining of a cough today; covid testing has been negative. Discussed options for medications for the cough and she refused those as well and I did not order any further treatments due to refusal.          Infected  stasis ulcer (CMS/[facility]) - Primary     Currently finishing treatment as directed by ID specialist. She recently cancelled her ID follow up appointment; recent labs were faxed to ID office.          Benign essential hypertension     Blood pressure has been consistently elevated; started patient on prinzide daily and she is refusing that as well as refusing lasix.          Stage 3b chronic kidney disease (CMS/[facility])     We will continue to avoid nephrotoxic agents and monitor with weekly BMP         Type 2 diabetes mellitus without complication, without long-term current use of insulin (CMS/[facility])     This is diet controlled; her fasting glucose has been < 100 on her lab results. A1C 4.8% on 1/31/2024.         Bilateral lower leg cellulitis     Multidrug resistant pseudomonas.  ID consulted and recommended 4 weeks of IV cefipime ending on 2/6/2024.  Percocet continued with 10/325 Q8 prn for pain  Wound care consult; it is noted she is refusing wound care treatments         Dementia without behavioral disturbance, psychotic disturbance, mood disturbance, or anxiety, unspecified dementia severity, unspecified dementia type (CMS/[facility])     Unreceptive to medical explanations and treatment. She continues to refuse wound care treatment and is selective with which medications she will take. Currently she is agreeable to take her vitamin D although there is periodic refusals noted in her MAR.

## 2024-02-09 ENCOUNTER — TELEPHONE (OUTPATIENT)
Dept: INFECTIOUS DISEASES | Age: 84
End: 2024-02-09

## 2024-02-09 NOTE — ASSESSMENT & PLAN NOTE
Currently finishing treatment as directed by ID specialist. She recently cancelled her ID follow up appointment; recent labs were faxed to ID office.

## 2024-02-09 NOTE — ASSESSMENT & PLAN NOTE
Patient is complaining of a cough today; covid testing has been negative. Discussed options for medications for the cough and she refused those as well and I did not order any further treatments due to refusal.

## 2024-02-09 NOTE — ASSESSMENT & PLAN NOTE
Blood pressure has been consistently elevated; started patient on prinzide daily and she is refusing that as well as refusing lasix.

## 2024-02-09 NOTE — TELEPHONE ENCOUNTER
Per SNF nurse, she states patient MARCELLA Perez has completed her IV Abx as of 2/7/24, has been on IV Cefepime, 1-G, Q-12. Patient was D/c from [facility] as of 1/9/24. Patient did not make her F/u appt in ID Clinic on 2/1/24.  Nurse asking if Ok to D/c Midline?  Of note, patient was D/c with a Picc Line, but had some trouble with this and was changed to a Midline sometime end of January, (nurse did not have a date)   Will update ID Physician.   Also, Dx of patient is RLE Cellulitis with an Ulcer.  Inquired who was addressing the this wound,  She says the SNF, Nurse practitioner has been attending to this wound.  Request she send latest Labs and Notes for ID physician to review. She confirmed the ID Fax#.     Patient seen 2/21/24 with Dr Rehman.

## 2024-02-10 DIAGNOSIS — L03.119 CELLULITIS OF LOWER EXTREMITY, UNSPECIFIED LATERALITY: ICD-10-CM

## 2024-02-10 RX ORDER — OXYCODONE AND ACETAMINOPHEN 10; 325 MG/1; MG/1
1 TABLET ORAL EVERY 8 HOURS PRN
Qty: 10 TABLET | Refills: 0 | Status: SHIPPED | OUTPATIENT
Start: 2024-02-10 | End: 2024-02-12 | Stop reason: SDUPTHER

## 2024-02-12 ENCOUNTER — NURSING HOME VISIT (OUTPATIENT)
Dept: POST ACUTE CARE | Facility: EXTERNAL LOCATION | Age: 84
End: 2024-02-12
Payer: MEDICARE

## 2024-02-12 DIAGNOSIS — L03.115 BILATERAL LOWER LEG CELLULITIS: ICD-10-CM

## 2024-02-12 DIAGNOSIS — I82.402 ACUTE EMBOLISM AND THROMBOSIS OF DEEP VEIN OF LEFT LOWER EXTREMITY (MULTI): Primary | ICD-10-CM

## 2024-02-12 DIAGNOSIS — N18.32 STAGE 3B CHRONIC KIDNEY DISEASE (MULTI): ICD-10-CM

## 2024-02-12 DIAGNOSIS — L03.116 BILATERAL LOWER LEG CELLULITIS: ICD-10-CM

## 2024-02-12 DIAGNOSIS — L03.119 CELLULITIS OF LOWER EXTREMITY, UNSPECIFIED LATERALITY: ICD-10-CM

## 2024-02-12 DIAGNOSIS — E11.9 TYPE 2 DIABETES MELLITUS WITHOUT COMPLICATION, WITHOUT LONG-TERM CURRENT USE OF INSULIN (MULTI): ICD-10-CM

## 2024-02-12 PROCEDURE — 99309 SBSQ NF CARE MODERATE MDM 30: CPT

## 2024-02-12 RX ORDER — OXYCODONE AND ACETAMINOPHEN 10; 325 MG/1; MG/1
1 TABLET ORAL EVERY 8 HOURS PRN
Qty: 42 TABLET | Refills: 0 | Status: SHIPPED | OUTPATIENT
Start: 2024-02-12 | End: 2024-02-28 | Stop reason: SDUPTHER

## 2024-02-12 NOTE — PROGRESS NOTES
Visit  Note   Subjective   Cheryl Elena is a 84 y.o. female who is being seen and evaluated for multiple medical problems. Nursing notes, vital signs, and labs were reviewed in the local facility chart.    Patient was evaluated today for general examination. She currently is not complaining of any increased pain or shortness of breath. She has a cough and has deferred and additional medications at this time.        Objective   There were no vitals taken for this visit.  Physical Exam  Vitals reviewed.   Constitutional:       Appearance: Normal appearance.   HENT:      Head: Normocephalic.   Cardiovascular:      Rate and Rhythm: Normal rate and regular rhythm.   Pulmonary:      Effort: Pulmonary effort is normal. No respiratory distress.      Breath sounds: Normal breath sounds. No stridor. No wheezing or rhonchi.   Musculoskeletal:      Cervical back: Neck supple.   Skin:     General: Skin is warm and dry.      Comments: Scabbed lesions bilateral tibial areas; currently has a dressing on both sides. Scabbing still present on 3rd toe left foot   Neurological:      Mental Status: She is alert.       Assessment/Plan   Problem List Items Addressed This Visit       Acute embolism and thrombosis of deep vein of left lower extremity (CMS/HCC) - Primary     We will continue her long-term prophylaxis and treatment with Eliquis twice daily 5 mg; she is taking this medication         Stage 3b chronic kidney disease (CMS/HCC)     We will continue to avoid nephrotoxic agents and monitor with weekly BMP         Type 2 diabetes mellitus without complication, without long-term current use of insulin (CMS/HCC)     This is diet controlled; her fasting glucose has been < 100 on her lab results. A1C 4.8% on 1/31/2024.         Bilateral lower leg cellulitis     Multidrug resistant pseudomonas.  ID consulted and recommended 4 weeks of IV cefipime which completed on 2/07/2024. We are awaiting call back on ID visit for her as she had  cancelled her previous appointment due to travel concerns.  Percocet continued with 10/325 Q8 prn for pain  Wound care consult; it is noted she is refusing wound care treatments however today she does have what appears to be xeroform wound wrap on both of her legs today          Other Visit Diagnoses       Cellulitis of lower extremity, unspecified laterality        Relevant Medications    oxyCODONE-acetaminophen (Percocet)  mg tablet

## 2024-02-12 NOTE — LETTER
Patient: Cheryl Elena  : 1940    Encounter Date: 2024    Visit  Note   Subjective  Cheryl Elena is a 84 y.o. female who is being seen and evaluated for multiple medical problems. Nursing notes, vital signs, and labs were reviewed in the local facility chart.    Patient was evaluated today for general examination. She currently is not complaining of any increased pain or shortness of breath. She has a cough and has deferred and additional medications at this time.        Objective  There were no vitals taken for this visit.  Physical Exam  Vitals reviewed.   Constitutional:       Appearance: Normal appearance.   HENT:      Head: Normocephalic.   Cardiovascular:      Rate and Rhythm: Normal rate and regular rhythm.   Pulmonary:      Effort: Pulmonary effort is normal. No respiratory distress.      Breath sounds: Normal breath sounds. No stridor. No wheezing or rhonchi.   Musculoskeletal:      Cervical back: Neck supple.   Skin:     General: Skin is warm and dry.      Comments: Scabbed lesions bilateral tibial areas; currently has a dressing on both sides. Scabbing still present on 3rd toe left foot   Neurological:      Mental Status: She is alert.       Assessment/Plan  Problem List Items Addressed This Visit       Acute embolism and thrombosis of deep vein of left lower extremity (CMS/HCC) - Primary     We will continue her long-term prophylaxis and treatment with Eliquis twice daily 5 mg; she is taking this medication         Stage 3b chronic kidney disease (CMS/HCC)     We will continue to avoid nephrotoxic agents and monitor with weekly BMP         Type 2 diabetes mellitus without complication, without long-term current use of insulin (CMS/HCC)     This is diet controlled; her fasting glucose has been < 100 on her lab results. A1C 4.8% on 2024.         Bilateral lower leg cellulitis     Multidrug resistant pseudomonas.  ID consulted and recommended 4 weeks of IV cefipime which completed on  2/07/2024. We are awaiting call back on ID visit for her as she had cancelled her previous appointment due to travel concerns.  Percocet continued with 10/325 Q8 prn for pain  Wound care consult; it is noted she is refusing wound care treatments however today she does have what appears to be xeroform wound wrap on both of her legs today          Other Visit Diagnoses       Cellulitis of lower extremity, unspecified laterality        Relevant Medications    oxyCODONE-acetaminophen (Percocet)  mg tablet               Electronically Signed By: TAMAR Nieto   2/12/24  5:38 PM

## 2024-02-12 NOTE — ASSESSMENT & PLAN NOTE
Multidrug resistant pseudomonas.  ID consulted and recommended 4 weeks of IV cefipime which completed on 2/07/2024. We are awaiting call back on ID visit for her as she had cancelled her previous appointment due to travel concerns.  Percocet continued with 10/325 Q8 prn for pain  Wound care consult; it is noted she is refusing wound care treatments however today she does have what appears to be xeroform wound wrap on both of her legs today

## 2024-02-13 ENCOUNTER — NURSING HOME VISIT (OUTPATIENT)
Dept: POST ACUTE CARE | Facility: EXTERNAL LOCATION | Age: 84
End: 2024-02-13
Payer: MEDICARE

## 2024-02-13 DIAGNOSIS — I83.209: ICD-10-CM

## 2024-02-13 DIAGNOSIS — E03.9 ACQUIRED HYPOTHYROIDISM: Primary | ICD-10-CM

## 2024-02-13 DIAGNOSIS — L97.909: ICD-10-CM

## 2024-02-13 DIAGNOSIS — I82.402 ACUTE EMBOLISM AND THROMBOSIS OF DEEP VEIN OF LEFT LOWER EXTREMITY (MULTI): ICD-10-CM

## 2024-02-13 DIAGNOSIS — I10 BENIGN ESSENTIAL HYPERTENSION: ICD-10-CM

## 2024-02-13 PROBLEM — F03.90 DEMENTIA WITHOUT BEHAVIORAL DISTURBANCE, PSYCHOTIC DISTURBANCE, MOOD DISTURBANCE, OR ANXIETY, UNSPECIFIED DEMENTIA SEVERITY, UNSPECIFIED DEMENTIA TYPE (MULTI): Status: RESOLVED | Noted: 2024-01-16 | Resolved: 2024-02-13

## 2024-02-13 PROCEDURE — 99310 SBSQ NF CARE HIGH MDM 45: CPT | Performed by: FAMILY MEDICINE

## 2024-02-13 NOTE — LETTER
Patient: Cheryl Elena  : 1940    Encounter Date: 2024    Visit  Note   Subjective  Cheryl Elena is a 84 y.o. female who is being seen and evaluated for multiple medical problems. Nursing notes, vital signs, and labs were reviewed in the local facility chart.    HPI I was asked to see this patient by nursing staff and administration who are concerned as she is repeatedly refusing most of her medications and treatments.  Since coming here for her chronic wound infections she has completed a long course of IV antibiotics and clinically there is no indication that the infection is still present.  She says that she does not want to take the vitamin supplement, the acidophilus, and does not believe she needs the diuretic.  She states that she does not believe she has high blood pressure even though every reading in this facility has been high.  She says that she always gets worked up when the staff comes and bothers her to take her blood pressure and believes that it is falsely elevated.  She fully understands that the consequences of untreated high blood pressure usually end up as stroke and heart attack.  She repeatedly states that she does not believe she has this condition.  Objective  There were no vitals taken for this visit.  Physical Exam  Constitutional:       Appearance: Normal appearance.   HENT:      Head: Normocephalic.   Eyes:      Conjunctiva/sclera: Conjunctivae normal.   Cardiovascular:      Rate and Rhythm: Normal rate and regular rhythm.      Comments: Radial pulses are easily felt but I cannot palpate any pedal pulses at all  Pulmonary:      Effort: Pulmonary effort is normal.      Breath sounds: Normal breath sounds.   Musculoskeletal:      Cervical back: Neck supple.      Right lower leg: Edema present.      Left lower leg: Edema present.      Comments: There is just trace edema 1 alf up the shins bilateral   Skin:     General: Skin is warm and dry.      Comments: Dry scabbed  wounds on multiple toes and the wounds on the shins are covered with Vaseline gauze per wound care team   Neurological:      Mental Status: She is alert.       Assessment/Plan  Problem List Items Addressed This Visit       Acquired hypothyroidism - Primary     Lab Results   Component Value Date    TSH 1.42 02/08/2023   This is at goal so  we will continue her levothyroxine supplement at 150 mcg         Acute embolism and thrombosis of deep vein of left lower extremity (CMS/HCC)     We will continue her long-term prophylaxis and treatment with Eliquis twice daily 5 mg; she is taking this medication         Infected stasis ulcer (CMS/HCC)     She has completed her prolonged course of intravenous antibiotic and clinically the wounds are pink with dry scabs around the edges and do not appear infected.  She is afebrile and white blood cell count is not elevated.  We will continue to watch her wounds closely and she will keep scheduled follow-up appointments with her infectious disease specialist to determine if more intravenous antibiotic is needed in the future.  Her PICC line is still in place and we will maintain this for the next few weeks in case IV medication was resumed.  She has been refusing her diuretic and with leg elevation there is actually minimal edema today.  I think it is okay to discontinue her Lasix         Benign essential hypertension     This 84-year-old female has consistently refused her lisinopril and HCT treatment as she believes she does not have hypertension.  I pointed out to her that blood pressure readings have consistently showed a systolic between 160 and 170 indicating that she does indeed have hypertension.  I reminded her that untreated hypertension will lead to heart attack and stroke.  She states that she understands this but still does not believe she has high blood pressure.  She thinks that she gets worked up every single time that we take her blood pressure and it is falsely  elevated.  I stated that I do not believe she is correct and I think she really does have hypertension and would benefit from treatment.  She understands the risks and is declining treatment at this time so we will discontinue her lisinopril.               Electronically Signed By: Yuriy Aguillon MD   2/13/24  4:13 PM

## 2024-02-13 NOTE — ASSESSMENT & PLAN NOTE
This 84-year-old female has consistently refused her lisinopril and HCT treatment as she believes she does not have hypertension.  I pointed out to her that blood pressure readings have consistently showed a systolic between 160 and 170 indicating that she does indeed have hypertension.  I reminded her that untreated hypertension will lead to heart attack and stroke.  She states that she understands this but still does not believe she has high blood pressure.  She thinks that she gets worked up every single time that we take her blood pressure and it is falsely elevated.  I stated that I do not believe she is correct and I think she really does have hypertension and would benefit from treatment.  She understands the risks and is declining treatment at this time so we will discontinue her lisinopril.

## 2024-02-13 NOTE — ASSESSMENT & PLAN NOTE
She has completed her prolonged course of intravenous antibiotic and clinically the wounds are pink with dry scabs around the edges and do not appear infected.  She is afebrile and white blood cell count is not elevated.  We will continue to watch her wounds closely and she will keep scheduled follow-up appointments with her infectious disease specialist to determine if more intravenous antibiotic is needed in the future.  Her PICC line is still in place and we will maintain this for the next few weeks in case IV medication was resumed.  She has been refusing her diuretic and with leg elevation there is actually minimal edema today.  I think it is okay to discontinue her Lasix

## 2024-02-13 NOTE — ASSESSMENT & PLAN NOTE
Lab Results   Component Value Date    TSH 1.42 02/08/2023     This is at goal so  we will continue her levothyroxine supplement at 150 mcg

## 2024-02-13 NOTE — PROGRESS NOTES
Visit  Note   Subjective   Cheryl Elena is a 84 y.o. female who is being seen and evaluated for multiple medical problems. Nursing notes, vital signs, and labs were reviewed in the local facility chart.    HPI I was asked to see this patient by nursing staff and administration who are concerned as she is repeatedly refusing most of her medications and treatments.  Since coming here for her chronic wound infections she has completed a long course of IV antibiotics and clinically there is no indication that the infection is still present.  She says that she does not want to take the vitamin supplement, the acidophilus, and does not believe she needs the diuretic.  She states that she does not believe she has high blood pressure even though every reading in this facility has been high.  She says that she always gets worked up when the staff comes and bothers her to take her blood pressure and believes that it is falsely elevated.  She fully understands that the consequences of untreated high blood pressure usually end up as stroke and heart attack.  She repeatedly states that she does not believe she has this condition.  Objective   There were no vitals taken for this visit.  Physical Exam  Constitutional:       Appearance: Normal appearance.   HENT:      Head: Normocephalic.   Eyes:      Conjunctiva/sclera: Conjunctivae normal.   Cardiovascular:      Rate and Rhythm: Normal rate and regular rhythm.      Comments: Radial pulses are easily felt but I cannot palpate any pedal pulses at all  Pulmonary:      Effort: Pulmonary effort is normal.      Breath sounds: Normal breath sounds.   Musculoskeletal:      Cervical back: Neck supple.      Right lower leg: Edema present.      Left lower leg: Edema present.      Comments: There is just trace edema 1 FCI up the shins bilateral   Skin:     General: Skin is warm and dry.      Comments: Dry scabbed wounds on multiple toes and the wounds on the shins are covered with  Vaseline gauze per wound care team   Neurological:      Mental Status: She is alert.       Assessment/Plan   Problem List Items Addressed This Visit       Acquired hypothyroidism - Primary     Lab Results   Component Value Date    TSH 1.42 02/08/2023   This is at goal so  we will continue her levothyroxine supplement at 150 mcg         Acute embolism and thrombosis of deep vein of left lower extremity (CMS/HCC)     We will continue her long-term prophylaxis and treatment with Eliquis twice daily 5 mg; she is taking this medication         Infected stasis ulcer (CMS/HCC)     She has completed her prolonged course of intravenous antibiotic and clinically the wounds are pink with dry scabs around the edges and do not appear infected.  She is afebrile and white blood cell count is not elevated.  We will continue to watch her wounds closely and she will keep scheduled follow-up appointments with her infectious disease specialist to determine if more intravenous antibiotic is needed in the future.  Her PICC line is still in place and we will maintain this for the next few weeks in case IV medication was resumed.  She has been refusing her diuretic and with leg elevation there is actually minimal edema today.  I think it is okay to discontinue her Lasix         Benign essential hypertension     This 84-year-old female has consistently refused her lisinopril and HCT treatment as she believes she does not have hypertension.  I pointed out to her that blood pressure readings have consistently showed a systolic between 160 and 170 indicating that she does indeed have hypertension.  I reminded her that untreated hypertension will lead to heart attack and stroke.  She states that she understands this but still does not believe she has high blood pressure.  She thinks that she gets worked up every single time that we take her blood pressure and it is falsely elevated.  I stated that I do not believe she is correct and I think she  really does have hypertension and would benefit from treatment.  She understands the risks and is declining treatment at this time so we will discontinue her lisinopril.

## 2024-02-14 ENCOUNTER — NURSING HOME VISIT (OUTPATIENT)
Dept: POST ACUTE CARE | Facility: EXTERNAL LOCATION | Age: 84
End: 2024-02-14
Payer: MEDICARE

## 2024-02-14 DIAGNOSIS — I73.9 PAD (PERIPHERAL ARTERY DISEASE) (CMS-HCC): ICD-10-CM

## 2024-02-14 DIAGNOSIS — M25.561 RIGHT KNEE PAIN, UNSPECIFIED CHRONICITY: Primary | ICD-10-CM

## 2024-02-14 PROCEDURE — 99308 SBSQ NF CARE LOW MDM 20: CPT

## 2024-02-14 NOTE — LETTER
Patient: Cheryl Elena  : 1940    Encounter Date: 2024    Visit  Note   Subjective  Cheryl Elena is a 84 y.o. female who is being seen and evaluated for multiple medical problems. Nursing notes, vital signs, and labs were reviewed in the local facility chart.    Nursing staff requested evaluation of patient today as she is complaining of right knee pain with no mechanism of injury. The patient denies any mechanism of injury to the knee and does not understand why her knee is painful today.        Objective  There were no vitals taken for this visit.  Physical Exam  Vitals reviewed.   Constitutional:       Appearance: Normal appearance.   HENT:      Head: Normocephalic.   Cardiovascular:      Rate and Rhythm: Normal rate and regular rhythm.   Pulmonary:      Effort: Pulmonary effort is normal.      Breath sounds: Normal breath sounds.   Musculoskeletal:         General: No swelling, deformity or signs of injury. Normal range of motion.      Cervical back: Neck supple.      Comments: +crepitus right knee; no edema or erythema at the joint/joint space. She is able to flex the knee on her own   Skin:     General: Skin is warm and dry.   Neurological:      Mental Status: She is alert.       Assessment/Plan  She does not want any testing on the knee itself and states the pain medications are helping with the knee pain. We will defer any further testing at this time.  She also talked about her scabs on her toes on the left foot and again refuses to believe she has any vascular compromise. Her ABIs are consistent with mild vascular changes in both legs as evidenced by waveforms in the EUGENIA study and LLE is noted to have decreased digital perfusion suggestive of distal disease. This was completed 2024.    Problem List Items Addressed This Visit       PAD (peripheral artery disease) (CMS/[facility])     Other Visit Diagnoses       Right knee pain, unspecified chronicity    -  Primary                Electronically Signed By: BENEDICT Nieto-CNP   2/16/24  9:07 AM   clear

## 2024-02-21 ENCOUNTER — OFFICE VISIT (OUTPATIENT)
Dept: INFECTIOUS DISEASES | Age: 84
End: 2024-02-21

## 2024-02-21 VITALS
OXYGEN SATURATION: 93 % | WEIGHT: 181 LBS | HEIGHT: 63 IN | TEMPERATURE: 97.7 F | DIASTOLIC BLOOD PRESSURE: 92 MMHG | HEART RATE: 84 BPM | SYSTOLIC BLOOD PRESSURE: 178 MMHG | BODY MASS INDEX: 32.07 KG/M2 | RESPIRATION RATE: 16 BRPM

## 2024-02-21 DIAGNOSIS — L03.116 BILATERAL LOWER LEG CELLULITIS: Primary | ICD-10-CM

## 2024-02-21 DIAGNOSIS — L97.902: ICD-10-CM

## 2024-02-21 DIAGNOSIS — A49.8 PSEUDOMONAS AERUGINOSA INFECTION: ICD-10-CM

## 2024-02-21 DIAGNOSIS — L03.115 BILATERAL LOWER LEG CELLULITIS: Primary | ICD-10-CM

## 2024-02-21 RX ORDER — CEFEPIME 1 G/50ML
1000 INJECTION, SOLUTION INTRAVENOUS EVERY 12 HOURS
COMMUNITY

## 2024-02-21 ASSESSMENT — PATIENT HEALTH QUESTIONNAIRE - PHQ9
SUM OF ALL RESPONSES TO PHQ QUESTIONS 1-9: 0
SUM OF ALL RESPONSES TO PHQ9 QUESTIONS 1 & 2: 0
2. FEELING DOWN, DEPRESSED OR HOPELESS: 0
SUM OF ALL RESPONSES TO PHQ QUESTIONS 1-9: 0
1. LITTLE INTEREST OR PLEASURE IN DOING THINGS: 0

## 2024-02-21 NOTE — PROGRESS NOTES
Onset    Kidney Disease Mother     High Blood Pressure Mother     Stroke Mother     Heart Disease Father      Allergies   Allergen Reactions    Benadryl [Diphenhydramine Hcl]     Cephalexin      Other reaction(s): Unknown  Tolerated ceftriaxone during 8/2021 admission    Cortisone Swelling    Prednisone Other (See Comments)    Codeine Rash    Pcn [Penicillins] Rash     Current Outpatient Medications on File Prior to Visit   Medication Sig Dispense Refill    cefepime (MAXIPIME) 1 GM/50ML Infuse 50 mLs intravenously in the morning and 50 mLs in the evening.      bisacodyl (DULCOLAX) 10 MG suppository Place 1 suppository rectally daily as needed for Constipation      acetaminophen (TYLENOL) 325 MG tablet Take 2 tablets by mouth every 4 hours as needed for Pain or Fever      apixaban (ELIQUIS) 5 MG TABS tablet Take by mouth 2 times daily      vitamin D (ERGOCALCIFEROL) 1.25 MG (27499 UT) CAPS capsule Take 1 capsule by mouth once a week Sunday      sodium chloride 0.9 % SOLN 250 mL with vancomycin 750 MG SOLR 750 mg Infuse 750 mg intravenously every 24 hours Indications: Infection with Dysregulated Inflammatory Response      SYNTHROID 150 MCG tablet Take 1 tablet by mouth Daily (Patient taking differently: Take 1 tablet by mouth Daily Indications: Underactive Thyroid) 90 tablet 0     No current facility-administered medications on file prior to visit.       Review of Systems   Patient is irritable  Concerned about discoloration in the legs  Concerned about legs and feet ulcerations  No leg edema    Objective   Physical Exam     Bilateral lower extremities and feet discoloration, some mottling and cool to touch  Extensive scabbed ulcers involving bilateral legs and toes  Right lateral leg with full thickness skin ulcer with bleeding, no erythema, no tenderness, no purulent drainage, beefy red  Photos were obtained with patient's permission              Labs obtained on February 7 were reviewed and discussed with the

## 2024-02-28 ENCOUNTER — NURSING HOME VISIT (OUTPATIENT)
Dept: POST ACUTE CARE | Facility: EXTERNAL LOCATION | Age: 84
End: 2024-02-28
Payer: MEDICARE

## 2024-02-28 DIAGNOSIS — I87.2 PERIPHERAL VENOUS INSUFFICIENCY: ICD-10-CM

## 2024-02-28 DIAGNOSIS — L03.115 BILATERAL LOWER LEG CELLULITIS: ICD-10-CM

## 2024-02-28 DIAGNOSIS — L03.116 BILATERAL LOWER LEG CELLULITIS: ICD-10-CM

## 2024-02-28 DIAGNOSIS — I10 BENIGN ESSENTIAL HYPERTENSION: ICD-10-CM

## 2024-02-28 DIAGNOSIS — L03.119 CELLULITIS OF LOWER EXTREMITY, UNSPECIFIED LATERALITY: Primary | ICD-10-CM

## 2024-02-28 DIAGNOSIS — I83.209: ICD-10-CM

## 2024-02-28 DIAGNOSIS — I73.9 PAD (PERIPHERAL ARTERY DISEASE) (CMS-HCC): ICD-10-CM

## 2024-02-28 DIAGNOSIS — L97.909: ICD-10-CM

## 2024-02-28 PROCEDURE — 99308 SBSQ NF CARE LOW MDM 20: CPT

## 2024-02-28 RX ORDER — OXYCODONE AND ACETAMINOPHEN 10; 325 MG/1; MG/1
1 TABLET ORAL EVERY 8 HOURS PRN
Qty: 42 TABLET | Refills: 0 | Status: SHIPPED | OUTPATIENT
Start: 2024-02-28 | End: 2024-03-13

## 2024-02-28 NOTE — PROGRESS NOTES
Visit  Note   Subjective   Cheryl Elena is a 84 y.o. female who is being seen and evaluated for multiple medical problems. Nursing notes, vital signs, and labs were reviewed in the local facility chart.    Patient evaluated today for general examination. She is complaining of bilateral leg pain which is being treated with pain medications currently.        Objective   There were no vitals taken for this visit.  Physical Exam  Vitals reviewed.   Constitutional:       Appearance: Normal appearance.   HENT:      Head: Normocephalic.   Cardiovascular:      Rate and Rhythm: Normal rate and regular rhythm.   Pulmonary:      Effort: Pulmonary effort is normal. No respiratory distress.      Breath sounds: Normal breath sounds. No wheezing, rhonchi or rales.   Musculoskeletal:      Cervical back: Neck supple.      Comments: Multiple areas of scabbing on her LLE toes; BLE distal tibial scabbing; has xeroform dressing on currently   Skin:     General: Skin is warm and dry.   Neurological:      Mental Status: She is alert.       Assessment/Plan   Problem List Items Addressed This Visit       Infected stasis ulcer (CMS/HCC)     She has completed her prolonged course of intravenous antibiotic and clinically the wounds are pink with dry scabs around the edges and do not appear infected.  She is afebrile and white blood cell count is not elevated.  We will continue to watch her wounds closely and she will keep scheduled follow-up appointments with her infectious disease specialist to determine if more intravenous antibiotic is needed in the future.  Her PICC line was discontinued by ID at her follow up appointment. She has been refusing her diuretic and with leg elevation there is actually minimal edema today.           Peripheral venous insufficiency    Benign essential hypertension     Multiple recommendations for blood pressure control have been made and patient denies having high blood pressure despite high blood pressure  readings and refuses to take blood pressure medications while here. She has been educated by multiple staff members as well as myself and overseeing attending physician the risks of sustained high blood pressure readings including heart attack, stroke, and/or death.         Bilateral lower leg cellulitis     This has resolved and there are residual scabbed areas on BLE that do not appear to be infected. Continue wound care treatments as she allows.          PAD (peripheral artery disease) (CMS/[facility])     Other Visit Diagnoses       Cellulitis of lower extremity, unspecified laterality    -  Primary    Relevant Medications    oxyCODONE-acetaminophen (Percocet)  mg tablet

## 2024-02-28 NOTE — ASSESSMENT & PLAN NOTE
This has resolved and there are residual scabbed areas on BLE that do not appear to be infected. Continue wound care treatments as she allows.

## 2024-02-28 NOTE — LETTER
Patient: Cheryl Elena  : 1940    Encounter Date: 2024    Visit  Note   Subjective  Cheryl Elena is a 84 y.o. female who is being seen and evaluated for multiple medical problems. Nursing notes, vital signs, and labs were reviewed in the local facility chart.    Patient evaluated today for general examination. She is complaining of bilateral leg pain which is being treated with pain medications currently.        Objective  There were no vitals taken for this visit.  Physical Exam  Vitals reviewed.   Constitutional:       Appearance: Normal appearance.   HENT:      Head: Normocephalic.   Cardiovascular:      Rate and Rhythm: Normal rate and regular rhythm.   Pulmonary:      Effort: Pulmonary effort is normal. No respiratory distress.      Breath sounds: Normal breath sounds. No wheezing, rhonchi or rales.   Musculoskeletal:      Cervical back: Neck supple.      Comments: Multiple areas of scabbing on her LLE toes; BLE distal tibial scabbing; has xeroform dressing on currently   Skin:     General: Skin is warm and dry.   Neurological:      Mental Status: She is alert.       Assessment/Plan  Problem List Items Addressed This Visit       Infected stasis ulcer (CMS/HCC)     She has completed her prolonged course of intravenous antibiotic and clinically the wounds are pink with dry scabs around the edges and do not appear infected.  She is afebrile and white blood cell count is not elevated.  We will continue to watch her wounds closely and she will keep scheduled follow-up appointments with her infectious disease specialist to determine if more intravenous antibiotic is needed in the future.  Her PICC line was discontinued by ID at her follow up appointment. She has been refusing her diuretic and with leg elevation there is actually minimal edema today.           Peripheral venous insufficiency    Benign essential hypertension     Multiple recommendations for blood pressure control have been made and  patient denies having high blood pressure despite high blood pressure readings and refuses to take blood pressure medications while here. She has been educated by multiple staff members as well as myself and overseeing attending physician the risks of sustained high blood pressure readings including heart attack, stroke, and/or death.         Bilateral lower leg cellulitis     This has resolved and there are residual scabbed areas on BLE that do not appear to be infected. Continue wound care treatments as she allows.          PAD (peripheral artery disease) (CMS/[facility])     Other Visit Diagnoses       Cellulitis of lower extremity, unspecified laterality    -  Primary    Relevant Medications    oxyCODONE-acetaminophen (Percocet)  mg tablet               Electronically Signed By: BENEDICT Nieto-CNP   2/28/24  5:40 PM

## 2024-02-28 NOTE — ASSESSMENT & PLAN NOTE
She has completed her prolonged course of intravenous antibiotic and clinically the wounds are pink with dry scabs around the edges and do not appear infected.  She is afebrile and white blood cell count is not elevated.  We will continue to watch her wounds closely and she will keep scheduled follow-up appointments with her infectious disease specialist to determine if more intravenous antibiotic is needed in the future.  Her PICC line was discontinued by ID at her follow up appointment. She has been refusing her diuretic and with leg elevation there is actually minimal edema today.

## 2024-02-28 NOTE — ASSESSMENT & PLAN NOTE
Multiple recommendations for blood pressure control have been made and patient denies having high blood pressure despite high blood pressure readings and refuses to take blood pressure medications while here. She has been educated by multiple staff members as well as myself and overseeing attending physician the risks of sustained high blood pressure readings including heart attack, stroke, and/or death.

## 2024-03-11 ENCOUNTER — NURSING HOME VISIT (OUTPATIENT)
Dept: POST ACUTE CARE | Facility: EXTERNAL LOCATION | Age: 84
End: 2024-03-11
Payer: MEDICARE

## 2024-03-11 DIAGNOSIS — I87.2 PERIPHERAL VENOUS INSUFFICIENCY: ICD-10-CM

## 2024-03-11 DIAGNOSIS — I83.012: Chronic | ICD-10-CM

## 2024-03-11 DIAGNOSIS — L97.212: Chronic | ICD-10-CM

## 2024-03-11 DIAGNOSIS — I82.402 ACUTE EMBOLISM AND THROMBOSIS OF DEEP VEIN OF LEFT LOWER EXTREMITY (MULTI): ICD-10-CM

## 2024-03-11 DIAGNOSIS — J44.9 CHRONIC OBSTRUCTIVE PULMONARY DISEASE, UNSPECIFIED COPD TYPE (MULTI): Primary | ICD-10-CM

## 2024-03-11 DIAGNOSIS — E11.9 TYPE 2 DIABETES MELLITUS WITHOUT COMPLICATION, WITHOUT LONG-TERM CURRENT USE OF INSULIN (MULTI): ICD-10-CM

## 2024-03-11 DIAGNOSIS — N18.32 STAGE 3B CHRONIC KIDNEY DISEASE (MULTI): ICD-10-CM

## 2024-03-11 DIAGNOSIS — I73.9 PAD (PERIPHERAL ARTERY DISEASE) (CMS-HCC): ICD-10-CM

## 2024-03-11 PROCEDURE — 99309 SBSQ NF CARE MODERATE MDM 30: CPT

## 2024-03-11 NOTE — PROGRESS NOTES
Visit  Note   Subjective   Cheryl Elena is a 84 y.o. female who is being seen and evaluated for multiple medical problems. Nursing notes, vital signs, and labs were reviewed in the local facility chart.    Patient was evaluated today per her request. Upon entering her room she is sleeping but able to wake easily. She states she wanted to see me because she wants to know what is causing the pain in her legs. She states she has worsening pain in the posterior aspect near her calf and ankle.        Objective   There were no vitals taken for this visit.  Physical Exam  Vitals reviewed.   Constitutional:       Appearance: Normal appearance.   HENT:      Head: Normocephalic.   Cardiovascular:      Rate and Rhythm: Normal rate and regular rhythm.   Pulmonary:      Breath sounds: Normal breath sounds.   Musculoskeletal:      Cervical back: Neck supple.   Skin:     General: Skin is warm and dry.      Comments: 3rd and 4th left digit LLE are scabbed over no drainage noted.     RLE mild serous drainage on lateral mid tibial wound; there is no dressing on her wound today    Tenderness present near her posterior distal portion of her calf muscle; no erythema or open areas    Neurological:      Mental Status: She is alert. Mental status is at baseline.       Assessment/Plan   We talked about her prolonged healing time of her toes; she is  unhappy they are not healed; prolonged healing is multifactorial type 2 diabetes and venous insufficiency; she is requesting a wound culture of the RLE open area on her lateral calf.   We talked about her pain and she does not want to change her pain medication at this time. Offered different options for pain medications and she defers at this time. She wants to know why she is having all this pain however she does agree to an ultrasound of her BLE's; even if this is positive for DVT this does not change her treatment plan as she is already on eliquis BID.   We talked about her kidney  function and her potassium levels today. She wants to know what the updated lab results were and they are still pending from today. We can review these results once they are available.   Problem List Items Addressed This Visit       Acute embolism and thrombosis of deep vein of left lower extremity (CMS/[facility])    Chronic obstructive pulmonary disease (CMS/HCC) - Primary    Peripheral venous insufficiency    Stage 3b chronic kidney disease (CMS/[facility])    Type 2 diabetes mellitus without complication, without long-term current use of insulin (CMS/[facility])    Venous stasis ulcer of right calf with fat layer exposed (CMS/[facility]) (Chronic)    PAD (peripheral artery disease) (CMS/[facility])

## 2024-03-11 NOTE — LETTER
Patient: Cheryl Elena  : 1940    Encounter Date: 2024    Visit  Note   Subjective  Cheryl Elena is a 84 y.o. female who is being seen and evaluated for multiple medical problems. Nursing notes, vital signs, and labs were reviewed in the local facility chart.    Patient was evaluated today per her request. Upon entering her room she is sleeping but able to wake easily. She states she wanted to see me because she wants to know what is causing the pain in her legs. She states she has worsening pain in the posterior aspect near her calf and ankle.        Objective  There were no vitals taken for this visit.  Physical Exam  Vitals reviewed.   Constitutional:       Appearance: Normal appearance.   HENT:      Head: Normocephalic.   Cardiovascular:      Rate and Rhythm: Normal rate and regular rhythm.   Pulmonary:      Breath sounds: Normal breath sounds.   Musculoskeletal:      Cervical back: Neck supple.   Skin:     General: Skin is warm and dry.      Comments: 3rd and 4th left digit LLE are scabbed over no drainage noted.     RLE mild serous drainage on lateral mid tibial wound; there is no dressing on her wound today    Tenderness present near her posterior distal portion of her calf muscle; no erythema or open areas    Neurological:      Mental Status: She is alert. Mental status is at baseline.       Assessment/Plan  We talked about her prolonged healing time of her toes; she is  unhappy they are not healed; prolonged healing is multifactorial type 2 diabetes and venous insufficiency; she is requesting a wound culture of the RLE open area on her lateral calf.   We talked about her pain and she does not want to change her pain medication at this time. Offered different options for pain medications and she defers at this time. She wants to know why she is having all this pain however she does agree to an ultrasound of her BLE's; even if this is positive for DVT this does not change her treatment plan  as she is already on eliquis BID.   We talked about her kidney function and her potassium levels today. She wants to know what the updated lab results were and they are still pending from today. We can review these results once they are available.   Problem List Items Addressed This Visit       Acute embolism and thrombosis of deep vein of left lower extremity (CMS/[facility])    Chronic obstructive pulmonary disease (CMS/[facility]) - Primary    Peripheral venous insufficiency    Stage 3b chronic kidney disease (CMS/[facility])    Type 2 diabetes mellitus without complication, without long-term current use of insulin (CMS/[facility])    Venous stasis ulcer of right calf with fat layer exposed (CMS/[facility]) (Chronic)    PAD (peripheral artery disease) (CMS/[facility])          Electronically Signed By: BENEDICT Nieto-CNP   3/11/24  4:48 PM

## 2024-04-01 ENCOUNTER — NURSING HOME VISIT (OUTPATIENT)
Dept: POST ACUTE CARE | Facility: EXTERNAL LOCATION | Age: 84
End: 2024-04-01
Payer: MEDICARE

## 2024-04-01 DIAGNOSIS — I83.209: Primary | ICD-10-CM

## 2024-04-01 DIAGNOSIS — I10 BENIGN ESSENTIAL HYPERTENSION: ICD-10-CM

## 2024-04-01 DIAGNOSIS — L97.909: Primary | ICD-10-CM

## 2024-04-01 DIAGNOSIS — E66.01 OBESITY, MORBID (MULTI): ICD-10-CM

## 2024-04-01 DIAGNOSIS — I82.402 ACUTE EMBOLISM AND THROMBOSIS OF DEEP VEIN OF LEFT LOWER EXTREMITY (MULTI): ICD-10-CM

## 2024-04-01 DIAGNOSIS — M54.50 LUMBAR PAIN: ICD-10-CM

## 2024-04-01 DIAGNOSIS — L03.116 BILATERAL LOWER LEG CELLULITIS: ICD-10-CM

## 2024-04-01 DIAGNOSIS — E11.9 TYPE 2 DIABETES MELLITUS WITHOUT COMPLICATION, WITHOUT LONG-TERM CURRENT USE OF INSULIN (MULTI): ICD-10-CM

## 2024-04-01 DIAGNOSIS — L03.115 BILATERAL LOWER LEG CELLULITIS: ICD-10-CM

## 2024-04-01 DIAGNOSIS — J44.9 CHRONIC OBSTRUCTIVE PULMONARY DISEASE, UNSPECIFIED COPD TYPE (MULTI): ICD-10-CM

## 2024-04-01 DIAGNOSIS — N18.32 STAGE 3B CHRONIC KIDNEY DISEASE (MULTI): ICD-10-CM

## 2024-04-01 PROCEDURE — 99309 SBSQ NF CARE MODERATE MDM 30: CPT

## 2024-04-01 NOTE — LETTER
Patient: Cheryl Elena  : 1940    Encounter Date: 2024    Visit  Note   Subjective  Cheryl Elena is a 84 y.o. female who is being seen and evaluated for multiple medical problems. Nursing notes, vital signs, and labs were reviewed in the local facility chart.    Patient was requesting evaluation today for her lower back pain. She states it is much worse she believes she injured it unintentionally with bed repositioning. She raises the head of her bed and simple flexing of there spinal column to sit upright at 30 degrees elicits pain. We reviewed her current list of allergies as she understands she cannot have steroids due to an allergy.        Objective  There were no vitals taken for this visit.  Physical Exam  Vitals reviewed.   Constitutional:       Appearance: Normal appearance.   HENT:      Head: Normocephalic.   Cardiovascular:      Rate and Rhythm: Normal rate and regular rhythm.   Pulmonary:      Breath sounds: Normal breath sounds.   Musculoskeletal:      Cervical back: Neck supple.   Skin:     General: Skin is warm and dry.      Comments: RLE scabbing wounds healing halo of erythema; small abrasions on toes appear to be healing   Neurological:      Mental Status: She is alert.       Assessment/Plan  For her back pain in addition to the percocet we added flexiril TID PRN for relief. Report back if pain does not improve. May use heat/cold pack PRN for relief as well.   She is asking if her wound is infected; this was deferred to wound team if they would like to culture it. Area is scabbed over there is no noticeable drainage currently; she was cleared by ID for infection and antibiotics were completed previously.   She is also requesting a letter for her  and her risk of rehospitalization. I will draft a generic letter for her .   Problem List Items Addressed This Visit       Acute embolism and thrombosis of deep vein of left lower extremity (CMS/HCC)     We will continue her  long-term prophylaxis and treatment with Eliquis twice daily 5 mg; she is taking this medication         Chronic obstructive pulmonary disease (CMS/[facility])     No current complaints today. Lungs CTA patient is stable.          Venous stasis ulcer of right calf limited to breakdown of skin without varicose veins (CMS/[facility]) - Primary     She has completed her prolonged course of intravenous antibiotic and clinically the wounds are pink with dry scabs around the edges and do not appear infected.  She is afebrile and white blood cell count is not elevated.  We will continue to watch her wounds closely and she will keep scheduled follow-up appointments with her infectious disease specialist to determine if more intravenous antibiotic is needed in the future.  Her PICC line was discontinued by ID at her follow up appointment. She has been refusing her diuretic and with leg elevation there is actually minimal edema today.  She has been refusing wound treatments periodically as well.          Benign essential hypertension     Multiple recommendations for blood pressure control have been made and patient denies having high blood pressure despite high blood pressure readings and refuses to take blood pressure medications while here. She has been educated by multiple staff members as well as myself and overseeing attending physician the risks of sustained high blood pressure readings including heart attack, stroke, and/or death.         Stage 3b chronic kidney disease (CMS/[facility])     We will continue to avoid nephrotoxic agents and monitor with every other week BMP; she has been having hyperkalemia episodes without taking lasix, I anticipate this to reoccur.          Type 2 diabetes mellitus without complication, without long-term current use of insulin (CMS/[facility])     This is diet controlled; her fasting glucose has been < 100 on her lab results. A1C 4.8% on 1/31/2024.         Bilateral lower leg cellulitis     This has resolved and  there are residual scabbed areas on BLE that do not appear to be infected. Continue wound care treatments as she allows.          Obesity, morbid (CMS/HCC)    Lumbar pain     This was aggravated by repositioning. Added flexiril for support. Will continue with long term percocet q8 prn for relief. No other concerns today.              Electronically Signed By: TAMAR Nieto   4/2/24  2:37 PM

## 2024-04-01 NOTE — LETTER
April 22, 2024       No Recipients    Patient: Cheryl Elena   YOB: 1940   Date of Visit: 4/1/2024       Dear Dr. Parry Recipients:    Thank you for referring Cheryl Elena to me for evaluation. Below are my notes for this consultation.  If you have questions, please do not hesitate to call me. I look forward to following your patient along with you.       Sincerely,     Yoly Bass, APRN-CNP      CC:   No Recipients  ______________________________________________________________________________________    Visit  Note   Subjective  Cheryl Elena is a 84 y.o. female who is being seen and evaluated for multiple medical problems. Nursing notes, vital signs, and labs were reviewed in the local facility chart.    Patient was requesting evaluation today for her lower back pain. She states it is much worse she believes she injured it unintentionally with bed repositioning. She raises the head of her bed and simple flexing of there spinal column to sit upright at 30 degrees elicits pain. We reviewed her current list of allergies as she understands she cannot have steroids due to an allergy.        Objective  There were no vitals taken for this visit.  Physical Exam  Vitals reviewed.   Constitutional:       Appearance: Normal appearance.   HENT:      Head: Normocephalic.   Cardiovascular:      Rate and Rhythm: Normal rate and regular rhythm.   Pulmonary:      Breath sounds: Normal breath sounds.   Musculoskeletal:      Cervical back: Neck supple.   Skin:     General: Skin is warm and dry.      Comments: RLE scabbing wounds healing halo of erythema; small abrasions on toes appear to be healing   Neurological:      Mental Status: She is alert.       Assessment/Plan  For her back pain in addition to the percocet we added flexiril TID PRN for relief. Report back if pain does not improve. May use heat/cold pack PRN for relief as well.   She is asking if her wound is infected; this was deferred to wound team  if they would like to culture it. Area is scabbed over there is no noticeable drainage currently; she was cleared by ID for infection and antibiotics were completed previously.   She is also requesting a letter for her  and her risk of rehospitalization. I will draft a generic letter for her .   Problem List Items Addressed This Visit       Acute embolism and thrombosis of deep vein of left lower extremity (CMS/HCC)     We will continue her long-term prophylaxis and treatment with Eliquis twice daily 5 mg; she is taking this medication         Chronic obstructive pulmonary disease (CMS/[facility])     No current complaints today. Lungs CTA patient is stable.          Venous stasis ulcer of right calf limited to breakdown of skin without varicose veins (CMS/[facility]) - Primary     She has completed her prolonged course of intravenous antibiotic and clinically the wounds are pink with dry scabs around the edges and do not appear infected.  She is afebrile and white blood cell count is not elevated.  We will continue to watch her wounds closely and she will keep scheduled follow-up appointments with her infectious disease specialist to determine if more intravenous antibiotic is needed in the future.  Her PICC line was discontinued by ID at her follow up appointment. She has been refusing her diuretic and with leg elevation there is actually minimal edema today.  She has been refusing wound treatments periodically as well.          Benign essential hypertension     Multiple recommendations for blood pressure control have been made and patient denies having high blood pressure despite high blood pressure readings and refuses to take blood pressure medications while here. She has been educated by multiple staff members as well as myself and overseeing attending physician the risks of sustained high blood pressure readings including heart attack, stroke, and/or death.         Stage 3b chronic kidney disease (CMS/[facility])      We will continue to avoid nephrotoxic agents and monitor with every other week BMP; she has been having hyperkalemia episodes without taking lasix, I anticipate this to reoccur.          Type 2 diabetes mellitus without complication, without long-term current use of insulin (CMS/[facility])     This is diet controlled; her fasting glucose has been < 100 on her lab results. A1C 4.8% on 1/31/2024.         Bilateral lower leg cellulitis     This has resolved and there are residual scabbed areas on BLE that do not appear to be infected. Continue wound care treatments as she allows.          Obesity, morbid (CMS/[facility])    Lumbar pain     This was aggravated by repositioning. Added flexiril for support. Will continue with long term percocet q8 prn for relief. No other concerns today.

## 2024-04-01 NOTE — LETTER
April 2, 2024     [facility]  [address]  [facility] [id number]    Patient: Cheryl Elena   YOB: 1940   Date of Visit: 4/1/2024       To Whom it May Concern:    Cheryl Elena is being medically monitored and treated in [facility] for infection after hospitalization on 01/09/2024. She is being treated in combination with infectious disease specialist for antibiotic recommendations. Wound care is following with her as well to promote healing. Physical and occupational therapies are in place as well for rehabilitation.   If you have questions, please do not hesitate to call me. I look forward to following your patient along with you.           Sincerely,           Yoly Bass, APRN-CNP      CC: No Recipients                      ______________________________________________________________________________________    Visit  Note   Subjective  Cheryl Elena is a 84 y.o. female who is being seen and evaluated for multiple medical problems. Nursing notes, vital signs, and labs were reviewed in the local facility chart.    Patient was requesting evaluation today for her lower back pain. She states it is much worse she believes she injured it unintentionally with bed repositioning. She raises the head of her bed and simple flexing of there spinal column to sit upright at 30 degrees elicits pain. We reviewed her current list of allergies as she understands she cannot have steroids due to an allergy.        Objective  There were no vitals taken for this visit.  Physical Exam  Vitals reviewed.   Constitutional:       Appearance: Normal appearance.   HENT:      Head: Normocephalic.   Cardiovascular:      Rate and Rhythm: Normal rate and regular rhythm.   Pulmonary:      Breath sounds: Normal breath sounds.   Musculoskeletal:      Cervical back: Neck supple.   Skin:     General: Skin is warm and dry.      Comments: RLE scabbing wounds healing halo of erythema; small  abrasions on toes appear to be healing   Neurological:      Mental Status: She is alert.       Assessment/Plan  For her back pain in addition to the percocet we added flexiril TID PRN for relief. Report back if pain does not improve. May use heat/cold pack PRN for relief as well.   She is asking if her wound is infected; this was deferred to wound team if they would like to culture it. Area is scabbed over there is no noticeable drainage currently; she was cleared by ID for infection and antibiotics were completed previously.   She is also requesting a letter for her  and her risk of rehospitalization. I will draft a generic letter for her .   Problem List Items Addressed This Visit       Acute embolism and thrombosis of deep vein of left lower extremity (CMS/HCC)     We will continue her long-term prophylaxis and treatment with Eliquis twice daily 5 mg; she is taking this medication         Chronic obstructive pulmonary disease (CMS/HCC)     No current complaints today. Lungs CTA patient is stable.          Venous stasis ulcer of right calf limited to breakdown of skin without varicose veins (CMS/HCC) - Primary     She has completed her prolonged course of intravenous antibiotic and clinically the wounds are pink with dry scabs around the edges and do not appear infected.  She is afebrile and white blood cell count is not elevated.  We will continue to watch her wounds closely and she will keep scheduled follow-up appointments with her infectious disease specialist to determine if more intravenous antibiotic is needed in the future.  Her PICC line was discontinued by ID at her follow up appointment. She has been refusing her diuretic and with leg elevation there is actually minimal edema today.  She has been refusing wound treatments periodically as well.          Benign essential hypertension     Multiple recommendations for blood pressure control have been made and patient denies having high blood  pressure despite high blood pressure readings and refuses to take blood pressure medications while here. She has been educated by multiple staff members as well as myself and overseeing attending physician the risks of sustained high blood pressure readings including heart attack, stroke, and/or death.         Stage 3b chronic kidney disease (CMS/[facility])     We will continue to avoid nephrotoxic agents and monitor with every other week BMP; she has been having hyperkalemia episodes without taking lasix, I anticipate this to reoccur.          Type 2 diabetes mellitus without complication, without long-term current use of insulin (CMS/[facility])     This is diet controlled; her fasting glucose has been < 100 on her lab results. A1C 4.8% on 1/31/2024.         Bilateral lower leg cellulitis     This has resolved and there are residual scabbed areas on BLE that do not appear to be infected. Continue wound care treatments as she allows.          Obesity, morbid (CMS/[facility])    Lumbar pain     This was aggravated by repositioning. Added flexiril for support. Will continue with long term percocet q8 prn for relief. No other concerns today.

## 2024-04-01 NOTE — LETTER
April 3, 2024     [facility]  [address]  [facility] [address]    Patient: Cheryl Elena   YOB: 1940   Date of Visit: 4/1/2024       To Whom It May Concern:    Cheryl Elena is being medically monitored and treated in [facility] for infection after hospitalization on 01/09/2024. She is being treated in combination with infectious disease specialist for antibiotic recommendations. Wound care is following with her as well to promote healing. Physical and occupational therapies are in place as well for rehabilitation.   If you have questions, please do not hesitate to call me.       Sincerely,         Yoly Bass, APRN-CNP                        CC: No Recipients    ______________________________________________________________________________________    Visit  Note   Subjective  Cheryl Elena is a 84 y.o. female who is being seen and evaluated for multiple medical problems. Nursing notes, vital signs, and labs were reviewed in the local facility chart.    Patient was requesting evaluation today for her lower back pain. She states it is much worse she believes she injured it unintentionally with bed repositioning. She raises the head of her bed and simple flexing of there spinal column to sit upright at 30 degrees elicits pain. We reviewed her current list of allergies as she understands she cannot have steroids due to an allergy.        Objective  There were no vitals taken for this visit.  Physical Exam  Vitals reviewed.   Constitutional:       Appearance: Normal appearance.   HENT:      Head: Normocephalic.   Cardiovascular:      Rate and Rhythm: Normal rate and regular rhythm.   Pulmonary:      Breath sounds: Normal breath sounds.   Musculoskeletal:      Cervical back: Neck supple.   Skin:     General: Skin is warm and dry.      Comments: RLE scabbing wounds healing halo of erythema; small abrasions on toes appear to be healing   Neurological:       Mental Status: She is alert.       Assessment/Plan  For her back pain in addition to the percocet we added flexiril TID PRN for relief. Report back if pain does not improve. May use heat/cold pack PRN for relief as well.   She is asking if her wound is infected; this was deferred to wound team if they would like to culture it. Area is scabbed over there is no noticeable drainage currently; she was cleared by ID for infection and antibiotics were completed previously.   She is also requesting a letter for her  and her risk of rehospitalization. I will draft a generic letter for her .   Problem List Items Addressed This Visit       Acute embolism and thrombosis of deep vein of left lower extremity (CMS/HCC)     We will continue her long-term prophylaxis and treatment with Eliquis twice daily 5 mg; she is taking this medication         Chronic obstructive pulmonary disease (CMS/HCC)     No current complaints today. Lungs CTA patient is stable.          Venous stasis ulcer of right calf limited to breakdown of skin without varicose veins (CMS/HCC) - Primary     She has completed her prolonged course of intravenous antibiotic and clinically the wounds are pink with dry scabs around the edges and do not appear infected.  She is afebrile and white blood cell count is not elevated.  We will continue to watch her wounds closely and she will keep scheduled follow-up appointments with her infectious disease specialist to determine if more intravenous antibiotic is needed in the future.  Her PICC line was discontinued by ID at her follow up appointment. She has been refusing her diuretic and with leg elevation there is actually minimal edema today.  She has been refusing wound treatments periodically as well.          Benign essential hypertension     Multiple recommendations for blood pressure control have been made and patient denies having high blood pressure despite high blood pressure readings and refuses to  take blood pressure medications while here. She has been educated by multiple staff members as well as myself and overseeing attending physician the risks of sustained high blood pressure readings including heart attack, stroke, and/or death.         Stage 3b chronic kidney disease (CMS/[facility])     We will continue to avoid nephrotoxic agents and monitor with every other week BMP; she has been having hyperkalemia episodes without taking lasix, I anticipate this to reoccur.          Type 2 diabetes mellitus without complication, without long-term current use of insulin (CMS/[facility])     This is diet controlled; her fasting glucose has been < 100 on her lab results. A1C 4.8% on 1/31/2024.         Bilateral lower leg cellulitis     This has resolved and there are residual scabbed areas on BLE that do not appear to be infected. Continue wound care treatments as she allows.          Obesity, morbid (CMS/[facility])    Lumbar pain     This was aggravated by repositioning. Added flexiril for support. Will continue with long term percocet q8 prn for relief. No other concerns today.

## 2024-04-02 ENCOUNTER — NURSING HOME VISIT (OUTPATIENT)
Dept: POST ACUTE CARE | Facility: EXTERNAL LOCATION | Age: 84
End: 2024-04-02
Payer: MEDICARE

## 2024-04-02 DIAGNOSIS — I87.2: ICD-10-CM

## 2024-04-02 DIAGNOSIS — M51.36 DDD (DEGENERATIVE DISC DISEASE), LUMBAR: ICD-10-CM

## 2024-04-02 DIAGNOSIS — L03.115 BILATERAL LOWER LEG CELLULITIS: Primary | ICD-10-CM

## 2024-04-02 DIAGNOSIS — L97.211: ICD-10-CM

## 2024-04-02 DIAGNOSIS — L03.116 BILATERAL LOWER LEG CELLULITIS: Primary | ICD-10-CM

## 2024-04-02 DIAGNOSIS — I73.9 PAD (PERIPHERAL ARTERY DISEASE) (CMS-HCC): ICD-10-CM

## 2024-04-02 DIAGNOSIS — I87.2 PERIPHERAL VENOUS INSUFFICIENCY: ICD-10-CM

## 2024-04-02 PROBLEM — M54.50 LUMBAR PAIN: Status: ACTIVE | Noted: 2024-04-02

## 2024-04-02 PROBLEM — A49.8 PSEUDOMONAS AERUGINOSA INFECTION: Status: RESOLVED | Noted: 2022-04-20 | Resolved: 2024-04-02

## 2024-04-02 PROCEDURE — 99310 SBSQ NF CARE HIGH MDM 45: CPT | Performed by: FAMILY MEDICINE

## 2024-04-02 RX ORDER — OXYCODONE AND ACETAMINOPHEN 10; 325 MG/1; MG/1
1 TABLET ORAL EVERY 8 HOURS PRN
Qty: 90 TABLET | Refills: 0 | Status: SHIPPED | OUTPATIENT
Start: 2024-04-02

## 2024-04-02 NOTE — ASSESSMENT & PLAN NOTE
Patient is not describing classic sciatica symptoms on the right-hand side with pain radiating all the way down the leg.  It sounds like she has some peripheral neuropathy related to the chronic venous stasis ulcers as well.  She is already using 3 times daily 10 mg Percocet which is barely controlling her pain.  I explained to her that increasing the narcotic pain medicine is unlikely to help and more likely to cause more tolerance and ultimately decrease the effectiveness over time.  Rather, I am recommending we start gabapentin which will more directly affect the neuropathic pain that she is describing.  I think will also help with the peripheral neuropathy she is describing.  I warned her that the medication may cause some sedation and she is aware.

## 2024-04-02 NOTE — PROGRESS NOTES
Visit  Note   Subjective   Cheryl Elena is a 84 y.o. female who is being seen and evaluated for multiple medical problems. Nursing notes, vital signs, and labs were reviewed in the local facility chart.    HPI this 84-year-old female is complaining of severe increased pain in her lower middle lumbar back radiating to the right buttock and all the way down the leg into the foot.  There is some numbness and tingling and a burning sensation in the right leg that accompanies this.  It is much worse when she tries to sit up and relieved a little bit when she lays flat.  Objective   There were no vitals taken for this visit.  Physical Exam  Constitutional:       Appearance: Normal appearance.   HENT:      Head: Normocephalic.   Pulmonary:      Effort: Pulmonary effort is normal.   Musculoskeletal:      Cervical back: Neck supple.   Skin:     General: Skin is warm and dry.   Psychiatric:         Mood and Affect: Mood normal.       Assessment/Plan   Problem List Items Addressed This Visit       Venous stasis ulcer of right calf limited to breakdown of skin without varicose veins (CMS/HCC)     This has been slow to improve but dose show healing.  The right leg now has dry brown scabbed exchar without discharge and no redness or induration or heat.  She saw her ID consultant who felt that the infection was resolved so she is no longer on abx and will continue to monitor.  I suggest leg elevation to reduce edema         Relevant Medications    oxyCODONE-acetaminophen (Percocet)  mg tablet    Peripheral venous insufficiency    Bilateral lower leg cellulitis - Primary     This has resolved and there are residual scabbed areas on BLE that do not appear to be infected. Continue wound care treatments as she allows.          PAD (peripheral artery disease) (CMS/HCC)    DDD (degenerative disc disease), lumbar     Patient is not describing classic sciatica symptoms on the right-hand side with pain radiating all the way down  the leg.  It sounds like she has some peripheral neuropathy related to the chronic venous stasis ulcers as well.  She is already using 3 times daily 10 mg Percocet which is barely controlling her pain.  I explained to her that increasing the narcotic pain medicine is unlikely to help and more likely to cause more tolerance and ultimately decrease the effectiveness over time.  Rather, I am recommending we start gabapentin which will more directly affect the neuropathic pain that she is describing.  I think will also help with the peripheral neuropathy she is describing.  I warned her that the medication may cause some sedation and she is aware.         Relevant Medications    oxyCODONE-acetaminophen (Percocet)  mg tablet

## 2024-04-02 NOTE — ASSESSMENT & PLAN NOTE
She has completed her prolonged course of intravenous antibiotic and clinically the wounds are pink with dry scabs around the edges and do not appear infected.  She is afebrile and white blood cell count is not elevated.  We will continue to watch her wounds closely and she will keep scheduled follow-up appointments with her infectious disease specialist to determine if more intravenous antibiotic is needed in the future.  Her PICC line was discontinued by ID at her follow up appointment. She has been refusing her diuretic and with leg elevation there is actually minimal edema today.  She has been refusing wound treatments periodically as well.

## 2024-04-02 NOTE — ASSESSMENT & PLAN NOTE
This has been slow to improve but dose show healing.  The right leg now has dry brown scabbed exchar without discharge and no redness or induration or heat.  She saw her ID consultant who felt that the infection was resolved so she is no longer on abx and will continue to monitor.  I suggest leg elevation to reduce edema

## 2024-04-02 NOTE — LETTER
Patient: Cheryl Elena  : 1940    Encounter Date: 2024    Visit  Note   Subjective  Cheryl Elena is a 84 y.o. female who is being seen and evaluated for multiple medical problems. Nursing notes, vital signs, and labs were reviewed in the local facility chart.    HPI this 84-year-old female is complaining of severe increased pain in her lower middle lumbar back radiating to the right buttock and all the way down the leg into the foot.  There is some numbness and tingling and a burning sensation in the right leg that accompanies this.  It is much worse when she tries to sit up and relieved a little bit when she lays flat.  Objective  There were no vitals taken for this visit.  Physical Exam  Constitutional:       Appearance: Normal appearance.   HENT:      Head: Normocephalic.   Pulmonary:      Effort: Pulmonary effort is normal.   Musculoskeletal:      Cervical back: Neck supple.   Skin:     General: Skin is warm and dry.   Psychiatric:         Mood and Affect: Mood normal.       Assessment/Plan  Problem List Items Addressed This Visit       Venous stasis ulcer of right calf limited to breakdown of skin without varicose veins (CMS/HCC)     This has been slow to improve but dose show healing.  The right leg now has dry brown scabbed exchar without discharge and no redness or induration or heat.  She saw her ID consultant who felt that the infection was resolved so she is no longer on abx and will continue to monitor.  I suggest leg elevation to reduce edema         Relevant Medications    oxyCODONE-acetaminophen (Percocet)  mg tablet    Peripheral venous insufficiency    Bilateral lower leg cellulitis - Primary     This has resolved and there are residual scabbed areas on BLE that do not appear to be infected. Continue wound care treatments as she allows.          PAD (peripheral artery disease) (CMS/HCC)    DDD (degenerative disc disease), lumbar     Patient is not describing classic sciatica  symptoms on the right-hand side with pain radiating all the way down the leg.  It sounds like she has some peripheral neuropathy related to the chronic venous stasis ulcers as well.  She is already using 3 times daily 10 mg Percocet which is barely controlling her pain.  I explained to her that increasing the narcotic pain medicine is unlikely to help and more likely to cause more tolerance and ultimately decrease the effectiveness over time.  Rather, I am recommending we start gabapentin which will more directly affect the neuropathic pain that she is describing.  I think will also help with the peripheral neuropathy she is describing.  I warned her that the medication may cause some sedation and she is aware.         Relevant Medications    oxyCODONE-acetaminophen (Percocet)  mg tablet          Electronically Signed By: Yuriy Aguillon MD   4/2/24  2:37 PM

## 2024-04-02 NOTE — ASSESSMENT & PLAN NOTE
This was aggravated by repositioning. Added flexiril for support. Will continue with long term percocet q8 prn for relief. No other concerns today.

## 2024-04-02 NOTE — PROGRESS NOTES
Visit  Note   Subjective   Cheryl Elena is a 84 y.o. female who is being seen and evaluated for multiple medical problems. Nursing notes, vital signs, and labs were reviewed in the local facility chart.    Patient was requesting evaluation today for her lower back pain. She states it is much worse she believes she injured it unintentionally with bed repositioning. She raises the head of her bed and simple flexing of there spinal column to sit upright at 30 degrees elicits pain. We reviewed her current list of allergies as she understands she cannot have steroids due to an allergy.        Objective   There were no vitals taken for this visit.  Physical Exam  Vitals reviewed.   Constitutional:       Appearance: Normal appearance.   HENT:      Head: Normocephalic.   Cardiovascular:      Rate and Rhythm: Normal rate and regular rhythm.   Pulmonary:      Breath sounds: Normal breath sounds.   Musculoskeletal:      Cervical back: Neck supple.   Skin:     General: Skin is warm and dry.      Comments: RLE scabbing wounds healing halo of erythema; small abrasions on toes appear to be healing   Neurological:      Mental Status: She is alert.       Assessment/Plan   For her back pain in addition to the percocet we added flexiril TID PRN for relief. Report back if pain does not improve. May use heat/cold pack PRN for relief as well.   She is asking if her wound is infected; this was deferred to wound team if they would like to culture it. Area is scabbed over there is no noticeable drainage currently; she was cleared by ID for infection and antibiotics were completed previously.   She is also requesting a letter for her  and her risk of rehospitalization. I will draft a generic letter for her .   Problem List Items Addressed This Visit       Acute embolism and thrombosis of deep vein of left lower extremity (CMS/HCC)     We will continue her long-term prophylaxis and treatment with Eliquis twice daily 5 mg; she  is taking this medication         Chronic obstructive pulmonary disease (CMS/[facility])     No current complaints today. Lungs CTA patient is stable.          Venous stasis ulcer of right calf limited to breakdown of skin without varicose veins (CMS/[facility]) - Primary     She has completed her prolonged course of intravenous antibiotic and clinically the wounds are pink with dry scabs around the edges and do not appear infected.  She is afebrile and white blood cell count is not elevated.  We will continue to watch her wounds closely and she will keep scheduled follow-up appointments with her infectious disease specialist to determine if more intravenous antibiotic is needed in the future.  Her PICC line was discontinued by ID at her follow up appointment. She has been refusing her diuretic and with leg elevation there is actually minimal edema today.  She has been refusing wound treatments periodically as well.          Benign essential hypertension     Multiple recommendations for blood pressure control have been made and patient denies having high blood pressure despite high blood pressure readings and refuses to take blood pressure medications while here. She has been educated by multiple staff members as well as myself and overseeing attending physician the risks of sustained high blood pressure readings including heart attack, stroke, and/or death.         Stage 3b chronic kidney disease (CMS/[facility])     We will continue to avoid nephrotoxic agents and monitor with every other week BMP; she has been having hyperkalemia episodes without taking lasix, I anticipate this to reoccur.          Type 2 diabetes mellitus without complication, without long-term current use of insulin (CMS/[facility])     This is diet controlled; her fasting glucose has been < 100 on her lab results. A1C 4.8% on 1/31/2024.         Bilateral lower leg cellulitis     This has resolved and there are residual scabbed areas on BLE that do not appear to be  infected. Continue wound care treatments as she allows.          Obesity, morbid (CMS/HCC)    Lumbar pain     This was aggravated by repositioning. Added flexiril for support. Will continue with long term percocet q8 prn for relief. No other concerns today.

## 2024-04-02 NOTE — ASSESSMENT & PLAN NOTE
We will continue to avoid nephrotoxic agents and monitor with every other week BMP; she has been having hyperkalemia episodes without taking lasix, I anticipate this to reoccur.

## 2024-04-17 ENCOUNTER — NURSING HOME VISIT (OUTPATIENT)
Dept: POST ACUTE CARE | Facility: EXTERNAL LOCATION | Age: 84
End: 2024-04-17
Payer: MEDICARE

## 2024-04-17 DIAGNOSIS — I87.2 PERIPHERAL VENOUS INSUFFICIENCY: ICD-10-CM

## 2024-04-17 DIAGNOSIS — L03.115 BILATERAL LOWER LEG CELLULITIS: ICD-10-CM

## 2024-04-17 DIAGNOSIS — N18.32 STAGE 3B CHRONIC KIDNEY DISEASE (MULTI): ICD-10-CM

## 2024-04-17 DIAGNOSIS — L03.116 BILATERAL LOWER LEG CELLULITIS: ICD-10-CM

## 2024-04-17 DIAGNOSIS — L97.909: Primary | ICD-10-CM

## 2024-04-17 DIAGNOSIS — I82.402 ACUTE EMBOLISM AND THROMBOSIS OF DEEP VEIN OF LEFT LOWER EXTREMITY (MULTI): ICD-10-CM

## 2024-04-17 DIAGNOSIS — I87.2: Primary | ICD-10-CM

## 2024-04-17 PROCEDURE — 99308 SBSQ NF CARE LOW MDM 20: CPT

## 2024-04-17 NOTE — PROGRESS NOTES
Visit  Note   Subjective   Cheryl Elena is a 84 y.o. female who is being seen and evaluated for multiple medical problems. Nursing notes, vital signs, and labs were reviewed in the local facility chart.    Patient requesting examination today for follow up on her wounds and a letter for court-she is asking for a letter again. I did write a letter for her last week however she states she needs the letter to specifically state that her ulcers have healed and she will be returning home once her PT/OT are completed. Reviewing her status with PT/OT, SW and nursing staff.        Objective   There were no vitals taken for this visit.  Physical Exam  Vitals reviewed.   Constitutional:       Appearance: Normal appearance.   HENT:      Head: Normocephalic.   Cardiovascular:      Rate and Rhythm: Normal rate and regular rhythm.   Pulmonary:      Effort: Pulmonary effort is normal. No respiratory distress.      Breath sounds: Normal breath sounds. No stridor. No wheezing, rhonchi or rales.   Musculoskeletal:      Cervical back: Neck supple.   Skin:     General: Skin is warm and dry.      Comments: BLE scabbed areas healing   Neurological:      Mental Status: She is alert.       Assessment/Plan   Will follow up on another letter tomorrow; LSW is assisting.   Problem List Items Addressed This Visit       Acute embolism and thrombosis of deep vein of left lower extremity (Multi)     We will continue her long-term prophylaxis and treatment with Eliquis twice daily 5 mg; she is taking this medication         Venous stasis ulcers (Multi) - Primary     Infection has resolved; she is now completing wound care for optimal healing.          Peripheral venous insufficiency    Stage 3b chronic kidney disease (Multi)     We will continue to avoid nephrotoxic agents and monitor with every other week BMP; she has been having hyperkalemia episodes without taking lasix, I anticipate this to reoccur         Bilateral lower leg cellulitis      This has resolved and there are residual scabbed areas on BLE that do not appear to be infected. Continue wound care treatments as she allows.

## 2024-04-17 NOTE — LETTER
Patient: Cheryl Elena  : 1940    Encounter Date: 2024    Visit  Note   Subjective  Cheryl Elena is a 84 y.o. female who is being seen and evaluated for multiple medical problems. Nursing notes, vital signs, and labs were reviewed in the local facility chart.    Patient requesting examination today for follow up on her wounds and a letter for court-she is asking for a letter again. I did write a letter for her last week however she states she needs the letter to specifically state that her ulcers have healed and she will be returning home once her PT/OT are completed. Reviewing her status with PT/OT, SW and nursing staff.        Objective  There were no vitals taken for this visit.  Physical Exam  Vitals reviewed.   Constitutional:       Appearance: Normal appearance.   HENT:      Head: Normocephalic.   Cardiovascular:      Rate and Rhythm: Normal rate and regular rhythm.   Pulmonary:      Effort: Pulmonary effort is normal. No respiratory distress.      Breath sounds: Normal breath sounds. No stridor. No wheezing, rhonchi or rales.   Musculoskeletal:      Cervical back: Neck supple.   Skin:     General: Skin is warm and dry.      Comments: BLE scabbed areas healing   Neurological:      Mental Status: She is alert.       Assessment/Plan  Will follow up on another letter tomorrow; LSW is assisting.   Problem List Items Addressed This Visit       Acute embolism and thrombosis of deep vein of left lower extremity (Multi)     We will continue her long-term prophylaxis and treatment with Eliquis twice daily 5 mg; she is taking this medication         Venous stasis ulcers (Multi) - Primary     Infection has resolved; she is now completing wound care for optimal healing.          Peripheral venous insufficiency    Stage 3b chronic kidney disease (Multi)     We will continue to avoid nephrotoxic agents and monitor with every other week BMP; she has been having hyperkalemia episodes without taking lasix, I  anticipate this to reoccur         Bilateral lower leg cellulitis     This has resolved and there are residual scabbed areas on BLE that do not appear to be infected. Continue wound care treatments as she allows.                Electronically Signed By: TAMAR Nieto   4/17/24  6:20 PM

## 2024-04-17 NOTE — LETTER
April 26, 2024         Patient: Cheryl Elena   YOB: 1940   Date of Visit: 4/17/2024       To Whom it May Concern:    Cheryl Elena has been under my care since January 9th, 2024. While under my care, Ms. Elena' health condition has improved with some of her issues resolved. Others are in the healing process and continue to progress. Ms. Elena no longer requires skilled nursing care and continues with therapy services. It is my professional opinion that with her resolution of issues and progression with therapy, she will be able to return home once goals are met.       Sincerely,    Yoly Bass, NATALIE       Sincerely,     Yoly Bass, APRN-CNP      CC: No Recipients  __                ____________________________________________________________________________________    Visit  Note   Subjective  Cheryl Elena is a 84 y.o. female who is being seen and evaluated for multiple medical problems. Nursing notes, vital signs, and labs were reviewed in the local facility chart.    Patient requesting examination today for follow up on her wounds and a letter for court-she is asking for a letter again. I did write a letter for her last week however she states she needs the letter to specifically state that her ulcers have healed and she will be returning home once her PT/OT are completed. Reviewing her status with PT/OT, SW and nursing staff.        Objective  There were no vitals taken for this visit.  Physical Exam  Vitals reviewed.   Constitutional:       Appearance: Normal appearance.   HENT:      Head: Normocephalic.   Cardiovascular:      Rate and Rhythm: Normal rate and regular rhythm.   Pulmonary:      Effort: Pulmonary effort is normal. No respiratory distress.      Breath sounds: Normal breath sounds. No stridor. No wheezing, rhonchi or rales.   Musculoskeletal:      Cervical back: Neck supple.   Skin:     General: Skin is warm and dry.      Comments: BLE scabbed areas healing   Neurological:       Mental Status: She is alert.       Assessment/Plan  Will follow up on another letter tomorrow; LSW is assisting.   Problem List Items Addressed This Visit       Acute embolism and thrombosis of deep vein of left lower extremity (Multi)     We will continue her long-term prophylaxis and treatment with Eliquis twice daily 5 mg; she is taking this medication         Venous stasis ulcers (Multi) - Primary     Infection has resolved; she is now completing wound care for optimal healing.          Peripheral venous insufficiency    Stage 3b chronic kidney disease (Multi)     We will continue to avoid nephrotoxic agents and monitor with every other week BMP; she has been having hyperkalemia episodes without taking lasix, I anticipate this to reoccur         Bilateral lower leg cellulitis     This has resolved and there are residual scabbed areas on BLE that do not appear to be infected. Continue wound care treatments as she allows.

## 2024-04-17 NOTE — ASSESSMENT & PLAN NOTE
We will continue to avoid nephrotoxic agents and monitor with every other week BMP; she has been having hyperkalemia episodes without taking lasix, I anticipate this to reoccur

## 2024-04-29 ENCOUNTER — TELEPHONE (OUTPATIENT)
Dept: INTERNAL MEDICINE | Age: 84
End: 2024-04-29

## 2024-04-29 NOTE — TELEPHONE ENCOUNTER
FYI-Patient called the office. She stated she needed a PCP for follow up/care. The system generated her as \"dismissed\" from the practice when I went to pull her up to schedule her. Not too sure what to do about this. She said \"NO\" She ended up hanging up on me.     Thank you

## 2024-05-03 ENCOUNTER — TELEPHONE (OUTPATIENT)
Dept: PRIMARY CARE | Facility: CLINIC | Age: 84
End: 2024-05-03
Payer: MEDICARE

## 2024-05-03 NOTE — TELEPHONE ENCOUNTER
Lyndsey from [facility] called asking if you would sign for her first certification period for home health care services?    [phone]

## 2024-05-14 ENCOUNTER — TELEPHONE (OUTPATIENT)
Dept: PRIMARY CARE | Facility: CLINIC | Age: 84
End: 2024-05-14
Payer: MEDICARE

## 2024-05-14 NOTE — TELEPHONE ENCOUNTER
mRS Starr CALLED REGARDING HER PATIENT SHE TAKES CARE OF.     Mrs. Starr CALLED REGARDING MIKE WOUNDS LOWER EXTREMITIES AND RIGHT FOOT AND SHE REFUSING CARE HER LEG SWOLLEN ALL WOUNDS ARE SEEPING GREENISH FLUIDS COMING OUT OF HER LEGS .  NEEDS ED CARE BUT REFUSING  TO GO MRS Starr JACQUELYN LPN WANTS TO REPORT THIS INFORMATION TO HER PRIMARY.  URGENT PLEASE ADVISED

## 2024-05-15 ENCOUNTER — TELEPHONE (OUTPATIENT)
Dept: PRIMARY CARE | Facility: CLINIC | Age: 84
End: 2024-05-15
Payer: MEDICARE

## 2024-05-15 ENCOUNTER — APPOINTMENT (OUTPATIENT)
Dept: RADIOLOGY | Facility: HOSPITAL | Age: 84
End: 2024-05-15
Payer: MEDICARE

## 2024-05-15 ENCOUNTER — HOSPITAL ENCOUNTER (EMERGENCY)
Facility: HOSPITAL | Age: 84
Discharge: LEFT BEFORE DISCHARGE | End: 2024-05-16
Attending: EMERGENCY MEDICINE
Payer: MEDICARE

## 2024-05-15 DIAGNOSIS — N17.9 AKI (ACUTE KIDNEY INJURY) (CMS-HCC): ICD-10-CM

## 2024-05-15 DIAGNOSIS — L03.90 CELLULITIS, UNSPECIFIED CELLULITIS SITE: Primary | ICD-10-CM

## 2024-05-15 LAB
ALBUMIN SERPL BCP-MCNC: 4 G/DL (ref 3.4–5)
ALP SERPL-CCNC: 87 U/L (ref 33–136)
ALT SERPL W P-5'-P-CCNC: 7 U/L (ref 7–45)
ANION GAP SERPL CALC-SCNC: 14 MMOL/L (ref 10–20)
APPEARANCE UR: CLEAR
AST SERPL W P-5'-P-CCNC: 9 U/L (ref 9–39)
BACTERIA #/AREA URNS AUTO: ABNORMAL /HPF
BASOPHILS # BLD AUTO: 0.03 X10*3/UL (ref 0–0.1)
BASOPHILS NFR BLD AUTO: 0.2 %
BILIRUB SERPL-MCNC: 0.3 MG/DL (ref 0–1.2)
BILIRUB UR STRIP.AUTO-MCNC: NEGATIVE MG/DL
BUN SERPL-MCNC: 74 MG/DL (ref 6–23)
CALCIUM SERPL-MCNC: 9.5 MG/DL (ref 8.6–10.3)
CARDIAC TROPONIN I PNL SERPL HS: 8 NG/L (ref 0–13)
CHLORIDE SERPL-SCNC: 105 MMOL/L (ref 98–107)
CK SERPL-CCNC: 57 U/L (ref 0–215)
CO2 SERPL-SCNC: 20 MMOL/L (ref 21–32)
COLOR UR: YELLOW
CREAT SERPL-MCNC: 2.17 MG/DL (ref 0.5–1.05)
EGFRCR SERPLBLD CKD-EPI 2021: 22 ML/MIN/1.73M*2
EOSINOPHIL # BLD AUTO: 0.31 X10*3/UL (ref 0–0.4)
EOSINOPHIL NFR BLD AUTO: 2.1 %
ERYTHROCYTE [DISTWIDTH] IN BLOOD BY AUTOMATED COUNT: 17.7 % (ref 11.5–14.5)
GLUCOSE SERPL-MCNC: 104 MG/DL (ref 74–99)
GLUCOSE UR STRIP.AUTO-MCNC: NEGATIVE MG/DL
HCT VFR BLD AUTO: 35.8 % (ref 36–46)
HGB BLD-MCNC: 11.4 G/DL (ref 12–16)
IMM GRANULOCYTES # BLD AUTO: 0.19 X10*3/UL (ref 0–0.5)
IMM GRANULOCYTES NFR BLD AUTO: 1.3 % (ref 0–0.9)
KETONES UR STRIP.AUTO-MCNC: NEGATIVE MG/DL
LACTATE SERPL-SCNC: 1.1 MMOL/L (ref 0.4–2)
LEUKOCYTE ESTERASE UR QL STRIP.AUTO: NEGATIVE
LYMPHOCYTES # BLD AUTO: 1.29 X10*3/UL (ref 0.8–3)
LYMPHOCYTES NFR BLD AUTO: 8.6 %
MCH RBC QN AUTO: 24.7 PG (ref 26–34)
MCHC RBC AUTO-ENTMCNC: 31.8 G/DL (ref 32–36)
MCV RBC AUTO: 78 FL (ref 80–100)
MONOCYTES # BLD AUTO: 1.27 X10*3/UL (ref 0.05–0.8)
MONOCYTES NFR BLD AUTO: 8.5 %
NEUTROPHILS # BLD AUTO: 11.89 X10*3/UL (ref 1.6–5.5)
NEUTROPHILS NFR BLD AUTO: 79.3 %
NITRITE UR QL STRIP.AUTO: NEGATIVE
NRBC BLD-RTO: 0 /100 WBCS (ref 0–0)
PH UR STRIP.AUTO: 5 [PH]
PLATELET # BLD AUTO: 406 X10*3/UL (ref 150–450)
POTASSIUM SERPL-SCNC: 4.3 MMOL/L (ref 3.5–5.3)
PROT SERPL-MCNC: 7.9 G/DL (ref 6.4–8.2)
PROT UR STRIP.AUTO-MCNC: ABNORMAL MG/DL
RBC # BLD AUTO: 4.61 X10*6/UL (ref 4–5.2)
RBC # UR STRIP.AUTO: ABNORMAL /UL
RBC #/AREA URNS AUTO: ABNORMAL /HPF
SODIUM SERPL-SCNC: 135 MMOL/L (ref 136–145)
SP GR UR STRIP.AUTO: 1.02
SQUAMOUS #/AREA URNS AUTO: ABNORMAL /HPF
UROBILINOGEN UR STRIP.AUTO-MCNC: <2 MG/DL
WBC # BLD AUTO: 15 X10*3/UL (ref 4.4–11.3)
WBC #/AREA URNS AUTO: ABNORMAL /HPF

## 2024-05-15 PROCEDURE — 96367 TX/PROPH/DG ADDL SEQ IV INF: CPT

## 2024-05-15 PROCEDURE — 93971 EXTREMITY STUDY: CPT | Performed by: RADIOLOGY

## 2024-05-15 PROCEDURE — 83605 ASSAY OF LACTIC ACID: CPT | Performed by: EMERGENCY MEDICINE

## 2024-05-15 PROCEDURE — 96361 HYDRATE IV INFUSION ADD-ON: CPT

## 2024-05-15 PROCEDURE — 87040 BLOOD CULTURE FOR BACTERIA: CPT | Mod: 59,ELYLAB | Performed by: EMERGENCY MEDICINE

## 2024-05-15 PROCEDURE — 84484 ASSAY OF TROPONIN QUANT: CPT | Performed by: EMERGENCY MEDICINE

## 2024-05-15 PROCEDURE — 81001 URINALYSIS AUTO W/SCOPE: CPT | Performed by: EMERGENCY MEDICINE

## 2024-05-15 PROCEDURE — 36415 COLL VENOUS BLD VENIPUNCTURE: CPT | Performed by: EMERGENCY MEDICINE

## 2024-05-15 PROCEDURE — 87185 SC STD ENZYME DETCJ PER NZM: CPT | Mod: 91,ELYLAB | Performed by: EMERGENCY MEDICINE

## 2024-05-15 PROCEDURE — 96375 TX/PRO/DX INJ NEW DRUG ADDON: CPT

## 2024-05-15 PROCEDURE — 80053 COMPREHEN METABOLIC PANEL: CPT | Performed by: EMERGENCY MEDICINE

## 2024-05-15 PROCEDURE — 96365 THER/PROPH/DIAG IV INF INIT: CPT

## 2024-05-15 PROCEDURE — 85025 COMPLETE CBC W/AUTO DIFF WBC: CPT | Performed by: EMERGENCY MEDICINE

## 2024-05-15 PROCEDURE — 99284 EMERGENCY DEPT VISIT MOD MDM: CPT | Mod: 25

## 2024-05-15 PROCEDURE — 2500000001 HC RX 250 WO HCPCS SELF ADMINISTERED DRUGS (ALT 637 FOR MEDICARE OP): Performed by: EMERGENCY MEDICINE

## 2024-05-15 PROCEDURE — 2500000004 HC RX 250 GENERAL PHARMACY W/ HCPCS (ALT 636 FOR OP/ED): Performed by: EMERGENCY MEDICINE

## 2024-05-15 PROCEDURE — 93970 EXTREMITY STUDY: CPT

## 2024-05-15 PROCEDURE — 82550 ASSAY OF CK (CPK): CPT | Performed by: EMERGENCY MEDICINE

## 2024-05-15 RX ORDER — SULFAMETHOXAZOLE AND TRIMETHOPRIM 800; 160 MG/1; MG/1
1 TABLET ORAL 2 TIMES DAILY
Qty: 20 TABLET | Refills: 0 | Status: SHIPPED | OUTPATIENT
Start: 2024-05-15 | End: 2024-05-29 | Stop reason: HOSPADM

## 2024-05-15 RX ORDER — NYSTATIN 100000 U/G
CREAM TOPICAL ONCE
Status: COMPLETED | OUTPATIENT
Start: 2024-05-15 | End: 2024-05-15

## 2024-05-15 RX ORDER — METRONIDAZOLE 500 MG/100ML
500 INJECTION, SOLUTION INTRAVENOUS ONCE
Status: COMPLETED | OUTPATIENT
Start: 2024-05-15 | End: 2024-05-15

## 2024-05-15 RX ORDER — MORPHINE SULFATE 4 MG/ML
4 INJECTION, SOLUTION INTRAMUSCULAR; INTRAVENOUS ONCE
Status: COMPLETED | OUTPATIENT
Start: 2024-05-15 | End: 2024-05-15

## 2024-05-15 RX ORDER — ONDANSETRON HYDROCHLORIDE 2 MG/ML
4 INJECTION, SOLUTION INTRAVENOUS ONCE
Status: COMPLETED | OUTPATIENT
Start: 2024-05-15 | End: 2024-05-15

## 2024-05-15 RX ADMIN — METRONIDAZOLE 500 MG: 500 INJECTION, SOLUTION INTRAVENOUS at 16:56

## 2024-05-15 RX ADMIN — NYSTATIN: 100000 CREAM TOPICAL at 17:00

## 2024-05-15 RX ADMIN — ONDANSETRON 4 MG: 2 INJECTION INTRAMUSCULAR; INTRAVENOUS at 17:54

## 2024-05-15 RX ADMIN — VANCOMYCIN HYDROCHLORIDE 2 G: 5 INJECTION, POWDER, LYOPHILIZED, FOR SOLUTION INTRAVENOUS at 16:53

## 2024-05-15 RX ADMIN — SODIUM CHLORIDE 1000 ML: 9 INJECTION, SOLUTION INTRAVENOUS at 16:08

## 2024-05-15 RX ADMIN — MORPHINE SULFATE 4 MG: 4 INJECTION, SOLUTION INTRAMUSCULAR; INTRAVENOUS at 17:54

## 2024-05-15 ASSESSMENT — PAIN DESCRIPTION - FREQUENCY: FREQUENCY: CONSTANT/CONTINUOUS

## 2024-05-15 ASSESSMENT — LIFESTYLE VARIABLES
EVER FELT BAD OR GUILTY ABOUT YOUR DRINKING: NO
EVER HAD A DRINK FIRST THING IN THE MORNING TO STEADY YOUR NERVES TO GET RID OF A HANGOVER: NO
HAVE YOU EVER FELT YOU SHOULD CUT DOWN ON YOUR DRINKING: NO
TOTAL SCORE: 0
HAVE PEOPLE ANNOYED YOU BY CRITICIZING YOUR DRINKING: NO

## 2024-05-15 ASSESSMENT — PAIN SCALES - GENERAL
PAINLEVEL_OUTOF10: 5 - MODERATE PAIN
PAINLEVEL_OUTOF10: 10 - WORST POSSIBLE PAIN
PAINLEVEL_OUTOF10: 0 - NO PAIN

## 2024-05-15 ASSESSMENT — PAIN DESCRIPTION - LOCATION
LOCATION_3: HIP
LOCATION_2: BACK
LOCATION: LEG

## 2024-05-15 ASSESSMENT — COLUMBIA-SUICIDE SEVERITY RATING SCALE - C-SSRS
1. IN THE PAST MONTH, HAVE YOU WISHED YOU WERE DEAD OR WISHED YOU COULD GO TO SLEEP AND NOT WAKE UP?: NO
6. HAVE YOU EVER DONE ANYTHING, STARTED TO DO ANYTHING, OR PREPARED TO DO ANYTHING TO END YOUR LIFE?: NO
2. HAVE YOU ACTUALLY HAD ANY THOUGHTS OF KILLING YOURSELF?: NO

## 2024-05-15 ASSESSMENT — PAIN - FUNCTIONAL ASSESSMENT: PAIN_FUNCTIONAL_ASSESSMENT: 0-10

## 2024-05-15 ASSESSMENT — PAIN DESCRIPTION - ORIENTATION: ORIENTATION: RIGHT;LEFT

## 2024-05-15 NOTE — TELEPHONE ENCOUNTER
"Dalila from [facility] called 05/15/2024 @ 2:18pm  She had received a notification that Dr Aguillon wants pt to go to 911 for wound   Nurses attempted to do this yesterday and pt refused  Dalila then called pt and pt said she's not going  Dalila explained to pt that she likely has sepsis   Dalila informed pt if she didn't go to the ER she would likely die  Pt was agitated and too paranoid to leave her house  Pt is concerned if she leaves her home her  will take everything she has   Pt's daughter Norma called and said pt's  is a kind man, would never take her things, and that her mother (pt) is going crazy    Dalila called 911 and upon arriving at pt's house they told pt she would likely die if she did not go with them   She refused again.  Dalila doesn't know if pt has ever been seen by psych  Dalila suggested to daughter to call police and put pt on 3 day hold and she could be seen by psych  Daughter couldn't get guardianship of her mother (pt) because pt is \"of sound mind\"   Dalila claims she has talked to pt and she is not of sound mind  Unsure what the best plan of action would do  Thinks maybe pt should be committed to psych in order to be evaluated and treated   Please advise, thanks!  "

## 2024-05-15 NOTE — TELEPHONE ENCOUNTER
05/15/24- Patient called back and was told by the home health care nurses that she needs to go to the ER to treat her sores on her legs.  She refused and stated numerous times that the ER won't do anything and wants to see Dr. Lewis first.  Nelly had spoken with her and repeated how the doctors here are all advising her to go to the ER. They are more equipped to treat her at the ER than our office.  Patient understood and says that she will go to the ER.

## 2024-05-15 NOTE — ED PROVIDER NOTES
HPI   Chief Complaint   Patient presents with    Wound Infection     Pt presents to ED via EMS from home, pt has swelling and wounds on bilateral lower extremities.        HPI: 84-year-old female presents complaining of wounds to bilateral lower extremities.  Patient is very hard of hearing.  She states that her legs have been like that for about a month.  She states that she was in a nursing home but she got out of the nursing home on May 5.  She states that Dr. Ghosh recommended that she come to the emergency department for antibiotics for her legs.  She states that they have been red and draining.  She does have a history of a DVT.  She is on anticoagulation.  She also has a history of COPD and hypertension, renal disease.  She does not wear oxygen.  She states that she gets along in a wheelchair.  She transfers to the bathroom.  She states that she lives with her  but they are currently getting .  She does have a history of venous stasis ulcers.  Patient states that the rash under her breast has been there for months and she puts lotion on a heating pad on it.  He denies being diabetic    Family HX: Denies any significant/pertinent family history.  Social Hx: Denies ETOH or drug use.  Review of systems:  Gen.: No weight loss, fatigue, anorexia, insomnia, fever.   Eyes: No vision loss, double vision, drainage, eye pain.   ENT: No pharyngitis, dry mouth.   Cardiac: No chest pain, palpitations, syncope, near syncope.   Pulmonary: No shortness of breath, cough, hemoptysis.   Heme/lymph: No swollen glands, fever, bleeding.   GI: No abdominal pain, change in bowel habits, melena, hematemesis, hematochezia, nausea, vomiting, diarrhea.   : No discharge, dysuria, frequency, urgency, hematuria.   Musculoskeletal: Bilateral lower extremity pain  Skin: No rashes.   Psych: No depression, anxiety, suicidality, homicidality.   Review of systems is otherwise negative unless stated above or in history of present  illness.    Physical Exam:    Appearance: Alert, oriented , cooperative, poorly kept   skin: Intact,  dry skin, no lesions, rash, petechiae or purpura.  Fungal infection noted under bilateral breasts and in the groin folds    Eyes: PERRLA, EOMs intact,  Conjunctiva pink with no redness or exudates. Eyelids without lesions. No scleral icterus.     ENT: Hearing grossly intact. External auditory canals patent, tympanic membranes intact with visible landmarks. Nares patent, mucus membranes moist. Dentition without lesions. Pharynx clear, uvula midline.     Neck: Supple, without meningismus. Thyroid not palpable. Trachea at midline. No lymphadenopathy.    Pulmonary: Clear bilaterally with good chest wall excursion. No rales, rhonchi or wheezing. No accessory muscle use or stridor.    Cardiac: Normal S1, S2 without murmur, rub, gallop or extrasystole. No JVD, Carotids without bruits.    Abdomen: Soft, nontender, active bowel sounds.  No palpable organomegaly.  No rebound or guarding.  No CVA tenderness.    Genitourinary: Exam deferred.    Musculoskeletal: Bilateral lower extremities with wounds and eschar and drainage  Neurological:   no focal findings identified.    Psychiatric: Appropriate mood and affect.     Medical Decision-Making:  Testing: Patient was started on IV antibiotics.  She was given IV fluids.  She was given IV pain medication.  Differential is cellulitis, osteomyelitis, sepsis, peripheral vascular disease, she does have pulses palpable.  She does have a chronic DVT.  We did obtain ultrasound which is likely showing the chronic DVT on the left leg per radiology.  Patient was given IV vancomycin.  Did obtain labs as well to look at her white count as well as her electrolytes and her creatinine.  Her white count is 15.  Creatinine is 2.1 which is worsened from previous.  I was very concerned about the legs and recommended that she be admitted for wound care, likely debridement, ID as well as IV antibiotics.   Patient is very adamant that she is not staying in the hospital.  She states that she will see Dr. Lewis and do it every medications he wants her to do.  She also states that physical therapy is coming to her home tomorrow.  She states that she wants to wait for the cultures and do antibiotics at home.  I discussed with her that I was very concerned about that.  I was concerned that she would develop sepsis or deeper infection if she could lose her legs or die.  She states that she has been through this before she did not die last time and she does not wish to stay.  She does appear to have capacity.  She is awake alert and appropriate.  I believe that she understands my concerns.  Therefore she will sign out AGAINST MEDICAL ADVICE.  Did reach out to .  Will attempt to get her enrolled in home health care as well and is healthy at home.  Will attempt to get a home health nurse.  I will send prescriptions for Bactrim to the pharmacy although my suspicion is that it will not be strong enough to cover this that she definitely needs IV antibiotics.  Patient does not want to go to the wound center because she states she has been there before and they did not do anything to help her.  She should have a very low threshold to return.  Please note we also did contact Adult Protective Services as I was concerned about the patient's living situation  Reevaluation:   Plan: AMA homegoing. Discussed differential. Impression:   1.  Bilateral lower extremity cellulitis with ulcers  2.    Labs Reviewed  CBC WITH AUTO DIFFERENTIAL - Abnormal     WBC                           15.0 (*)               nRBC                          0.0                    RBC                           4.61                   Hemoglobin                    11.4 (*)               Hematocrit                    35.8 (*)               MCV                           78 (*)                 MCH                           24.7 (*)               MCHC                           31.8 (*)               RDW                           17.7 (*)               Platelets                     406                    Neutrophils %                 79.3                   Immature Granulocytes %, Automated   1.3 (*)                Lymphocytes %                 8.6                    Monocytes %                   8.5                    Eosinophils %                 2.1                    Basophils %                   0.2                    Neutrophils Absolute          11.89 (*)               Immature Granulocytes Absolute, Au*   0.19                   Lymphocytes Absolute          1.29                   Monocytes Absolute            1.27 (*)               Eosinophils Absolute          0.31                   Basophils Absolute            0.03                COMPREHENSIVE METABOLIC PANEL - Abnormal     Glucose                       104 (*)                Sodium                        135 (*)                Potassium                     4.3                    Chloride                      105                    Bicarbonate                   20 (*)                 Anion Gap                     14                     Urea Nitrogen                 74 (*)                 Creatinine                    2.17 (*)               eGFR                          22 (*)                 Calcium                       9.5                    Albumin                       4.0                    Alkaline Phosphatase          87                     Total Protein                 7.9                    AST                           9                      Bilirubin, Total              0.3                    ALT                           7                   LACTATE - Normal     Lactate                       1.1                      Narrative: Venipuncture immediately after or during the administration of Metamizole may lead to falsely low results. Testing should be performed immediately                prior to  Metamizole dosing.  TROPONIN I, HIGH SENSITIVITY - Normal     Troponin I, High Sensitivity   8                        Narrative: Less than 99th percentile of normal range cutoff-                Female and children under 18 years old <14 ng/L; Male <21 ng/L: Negative                Repeat testing should be performed if clinically indicated.                                 Female and children under 18 years old 14-50 ng/L; Male 21-50 ng/L:                Consistent with possible cardiac damage and possible increased clinical                 risk. Serial measurements may help to assess extent of myocardial damage.                                 >50 ng/L: Consistent with cardiac damage, increased clinical risk and                myocardial infarction. Serial measurements may help assess extent of                 myocardial damage.                                  NOTE: Children less than 1 year old may have higher baseline troponin                 levels and results should be interpreted in conjunction with the overall                 clinical context.                                 NOTE: Troponin I testing is performed using a different                 testing methodology at [facility] than at other                 [facility]. Direct result comparisons should only                 be made within the same method.  CREATINE KINASE - Normal     Creatine Kinase               57                  BLOOD CULTURE  BLOOD CULTURE  TISSUE/WOUND CULTURE/SMEAR  URINALYSIS WITH REFLEX CULTURE AND MICROSCOPIC       Narrative: The following orders were created for panel order Urinalysis with Reflex Culture and Microscopic.                Procedure                               Abnormality         Status                                   ---------                               -----------         ------                                   Urinalysis with Reflex C...[[id number]]                                                                Extra Urine Gray Tube[[id number]]                                                                                     Please view results for these tests on the individual orders.  URINALYSIS WITH REFLEX CULTURE AND MICROSCOPIC  EXTRA URINE GRAY TUBE     Vascular US lower extremity venous duplex bilateral   Final Result    Occlusive left lower extremity DVT seen in the femoral vein proximal    to distal. This could be chronic given the appearance. It is    difficult to say with certainty.          MACRO:    None          Signed by: Romeo Dorado 5/15/2024 7:15 PM    Dictation workstation:   [id number]                                [place] Coma Scale Score: 15                     Patient History   Past Medical History:   Diagnosis Date    COPD (chronic obstructive pulmonary disease) (Multi)     Hypertension      No past surgical history on file.  No family history on file.  Social History     Tobacco Use    Smoking status: Former     Current packs/day: 0.00     Types: Cigarettes     Quit date: 2016     Years since quittin.3    Smokeless tobacco: Never   Substance Use Topics    Alcohol use: Not on file    Drug use: Not on file       Physical Exam   ED Triage Vitals [05/15/24 1543]   Temperature Heart Rate Respirations BP   36.3 °C (97.3 °F) 92 20 (!) 159/113      Pulse Ox Temp Source Heart Rate Source Patient Position   98 % Temporal Monitor Sitting      BP Location FiO2 (%)     Right arm --       Physical Exam    ED Course & MDM   Diagnoses as of 05/15/24 2118   Cellulitis, unspecified cellulitis site   RAYMUNDO (acute kidney injury) ([facility])       Medical Decision Making      Procedure  Procedures     Jennie Ray MD  05/15/24 2118       Jennie Ray MD  05/15/24 2120

## 2024-05-15 NOTE — TELEPHONE ENCOUNTER
"Called Dalila back  She was frustrated because she said \"they just tried this\"  I told her we are in a difficult situation as the doctor covering for Dr. Aguillon doesn't know this patient  I advised Dr. Cason said get this patient to the ER immediately. Get her to the ER somehow.     "

## 2024-05-16 ENCOUNTER — TELEPHONE (OUTPATIENT)
Dept: PRIMARY CARE | Facility: CLINIC | Age: 84
End: 2024-05-16
Payer: MEDICARE

## 2024-05-16 VITALS
RESPIRATION RATE: 18 BRPM | TEMPERATURE: 97.3 F | DIASTOLIC BLOOD PRESSURE: 86 MMHG | OXYGEN SATURATION: 95 % | BODY MASS INDEX: 40.75 KG/M2 | WEIGHT: 230 LBS | SYSTOLIC BLOOD PRESSURE: 148 MMHG | HEART RATE: 86 BPM | HEIGHT: 63 IN

## 2024-05-16 LAB — HOLD SPECIMEN: NORMAL

## 2024-05-16 ASSESSMENT — PAIN - FUNCTIONAL ASSESSMENT: PAIN_FUNCTIONAL_ASSESSMENT: 0-10

## 2024-05-16 ASSESSMENT — PAIN SCALES - GENERAL: PAINLEVEL_OUTOF10: 0 - NO PAIN

## 2024-05-16 NOTE — TELEPHONE ENCOUNTER
Dalila from [address] [place] [address] called  requesting orders for patient. Explained to her that Ms Elena hasn't been seen by our providers and no orders could be given.  Advised her to call [facility] or possibly the [facility] for a emergency wellness check.  Dalila stated they have placed a call to [employer]PS but it could be weeks before a  can come out. Advised to call back and make known the urgency of the situation.  She states that the family cannot make her  be compliant with any medical treatments or advice.  Majority of the family has given up trying.  Daughter states that they most likely will not make the appointment they have with Dr Reed on May 23rd, because patient will refuse to go.

## 2024-05-16 NOTE — PROGRESS NOTES
Patient was referred to social work this evening from bedside RN with the following information:    Patient with Cellulitis and multiple infections. She would like to leave but I do not believe that she is able to care for herself. She was in a nursing home but signed herself out recently. She has hairs from an animal in her wounds because she has not been cleaning them.   She had no dressings on her legs when she came in and there was cat and human hair caked into her wounds.     I have been informed the patient has capacity per RN and MD.     I called and spoke with the patient who stated she will not stay at the hospital for treatment. She was advised by her Dr to come to the hospital to get antibiotics and go home. She indicated she has home care for home PT and is waiting for the agency whose name she cannot recall to call her to set up a time to help her with mobility issues.   She does not feel as though she needs home care for wound care at this time despite being informed by her current medical team.  She was forced to leave the snf she was in because her insurance would no longer cover the costs.     At this point, we cannot force her to accept wound care although it does appear with education about her health she may have self-neglect. I will attempt to call APS in the [facility] in which she resides on 5.16 to determine what if anything they can do to assist.

## 2024-05-18 LAB
B-LACTAMASE ORGANISM ISLT: NEGATIVE
BACTERIA SPEC CULT: ABNORMAL
GRAM STN SPEC: ABNORMAL
GRAM STN SPEC: ABNORMAL

## 2024-05-19 ENCOUNTER — TELEPHONE (OUTPATIENT)
Dept: PHARMACY | Facility: HOSPITAL | Age: 84
End: 2024-05-19
Payer: MEDICARE

## 2024-05-19 LAB
BACTERIA BLD CULT: NORMAL
BACTERIA BLD CULT: NORMAL

## 2024-05-19 NOTE — PROGRESS NOTES
EDPD Note: Antibiotics Reviewed and Warranted    Contacted Ms. Cheryl Elena regarding a positive wound culture/result that was taken during their recent emergency room visit. I completed education with patient . The patient is being treated appropriately with Bactrim DS, 1 tab BID x10 days.     Difficulty with the phones during conversation: hard for me to hear patient d/t very loud volume in background, and hard for patient to hear me d/t connectivity issues.     Patient reports taking her antibiotic as prescribed, denies fever/chills. Reports that her legs were not wrapped prior to discharge, and her pant legs stick to the wounds-she has been trying to order bandages unsuccessfully. States she is very capable of taking care of herself. She has a registered nurse that she can call if she needs anything. Recommended she discuss wound care with her doctor and follow-up this week. Patient had no questions.    Susceptibility data from last 90 days.  Collected Specimen Info Organism Clindamycin Erythromycin Oxacillin Tetracycline Trimethoprim/Sulfamethoxazole Vancomycin   05/15/24 Tissue/Biopsy from Wound/Tissue Methicillin Susceptible Staphylococcus aureus (MSSA)  R  R  S  R  S  S     Mixed Anaerobic Bacteria           Mixed Gram-Positive and Gram-Negative Bacteria              No further follow up needed from EDPD Team.     Katia Macias, [facility]

## 2024-05-24 ENCOUNTER — HOSPITAL ENCOUNTER (INPATIENT)
Facility: HOSPITAL | Age: 84
LOS: 4 days | Discharge: NURSING, REHAB OR LONG-TERM CARE | DRG: 871 | End: 2024-05-29
Attending: STUDENT IN AN ORGANIZED HEALTH CARE EDUCATION/TRAINING PROGRAM | Admitting: STUDENT IN AN ORGANIZED HEALTH CARE EDUCATION/TRAINING PROGRAM
Payer: MEDICARE

## 2024-05-24 ENCOUNTER — APPOINTMENT (OUTPATIENT)
Dept: RADIOLOGY | Facility: HOSPITAL | Age: 84
DRG: 871 | End: 2024-05-24
Payer: MEDICARE

## 2024-05-24 DIAGNOSIS — R53.1 GENERALIZED WEAKNESS: ICD-10-CM

## 2024-05-24 DIAGNOSIS — L03.115 BILATERAL CELLULITIS OF LOWER LEG: ICD-10-CM

## 2024-05-24 DIAGNOSIS — N17.9 ACUTE RENAL FAILURE SUPERIMPOSED ON CHRONIC KIDNEY DISEASE, UNSPECIFIED ACUTE RENAL FAILURE TYPE, UNSPECIFIED CKD STAGE (CMS-HCC): ICD-10-CM

## 2024-05-24 DIAGNOSIS — N18.9 ACUTE RENAL FAILURE SUPERIMPOSED ON CHRONIC KIDNEY DISEASE, UNSPECIFIED ACUTE RENAL FAILURE TYPE, UNSPECIFIED CKD STAGE (CMS-HCC): ICD-10-CM

## 2024-05-24 DIAGNOSIS — E86.0 DEHYDRATION: ICD-10-CM

## 2024-05-24 DIAGNOSIS — N18.32 ACUTE RENAL FAILURE SUPERIMPOSED ON STAGE 3B CHRONIC KIDNEY DISEASE, UNSPECIFIED ACUTE RENAL FAILURE TYPE (MULTI): Primary | ICD-10-CM

## 2024-05-24 DIAGNOSIS — Z74.09 DECREASED AMBULATION STATUS: ICD-10-CM

## 2024-05-24 DIAGNOSIS — A41.9 SEPSIS, DUE TO UNSPECIFIED ORGANISM, UNSPECIFIED WHETHER ACUTE ORGAN DYSFUNCTION PRESENT (MULTI): ICD-10-CM

## 2024-05-24 DIAGNOSIS — L03.116 CELLULITIS OF LEFT FOOT: ICD-10-CM

## 2024-05-24 DIAGNOSIS — E87.20 METABOLIC ACIDOSIS: ICD-10-CM

## 2024-05-24 DIAGNOSIS — N17.9 ACUTE RENAL FAILURE SUPERIMPOSED ON STAGE 3B CHRONIC KIDNEY DISEASE, UNSPECIFIED ACUTE RENAL FAILURE TYPE (MULTI): Primary | ICD-10-CM

## 2024-05-24 DIAGNOSIS — I50.33 ACUTE ON CHRONIC DIASTOLIC (CONGESTIVE) HEART FAILURE (MULTI): ICD-10-CM

## 2024-05-24 DIAGNOSIS — L03.116 BILATERAL CELLULITIS OF LOWER LEG: ICD-10-CM

## 2024-05-24 DIAGNOSIS — E87.5 HYPERKALEMIA: ICD-10-CM

## 2024-05-24 LAB
ALBUMIN SERPL BCP-MCNC: 3.1 G/DL (ref 3.4–5)
ALBUMIN SERPL BCP-MCNC: 3.6 G/DL (ref 3.4–5)
ALP SERPL-CCNC: 80 U/L (ref 33–136)
ALP SERPL-CCNC: 91 U/L (ref 33–136)
ALT SERPL W P-5'-P-CCNC: 11 U/L (ref 7–45)
ALT SERPL W P-5'-P-CCNC: 11 U/L (ref 7–45)
ANION GAP BLDV CALCULATED.4IONS-SCNC: 19 MMOL/L (ref 10–25)
ANION GAP SERPL CALC-SCNC: 17 MMOL/L (ref 10–20)
ANION GAP SERPL CALC-SCNC: 23 MMOL/L (ref 10–20)
APPARATUS: ABNORMAL
AST SERPL W P-5'-P-CCNC: 23 U/L (ref 9–39)
AST SERPL W P-5'-P-CCNC: 32 U/L (ref 9–39)
BASE EXCESS BLDA CALC-SCNC: -16.7 MMOL/L (ref -2–3)
BASE EXCESS BLDV CALC-SCNC: -17.3 MMOL/L (ref -2–3)
BASOPHILS # BLD AUTO: 0.07 X10*3/UL (ref 0–0.1)
BASOPHILS NFR BLD AUTO: 0.2 %
BILIRUB SERPL-MCNC: 0.3 MG/DL (ref 0–1.2)
BILIRUB SERPL-MCNC: 0.4 MG/DL (ref 0–1.2)
BNP SERPL-MCNC: 106 PG/ML (ref 0–99)
BODY TEMPERATURE: ABNORMAL
BODY TEMPERATURE: ABNORMAL
BUN SERPL-MCNC: 129 MG/DL (ref 6–23)
BUN SERPL-MCNC: 138 MG/DL (ref 6–23)
CA-I BLDV-SCNC: 1.13 MMOL/L (ref 1.1–1.33)
CALCIUM SERPL-MCNC: 7.8 MG/DL (ref 8.6–10.3)
CALCIUM SERPL-MCNC: 8.3 MG/DL (ref 8.6–10.3)
CARDIAC TROPONIN I PNL SERPL HS: 23 NG/L (ref 0–13)
CARDIAC TROPONIN I PNL SERPL HS: 25 NG/L (ref 0–13)
CHLORIDE BLDV-SCNC: 104 MMOL/L (ref 98–107)
CHLORIDE SERPL-SCNC: 104 MMOL/L (ref 98–107)
CHLORIDE SERPL-SCNC: 106 MMOL/L (ref 98–107)
CK SERPL-CCNC: 633 U/L (ref 0–215)
CO2 SERPL-SCNC: 11 MMOL/L (ref 21–32)
CO2 SERPL-SCNC: 8 MMOL/L (ref 21–32)
CREAT SERPL-MCNC: 5.21 MG/DL (ref 0.5–1.05)
CREAT SERPL-MCNC: 5.73 MG/DL (ref 0.5–1.05)
CRITICAL CALL TIME: 2117
CRITICAL CALL TIME: 2320
CRITICAL CALLED BY: ABNORMAL
CRITICAL CALLED BY: ABNORMAL
CRITICAL CALLED TO: ABNORMAL
CRITICAL CALLED TO: ABNORMAL
CRITICAL READ BACK: ABNORMAL
CRITICAL READ BACK: ABNORMAL
CRP SERPL-MCNC: 6.22 MG/DL
EGFRCR SERPLBLD CKD-EPI 2021: 7 ML/MIN/1.73M*2
EGFRCR SERPLBLD CKD-EPI 2021: 8 ML/MIN/1.73M*2
EOSINOPHIL # BLD AUTO: 0 X10*3/UL (ref 0–0.4)
EOSINOPHIL NFR BLD AUTO: 0 %
ERYTHROCYTE [DISTWIDTH] IN BLOOD BY AUTOMATED COUNT: 19.4 % (ref 11.5–14.5)
ERYTHROCYTE [SEDIMENTATION RATE] IN BLOOD BY WESTERGREN METHOD: 29 MM/H (ref 0–30)
GLUCOSE BLD MANUAL STRIP-MCNC: 81 MG/DL (ref 74–99)
GLUCOSE BLDV-MCNC: 97 MG/DL (ref 74–99)
GLUCOSE SERPL-MCNC: 120 MG/DL (ref 74–99)
GLUCOSE SERPL-MCNC: 173 MG/DL (ref 74–99)
HCO3 BLDA-SCNC: 10.7 MMOL/L (ref 22–26)
HCO3 BLDV-SCNC: 10 MMOL/L (ref 22–26)
HCT VFR BLD AUTO: 34.8 % (ref 36–46)
HCT VFR BLD EST: 32 % (ref 36–46)
HGB BLD-MCNC: 11.2 G/DL (ref 12–16)
HGB BLDV-MCNC: 10.6 G/DL (ref 12–16)
IMM GRANULOCYTES # BLD AUTO: 0.93 X10*3/UL (ref 0–0.5)
IMM GRANULOCYTES NFR BLD AUTO: 3 % (ref 0–0.9)
INHALED O2 CONCENTRATION: 100 %
INHALED O2 CONCENTRATION: 32 %
INR PPP: 2 (ref 0.9–1.1)
LACTATE BLDV-SCNC: 2.3 MMOL/L (ref 0.4–2)
LACTATE SERPL-SCNC: 2 MMOL/L (ref 0.4–2)
LIPASE SERPL-CCNC: 119 U/L (ref 9–82)
LYMPHOCYTES # BLD AUTO: 0.68 X10*3/UL (ref 0.8–3)
LYMPHOCYTES NFR BLD AUTO: 2.2 %
MCH RBC QN AUTO: 24.9 PG (ref 26–34)
MCHC RBC AUTO-ENTMCNC: 32.2 G/DL (ref 32–36)
MCV RBC AUTO: 77 FL (ref 80–100)
MONOCYTES # BLD AUTO: 1.23 X10*3/UL (ref 0.05–0.8)
MONOCYTES NFR BLD AUTO: 3.9 %
NEUTROPHILS # BLD AUTO: 28.37 X10*3/UL (ref 1.6–5.5)
NEUTROPHILS NFR BLD AUTO: 90.7 %
NRBC BLD-RTO: 0.2 /100 WBCS (ref 0–0)
OXYHGB MFR BLDA: 40.4 % (ref 94–98)
OXYHGB MFR BLDV: 66.1 % (ref 45–75)
PCO2 BLDA: 30 MM HG (ref 38–42)
PCO2 BLDV: 28 MM HG (ref 41–51)
PH BLDA: 7.16 PH (ref 7.38–7.42)
PH BLDV: 7.16 PH (ref 7.33–7.43)
PLATELET # BLD AUTO: 419 X10*3/UL (ref 150–450)
PO2 BLDA: 35 MM HG (ref 85–95)
PO2 BLDV: 47 MM HG (ref 35–45)
POTASSIUM BLDV-SCNC: 7.6 MMOL/L (ref 3.5–5.3)
POTASSIUM SERPL-SCNC: 5.2 MMOL/L (ref 3.5–5.3)
POTASSIUM SERPL-SCNC: 6.7 MMOL/L (ref 3.5–5.3)
PROT SERPL-MCNC: 6 G/DL (ref 6.4–8.2)
PROT SERPL-MCNC: 6.9 G/DL (ref 6.4–8.2)
PROTHROMBIN TIME: 23.2 SECONDS (ref 9.8–12.8)
RBC # BLD AUTO: 4.5 X10*6/UL (ref 4–5.2)
SAO2 % BLDA: 42 % (ref 94–100)
SAO2 % BLDV: 66 % (ref 45–75)
SODIUM BLDV-SCNC: 125 MMOL/L (ref 136–145)
SODIUM SERPL-SCNC: 128 MMOL/L (ref 136–145)
SODIUM SERPL-SCNC: 129 MMOL/L (ref 136–145)
WBC # BLD AUTO: 31.3 X10*3/UL (ref 4.4–11.3)

## 2024-05-24 PROCEDURE — 84132 ASSAY OF SERUM POTASSIUM: CPT | Mod: 91 | Performed by: PHYSICIAN ASSISTANT

## 2024-05-24 PROCEDURE — 73590 X-RAY EXAM OF LOWER LEG: CPT | Mod: 50

## 2024-05-24 PROCEDURE — 96366 THER/PROPH/DIAG IV INF ADDON: CPT

## 2024-05-24 PROCEDURE — 73620 X-RAY EXAM OF FOOT: CPT | Mod: 50

## 2024-05-24 PROCEDURE — 36600 WITHDRAWAL OF ARTERIAL BLOOD: CPT

## 2024-05-24 PROCEDURE — 86140 C-REACTIVE PROTEIN: CPT | Performed by: PHYSICIAN ASSISTANT

## 2024-05-24 PROCEDURE — 2500000004 HC RX 250 GENERAL PHARMACY W/ HCPCS (ALT 636 FOR OP/ED): Performed by: PHYSICIAN ASSISTANT

## 2024-05-24 PROCEDURE — 83880 ASSAY OF NATRIURETIC PEPTIDE: CPT | Performed by: PHYSICIAN ASSISTANT

## 2024-05-24 PROCEDURE — 96367 TX/PROPH/DG ADDL SEQ IV INF: CPT

## 2024-05-24 PROCEDURE — 70450 CT HEAD/BRAIN W/O DYE: CPT

## 2024-05-24 PROCEDURE — 80053 COMPREHEN METABOLIC PANEL: CPT | Performed by: PHYSICIAN ASSISTANT

## 2024-05-24 PROCEDURE — 99291 CRITICAL CARE FIRST HOUR: CPT | Performed by: PHYSICIAN ASSISTANT

## 2024-05-24 PROCEDURE — 71045 X-RAY EXAM CHEST 1 VIEW: CPT | Mod: FOREIGN READ | Performed by: RADIOLOGY

## 2024-05-24 PROCEDURE — 96365 THER/PROPH/DIAG IV INF INIT: CPT

## 2024-05-24 PROCEDURE — 85025 COMPLETE CBC W/AUTO DIFF WBC: CPT | Performed by: PHYSICIAN ASSISTANT

## 2024-05-24 PROCEDURE — 83605 ASSAY OF LACTIC ACID: CPT | Performed by: PHYSICIAN ASSISTANT

## 2024-05-24 PROCEDURE — 83690 ASSAY OF LIPASE: CPT | Performed by: PHYSICIAN ASSISTANT

## 2024-05-24 PROCEDURE — 85610 PROTHROMBIN TIME: CPT | Performed by: PHYSICIAN ASSISTANT

## 2024-05-24 PROCEDURE — 84484 ASSAY OF TROPONIN QUANT: CPT | Performed by: PHYSICIAN ASSISTANT

## 2024-05-24 PROCEDURE — 85652 RBC SED RATE AUTOMATED: CPT | Performed by: PHYSICIAN ASSISTANT

## 2024-05-24 PROCEDURE — 2500000004 HC RX 250 GENERAL PHARMACY W/ HCPCS (ALT 636 FOR OP/ED): Performed by: STUDENT IN AN ORGANIZED HEALTH CARE EDUCATION/TRAINING PROGRAM

## 2024-05-24 PROCEDURE — 36415 COLL VENOUS BLD VENIPUNCTURE: CPT | Performed by: PHYSICIAN ASSISTANT

## 2024-05-24 PROCEDURE — 82947 ASSAY GLUCOSE BLOOD QUANT: CPT

## 2024-05-24 PROCEDURE — 82805 BLOOD GASES W/O2 SATURATION: CPT | Performed by: STUDENT IN AN ORGANIZED HEALTH CARE EDUCATION/TRAINING PROGRAM

## 2024-05-24 PROCEDURE — 2500000002 HC RX 250 W HCPCS SELF ADMINISTERED DRUGS (ALT 637 FOR MEDICARE OP, ALT 636 FOR OP/ED): Performed by: PHYSICIAN ASSISTANT

## 2024-05-24 PROCEDURE — 96361 HYDRATE IV INFUSION ADD-ON: CPT

## 2024-05-24 PROCEDURE — 96368 THER/DIAG CONCURRENT INF: CPT

## 2024-05-24 PROCEDURE — 82550 ASSAY OF CK (CPK): CPT | Performed by: STUDENT IN AN ORGANIZED HEALTH CARE EDUCATION/TRAINING PROGRAM

## 2024-05-24 PROCEDURE — 73590 X-RAY EXAM OF LOWER LEG: CPT | Mod: BILATERAL PROCEDURE | Performed by: RADIOLOGY

## 2024-05-24 PROCEDURE — 96375 TX/PRO/DX INJ NEW DRUG ADDON: CPT

## 2024-05-24 PROCEDURE — 73620 X-RAY EXAM OF FOOT: CPT | Mod: BILATERAL PROCEDURE | Performed by: RADIOLOGY

## 2024-05-24 PROCEDURE — 87040 BLOOD CULTURE FOR BACTERIA: CPT | Mod: ELYLAB | Performed by: PHYSICIAN ASSISTANT

## 2024-05-24 PROCEDURE — 2500000005 HC RX 250 GENERAL PHARMACY W/O HCPCS: Performed by: PHYSICIAN ASSISTANT

## 2024-05-24 PROCEDURE — 70450 CT HEAD/BRAIN W/O DYE: CPT | Performed by: RADIOLOGY

## 2024-05-24 PROCEDURE — 71045 X-RAY EXAM CHEST 1 VIEW: CPT

## 2024-05-24 RX ORDER — CALCIUM GLUCONATE 20 MG/ML
2 INJECTION, SOLUTION INTRAVENOUS ONCE
Status: COMPLETED | OUTPATIENT
Start: 2024-05-24 | End: 2024-05-25

## 2024-05-24 RX ORDER — FENTANYL CITRATE 50 UG/ML
25 INJECTION, SOLUTION INTRAMUSCULAR; INTRAVENOUS ONCE
Status: COMPLETED | OUTPATIENT
Start: 2024-05-24 | End: 2024-05-24

## 2024-05-24 RX ORDER — DEXTROSE 50 % IN WATER (D50W) INTRAVENOUS SYRINGE
25 ONCE
Status: COMPLETED | OUTPATIENT
Start: 2024-05-24 | End: 2024-05-24

## 2024-05-24 RX ORDER — METRONIDAZOLE 500 MG/100ML
500 INJECTION, SOLUTION INTRAVENOUS ONCE
Status: COMPLETED | OUTPATIENT
Start: 2024-05-24 | End: 2024-05-24

## 2024-05-24 RX ADMIN — SODIUM CHLORIDE 1000 ML: 9 INJECTION, SOLUTION INTRAVENOUS at 19:47

## 2024-05-24 RX ADMIN — SODIUM BICARBONATE 125 ML/HR: 84 INJECTION, SOLUTION INTRAVENOUS at 22:32

## 2024-05-24 RX ADMIN — DEXTROSE MONOHYDRATE 25 G: 25 INJECTION, SOLUTION INTRAVENOUS at 22:32

## 2024-05-24 RX ADMIN — CALCIUM GLUCONATE 2 G: 20 INJECTION, SOLUTION INTRAVENOUS at 22:31

## 2024-05-24 RX ADMIN — METRONIDAZOLE 500 MG: 500 INJECTION, SOLUTION INTRAVENOUS at 19:48

## 2024-05-24 RX ADMIN — VANCOMYCIN HYDROCHLORIDE 1500 MG: 1.5 INJECTION, POWDER, LYOPHILIZED, FOR SOLUTION INTRAVENOUS at 21:30

## 2024-05-24 RX ADMIN — SODIUM CHLORIDE 1000 ML: 9 INJECTION, SOLUTION INTRAVENOUS at 21:31

## 2024-05-24 RX ADMIN — FENTANYL CITRATE 25 MCG: 50 INJECTION, SOLUTION INTRAMUSCULAR; INTRAVENOUS at 22:25

## 2024-05-24 RX ADMIN — CEFEPIME 2 G: 2 INJECTION, POWDER, FOR SOLUTION INTRAVENOUS at 21:35

## 2024-05-24 RX ADMIN — INSULIN HUMAN 5 UNITS: 100 INJECTION, SOLUTION PARENTERAL at 22:31

## 2024-05-24 ASSESSMENT — LIFESTYLE VARIABLES
TOTAL SCORE: 0
HAVE YOU EVER FELT YOU SHOULD CUT DOWN ON YOUR DRINKING: NO
EVER HAD A DRINK FIRST THING IN THE MORNING TO STEADY YOUR NERVES TO GET RID OF A HANGOVER: NO
EVER FELT BAD OR GUILTY ABOUT YOUR DRINKING: NO
HAVE PEOPLE ANNOYED YOU BY CRITICIZING YOUR DRINKING: NO

## 2024-05-24 ASSESSMENT — PAIN DESCRIPTION - PAIN TYPE: TYPE: ACUTE PAIN

## 2024-05-24 ASSESSMENT — PAIN DESCRIPTION - LOCATION: LOCATION: COCCYX

## 2024-05-24 ASSESSMENT — PAIN DESCRIPTION - ORIENTATION: ORIENTATION: MID

## 2024-05-24 ASSESSMENT — PAIN DESCRIPTION - DESCRIPTORS: DESCRIPTORS: BURNING;SORE

## 2024-05-24 ASSESSMENT — PAIN DESCRIPTION - FREQUENCY: FREQUENCY: CONSTANT/CONTINUOUS

## 2024-05-24 ASSESSMENT — PAIN - FUNCTIONAL ASSESSMENT: PAIN_FUNCTIONAL_ASSESSMENT: 0-10

## 2024-05-24 ASSESSMENT — PAIN SCALES - GENERAL: PAINLEVEL_OUTOF10: 9

## 2024-05-24 NOTE — ED PROVIDER NOTES
HPI   No chief complaint on file.      This is an 84-year-old female who was brought in from home by EMS with altered worsening action with increased confusion.  The exact onset of the altered mental status is unknown as the patient is confused and there are no family members present.  Patient reports increased pain in her lower extremities, increased weakness as well as nausea.  The patient states that she has not eaten for the past 5 days and had very little water.  She complains of feeling very weak.  She denies any chest pain, heart palpitations, cough, abdominal pain, nausea urinary symptoms, diarrhea.  The patient's history was obtained from her EMR as well as report from EMS personnel.  Patient was seen in the emergency room on 15 May for cellulitis of her lower extremities the patient was offered and recommended admission but she left AGAINST MEDICAL ADVICE.  The patient was given IV antibiotics on the 15th according to the patient's chart she was, follow-up with her primary care physician.  Today the patient seems more confused, unknown any history of falls or head injuries.  She does have a history of previous DVT currently on anticoagulation, history of COPD, hypertension, renal disease, according to the notes she gets around in a wheelchair.  According to the patient's EMR she was admitted back in February for chronic cellulitis of her bilateral lower extremities due to infected stasis ulcers.  Her culture at that time grew Pseudomonas aeruginosa and she had toe gangrene.  She was on cefepime at the time.      History provided by:  Medical records and EMS personnel  History limited by:  Mental status change                      No data recorded                   Patient History   Past Medical History:   Diagnosis Date    COPD (chronic obstructive pulmonary disease) (Multi)     Diabetes mellitus (Multi)     Hypertension      History reviewed. No pertinent surgical history.  No family history on  file.  Social History     Tobacco Use    Smoking status: Former     Current packs/day: 0.00     Types: Cigarettes     Quit date: 2016     Years since quittin.4    Smokeless tobacco: Never   Substance Use Topics    Alcohol use: Not on file    Drug use: Not on file       Physical Exam   ED Triage Vitals [245]   Temperature Heart Rate Respirations BP   36.2 °C (97.2 °F) 88 20 (!) 172/104      Pulse Ox Temp Source Heart Rate Source Patient Position   94 % Temporal Monitor Lying      BP Location FiO2 (%)     Right arm --       Physical Exam  Vitals and nursing note reviewed. Exam conducted with a chaperone present.   Constitutional:       General: She is awake. She is not in acute distress.     Appearance: She is obese. She is ill-appearing.      Comments: Patient appears confused, disheveled, unkept   HENT:      Head: Normocephalic and atraumatic.      Jaw: There is normal jaw occlusion.      Right Ear: Hearing, tympanic membrane, ear canal and external ear normal.      Left Ear: Hearing, tympanic membrane, ear canal and external ear normal.      Nose: Nose normal.      Mouth/Throat:      Lips: Pink.      Mouth: Mucous membranes are dry.      Pharynx: Oropharynx is clear. Uvula midline.   Eyes:      Conjunctiva/sclera: Conjunctivae normal.      Pupils: Pupils are equal, round, and reactive to light.   Neck:      Vascular: No carotid bruit.   Cardiovascular:      Rate and Rhythm: Regular rhythm. Tachycardia present.      Pulses: Normal pulses.      Heart sounds: Normal heart sounds.   Pulmonary:      Effort: Pulmonary effort is normal.      Breath sounds: Examination of the right-upper field reveals decreased breath sounds. Examination of the left-upper field reveals decreased breath sounds. Examination of the right-middle field reveals decreased breath sounds. Examination of the left-middle field reveals decreased breath sounds. Examination of the right-lower field reveals decreased breath sounds.  Examination of the left-lower field reveals decreased breath sounds. Decreased breath sounds present. No wheezing.   Abdominal:      General: There is no distension.      Palpations: There is no mass.      Tenderness: There is no abdominal tenderness. There is no right CVA tenderness, left CVA tenderness, guarding or rebound.      Hernia: No hernia is present.   Musculoskeletal:         General: Swelling and tenderness present.      Right shoulder: Normal.      Left shoulder: Normal.      Right upper arm: Normal.      Left upper arm: Normal.      Right elbow: Normal.      Left elbow: Normal.      Right forearm: Normal.      Left forearm: Normal.      Right wrist: Normal.      Left wrist: Normal.      Right hand: Normal.      Left hand: Normal.      Cervical back: Normal, normal range of motion and neck supple. No rigidity, tenderness or bony tenderness.      Thoracic back: Normal. No bony tenderness.      Lumbar back: Normal. No bony tenderness.      Right lower leg: Swelling, tenderness and bony tenderness present. Edema present.      Left lower leg: Swelling and tenderness present. Edema present.      Right ankle: Swelling present.      Left ankle: Swelling present.      Right foot: Swelling and tenderness present.      Left foot: Swelling and tenderness present.        Legs:       Comments: Both lower extremities show necrotic tissue in the anterior tibias as well as the dorsal aspect of the left foot involving the second third and fourth toes, there is also some skin breakdown the tips of the toes of the right foot with surrounding erythema.   Lymphadenopathy:      Cervical: No cervical adenopathy.   Skin:     General: Skin is warm and dry.      Capillary Refill: Capillary refill takes less than 2 seconds.      Findings: Erythema and wound present.          Neurological:      General: No focal deficit present.      Mental Status: She is alert and oriented to person, place, and time. Mental status is at baseline.    Psychiatric:         Attention and Perception: Attention normal.         Mood and Affect: Affect normal. Mood is anxious.         Speech: Speech is rapid and pressured.         Behavior: Behavior is cooperative.         Thought Content: Thought content normal.         Cognition and Memory: Cognition and memory normal.         Judgment: Judgment normal.         ED Course & MDM   Diagnoses as of 05/25/24 0151   Bilateral cellulitis of lower leg   Cellulitis of left foot   Acute renal failure superimposed on chronic kidney disease, unspecified acute renal failure type, unspecified CKD stage (CMS-HCC)   Dehydration   Decreased ambulation status   Generalized weakness   Metabolic acidosis   Sepsis, due to unspecified organism, unspecified whether acute organ dysfunction present (Multi)   Hyperkalemia   Acute renal failure superimposed on stage 3b chronic kidney disease, unspecified acute renal failure type (Multi)       Medical Decision Making  Temperature 36 2, heart rate 88, respirations 20, blood pressure 172/104, pulse ox was 94% on room air  Initiated septic labs I ordered lab work including blood cultures x 2 urine urine culture lactic acid CRP sed rate.  Patient was given a liter of normal saline wide open, cefepime 2 g IV piggyback, vancomycin 1.5 g IV piggyback and metronidazole 500 mg IV piggyback.  I have ordered a chest x-ray as well as x-rays of both lower extremities  Lab results were reviewed, CBC shows white cells 31.3, hemoglobin 11.2, hematocrit 34.8, platelet count was 419, 9 days ago white count was 15.  Patient's metabolic shows a glucose of 120, sodium was 128, creatinine is 5.73, GFR 7, 9 days ago the patient's creatinine was 2.17 with a GFR of 22.  4 months ago the GFR was 44.  Patient's lipase is 119 troponin was 23 9 days ago was 8.  CRP is 6.224 months ago was 7.09 lactic is 2.0, sed rate 29.  I have ordered a second liter of normal saline wide open and 25 mcg of fentanyl IV push.  The  patient's metabolic panel shows a glucose of 120 sodium was 128 potassium 6.7 hemolyzed chloride 104 bicarb of 8 BUN was 138 creatinine 5.73, anion gap of 23.  The patient was given sodium bicarb drip and D5 at 125 mL/h, she was given 5 units of regular insulin followed by 25 g of D50 and 2 g of calcium gluconate.  Will recheck the patient's metabolic panel after treatment.  After their first IV bolus repeat perfusion exam was done.  Patient shows good perfusion to her extremities.  EKG interpreted by me at 2116 hrs. normal sinus rhythm with sinus arrhythmia rate 90  QRS was 82  QTc was 464 there is some artifact but I do not see any evidence of any peaked T waves concerning for hyperkalemia.  Patient's blood gas shows a pH of 7.16P CO2 of 28 pO2 of 47 sodium 125 K was 7.6 lactic is 2.3 base excess is -17.3.    CT scan of the head shows no acute intracranial hemorrhage or mass effect or midline shift, encephalomalacia in the left cerebellum suggestive of remote infarct, additional nonspecific scattered white matter hypodensities favored to represent sequelae of small vessel ischemia, chest x-ray shows no acute cardiopulmonary disease, x-rays of the tib-fib show osteopenia no evidence of any bony abnormality no soft tissue gas is identified, x-ray of the feet showed demineralization no other bony abnormalities noted.  No soft tissue gas is seen.  Patient was evaluated by ICU and Dr. Varela.  The patient received bicarb IV push.  Patient's blood gas was rechecked pH is now 7.31 pCO2 29 pO2 of 33 base excess is now -10.4 which shows improvement.  Patient's repeat metabolic shows glucose 173 sodium 129 potassium is 5.2  creatinine 5.21 with a GFR of 8.  Patient's repeat troponin was 25.  Patient remains in fair condition.  She was accepted to the hospitalist service noted to [facility].  Most recent vital signs blood pressure 162/68 heart rate 92 respirations 18 pulse ox is  97%.          Procedure  Critical Care    Performed by: Gurwinder Mcfarland PA-C  Authorized by: Varinder Cassidy DO    Critical care provider statement:     Critical care time (minutes):  63    Critical care time was exclusive of:  Separately billable procedures and treating other patients    Critical care was necessary to treat or prevent imminent or life-threatening deterioration of the following conditions:  Sepsis and dehydration    Critical care was time spent personally by me on the following activities:  Development of treatment plan with patient or surrogate, discussions with consultants, evaluation of patient's response to treatment, examination of patient, obtaining history from patient or surrogate, ordering and review of laboratory studies, ordering and review of radiographic studies, pulse oximetry, re-evaluation of patient's condition and review of old charts    Care discussed with: admitting provider         Gurwinder Mcfarland PA-C  05/25/24 0153

## 2024-05-25 ENCOUNTER — APPOINTMENT (OUTPATIENT)
Dept: CARDIOLOGY | Facility: HOSPITAL | Age: 84
DRG: 871 | End: 2024-05-25
Payer: MEDICARE

## 2024-05-25 PROBLEM — I50.33 ACUTE ON CHRONIC DIASTOLIC (CONGESTIVE) HEART FAILURE (MULTI): Status: ACTIVE | Noted: 2024-05-25

## 2024-05-25 PROBLEM — L03.115 BILATERAL CELLULITIS OF LOWER LEG: Status: ACTIVE | Noted: 2024-05-25

## 2024-05-25 PROBLEM — L03.90 SEPSIS DUE TO CELLULITIS (MULTI): Status: ACTIVE | Noted: 2024-05-25

## 2024-05-25 PROBLEM — L89.152 PRESSURE INJURY OF SACRAL REGION, STAGE 2 (MULTI): Status: ACTIVE | Noted: 2024-05-25

## 2024-05-25 PROBLEM — A41.9 SEPSIS DUE TO CELLULITIS (MULTI): Status: ACTIVE | Noted: 2024-05-25

## 2024-05-25 PROBLEM — N18.32: Status: ACTIVE | Noted: 2024-05-25

## 2024-05-25 PROBLEM — E87.29 HIGH ANION GAP METABOLIC ACIDOSIS: Status: ACTIVE | Noted: 2024-05-25

## 2024-05-25 PROBLEM — E87.1 HYPONATREMIA: Status: ACTIVE | Noted: 2024-05-25

## 2024-05-25 PROBLEM — E87.5 HYPERKALEMIA: Status: ACTIVE | Noted: 2024-05-25

## 2024-05-25 PROBLEM — G93.41 ACUTE METABOLIC ENCEPHALOPATHY: Status: ACTIVE | Noted: 2024-05-25

## 2024-05-25 PROBLEM — N17.9 ACUTE RENAL FAILURE (ARF) (CMS-HCC): Status: ACTIVE | Noted: 2024-05-25

## 2024-05-25 PROBLEM — L03.116 BILATERAL CELLULITIS OF LOWER LEG: Status: ACTIVE | Noted: 2024-05-25

## 2024-05-25 PROBLEM — J96.01 ACUTE HYPOXIC RESPIRATORY FAILURE (MULTI): Status: ACTIVE | Noted: 2024-05-25

## 2024-05-25 PROBLEM — N17.9: Status: ACTIVE | Noted: 2024-05-25

## 2024-05-25 LAB
ALBUMIN SERPL BCP-MCNC: 3.1 G/DL (ref 3.4–5)
ALP SERPL-CCNC: 70 U/L (ref 33–136)
ALT SERPL W P-5'-P-CCNC: 13 U/L (ref 7–45)
ANION GAP BLDA CALCULATED.4IONS-SCNC: 15 MMO/L (ref 10–25)
ANION GAP BLDV CALCULATED.4IONS-SCNC: 16 MMOL/L (ref 10–25)
ANION GAP SERPL CALC-SCNC: 19 MMOL/L (ref 10–20)
AORTIC VALVE MEAN GRADIENT: 22 MMHG
AORTIC VALVE PEAK VELOCITY: 2.81 M/S
APPEARANCE UR: CLEAR
ARTERIAL PATENCY WRIST A: POSITIVE
AST SERPL W P-5'-P-CCNC: 31 U/L (ref 9–39)
AV PEAK GRADIENT: 31.6 MMHG
AVA (PEAK VEL): 1.29 CM2
AVA (VTI): 1.17 CM2
BACTERIA #/AREA URNS AUTO: ABNORMAL /HPF
BASE EXCESS BLDA CALC-SCNC: -9.7 MMOL/L (ref -2–3)
BASE EXCESS BLDV CALC-SCNC: -10.4 MMOL/L (ref -2–3)
BASOPHILS # BLD AUTO: 0.02 X10*3/UL (ref 0–0.1)
BASOPHILS NFR BLD AUTO: 0.1 %
BILIRUB DIRECT SERPL-MCNC: 0.1 MG/DL (ref 0–0.3)
BILIRUB SERPL-MCNC: 0.3 MG/DL (ref 0–1.2)
BILIRUB UR STRIP.AUTO-MCNC: NEGATIVE MG/DL
BODY TEMPERATURE: ABNORMAL
BODY TEMPERATURE: ABNORMAL
BUN SERPL-MCNC: 128 MG/DL (ref 6–23)
CA-I BLDA-SCNC: 1.19 MMOL/L (ref 1.1–1.33)
CA-I BLDV-SCNC: 1.14 MMOL/L (ref 1.1–1.33)
CALCIUM SERPL-MCNC: 8.4 MG/DL (ref 8.6–10.3)
CHLORIDE BLDA-SCNC: 106 MMOL/L (ref 98–107)
CHLORIDE BLDV-SCNC: 106 MMOL/L (ref 98–107)
CHLORIDE SERPL-SCNC: 105 MMOL/L (ref 98–107)
CO2 SERPL-SCNC: 14 MMOL/L (ref 21–32)
COLOR UR: YELLOW
CREAT SERPL-MCNC: 4.88 MG/DL (ref 0.5–1.05)
CREAT UR-MCNC: 61.2 MG/DL (ref 20–320)
EGFRCR SERPLBLD CKD-EPI 2021: 8 ML/MIN/1.73M*2
EJECTION FRACTION APICAL 4 CHAMBER: 65.7
EOSINOPHIL # BLD AUTO: 0 X10*3/UL (ref 0–0.4)
EOSINOPHIL NFR BLD AUTO: 0 %
ERYTHROCYTE [DISTWIDTH] IN BLOOD BY AUTOMATED COUNT: 18.8 % (ref 11.5–14.5)
EST. AVERAGE GLUCOSE BLD GHB EST-MCNC: 108 MG/DL
GLUCOSE BLD MANUAL STRIP-MCNC: 101 MG/DL (ref 74–99)
GLUCOSE BLD MANUAL STRIP-MCNC: 102 MG/DL (ref 74–99)
GLUCOSE BLD MANUAL STRIP-MCNC: 108 MG/DL (ref 74–99)
GLUCOSE BLD MANUAL STRIP-MCNC: 113 MG/DL (ref 74–99)
GLUCOSE BLD MANUAL STRIP-MCNC: 121 MG/DL (ref 74–99)
GLUCOSE BLD MANUAL STRIP-MCNC: 77 MG/DL (ref 74–99)
GLUCOSE BLDA-MCNC: 101 MG/DL (ref 74–99)
GLUCOSE BLDV-MCNC: 65 MG/DL (ref 74–99)
GLUCOSE SERPL-MCNC: 125 MG/DL (ref 74–99)
GLUCOSE UR STRIP.AUTO-MCNC: NEGATIVE MG/DL
HBA1C MFR BLD: 5.4 %
HCO3 BLDA-SCNC: 13.9 MMOL/L (ref 22–26)
HCO3 BLDV-SCNC: 14.6 MMOL/L (ref 22–26)
HCT VFR BLD AUTO: 27.5 % (ref 36–46)
HCT VFR BLD EST: 28 % (ref 36–46)
HCT VFR BLD EST: 35 % (ref 36–46)
HGB BLD-MCNC: 8.9 G/DL (ref 12–16)
HGB BLDA-MCNC: 9.2 G/DL (ref 12–16)
HGB BLDV-MCNC: 11.5 G/DL (ref 12–16)
HOLD SPECIMEN: NORMAL
HOLD SPECIMEN: NORMAL
HYALINE CASTS #/AREA URNS AUTO: ABNORMAL /LPF
IMM GRANULOCYTES # BLD AUTO: 0.32 X10*3/UL (ref 0–0.5)
IMM GRANULOCYTES NFR BLD AUTO: 1.2 % (ref 0–0.9)
INHALED O2 CONCENTRATION: 100 %
INHALED O2 CONCENTRATION: 21 %
KETONES UR STRIP.AUTO-MCNC: NEGATIVE MG/DL
LACTATE BLDA-SCNC: 1 MMOL/L (ref 0.4–2)
LACTATE BLDV-SCNC: 2.2 MMOL/L (ref 0.4–2)
LEFT ATRIUM VOLUME AREA LENGTH INDEX BSA: 18.7 ML/M2
LEFT VENTRICLE INTERNAL DIMENSION DIASTOLE: 4.51 CM (ref 3.5–6)
LEFT VENTRICULAR OUTFLOW TRACT DIAMETER: 1.93 CM
LEUKOCYTE ESTERASE UR QL STRIP.AUTO: NEGATIVE
LV EJECTION FRACTION BIPLANE: 65 %
LYMPHOCYTES # BLD AUTO: 0.54 X10*3/UL (ref 0.8–3)
LYMPHOCYTES NFR BLD AUTO: 2 %
MCH RBC QN AUTO: 24.5 PG (ref 26–34)
MCHC RBC AUTO-ENTMCNC: 32.4 G/DL (ref 32–36)
MCV RBC AUTO: 76 FL (ref 80–100)
MITRAL VALVE E/A RATIO: 0.59
MITRAL VALVE E/E' RATIO: 10.74
MONOCYTES # BLD AUTO: 1.23 X10*3/UL (ref 0.05–0.8)
MONOCYTES NFR BLD AUTO: 4.5 %
MUCOUS THREADS #/AREA URNS AUTO: ABNORMAL /LPF
NEUTROPHILS # BLD AUTO: 25.14 X10*3/UL (ref 1.6–5.5)
NEUTROPHILS NFR BLD AUTO: 92.2 %
NITRITE UR QL STRIP.AUTO: NEGATIVE
NRBC BLD-RTO: 0.1 /100 WBCS (ref 0–0)
OSMOLALITY UR: 399 MOSM/KG (ref 200–1200)
OXYHGB MFR BLDA: 94.8 % (ref 94–98)
OXYHGB MFR BLDV: 42.8 % (ref 45–75)
PCO2 BLDA: 23 MM HG (ref 38–42)
PCO2 BLDV: 29 MM HG (ref 41–51)
PH BLDA: 7.39 PH (ref 7.38–7.42)
PH BLDV: 7.31 PH (ref 7.33–7.43)
PH UR STRIP.AUTO: 5 [PH]
PLATELET # BLD AUTO: 311 X10*3/UL (ref 150–450)
PO2 BLDA: 96 MM HG (ref 85–95)
PO2 BLDV: 33 MM HG (ref 35–45)
POTASSIUM BLDA-SCNC: 4.5 MMOL/L (ref 3.5–5.3)
POTASSIUM BLDV-SCNC: 4.3 MMOL/L (ref 3.5–5.3)
POTASSIUM SERPL-SCNC: 4.5 MMOL/L (ref 3.5–5.3)
PROT SERPL-MCNC: 5.7 G/DL (ref 6.4–8.2)
PROT UR STRIP.AUTO-MCNC: NEGATIVE MG/DL
RBC # BLD AUTO: 3.64 X10*6/UL (ref 4–5.2)
RBC # UR STRIP.AUTO: ABNORMAL /UL
RBC #/AREA URNS AUTO: ABNORMAL /HPF
RIGHT VENTRICLE FREE WALL PEAK S': 15.9 CM/S
RIGHT VENTRICLE PEAK SYSTOLIC PRESSURE: 29.8 MMHG
SAO2 % BLDA: 96 % (ref 94–100)
SAO2 % BLDV: 44 % (ref 45–75)
SODIUM BLDA-SCNC: 130 MMOL/L (ref 136–145)
SODIUM BLDV-SCNC: 132 MMOL/L (ref 136–145)
SODIUM SERPL-SCNC: 133 MMOL/L (ref 136–145)
SODIUM UR-SCNC: 38 MMOL/L
SODIUM/CREAT UR-RTO: 62 MMOL/G CREAT
SP GR UR STRIP.AUTO: 1.01
SPECIMEN DRAWN FROM PATIENT: ABNORMAL
SQUAMOUS #/AREA URNS AUTO: ABNORMAL /HPF
TRICUSPID ANNULAR PLANE SYSTOLIC EXCURSION: 1.9 CM
TSH SERPL-ACNC: 0.53 MIU/L (ref 0.44–3.98)
UROBILINOGEN UR STRIP.AUTO-MCNC: <2 MG/DL
VANCOMYCIN SERPL-MCNC: 21.1 UG/ML (ref 5–20)
WBC # BLD AUTO: 27.3 X10*3/UL (ref 4.4–11.3)
WBC #/AREA URNS AUTO: ABNORMAL /HPF

## 2024-05-25 PROCEDURE — 82947 ASSAY GLUCOSE BLOOD QUANT: CPT

## 2024-05-25 PROCEDURE — 82248 BILIRUBIN DIRECT: CPT | Performed by: INTERNAL MEDICINE

## 2024-05-25 PROCEDURE — 85025 COMPLETE CBC W/AUTO DIFF WBC: CPT | Performed by: STUDENT IN AN ORGANIZED HEALTH CARE EDUCATION/TRAINING PROGRAM

## 2024-05-25 PROCEDURE — 99221 1ST HOSP IP/OBS SF/LOW 40: CPT | Performed by: PLASTIC SURGERY

## 2024-05-25 PROCEDURE — 81001 URINALYSIS AUTO W/SCOPE: CPT | Performed by: PHYSICIAN ASSISTANT

## 2024-05-25 PROCEDURE — 87070 CULTURE OTHR SPECIMN AEROBIC: CPT | Mod: ELYLAB | Performed by: INTERNAL MEDICINE

## 2024-05-25 PROCEDURE — 99223 1ST HOSP IP/OBS HIGH 75: CPT | Performed by: STUDENT IN AN ORGANIZED HEALTH CARE EDUCATION/TRAINING PROGRAM

## 2024-05-25 PROCEDURE — 82947 ASSAY GLUCOSE BLOOD QUANT: CPT | Mod: 91

## 2024-05-25 PROCEDURE — 82570 ASSAY OF URINE CREATININE: CPT | Performed by: STUDENT IN AN ORGANIZED HEALTH CARE EDUCATION/TRAINING PROGRAM

## 2024-05-25 PROCEDURE — 2500000001 HC RX 250 WO HCPCS SELF ADMINISTERED DRUGS (ALT 637 FOR MEDICARE OP): Performed by: STUDENT IN AN ORGANIZED HEALTH CARE EDUCATION/TRAINING PROGRAM

## 2024-05-25 PROCEDURE — 93306 TTE W/DOPPLER COMPLETE: CPT

## 2024-05-25 PROCEDURE — 36415 COLL VENOUS BLD VENIPUNCTURE: CPT | Performed by: PHYSICIAN ASSISTANT

## 2024-05-25 PROCEDURE — 93306 TTE W/DOPPLER COMPLETE: CPT | Performed by: INTERNAL MEDICINE

## 2024-05-25 PROCEDURE — 84132 ASSAY OF SERUM POTASSIUM: CPT | Mod: 91 | Performed by: STUDENT IN AN ORGANIZED HEALTH CARE EDUCATION/TRAINING PROGRAM

## 2024-05-25 PROCEDURE — 84443 ASSAY THYROID STIM HORMONE: CPT | Performed by: INTERNAL MEDICINE

## 2024-05-25 PROCEDURE — 83036 HEMOGLOBIN GLYCOSYLATED A1C: CPT | Mod: ELYLAB | Performed by: STUDENT IN AN ORGANIZED HEALTH CARE EDUCATION/TRAINING PROGRAM

## 2024-05-25 PROCEDURE — 84132 ASSAY OF SERUM POTASSIUM: CPT | Mod: 91 | Performed by: INTERNAL MEDICINE

## 2024-05-25 PROCEDURE — 2500000001 HC RX 250 WO HCPCS SELF ADMINISTERED DRUGS (ALT 637 FOR MEDICARE OP): Performed by: INTERNAL MEDICINE

## 2024-05-25 PROCEDURE — 2500000005 HC RX 250 GENERAL PHARMACY W/O HCPCS: Performed by: STUDENT IN AN ORGANIZED HEALTH CARE EDUCATION/TRAINING PROGRAM

## 2024-05-25 PROCEDURE — 83935 ASSAY OF URINE OSMOLALITY: CPT | Mod: ELYLAB | Performed by: STUDENT IN AN ORGANIZED HEALTH CARE EDUCATION/TRAINING PROGRAM

## 2024-05-25 PROCEDURE — 36415 COLL VENOUS BLD VENIPUNCTURE: CPT | Performed by: STUDENT IN AN ORGANIZED HEALTH CARE EDUCATION/TRAINING PROGRAM

## 2024-05-25 PROCEDURE — 84132 ASSAY OF SERUM POTASSIUM: CPT | Mod: 91 | Performed by: PHYSICIAN ASSISTANT

## 2024-05-25 PROCEDURE — 2500000004 HC RX 250 GENERAL PHARMACY W/ HCPCS (ALT 636 FOR OP/ED): Performed by: STUDENT IN AN ORGANIZED HEALTH CARE EDUCATION/TRAINING PROGRAM

## 2024-05-25 PROCEDURE — 84145 PROCALCITONIN (PCT): CPT | Mod: ELYLAB | Performed by: INTERNAL MEDICINE

## 2024-05-25 PROCEDURE — 80202 ASSAY OF VANCOMYCIN: CPT

## 2024-05-25 PROCEDURE — 1200000002 HC GENERAL ROOM WITH TELEMETRY DAILY

## 2024-05-25 RX ORDER — ACETAMINOPHEN 160 MG/5ML
650 SOLUTION ORAL EVERY 4 HOURS PRN
Status: DISCONTINUED | OUTPATIENT
Start: 2024-05-25 | End: 2024-05-30 | Stop reason: HOSPADM

## 2024-05-25 RX ORDER — MORPHINE SULFATE 2 MG/ML
2 INJECTION, SOLUTION INTRAMUSCULAR; INTRAVENOUS ONCE
Status: COMPLETED | OUTPATIENT
Start: 2024-05-26 | End: 2024-05-26

## 2024-05-25 RX ORDER — OXYCODONE AND ACETAMINOPHEN 5; 325 MG/1; MG/1
1 TABLET ORAL ONCE
Status: COMPLETED | OUTPATIENT
Start: 2024-05-25 | End: 2024-05-25

## 2024-05-25 RX ORDER — SODIUM BICARBONATE 1 MEQ/ML
100 SYRINGE (ML) INTRAVENOUS ONCE
Status: COMPLETED | OUTPATIENT
Start: 2024-05-25 | End: 2024-05-25

## 2024-05-25 RX ORDER — ACETAMINOPHEN 650 MG/1
650 SUPPOSITORY RECTAL EVERY 4 HOURS PRN
Status: DISCONTINUED | OUTPATIENT
Start: 2024-05-25 | End: 2024-05-30 | Stop reason: HOSPADM

## 2024-05-25 RX ORDER — DEXTROSE 50 % IN WATER (D50W) INTRAVENOUS SYRINGE
25
Status: DISCONTINUED | OUTPATIENT
Start: 2024-05-25 | End: 2024-05-30 | Stop reason: HOSPADM

## 2024-05-25 RX ORDER — ACETAMINOPHEN 325 MG/1
650 TABLET ORAL EVERY 4 HOURS PRN
Status: DISCONTINUED | OUTPATIENT
Start: 2024-05-25 | End: 2024-05-30 | Stop reason: HOSPADM

## 2024-05-25 RX ORDER — VANCOMYCIN HYDROCHLORIDE 1 G/20ML
INJECTION, POWDER, LYOPHILIZED, FOR SOLUTION INTRAVENOUS DAILY PRN
Status: DISCONTINUED | OUTPATIENT
Start: 2024-05-25 | End: 2024-05-27

## 2024-05-25 RX ORDER — OXYCODONE AND ACETAMINOPHEN 5; 325 MG/1; MG/1
1 TABLET ORAL EVERY 6 HOURS PRN
Status: DISCONTINUED | OUTPATIENT
Start: 2024-05-25 | End: 2024-05-25

## 2024-05-25 RX ORDER — OXYCODONE AND ACETAMINOPHEN 10; 325 MG/1; MG/1
1 TABLET ORAL EVERY 6 HOURS PRN
Status: DISCONTINUED | OUTPATIENT
Start: 2024-05-25 | End: 2024-05-30 | Stop reason: HOSPADM

## 2024-05-25 RX ORDER — TALC
3 POWDER (GRAM) TOPICAL NIGHTLY PRN
Status: DISCONTINUED | OUTPATIENT
Start: 2024-05-25 | End: 2024-05-30 | Stop reason: HOSPADM

## 2024-05-25 RX ORDER — POLYETHYLENE GLYCOL 3350 17 G/17G
17 POWDER, FOR SOLUTION ORAL DAILY
Status: DISCONTINUED | OUTPATIENT
Start: 2024-05-25 | End: 2024-05-30 | Stop reason: HOSPADM

## 2024-05-25 RX ORDER — MENTHOL AND ZINC OXIDE .44; 20.625 G/100G; G/100G
1 OINTMENT TOPICAL ONCE AS NEEDED
Status: COMPLETED | OUTPATIENT
Start: 2024-05-25 | End: 2024-05-25

## 2024-05-25 RX ORDER — SODIUM BICARBONATE 1 MEQ/ML
50 SYRINGE (ML) INTRAVENOUS ONCE
Status: DISCONTINUED | OUTPATIENT
Start: 2024-05-25 | End: 2024-05-25

## 2024-05-25 RX ORDER — LEVOTHYROXINE SODIUM 75 UG/1
150 TABLET ORAL
Status: DISCONTINUED | OUTPATIENT
Start: 2024-05-25 | End: 2024-05-30 | Stop reason: HOSPADM

## 2024-05-25 RX ORDER — DEXTROSE 50 % IN WATER (D50W) INTRAVENOUS SYRINGE
12.5
Status: DISCONTINUED | OUTPATIENT
Start: 2024-05-25 | End: 2024-05-30 | Stop reason: HOSPADM

## 2024-05-25 RX ORDER — IPRATROPIUM BROMIDE AND ALBUTEROL SULFATE 2.5; .5 MG/3ML; MG/3ML
3 SOLUTION RESPIRATORY (INHALATION)
Status: DISCONTINUED | OUTPATIENT
Start: 2024-05-25 | End: 2024-05-30 | Stop reason: HOSPADM

## 2024-05-25 RX ORDER — SILVER SULFADIAZINE 10 G/1000G
CREAM TOPICAL 2 TIMES DAILY
Status: DISCONTINUED | OUTPATIENT
Start: 2024-05-25 | End: 2024-05-30 | Stop reason: HOSPADM

## 2024-05-25 RX ORDER — INSULIN LISPRO 100 [IU]/ML
0-5 INJECTION, SOLUTION INTRAVENOUS; SUBCUTANEOUS
Status: DISCONTINUED | OUTPATIENT
Start: 2024-05-25 | End: 2024-05-30 | Stop reason: HOSPADM

## 2024-05-25 RX ADMIN — SODIUM BICARBONATE 75 ML/HR: 84 INJECTION, SOLUTION INTRAVENOUS at 15:33

## 2024-05-25 RX ADMIN — LEVOTHYROXINE SODIUM 150 MCG: 75 TABLET ORAL at 05:10

## 2024-05-25 RX ADMIN — APIXABAN 5 MG: 5 TABLET, FILM COATED ORAL at 10:39

## 2024-05-25 RX ADMIN — SODIUM CHLORIDE 10 ML: 9 INJECTION, SOLUTION INTRAVENOUS at 10:39

## 2024-05-25 RX ADMIN — OXYCODONE AND ACETAMINOPHEN 1 TABLET: 10; 325 TABLET ORAL at 22:46

## 2024-05-25 RX ADMIN — OXYCODONE HYDROCHLORIDE AND ACETAMINOPHEN 1 TABLET: 5; 325 TABLET ORAL at 04:47

## 2024-05-25 RX ADMIN — SODIUM CHLORIDE 10 ML: 9 INJECTION, SOLUTION INTRAVENOUS at 22:00

## 2024-05-25 RX ADMIN — SODIUM BICARBONATE 100 MEQ: 84 INJECTION INTRAVENOUS at 00:42

## 2024-05-25 RX ADMIN — Medication 1 APPLICATION: at 02:27

## 2024-05-25 RX ADMIN — PIPERACILLIN SODIUM AND TAZOBACTAM SODIUM 3.38 G: 3; .375 INJECTION, SOLUTION INTRAVENOUS at 10:42

## 2024-05-25 RX ADMIN — APIXABAN 5 MG: 5 TABLET, FILM COATED ORAL at 21:30

## 2024-05-25 RX ADMIN — OXYCODONE HYDROCHLORIDE AND ACETAMINOPHEN 1 TABLET: 5; 325 TABLET ORAL at 11:31

## 2024-05-25 RX ADMIN — PIPERACILLIN SODIUM AND TAZOBACTAM SODIUM 3.38 G: 3; .375 INJECTION, SOLUTION INTRAVENOUS at 04:54

## 2024-05-25 RX ADMIN — PIPERACILLIN SODIUM AND TAZOBACTAM SODIUM 3.38 G: 3; .375 INJECTION, SOLUTION INTRAVENOUS at 21:30

## 2024-05-25 SDOH — SOCIAL STABILITY: SOCIAL INSECURITY: DOES ANYONE TRY TO KEEP YOU FROM HAVING/CONTACTING OTHER FRIENDS OR DOING THINGS OUTSIDE YOUR HOME?: NO

## 2024-05-25 SDOH — SOCIAL STABILITY: SOCIAL INSECURITY: ARE YOU OR HAVE YOU BEEN THREATENED OR ABUSED PHYSICALLY, EMOTIONALLY, OR SEXUALLY BY ANYONE?: NO

## 2024-05-25 SDOH — SOCIAL STABILITY: SOCIAL INSECURITY: DO YOU FEEL UNSAFE GOING BACK TO THE PLACE WHERE YOU ARE LIVING?: NO

## 2024-05-25 SDOH — SOCIAL STABILITY: SOCIAL INSECURITY: HAVE YOU HAD THOUGHTS OF HARMING ANYONE ELSE?: NO

## 2024-05-25 SDOH — SOCIAL STABILITY: SOCIAL INSECURITY: ARE THERE ANY APPARENT SIGNS OF INJURIES/BEHAVIORS THAT COULD BE RELATED TO ABUSE/NEGLECT?: NO

## 2024-05-25 SDOH — SOCIAL STABILITY: SOCIAL INSECURITY: HAS ANYONE EVER THREATENED TO HURT YOUR FAMILY OR YOUR PETS?: NO

## 2024-05-25 SDOH — SOCIAL STABILITY: SOCIAL INSECURITY: DO YOU FEEL ANYONE HAS EXPLOITED OR TAKEN ADVANTAGE OF YOU FINANCIALLY OR OF YOUR PERSONAL PROPERTY?: NO

## 2024-05-25 SDOH — SOCIAL STABILITY: SOCIAL INSECURITY: WERE YOU ABLE TO COMPLETE ALL THE BEHAVIORAL HEALTH SCREENINGS?: YES

## 2024-05-25 SDOH — SOCIAL STABILITY: SOCIAL INSECURITY: HAVE YOU HAD ANY THOUGHTS OF HARMING ANYONE ELSE?: NO

## 2024-05-25 SDOH — SOCIAL STABILITY: SOCIAL INSECURITY: ABUSE: ADULT

## 2024-05-25 ASSESSMENT — COGNITIVE AND FUNCTIONAL STATUS - GENERAL
MOBILITY SCORE: 9
TOILETING: A LOT
HELP NEEDED FOR BATHING: TOTAL
MOVING FROM LYING ON BACK TO SITTING ON SIDE OF FLAT BED WITH BEDRAILS: A LOT
TURNING FROM BACK TO SIDE WHILE IN FLAT BAD: A LOT
STANDING UP FROM CHAIR USING ARMS: TOTAL
DRESSING REGULAR LOWER BODY CLOTHING: TOTAL
WALKING IN HOSPITAL ROOM: TOTAL
PATIENT BASELINE BEDBOUND: NO
CLIMB 3 TO 5 STEPS WITH RAILING: TOTAL
DAILY ACTIVITIY SCORE: 13
EATING MEALS: A LITTLE
MOVING TO AND FROM BED TO CHAIR: A LOT
DRESSING REGULAR UPPER BODY CLOTHING: A LOT

## 2024-05-25 ASSESSMENT — ACTIVITIES OF DAILY LIVING (ADL)
FEEDING YOURSELF: INDEPENDENT
HEARING - LEFT EAR: DIFFICULTY WITH NOISE
BATHING: NEEDS ASSISTANCE
DRESSING YOURSELF: NEEDS ASSISTANCE
TOILETING: NEEDS ASSISTANCE
HEARING - RIGHT EAR: DIFFICULTY WITH NOISE
ADEQUATE_TO_COMPLETE_ADL: YES
ASSISTIVE_DEVICE: WHEELCHAIR
GROOMING: NEEDS ASSISTANCE
LACK_OF_TRANSPORTATION: NO
JUDGMENT_ADEQUATE_SAFELY_COMPLETE_DAILY_ACTIVITIES: YES
WALKS IN HOME: NEEDS ASSISTANCE
PATIENT'S MEMORY ADEQUATE TO SAFELY COMPLETE DAILY ACTIVITIES?: YES

## 2024-05-25 ASSESSMENT — PATIENT HEALTH QUESTIONNAIRE - PHQ9
1. LITTLE INTEREST OR PLEASURE IN DOING THINGS: SEVERAL DAYS
SUM OF ALL RESPONSES TO PHQ9 QUESTIONS 1 & 2: 2
2. FEELING DOWN, DEPRESSED OR HOPELESS: SEVERAL DAYS

## 2024-05-25 ASSESSMENT — PAIN - FUNCTIONAL ASSESSMENT
PAIN_FUNCTIONAL_ASSESSMENT: 0-10

## 2024-05-25 ASSESSMENT — LIFESTYLE VARIABLES
AUDIT-C TOTAL SCORE: 0
HOW MANY STANDARD DRINKS CONTAINING ALCOHOL DO YOU HAVE ON A TYPICAL DAY: PATIENT DOES NOT DRINK
AUDIT-C TOTAL SCORE: 0
HOW OFTEN DO YOU HAVE 6 OR MORE DRINKS ON ONE OCCASION: NEVER
HOW OFTEN DO YOU HAVE A DRINK CONTAINING ALCOHOL: NEVER
SKIP TO QUESTIONS 9-10: 1

## 2024-05-25 ASSESSMENT — PAIN SCALES - GENERAL
PAINLEVEL_OUTOF10: 7
PAINLEVEL_OUTOF10: 10 - WORST POSSIBLE PAIN
PAINLEVEL_OUTOF10: 9

## 2024-05-25 ASSESSMENT — PAIN DESCRIPTION - LOCATION: LOCATION: LEG

## 2024-05-25 ASSESSMENT — PAIN DESCRIPTION - DESCRIPTORS: DESCRIPTORS: BURNING

## 2024-05-25 NOTE — CONSULTS
PLASTIC  SURGERY  CONSULTATION        Reason for consultation:       Bilateral lower extremity wounds with cellulitis      History of present illness:      84-year-old female admitted for altered mental status has a history of DVT being evaluated for bilateral lower extremity wounds with cellulitis        Past medical history:      History of DVT  COPD  Type 2 diabetes  Chronic kidney disease        Past surgical history:      Cholecystectomy  Appendectomy  Duodenal ulcer repair with Dimas patch      Medications:       Eliquis  Humalog insulin  Levothyroxine  Zosyn  MiraLAX  Cefepime  Metronidazole  Percocet  Vancomycin    ALLERGIES:      Silver  Cephalexin  Cortisone  Diphenhydramine  Prednisone  Codeine  Penicillin        Social history:      Former smoker, nondrinker      Family history:      Reviewed            Review of systems:  At least 10 systems reviewed and are otherwise negative except for as noted in the history of present illness                PHYSICAL  EXAMINATION       Height:   5 foot 2 inches                 weight:         190 pounds                 Vital signs:    Temperature 36.3 pulse 93 blood pressure 140/63    General: Well-developed,   female          in no acute distress,     HEENT:   Within normal limits    Neck:   Supple, no observable masses    Lungs: Clear to auscultation    Heart: Regular rate and rhythm    Abdomen: Soft, nontender    Extremities: Full range of motion; left lower extremity worse than right with areas of partial and full-thickness skin loss with scab formation and surrounding erythema; no areas of fluctuance    Neurological: Grossly within normal limits                Impression:      Partial and full-thickness skin loss bilateral lower extremities      Recommendation:      Start Silvadene cream dressing changes twice daily        Thank you for the referral.    Roland Reyes, MD        *These notes are being done using Dragon voice recognition technology and  may include unintended errors with respect to translation of words, typographical errors or grammar errors which may not have been identified prior to finalization of the chart note.  Patient

## 2024-05-25 NOTE — CONSULTS
"Nutrition Initial Assessment:   Nutrition Assessment    Reason for Assessment: Admission nursing screening    Patient is a 84 y.o. female presenting with acute renal failure.  Pt with past medical history of HFpEF, COPD, type 2 diabetes, hypothyroid      Nutrition History:  Energy Intake:  (no meal intakes recorded at this time)  Food and Nutrient History: RDN consult for MST score of 3. Attempted to meet with pt however pt unavailable due to procedure. Per H&P, pt states that she has not eaten for the past 5 days and has had very little water intake. Pt noted to have infected wounds to BLE. No meal intakes recorded at this time. Will add Glucerna with meals and follow for acceptance. Will continue to monitor.  Vitamin/Herbal Supplement Use: none per home med list  Food Allergies/Intolerances:  None  GI Symptoms:  unable to assess  Oral Problems:  unable to assess       Anthropometrics:  Height: 157.5 cm (5' 2\")   Weight: 86.2 kg (190 lb)   BMI (Calculated): 34.74  IBW/kg (Dietitian Calculated): 50 kg  Percent of IBW: 172 %       Weight History:   Wt Readings from Last 10 Encounters:   05/24/24 86.2 kg (190 lb)   05/15/24 104 kg (230 lb)   01/14/24 95.3 kg (210 lb)   01/06/24 95.7 kg (210 lb 15.7 oz)   12/08/21 89.4 kg (197 lb)      Weight Change %:  Weight History / % Weight Change: Per chart review, pt with 20 lb, 9.5% wt loss in 4 months.  Significant Weight Loss: Yes    Nutrition Focused Physical Exam Findings:  defer: pt unavailable, will attempt at follow up visit.  Subcutaneous Fat Loss:      Muscle Wasting:     Edema:  Edema: none  Physical Findings:  Skin: Positive (infected wounds to BLE per chart review)    Nutrition Significant Labs:  BMP Trend:   Results from last 7 days   Lab Units 05/25/24  0552 05/24/24  2251 05/24/24  1939   GLUCOSE mg/dL 125* 173* 120*   CALCIUM mg/dL 8.4* 7.8* 8.3*   SODIUM mmol/L 133* 129* 128*   POTASSIUM mmol/L 4.5 5.2 6.7*   CO2 mmol/L 14* 11* 8*   CHLORIDE mmol/L 105 106 104 "   BUN mg/dL 128* 129* 138*   CREATININE mg/dL 4.88* 5.21* 5.73*    , A1C:  Lab Results   Component Value Date    HGBA1C 5.4 05/25/2024   , BG POCT trend:   Results from last 7 days   Lab Units 05/25/24  0730 05/25/24  0358 05/25/24  0007 05/24/24 2120   POCT GLUCOSE mg/dL 121* 77 102* 81    , Renal Lab Trend:   Results from last 7 days   Lab Units 05/25/24  0552 05/24/24  2251 05/24/24  1939   POTASSIUM mmol/L 4.5 5.2 6.7*   SODIUM mmol/L 133* 129* 128*   EGFR mL/min/1.73m*2 8* 8* 7*   BUN mg/dL 128* 129* 138*   CREATININE mg/dL 4.88* 5.21* 5.73*        Nutrition Specific Medications:  Reviewed:  apixaban, 5 mg, oral, BID  insulin lispro, 0-5 Units, subcutaneous, TID  levothyroxine, 150 mcg, oral, Daily before breakfast  oxygen, , inhalation, Continuous - Inhalation  piperacillin-tazobactam, 3.375 g, intravenous, q12h   And  sodium chloride, 10 mL, intravenous, q12h  polyethylene glycol, 17 g, oral, Daily         I/O:   Last BM Date: 05/25/24;      Dietary Orders (From admission, onward)       Start     Ordered    05/25/24 0419  May Participate in Room Service  Once        Question:  .  Answer:  Yes    05/25/24 0418 05/25/24 0400  Adult diet Regular  Diet effective now        Question:  Diet type  Answer:  Regular    05/25/24 0359                     Estimated Needs:   Total Energy Estimated Needs (kCal): 2155 kCal  Method for Estimating Needs: 25 kcal/kg ABW  Total Protein Estimated Needs (g): 103 g  Method for Estimating Needs: 1.2 g/kgABW  Total Fluid Estimated Needs (mL): 2155 mL  Method for Estimating Needs: 1 ml/kcal or per MD        Nutrition Diagnosis   Malnutrition Diagnosis  Patient has Malnutrition Diagnosis: Yes  Diagnosis Status: New  Malnutrition Diagnosis: Severe malnutrition related to acute disease or injury  As Evidenced by: <50% of estimated energy requirements for 5 days, 9.5% wt loss in 4 months    Nutrition Diagnosis  Patient has Nutrition Diagnosis: Yes  Diagnosis Status (1):  New  Nutrition Diagnosis 1: Increased nutrient needs  Related to (1): healing  As Evidenced by (1): infected wounds to BLE       Nutrition Interventions/Recommendations         Nutrition Prescription:  Individualized Nutrition Prescription Provided for : Continue Regular diet to help promote po intake, monitor blood sugar and need for carn control diet. Glucerna Shake with meals        Nutrition Interventions:   Interventions: Meals and snacks, Medical food supplement  Meals and Snacks: General healthful diet  Goal: Consumes 3 meals per day  Medical Food Supplement: Commercial beverage  Goal: Glucerna TID (provides 220 kcal, 10 g protein per serving)         Nutrition Education:   No needs at this time, will re-assess at follow up       Nutrition Monitoring and Evaluation   Food/Nutrient Related History Monitoring  Monitoring and Evaluation Plan: Energy intake, Amount of food, Fluid intake  Energy Intake: Estimated energy intake  Criteria: Pt meets >75% of estimated energy needs  Fluid Intake: Estimated fluid intake  Criteria: Meets >75% of estimated fluid needs  Amount of Food: Estimated amout of food, Medical food intake  Criteria: Pt consumes >75% of meals and supplements    Body Composition/Growth/Weight History  Monitoring and Evaluation Plan: Weight  Weight: Measured weight  Criteria: Maintains stable weight    Biochemical Data, Medical Tests and Procedures  Monitoring and Evaluation Plan: Glucose/endocrine profile, Electrolyte/renal panel  Electrolyte and Renal Panel: Sodium, Potassium, Phosphorus  Criteria: Electrolytes WNL  Glucose/Endocrine Profile: Glucose, casual  Criteria: BG within desirable range    Nutrition Focused Physical Findings  Monitoring and Evaluation Plan: Skin  Skin: Impaired wound healing  Criteria: Promote wound healing through adequate nutrition       Time Spent/Follow-up Reminder:   Time Spent (min): 30 minutes  Last Date of Nutrition Visit: 05/25/24  Nutrition Follow-Up Needed?: 3-5  days  Follow up Comment: al CACERES

## 2024-05-25 NOTE — H&P
Medical Group History and Physical      ASSESSMENT & PLAN:     #.  Acute renal failure (superimposed on CKD3) with severe high anion gap metabolic acidosis and hyperkalemia  #.  Sepsis secondary to bilateral leg cellulitis  #.  Acute hypoxic respiratory failure likely secondary to decompensated HFpEF versus new onset systolic heart failure  #.  Acute metabolic encephalopathy likely secondary to uremia versus sepsis  #.  COPD - not in acute exacerbation  #.  Sacral decubitus ulcer  #.  Hyponatremia  #.  History of DVT  #.  Long-term anticoagulation use  #.  Type 2 diabetes  -SCr 5.73 (baseline appears to be around 1.5), , K 6.7, bicarb 8  -This is likely prerenal due to reduced p.o. intake in the setting sepsis, CK elevated but not suggestive of rhabdomyolysis  -S/p 2L NS and started on bicarb drip, now likely developing worsening pulmonary edema causing acute hypoxic respiratory failure  -CXR shows evidence of pulmonary vascular congestion as well as possible small pleural effusions  -Significant leukocytosis noted with WBC count of 31.3 and left shift  -Last echocardiogram in 2021 showing EF of 55% and diastolic dysfunction  -Prior cultures from leg wounds have grown MRSA and Pseudomonas (susceptible to Zosyn)    Plan:  -Will attempt to correct severe metabolic acidosis with pushes of IV bicarb, will avoid continuous drip as to not worsen pulmonary edema and respiratory status  -Serial VBG's  -Nephrology consult  -Consider diuretics for fluid overload and to augment urine output, will hold off for now given sepsis  -Avoid further fluids  -Continue supplemental oxygen, wean as tolerated  -Given prior cultures will continue vancomycin and change cefepime to Zosyn as to not worsen encephalopathy  -Obtain formal echocardiogram  -Consider cardiology consult pending echocardiogram results  -Strict intake and output, daily weights  -Continue home Eliquis  -Insulin sliding scale, hypoglycemic protocol, repeat  A1c  -DuoNebs as needed  -Wound care consult for sacral decubitus ulcer  -Will send urine studies for further evaluation of hyponatremia, suspect this is hypotonic hyponatremia in the setting of poor p.o. intake  -Insert Shah catheter  -Telemetry, continuous pulse ox    VTE PPX: Eliquis      Gurwinder Varela MD    --Of note, this documentation is completed using the Dragon Dictation system (voice recognition software). There may be spelling and/or grammatical errors that were not corrected prior to final submission.--    HISTORY OF PRESENT ILLNESS:   Chief Complaint: Altered mental status    Cheryl Elena is a 84 y.o. female with a history of DVT on Eliquis, HFpEF, COPD, type 2 diabetes presenting with increasing confusion and altered mental status.  Unclear onset as patient is confused and family is not present.  She does report that her lower extremities are more painful, swollen, and red than usual.  She states that she has not eaten for the past 5 days and has had very little water intake.  She feels very weak overall.  She denies any fever or chills.  She also denies chest pain or urinary symptoms.  She does feel slightly short of breath.  She does not feel wheezy.  Initial labs notable for markedly elevated creatinine of 5.73 with BUN of 138 and potassium of 6.7 and bicarb of 8.  BNP elevated to 106.  Troponin 23 with repeat increasing to 25.  Anion gap 23.  Patient was treated with IV insulin and started on a bicarb drip with potassium normalizing following this.  Was also started on broad-spectrum antibiotics, blood cultures were drawn, and she was given 2 L of normal saline.  VBG showed pH of 7.16 with pCO2 of 28 and bicarb of 10.  CXR showed pulmonary vascular congestion and possible small pleural effusions.  Patient initially was on room air but then became hypoxic and is now requiring 3 L.  Bicarb drip was then stopped due to worsening fluid overload and she was given an IV push of 100 mEq of sodium  bicarb.  Shah catheter was placed.       ROS  10 point review of systems negative except per HPI     PAST HISTORIES:     Past Medical History  See HPI    Surgical History  Cholecystectomy, appendectomy, laparoscopic duodenal ulcer repair with Dimas patch      Social History  She reports that she quit smoking about 8 years ago. Her smoking use included cigarettes. She has never used smokeless tobacco. No history on file for alcohol use and drug use.    Family History  No family history on file.    Allergies:  Silver (bulk), Cephalexin, Cortisone, Diphenhydramine hcl, Prednisone, Codeine, and Penicillins      OBJECTIVE:      Last Recorded Vitals  /68 (BP Location: Right arm, Patient Position: Lying)   Pulse 92   Temp 35.9 °C (96.6 °F) (Temporal)   Resp 18   Wt 86.2 kg (190 lb)   SpO2 97%     Last I/O:  No intake/output data recorded.    Physical Exam   Gen: NAD, appears stated age  HEENT: EOM, MMM  CV: RRR, no murmurs rubs or gallops  Resp: On nasal cannula, no wheezes, bibasilar crackles noted  Abdomen: soft, NT,+BS  LE: Significant edema bilaterally with marked erythema warmth and drainage  Neuro: A&Ox2, moving all extremities    LABS AND IMAGING:       Relevant Results  Labs Reviewed   CBC WITH AUTO DIFFERENTIAL - Abnormal       Result Value    WBC 31.3 (*)     nRBC 0.2 (*)     RBC 4.50      Hemoglobin 11.2 (*)     Hematocrit 34.8 (*)     MCV 77 (*)     MCH 24.9 (*)     MCHC 32.2      RDW 19.4 (*)     Platelets 419      Neutrophils % 90.7      Immature Granulocytes %, Automated 3.0 (*)     Lymphocytes % 2.2      Monocytes % 3.9      Eosinophils % 0.0      Basophils % 0.2      Neutrophils Absolute 28.37 (*)     Immature Granulocytes Absolute, Automated 0.93 (*)     Lymphocytes Absolute 0.68 (*)     Monocytes Absolute 1.23 (*)     Eosinophils Absolute 0.00      Basophils Absolute 0.07     COMPREHENSIVE METABOLIC PANEL - Abnormal    Glucose 120 (*)     Sodium 128 (*)     Potassium 6.7 (*)     Chloride 104       Bicarbonate 8 (*)     Anion Gap 23 (*)     Urea Nitrogen 138 (*)     Creatinine 5.73 (*)     eGFR 7 (*)     Calcium 8.3 (*)     Albumin 3.6      Alkaline Phosphatase 91      Total Protein 6.9      AST 32      Bilirubin, Total 0.4      ALT 11     LIPASE - Abnormal    Lipase 119 (*)     Narrative:     Venipuncture immediately after or during the administration of Metamizole may lead to falsely low results. Testing should be performed immediately prior to Metamizole dosing.   BLOOD GAS VENOUS FULL PANEL - Abnormal    POCT pH, Venous 7.16 (*)     POCT pCO2, Venous 28 (*)     POCT pO2, Venous 47 (*)     POCT SO2, Venous 66      POCT Oxy Hemoglobin, Venous 66.1      POCT Hematocrit Calculated, Venous 32.0 (*)     POCT Sodium, Venous 125 (*)     POCT Potassium, Venous 7.6 (*)     POCT Chloride, Venous 104      POCT Ionized Calicum, Venous 1.13      POCT Glucose, Venous 97      POCT Lactate, Venous 2.3 (*)     POCT Base Excess, Venous -17.3 (*)     POCT HCO3 Calculated, Venous 10.0 (*)     POCT Hemoglobin, Venous 10.6 (*)     POCT Anion Gap, Venous 19.0      Patient Temperature        FiO2 100      Critical Called By JEAN-PIERRE RRT      Critical Called To MAITE DO      Critical Call Time 2117      Critical Read Back Y     B-TYPE NATRIURETIC PEPTIDE - Abnormal     (*)     Narrative:        <100 pg/mL - Heart failure unlikely  100-299 pg/mL - Intermediate probability of acute heart                  failure exacerbation. Correlate with clinical                  context and patient history.    >=300 pg/mL - Heart Failure likely. Correlate with clinical                  context and patient history.    BNP testing is performed using different testing methodology at [facility] than at other [facility]. Direct result comparisons should only be made within the same method.      PROTIME-INR - Abnormal    Protime 23.2 (*)     INR 2.0 (*)    C-REACTIVE PROTEIN - Abnormal    C-Reactive Protein 6.22 (*)     SERIAL TROPONIN-INITIAL - Abnormal    Troponin I, High Sensitivity 23 (*)     Narrative:     Less than 99th percentile of normal range cutoff-  Female and children under 18 years old <14 ng/L; Male <21 ng/L: Negative  Repeat testing should be performed if clinically indicated.     Female and children under 18 years old 14-50 ng/L; Male 21-50 ng/L:  Consistent with possible cardiac damage and possible increased clinical   risk. Serial measurements may help to assess extent of myocardial damage.     >50 ng/L: Consistent with cardiac damage, increased clinical risk and  myocardial infarction. Serial measurements may help assess extent of   myocardial damage.      NOTE: Children less than 1 year old may have higher baseline troponin   levels and results should be interpreted in conjunction with the overall   clinical context.     NOTE: Troponin I testing is performed using a different   testing methodology at [facility] than at other   [facility]. Direct result comparisons should only   be made within the same method.   SERIAL TROPONIN, 1 HOUR - Abnormal    Troponin I, High Sensitivity 25 (*)     Narrative:     Less than 99th percentile of normal range cutoff-  Female and children under 18 years old <14 ng/L; Male <21 ng/L: Negative  Repeat testing should be performed if clinically indicated.     Female and children under 18 years old 14-50 ng/L; Male 21-50 ng/L:  Consistent with possible cardiac damage and possible increased clinical   risk. Serial measurements may help to assess extent of myocardial damage.     >50 ng/L: Consistent with cardiac damage, increased clinical risk and  myocardial infarction. Serial measurements may help assess extent of   myocardial damage.      NOTE: Children less than 1 year old may have higher baseline troponin   levels and results should be interpreted in conjunction with the overall   clinical context.     NOTE: Troponin I testing is performed using a different    testing methodology at [facility] than at other   [facility]. Direct result comparisons should only   be made within the same method.   COMPREHENSIVE METABOLIC PANEL - Abnormal    Glucose 173 (*)     Sodium 129 (*)     Potassium 5.2      Chloride 106      Bicarbonate 11 (*)     Anion Gap 17      Urea Nitrogen 129 (*)     Creatinine 5.21 (*)     eGFR 8 (*)     Calcium 7.8 (*)     Albumin 3.1 (*)     Alkaline Phosphatase 80      Total Protein 6.0 (*)     AST 23      Bilirubin, Total 0.3      ALT 11     CREATINE KINASE - Abnormal    Creatine Kinase 633 (*)    BLOOD GAS ARTERIAL - Abnormal    POCT pH, Arterial 7.16 (*)     POCT pCO2, Arterial 30 (*)     POCT pO2, Arterial 35 (*)     POCT SO2, Arterial 42 (*)     POCT Oxy Hemoglobin, Arterial 40.4 (*)     POCT Base Excess, Arterial -16.7 (*)     POCT HCO3 Calculated, Arterial 10.7 (*)     Patient Temperature        FiO2 32      Apparatus CANNULA      Critical Called By JEAN-PIERRE RRT      Critical Called To SUMAN ADAM      Critical Call Time 2320      Critical Read Back Y     POCT GLUCOSE - Abnormal    POCT Glucose 102 (*)    LACTATE - Normal    Lactate 2.0      Narrative:     Venipuncture immediately after or during the administration of Metamizole may lead to falsely low results. Testing should be performed immediately  prior to Metamizole dosing.   SEDIMENTATION RATE, AUTOMATED - Normal    Sedimentation Rate 29     POCT GLUCOSE - Normal    POCT Glucose 81     BLOOD CULTURE   BLOOD CULTURE   TROPONIN SERIES- (INITIAL, 1 HR)    Narrative:     The following orders were created for panel order Troponin I Series, High Sensitivity (0, 1 HR).  Procedure                               Abnormality         Status                     ---------                               -----------         ------                     Troponin I, High Sensiti...[[id number]]  Abnormal            Final result               Troponin, High Sensitivi...[[id number]]  Abnormal             Final result                 Please view results for these tests on the individual orders.   URINALYSIS WITH REFLEX CULTURE AND MICROSCOPIC    Narrative:     The following orders were created for panel order Urinalysis with Reflex Culture and Microscopic.  Procedure                               Abnormality         Status                     ---------                               -----------         ------                     Urinalysis with Reflex C...[[id number]]                                                 Extra Urine Gray Tube[[id number]]                                                         Please view results for these tests on the individual orders.   URINALYSIS WITH REFLEX CULTURE AND MICROSCOPIC   EXTRA URINE GRAY TUBE   BLOOD GAS VENOUS   POCT GLUCOSE METER     XR chest 1 view   Final Result   No acute cardiopulmonary disease.  No significant changes seen when   compared to the prior chest x-ray..   Signed by Shalom Ford MD      XR tibia fibula bilateral 2 views   Final Result   Osteopenia. No acute bony abnormality. No soft tissue gas is   identified.   Signed by Aniceto Moreno MD      XR foot 1-2 views bilateral   Final Result   The patient is demineralized. No acute bony abnormality is seen. No   soft tissue gas is seen.   Signed by Aniceto Moreno MD      CT head wo IV contrast   Final Result   No acute intracranial hemorrhage, mass effect or midline shift.        Encephalomalacia in the left cerebellum suggestive of a remote   infarct. Additional nonspecific scattered white matter hypodensities   favored to represent sequela of small vessel ischemia.                  MACRO:   None.        Signed by: Evan Finkelstein 5/24/2024 9:44 PM   Dictation workstation:   [id number]

## 2024-05-25 NOTE — CONSULTS
Vancomycin Dosing by Pharmacy- INITIAL    Cheryl Elena is a 84 y.o. year old female who Pharmacy has been consulted for vancomycin dosing for cellulitis, skin and soft tissue. Based on the patient's indication and renal status this patient will be dosed based on a goal trough/random level of 15-20.     Renal function is currently declining.    Visit Vitals  /76   Pulse 106   Temp 35.8 °C (96.4 °F)   Resp 18        Lab Results   Component Value Date    CREATININE 5.21 (H) 2024    CREATININE 5.73 (H) 2024    CREATININE 2.17 (H) 05/15/2024    CREATININE 1.19 (H) 2024        Patient weight is as follows:   Vitals:    24   Weight: 86.2 kg (190 lb)       Cultures:  No results found for the encounter in last 14 days.        No intake/output data recorded.  I/O during current shift:  I/O this shift:  In: 2987.5 [I.V.:187.5; IV Piggyback:2800]  Out: -     Temp (24hrs), Av.6 °C (96 °F), Min:34.2 °C (93.6 °F), Max:36.2 °C (97.2 °F)         Assessment/Plan     Patient has already been given a loading dose of 1500 mg.  Will initiate vancomycin maintenance,  dosing based on levels mg every due to RAYMUNDO hours.    This dosing regimen is predicted by [employer] to result in the following pharmacokinetic parameters:  Traditional dosing due to acute renal failure, crcl=8    Follow-up level will be ordered on 24 at 2100 unless clinically indicated sooner.  Will continue to monitor renal function daily while on vancomycin and order serum creatinine at least every 48 hours if not already ordered.  Follow for continued vancomycin needs, clinical response, and signs/symptoms of toxicity.       Jannette Christensen, PharmD

## 2024-05-25 NOTE — PROGRESS NOTES
Respiratory Therapy Note     Latest Reference Range & Units 05/24/24 21:01   POCT pH, Venous 7.33 - 7.43 pH 7.16 (LL)   POCT pCO2, Venous 41 - 51 mm Hg 28 (L)   POCT pO2, Venous 35 - 45 mm Hg 47 (H)   POCT SO2, Venous 45 - 75 % 66   POCT Oxy Hemoglobin, Venous 45.0 - 75.0 % 66.1   POCT Hematocrit Calculated, Venous 36.0 - 46.0 % 32.0 (L)   POCT Sodium, Venous 136 - 145 mmol/L 125 (L)   POCT Potassium, Venous 3.5 - 5.3 mmol/L 7.6 (HH)   POCT Chloride, Venous 98 - 107 mmol/L 104   POCT Ionized Calicum, Venous 1.10 - 1.33 mmol/L 1.13   POCT Glucose, Venous 74 - 99 mg/dL 97   POCT Lactate, Venous 0.4 - 2.0 mmol/L 2.3 (H)   POCT Base Excess, Venous -2.0 - 3.0 mmol/L -17.3 (L)   POCT HCO3 Calculated, Venous 22.0 - 26.0 mmol/L 10.0 (L)   POCT Hemoglobin, Venous 12.0 - 16.0 g/dL 10.6 (L)   POCT Anion Gap, Venous 10.0 - 25.0 mmol/L 19.0   FiO2 % 100   Patient Temperature  COMMENT ONLY   (LL): Data is critically low  (HH): Data is critically high  (L): Data is abnormally low  (H): Data is abnormally high
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
## 2024-05-25 NOTE — PROGRESS NOTES
Respiratory Therapy Note    Please note that the results below are from venous blood. Arterial blood was not obtained due to patient tremors/pulling back arm repeatedly.      Latest Reference Range & Units 05/24/24 23:17   POCT pH, Arterial 7.38 - 7.42 pH 7.16 (LL)   POCT pCO2, Arterial 38 - 42 mm Hg 30 (L)   POCT pO2, Arterial 85 - 95 mm Hg 35 (LL)   POCT SO2, Arterial 94 - 100 % 42 (L)   POCT Oxy Hemoglobin, Arterial 94.0 - 98.0 % 40.4 (L)   POCT Base Excess, Arterial -2.0 - 3.0 mmol/L -16.7 (L)   POCT HCO3 Calculated, Arterial 22.0 - 26.0 mmol/L 10.7 (L)   (LL): Data is critically low  (L): Data is abnormally low

## 2024-05-25 NOTE — SIGNIFICANT EVENT
Pt seen and examined. Alert, oriented to self and year. Pain in BLE. Afebrile. VSS. On room air this morning, satting well. Na improved to 133. Scr down trend to 4.88, . Bicarb  improved to 14. WBC down trend to 27.3k. Hgb drop to 8.9, likely dilutional drop, no active bleeding noted. Nephrology consulted, will fu recs. Cont vanc/zosyn, will consult ID, plastic surgery for infected BLE wounds. Bcx pending, check wound cx. TTE ordered, pending.

## 2024-05-26 LAB
ALBUMIN SERPL BCP-MCNC: 2.9 G/DL (ref 3.4–5)
ANION GAP SERPL CALC-SCNC: 14 MMOL/L (ref 10–20)
BASOPHILS # BLD AUTO: 0.02 X10*3/UL (ref 0–0.1)
BASOPHILS NFR BLD AUTO: 0.1 %
BUN SERPL-MCNC: 110 MG/DL (ref 6–23)
CALCIUM SERPL-MCNC: 7.8 MG/DL (ref 8.6–10.3)
CHLORIDE SERPL-SCNC: 105 MMOL/L (ref 98–107)
CO2 SERPL-SCNC: 21 MMOL/L (ref 21–32)
CREAT SERPL-MCNC: 3.61 MG/DL (ref 0.5–1.05)
EGFRCR SERPLBLD CKD-EPI 2021: 12 ML/MIN/1.73M*2
EOSINOPHIL # BLD AUTO: 0.05 X10*3/UL (ref 0–0.4)
EOSINOPHIL NFR BLD AUTO: 0.3 %
ERYTHROCYTE [DISTWIDTH] IN BLOOD BY AUTOMATED COUNT: 18.6 % (ref 11.5–14.5)
GLUCOSE BLD MANUAL STRIP-MCNC: 110 MG/DL (ref 74–99)
GLUCOSE BLD MANUAL STRIP-MCNC: 116 MG/DL (ref 74–99)
GLUCOSE BLD MANUAL STRIP-MCNC: 166 MG/DL (ref 74–99)
GLUCOSE BLD MANUAL STRIP-MCNC: 99 MG/DL (ref 74–99)
GLUCOSE SERPL-MCNC: 105 MG/DL (ref 74–99)
HCT VFR BLD AUTO: 25.7 % (ref 36–46)
HGB BLD-MCNC: 8.6 G/DL (ref 12–16)
HOLD SPECIMEN: NORMAL
IMM GRANULOCYTES # BLD AUTO: 0.25 X10*3/UL (ref 0–0.5)
IMM GRANULOCYTES NFR BLD AUTO: 1.3 % (ref 0–0.9)
LYMPHOCYTES # BLD AUTO: 0.96 X10*3/UL (ref 0.8–3)
LYMPHOCYTES NFR BLD AUTO: 4.9 %
MAGNESIUM SERPL-MCNC: 2.03 MG/DL (ref 1.6–2.4)
MCH RBC QN AUTO: 24.8 PG (ref 26–34)
MCHC RBC AUTO-ENTMCNC: 33.5 G/DL (ref 32–36)
MCV RBC AUTO: 74 FL (ref 80–100)
MONOCYTES # BLD AUTO: 1.2 X10*3/UL (ref 0.05–0.8)
MONOCYTES NFR BLD AUTO: 6.1 %
NEUTROPHILS # BLD AUTO: 17.21 X10*3/UL (ref 1.6–5.5)
NEUTROPHILS NFR BLD AUTO: 87.3 %
NRBC BLD-RTO: 0.2 /100 WBCS (ref 0–0)
PHOSPHATE SERPL-MCNC: 3.8 MG/DL (ref 2.5–4.9)
PLATELET # BLD AUTO: 309 X10*3/UL (ref 150–450)
POTASSIUM SERPL-SCNC: 4 MMOL/L (ref 3.5–5.3)
PROCALCITONIN SERPL-MCNC: 1.79 NG/ML
RBC # BLD AUTO: 3.47 X10*6/UL (ref 4–5.2)
SODIUM SERPL-SCNC: 136 MMOL/L (ref 136–145)
VANCOMYCIN SERPL-MCNC: 17.4 UG/ML (ref 5–20)
WBC # BLD AUTO: 19.7 X10*3/UL (ref 4.4–11.3)

## 2024-05-26 PROCEDURE — 2500000001 HC RX 250 WO HCPCS SELF ADMINISTERED DRUGS (ALT 637 FOR MEDICARE OP): Performed by: STUDENT IN AN ORGANIZED HEALTH CARE EDUCATION/TRAINING PROGRAM

## 2024-05-26 PROCEDURE — 82947 ASSAY GLUCOSE BLOOD QUANT: CPT | Mod: 91

## 2024-05-26 PROCEDURE — 2500000004 HC RX 250 GENERAL PHARMACY W/ HCPCS (ALT 636 FOR OP/ED): Performed by: STUDENT IN AN ORGANIZED HEALTH CARE EDUCATION/TRAINING PROGRAM

## 2024-05-26 PROCEDURE — 80202 ASSAY OF VANCOMYCIN: CPT

## 2024-05-26 PROCEDURE — 99232 SBSQ HOSP IP/OBS MODERATE 35: CPT | Performed by: INTERNAL MEDICINE

## 2024-05-26 PROCEDURE — 36415 COLL VENOUS BLD VENIPUNCTURE: CPT

## 2024-05-26 PROCEDURE — 80048 BASIC METABOLIC PNL TOTAL CA: CPT | Performed by: STUDENT IN AN ORGANIZED HEALTH CARE EDUCATION/TRAINING PROGRAM

## 2024-05-26 PROCEDURE — 2500000002 HC RX 250 W HCPCS SELF ADMINISTERED DRUGS (ALT 637 FOR MEDICARE OP, ALT 636 FOR OP/ED): Performed by: STUDENT IN AN ORGANIZED HEALTH CARE EDUCATION/TRAINING PROGRAM

## 2024-05-26 PROCEDURE — 83735 ASSAY OF MAGNESIUM: CPT | Performed by: INTERNAL MEDICINE

## 2024-05-26 PROCEDURE — 1200000002 HC GENERAL ROOM WITH TELEMETRY DAILY

## 2024-05-26 PROCEDURE — 36415 COLL VENOUS BLD VENIPUNCTURE: CPT | Performed by: STUDENT IN AN ORGANIZED HEALTH CARE EDUCATION/TRAINING PROGRAM

## 2024-05-26 PROCEDURE — 84100 ASSAY OF PHOSPHORUS: CPT | Performed by: INTERNAL MEDICINE

## 2024-05-26 PROCEDURE — 85025 COMPLETE CBC W/AUTO DIFF WBC: CPT | Performed by: STUDENT IN AN ORGANIZED HEALTH CARE EDUCATION/TRAINING PROGRAM

## 2024-05-26 RX ORDER — MORPHINE SULFATE 2 MG/ML
2 INJECTION, SOLUTION INTRAMUSCULAR; INTRAVENOUS ONCE
Status: COMPLETED | OUTPATIENT
Start: 2024-05-26 | End: 2024-05-26

## 2024-05-26 RX ORDER — VANCOMYCIN HYDROCHLORIDE 750 MG/150ML
750 INJECTION, SOLUTION INTRAVENOUS ONCE
Status: COMPLETED | OUTPATIENT
Start: 2024-05-27 | End: 2024-05-27

## 2024-05-26 RX ORDER — HYDROXYZINE HYDROCHLORIDE 25 MG/1
50 TABLET, FILM COATED ORAL EVERY 4 HOURS PRN
Status: DISCONTINUED | OUTPATIENT
Start: 2024-05-26 | End: 2024-05-30 | Stop reason: HOSPADM

## 2024-05-26 RX ORDER — MENTHOL AND ZINC OXIDE .44; 20.625 G/100G; G/100G
1 OINTMENT TOPICAL 4 TIMES DAILY PRN
Status: DISCONTINUED | OUTPATIENT
Start: 2024-05-26 | End: 2024-05-30 | Stop reason: HOSPADM

## 2024-05-26 RX ADMIN — SODIUM CHLORIDE 10 ML: 9 INJECTION, SOLUTION INTRAVENOUS at 09:43

## 2024-05-26 RX ADMIN — MORPHINE SULFATE 2 MG: 2 INJECTION, SOLUTION INTRAMUSCULAR; INTRAVENOUS at 06:12

## 2024-05-26 RX ADMIN — APIXABAN 5 MG: 5 TABLET, FILM COATED ORAL at 09:42

## 2024-05-26 RX ADMIN — OXYCODONE AND ACETAMINOPHEN 1 TABLET: 10; 325 TABLET ORAL at 17:26

## 2024-05-26 RX ADMIN — OXYCODONE AND ACETAMINOPHEN 1 TABLET: 10; 325 TABLET ORAL at 05:25

## 2024-05-26 RX ADMIN — SODIUM CHLORIDE 10 ML: 9 INJECTION, SOLUTION INTRAVENOUS at 22:00

## 2024-05-26 RX ADMIN — PIPERACILLIN SODIUM AND TAZOBACTAM SODIUM 3.38 G: 3; .375 INJECTION, SOLUTION INTRAVENOUS at 09:43

## 2024-05-26 RX ADMIN — MORPHINE SULFATE 2 MG: 2 INJECTION, SOLUTION INTRAMUSCULAR; INTRAVENOUS at 00:09

## 2024-05-26 RX ADMIN — INSULIN LISPRO 1 UNITS: 100 INJECTION, SOLUTION INTRAVENOUS; SUBCUTANEOUS at 11:27

## 2024-05-26 RX ADMIN — OXYCODONE AND ACETAMINOPHEN 1 TABLET: 10; 325 TABLET ORAL at 11:33

## 2024-05-26 RX ADMIN — PIPERACILLIN SODIUM AND TAZOBACTAM SODIUM 3.38 G: 3; .375 INJECTION, SOLUTION INTRAVENOUS at 23:15

## 2024-05-26 RX ADMIN — POLYETHYLENE GLYCOL 3350 17 G: 17 POWDER, FOR SOLUTION ORAL at 09:42

## 2024-05-26 RX ADMIN — APIXABAN 5 MG: 5 TABLET, FILM COATED ORAL at 20:54

## 2024-05-26 RX ADMIN — LEVOTHYROXINE SODIUM 150 MCG: 75 TABLET ORAL at 05:25

## 2024-05-26 RX ADMIN — OXYCODONE AND ACETAMINOPHEN 1 TABLET: 10; 325 TABLET ORAL at 23:50

## 2024-05-26 RX ADMIN — MORPHINE SULFATE 2 MG: 2 INJECTION, SOLUTION INTRAMUSCULAR; INTRAVENOUS at 20:55

## 2024-05-26 ASSESSMENT — COGNITIVE AND FUNCTIONAL STATUS - GENERAL
STANDING UP FROM CHAIR USING ARMS: TOTAL
TURNING FROM BACK TO SIDE WHILE IN FLAT BAD: A LOT
TOILETING: A LOT
DRESSING REGULAR LOWER BODY CLOTHING: TOTAL
DAILY ACTIVITIY SCORE: 13
MOBILITY SCORE: 9
WALKING IN HOSPITAL ROOM: TOTAL
MOVING FROM LYING ON BACK TO SITTING ON SIDE OF FLAT BED WITH BEDRAILS: A LOT
CLIMB 3 TO 5 STEPS WITH RAILING: TOTAL
MOVING TO AND FROM BED TO CHAIR: A LOT
DRESSING REGULAR UPPER BODY CLOTHING: A LOT
EATING MEALS: A LITTLE
HELP NEEDED FOR BATHING: TOTAL

## 2024-05-26 ASSESSMENT — PAIN - FUNCTIONAL ASSESSMENT
PAIN_FUNCTIONAL_ASSESSMENT: 0-10

## 2024-05-26 ASSESSMENT — PAIN SCALES - GENERAL
PAINLEVEL_OUTOF10: 9
PAINLEVEL_OUTOF10: 10 - WORST POSSIBLE PAIN
PAINLEVEL_OUTOF10: 9

## 2024-05-26 ASSESSMENT — PAIN SCALES - WONG BAKER: WONGBAKER_NUMERICALRESPONSE: HURTS WHOLE LOT

## 2024-05-26 ASSESSMENT — PAIN DESCRIPTION - LOCATION: LOCATION: LEG

## 2024-05-26 NOTE — NURSING NOTE
Pt is refusing for her dressings on her legs to be changed. Pt has a silver allergy and paged  to make sure that's okay to apply. Pt said that she doesn't want the silvadone on her legs. I educated pt on the benefits of having the dressings changed and she still refused.

## 2024-05-26 NOTE — PROGRESS NOTES
"Nephrology Progress Note    Assessment:  84 y.o. female with history s/f HTN, COPD and T2DM who presented for AMS.      RAYMUNDO on CKD stage III: 2/2 volume depletion, Scr 5.2 on presentation, 1.2 w/ eGFR mid 40s at baseline, CKD risk factors T2DM, HTN   HTN  Encephalopathy  Hyponatremia  Metabolic acidosis   Anemia   Fluid overload: echo w/ nml LVEF w/ DD      Plan:  - awaiting albumin to see if has hypoalbuminemia to explain part of her edema   - will hold off on further IVFs at this time, encourage PO   - holding on diuretics for now    Subjective:  Admit Date: 5/24/2024    Interval History: function improving, has been getting doses of morphine, got fluids yesterday, acidosis almost resolved      Medications:  Scheduled Meds:apixaban, 5 mg, oral, BID  insulin lispro, 0-5 Units, subcutaneous, TID  levothyroxine, 150 mcg, oral, Daily before breakfast  oxygen, , inhalation, Continuous - Inhalation  piperacillin-tazobactam, 3.375 g, intravenous, q12h   And  sodium chloride, 10 mL, intravenous, q12h  polyethylene glycol, 17 g, oral, Daily  silver sulfADIAZINE, , Topical, BID      Continuous Infusions:     CBC:   Lab Results   Component Value Date    WBC 19.7 (H) 05/26/2024    RBC 3.47 (L) 05/26/2024    HGB 8.6 (L) 05/26/2024    HCT 25.7 (L) 05/26/2024    MCV 74 (L) 05/26/2024    MCH 24.8 (L) 05/26/2024    MCHC 33.5 05/26/2024    RDW 18.6 (H) 05/26/2024     05/26/2024     BMP:    Lab Results   Component Value Date     05/26/2024    K 4.0 05/26/2024     05/26/2024    CO2 21 05/26/2024     (HH) 05/26/2024    CREATININE 3.61 (H) 05/26/2024    CALCIUM 7.8 (L) 05/26/2024    GLUCOSE 105 (H) 05/26/2024       Objective:  Vitals: /59 (BP Location: Right arm, Patient Position: Sitting)   Pulse 78   Temp 36.3 °C (97.3 °F) (Temporal)   Resp 18   Ht 1.575 m (5' 2\")   Wt 86.2 kg (190 lb)   SpO2 96%   BMI 34.75 kg/m²    Wt Readings from Last 3 Encounters:   05/24/24 86.2 kg (190 lb)   05/15/24 104 " kg (230 lb)   12/08/21 89.4 kg (197 lb)      24HR INTAKE/OUTPUT:    Intake/Output Summary (Last 24 hours) at 5/26/2024 1148  Last data filed at 5/26/2024 0534  Gross per 24 hour   Intake --   Output 1310 ml   Net -1310 ml       General: alert, in no apparent distress  HEENT: normocephalic, atraumatic, anicteric  Lungs: non-labored respirations, clear to auscultation bilaterally  Heart: regular rate and rhythm, no murmurs or rubs  Abdomen: soft, non-tender, non-distended  Ext: no cyanosis, no peripheral edema  Neuro: alert and oriented, no gross abnormalities        Electronically signed by Sharita Lane MD, MD

## 2024-05-26 NOTE — PROGRESS NOTES
ASSESSMENT & PLAN:     #.  Acute renal failure (superimposed on CKD3) with severe high anion gap metabolic acidosis and hyperkalemia  #.  Sepsis secondary to bilateral leg cellulitis  #.  Acute hypoxic respiratory failure likely secondary to decompensated HFpEF versus new onset systolic heart failure  #.  Acute metabolic encephalopathy likely secondary to uremia versus sepsis  #.  COPD - not in acute exacerbation  #.  Sacral decubitus ulcer  #.  Hyponatremia  #.  History of DVT  #.  Long-term anticoagulation use  #.  Type 2 diabetes  -SCr 5.73 (baseline appears to be around 1.5), , K 6.7, bicarb 8  -This is likely prerenal due to reduced p.o. intake in the setting sepsis, CK elevated but not suggestive of rhabdomyolysis  -S/p 2L NS and started on bicarb drip, now likely developing worsening pulmonary edema causing acute hypoxic respiratory failure  -CXR shows evidence of pulmonary vascular congestion as well as possible small pleural effusions  -Significant leukocytosis noted with WBC count of 31.3 and left shift  -Last echocardiogram in 2021 showing EF of 55% and diastolic dysfunction  -Prior cultures from leg wounds have grown MRSA and Pseudomonas (susceptible to Zosyn)     Plan:  -Will attempt to correct severe metabolic acidosis with pushes of IV bicarb, will avoid continuous drip as to not worsen pulmonary edema and respiratory status  -Serial VBG's  -Nephrology consult  -Consider diuretics for fluid overload and to augment urine output, will hold off for now given sepsis  -Avoid further fluids  -Continue supplemental oxygen, wean as tolerated  -Given prior cultures will continue vancomycin and change cefepime to Zosyn as to not worsen encephalopathy  -Obtain formal echocardiogram  -Consider cardiology consult pending echocardiogram results  -Strict intake and output, daily weights  -Continue home Eliquis  -Insulin sliding scale, hypoglycemic protocol, repeat A1c  -DuoNebs as needed  -Wound care consult  for sacral decubitus ulcer  -Will send urine studies for further evaluation of hyponatremia, suspect this is hypotonic hyponatremia in the setting of poor p.o. intake  -Insert Madsen catheter  -Telemetry, continuous pulse ox     VTE PPX: Eliquis    5/26/24  -mental status improving, VSS, remains on room air without resp distress  -nephrology following. Was restarted on gentle bicarb gtt yesterday. Renal function continues to improve today, Scr down to 3.61, bicarb improved to normal 21, Na improved to normal 136. Will fu further nephrology recs today.   -TTE was done yesterday that showed normal LVEF 60-65%, did show diastolic dysfunction. Will keep diuretics on hold as has improved with volume repletion.   -Cont PTA pain control for BLE wounds with cellulitis. Okay to return to PTA dosing of Percocet with improvement in mental status. Plastic surgery and wound care on, plastics rec to do silvadene dressing changes bid to legs  -ID consulted as well, currently on empiric vanc/zosyn. Fu bcx, wound cx. Procal pending. Has been afebrile, leukocytosis down trending   -remove madsen today, trial external female cath  -PT/OT wendy Giang MD    SUBJECTIVE     NAEON. Still having pain in BLE, her main complaint today. Mental status appears better today. No CP, dyspnea.     OBJECTIVE:       Last Recorded Vitals:  Vitals:    05/25/24 1441 05/25/24 1957 05/26/24 0007 05/26/24 0701   BP: 131/57 156/60 127/57 119/59   BP Location: Right arm  Right arm Right arm   Patient Position: Lying  Lying Sitting   Pulse: 72 92 85 78   Resp: 18      Temp: 36.3 °C (97.3 °F) 36.3 °C (97.3 °F) 36.5 °C (97.7 °F) 36.3 °C (97.3 °F)   TempSrc: Temporal  Temporal Temporal   SpO2: 94% 98% 97% 96%   Weight:       Height:           Last I/O:  I/O last 3 completed shifts:  In: 2987.5 (34.7 mL/kg) [I.V.:187.5 (2.2 mL/kg); IV Piggyback:2800]  Out: 1310 (15.2 mL/kg) [Urine:1310 (0.4 mL/kg/hr)]  Weight: 86.2 kg     Physical Exam:  Gen: NAD, appears  stated age  HEENT: EOM, MMM  CV: RRR, no murmurs rubs or gallops  Resp: On nasal cannula, no wheezes, bibasilar crackles noted  Abdomen: soft, NT,+BS  LE: BLE wounds with drainage, ttp, warmth, surrounding redness  Neuro: A&Ox2, moving all extremities    Inpatient Medications:  apixaban, 5 mg, oral, BID  insulin lispro, 0-5 Units, subcutaneous, TID  levothyroxine, 150 mcg, oral, Daily before breakfast  oxygen, , inhalation, Continuous - Inhalation  piperacillin-tazobactam, 3.375 g, intravenous, q12h   And  sodium chloride, 10 mL, intravenous, q12h  polyethylene glycol, 17 g, oral, Daily  silver sulfADIAZINE, , Topical, BID        PRN Medications  PRN medications: acetaminophen **OR** acetaminophen **OR** acetaminophen, dextrose, dextrose, glucagon, glucagon, ipratropium-albuteroL, melatonin, oxyCODONE-acetaminophen, vancomycin    Continuous Medications:         LABS AND IMAGING:     Labs:  Results for orders placed or performed during the hospital encounter of 05/24/24 (from the past 24 hour(s))   BLOOD GAS ARTERIAL FULL PANEL   Result Value Ref Range    POCT pH, Arterial 7.39 7.38 - 7.42 pH    POCT pCO2, Arterial 23 (L) 38 - 42 mm Hg    POCT pO2, Arterial 96 (H) 85 - 95 mm Hg    POCT SO2, Arterial 96 94 - 100 %    POCT Oxy Hemoglobin, Arterial 94.8 94.0 - 98.0 %    POCT Hematocrit Calculated, Arterial 28.0 (L) 36.0 - 46.0 %    POCT Sodium, Arterial 130 (L) 136 - 145 mmol/L    POCT Potassium, Arterial 4.5 3.5 - 5.3 mmol/L    POCT Chloride, Arterial 106 98 - 107 mmol/L    POCT Ionized Calcium, Arterial 1.19 1.10 - 1.33 mmol/L    POCT Glucose, Arterial 101 (H) 74 - 99 mg/dL    POCT Lactate, Arterial 1.0 0.4 - 2.0 mmol/L    POCT Base Excess, Arterial -9.7 (L) -2.0 - 3.0 mmol/L    POCT HCO3 Calculated, Arterial 13.9 (L) 22.0 - 26.0 mmol/L    POCT Hemoglobin, Arterial 9.2 (L) 12.0 - 16.0 g/dL    POCT Anion Gap, Arterial 15 10 - 25 mmo/L    Patient Temperature      FiO2 21 %    Site of Arterial Puncture Radial Left      Rudy's Test Positive    Transthoracic Echo (TTE) Complete   Result Value Ref Range    AV mn grad 22.0 mmHg    AV pk isai 2.81 m/s    LV Biplane EF 65 %    LVOT diam 1.93 cm    MV E/A ratio 0.59     Tricuspid annular plane systolic excursion 1.9 cm    MV avg E/e' ratio 10.74     LA vol index A/L 18.7 ml/m2    RV free wall pk S' 15.90 cm/s    RVSP 29.8 mmHg    LVIDd 4.51 cm    Aortic Valve Area by Continuity of Peak Velocity 1.29 cm2    AV pk grad 31.6 mmHg    Aortic Valve Area by Continuity of VTI 1.17 cm2    LV A4C EF 65.7    POCT GLUCOSE   Result Value Ref Range    POCT Glucose 101 (H) 74 - 99 mg/dL   POCT GLUCOSE   Result Value Ref Range    POCT Glucose 108 (H) 74 - 99 mg/dL   POCT GLUCOSE   Result Value Ref Range    POCT Glucose 113 (H) 74 - 99 mg/dL   Vancomycin   Result Value Ref Range    Vancomycin 21.1 (H) 5.0 - 20.0 ug/mL   SST TOP   Result Value Ref Range    Extra Tube Hold for add-ons.    CBC and Auto Differential   Result Value Ref Range    WBC 19.7 (H) 4.4 - 11.3 x10*3/uL    nRBC 0.2 (H) 0.0 - 0.0 /100 WBCs    RBC 3.47 (L) 4.00 - 5.20 x10*6/uL    Hemoglobin 8.6 (L) 12.0 - 16.0 g/dL    Hematocrit 25.7 (L) 36.0 - 46.0 %    MCV 74 (L) 80 - 100 fL    MCH 24.8 (L) 26.0 - 34.0 pg    MCHC 33.5 32.0 - 36.0 g/dL    RDW 18.6 (H) 11.5 - 14.5 %    Platelets 309 150 - 450 x10*3/uL    Neutrophils % 87.3 40.0 - 80.0 %    Immature Granulocytes %, Automated 1.3 (H) 0.0 - 0.9 %    Lymphocytes % 4.9 13.0 - 44.0 %    Monocytes % 6.1 2.0 - 10.0 %    Eosinophils % 0.3 0.0 - 6.0 %    Basophils % 0.1 0.0 - 2.0 %    Neutrophils Absolute 17.21 (H) 1.60 - 5.50 x10*3/uL    Immature Granulocytes Absolute, Automated 0.25 0.00 - 0.50 x10*3/uL    Lymphocytes Absolute 0.96 0.80 - 3.00 x10*3/uL    Monocytes Absolute 1.20 (H) 0.05 - 0.80 x10*3/uL    Eosinophils Absolute 0.05 0.00 - 0.40 x10*3/uL    Basophils Absolute 0.02 0.00 - 0.10 x10*3/uL   Basic metabolic panel   Result Value Ref Range    Glucose 105 (H) 74 - 99 mg/dL    Sodium 136  136 - 145 mmol/L    Potassium 4.0 3.5 - 5.3 mmol/L    Chloride 105 98 - 107 mmol/L    Bicarbonate 21 21 - 32 mmol/L    Anion Gap 14 10 - 20 mmol/L    Urea Nitrogen 110 (HH) 6 - 23 mg/dL    Creatinine 3.61 (H) 0.50 - 1.05 mg/dL    eGFR 12 (L) >60 mL/min/1.73m*2    Calcium 7.8 (L) 8.6 - 10.3 mg/dL   Magnesium   Result Value Ref Range    Magnesium 2.03 1.60 - 2.40 mg/dL   Phosphorus   Result Value Ref Range    Phosphorus 3.8 2.5 - 4.9 mg/dL   POCT GLUCOSE   Result Value Ref Range    POCT Glucose 99 74 - 99 mg/dL        Imaging:  Transthoracic Echo (TTE) Complete            [place] [id number]   Tel [phone] Fax [phone]    TRANSTHORACIC ECHOCARDIOGRAM REPORT       Patient Name:      MIKE Hill Physician:    [id number] Titi Mckinnon DO  Study Date:        5/25/2024             Ordering Provider:    [id number] WILL  MRN/PID:           [id number]              Fellow:  Accession#:        [id number]          Nurse:  Date of Birth/Age: 1940 / 84 years  Sonographer:          Alicia PANTOJA  Gender:            F                     Additional Staff:  Height:            157.48 cm             Admit Date:           5/24/2024  Weight:            87.09 kg              Admission Status:     Inpatient -                                                                 Routine  BSA / BMI:         1.88 m2 / 35.12 kg/m2 Department Location:  [address] [place] [address]  Blood Pressure: 180 /76 mmHg    Study Type:    TRANSTHORACIC ECHO (TTE) COMPLETE  Diagnosis/ICD: Acute on chronic diastolic (congestive) heart failure                 (CHF)-I50.33  Indication:    Congestive Heart Failure  CPT Codes:     Echo Complete w Full Doppler-[id number]    Patient History:  Diabetes:           Yes  Pertinent History: Previous DVT, Cardiomyopathy, CHF, Renal Failure and COPD.    Study Detail: The following Echo studies were performed: 2D, M-Mode, Doppler and                color flow. The patient was asleep.       PHYSICIAN INTERPRETATION:  Left Ventricle: Left ventricular systolic function is normal, with an estimated ejection fraction of 60-65%. There are no regional wall motion abnormalities. The left ventricular cavity size is normal. There is mild concentric left ventricular hypertrophy. Spectral Doppler shows an impaired relaxation pattern of left ventricular diastolic filling.  LV Wall Scoring:  All segments are normal.    Left Atrium: The left atrium is normal in size.  Right Ventricle: The right ventricle is normal in size. There is normal right ventricular global systolic function.  Right Atrium: The right atrium is normal in size.  Aortic Valve: The aortic valve appears structurally normal. The aortic valve appears tricuspid with restriction. There is mild to moderate aortic valve cusp calcification. There is evidence of mild to moderate aortic valve stenosis.  The peak aortic velocity was obtained from the apical view. There is mild aortic valve regurgitation. The peak instantaneous gradient of the aortic valve is 31.6 mmHg. The mean gradient of the aortic valve is 22.0 mmHg.  Mitral Valve: The mitral valve is normal in structure. There is no evidence of mitral valve stenosis. There is normal mitral valve leaflet mobility. There is mild mitral annular calcification. There is no evidence of mitral valve regurgitation.  Tricuspid Valve: The tricuspid valve is structurally normal. There is normal tricuspid valve leaflet mobility. There is mild tricuspid regurgitation.  Pulmonic Valve: The pulmonic valve is structurally normal. There is no indication of pulmonic valve regurgitation.  Pericardium: There is a trivial pericardial effusion.  Aorta: The aortic root is normal.  Pulmonary Artery: The main  pulmonary artery is normal in size, and position, with normal bifurcation into the left and right pulmonary arteries. The tricuspid regurgitant velocity is 2.59 m/s, and with an estimated right atrial pressure of 3 mmHg, the estimated pulmonary artery pressure is mildly elevated with the RVSP at 29.8 mmHg.  Systemic Veins: The inferior vena cava appears to be of normal size.  In comparison to the previous echocardiogram(s): The left ventricular function is unchanged. The left ventricular hypertrophy is unchanged. The left ventricular diastolic function is unchanged.       CONCLUSIONS:   1. Left ventricular systolic function is normal with a 60-65% estimated ejection fraction.   2. Spectral Doppler shows an impaired relaxation pattern of left ventricular diastolic filling.   3. There is no evidence of mitral valve stenosis.   4. No evidence of mitral valve regurgitation.   5. Mild to moderate aortic valve stenosis.   6. The aortic valve appears tricuspid with restriction.   7. Mild aortic valve regurgitation.   8. The main pulmonary artery is normal in size, and position, with normal bifurcation into the left and right pulmonary arteries.    QUANTITATIVE DATA SUMMARY:  2D MEASUREMENTS:                            Normal Ranges:  Ao Root d:     2.55 cm    (2.0-3.7cm)  LAs:           3.20 cm    (2.7-4.0cm)  IVSd:          1.17 cm    (0.6-1.1cm)  LVPWd:         1.18 cm    (0.6-1.1cm)  LVIDd:         4.51 cm    (3.9-5.9cm)  LVIDs:         3.24 cm  LV Mass Index: 102.7 g/m2  LV % FS        28.2 %    LA VOLUME:                                Normal Ranges:  LA Vol A4C:        37.2 ml    (22+/-6mL/m2)  LA Vol A2C:        31.4 ml  LA Vol BP:         35.1 ml  LA Vol Index A4C:  19.8ml/m2  LA Vol Index A2C:  16.7 ml/m2  LA Vol Index BP:   18.7 ml/m2  LA Area A4C:       14.5 cm2  LA Area A2C:       13.0 cm2  LA Major Axis A4C: 4.8 cm  LA Major Axis A2C: 4.6 cm  LA Volume Index:   17.7 ml/m2    RA VOLUME BY A/L METHOD:                                 Normal Ranges:  RA Vol A4C:        24.8 ml    (8.3-19.5ml)  RA Vol Index A4C:  13.2 ml/m2  RA Area A4C:       12.0 cm2  RA Major Axis A4C: 4.9 cm    AORTA MEASUREMENTS:                     Normal Ranges:  Asc Ao, d: 2.39 cm (2.1-3.4cm)    LV SYSTOLIC FUNCTION BY 2D PLANIMETRY (MOD):                      Normal Ranges:  EF-A4C View: 65.7 % (>=55%)  EF-A2C View: 65.6 %  EF-Biplane:  65.0 %    LV DIASTOLIC FUNCTION:                                Normal Ranges:  MV Peak E:        0.84 m/s    (0.7-1.2 m/s)  MV Peak A:        1.42 m/s    (0.42-0.7 m/s)  E/A Ratio:        0.59        (1.0-2.2)  MV e'             0.08 m/s    (>8.0)  MV lateral e'     0.08 m/s  MV medial e'      0.08 m/s  E/e' Ratio:       10.74       (<8.0)  PulmV Sys Derek:    64.90 cm/s  PulmV Mchugh Derek:   42.70 cm/s  PulmV S/D Derek:    1.50  PulmV A Revs Derek: 38.10 cm/s  PulmV A Revs Dur: 174.00 msec    MITRAL VALVE:                  Normal Ranges:  MV DT: 302 msec (150-240msec)    AORTIC VALVE:                                     Normal Ranges:  AoV Vmax:                2.81 m/s  (<=1.7m/s)  AoV Peak P.6 mmHg (<20mmHg)  AoV Mean P.0 mmHg (1.7-11.5mmHg)  LVOT Max Derek:            1.24 m/s  (<=1.1m/s)  AoV VTI:                 58.70 cm  (18-25cm)  LVOT VTI:                23.40 cm  LVOT Diameter:           1.93 cm   (1.8-2.4cm)  AoV Area, VTI:           1.17 cm2  (2.5-5.5cm2)  AoV Area,Vmax:           1.29 cm2  (2.5-4.5cm2)  AoV Dimensionless Index: 0.40       RIGHT VENTRICLE:  RV Basal 2.58 cm  RV Mid   1.81 cm  RV Major 4.9 cm  TAPSE:   18.7 mm  RV s'    0.16 m/s    TRICUSPID VALVE/RVSP:                              Normal Ranges:  Peak TR Velocity: 2.59 m/s  RV Syst Pressure: 29.8 mmHg (< 30mmHg)  IVC Diam:         1.08 cm    PULMONIC VALVE:                           Normal Ranges:  PV Accel Time: 66 msec   (>120ms)  PV Max Derek:    1.6 m/s   (0.6-0.9m/s)  PV Max PG:     10.1 mmHg    Pulmonary  Veins:  PulmV A Revs Dur: 174.00 msec  PulmV A Revs Derek: 38.10 cm/s  PulmV Mchugh Derek:   42.70 cm/s  PulmV S/D Derek:    1.50  PulmV Sys Derek:    64.90 cm/s       [id number] Titi Mckinnon DO  Electronically signed on 5/25/2024 at 11:34:54 AM       Wall Scoring       ** Final **

## 2024-05-26 NOTE — PROGRESS NOTES
"Physical Therapy                 Therapy Communication Note    Patient Name: Cheryl Elena  MRN: [id number]  Today's Date: 5/26/2024     Discipline: Physical Therapy    Missed Visit Reason: Received order for P.T. eval. Chart reviewed. Attempted to see Pt. for P.T. eval. but Pt. adamantly refused.  Pt. repeatedly refused therapy even after reason and rationale for therapy was explained to Pt. Pt. screamed \"They don't even know what is wrong with me yet! I can't do therapy!\"  Per RN Pt. has also been refusing dressing changes and other care as well.  Pt.  stated (when asked) that she lives alone, does not amb and uses a W/C at baseline. Will re-attempt P.T. eval when Pt. is medically appropriate and agreeable.   "

## 2024-05-27 LAB
ANION GAP SERPL CALC-SCNC: 14 MMOL/L (ref 10–20)
BACTERIA SPEC CULT: ABNORMAL
BASOPHILS # BLD AUTO: 0.01 X10*3/UL (ref 0–0.1)
BASOPHILS NFR BLD AUTO: 0.1 %
BUN SERPL-MCNC: 82 MG/DL (ref 6–23)
CALCIUM SERPL-MCNC: 7.9 MG/DL (ref 8.6–10.3)
CHLORIDE SERPL-SCNC: 106 MMOL/L (ref 98–107)
CO2 SERPL-SCNC: 20 MMOL/L (ref 21–32)
CREAT SERPL-MCNC: 2.41 MG/DL (ref 0.5–1.05)
EGFRCR SERPLBLD CKD-EPI 2021: 19 ML/MIN/1.73M*2
EOSINOPHIL # BLD AUTO: 0.16 X10*3/UL (ref 0–0.4)
EOSINOPHIL NFR BLD AUTO: 0.9 %
ERYTHROCYTE [DISTWIDTH] IN BLOOD BY AUTOMATED COUNT: 19.2 % (ref 11.5–14.5)
GLUCOSE BLD MANUAL STRIP-MCNC: 90 MG/DL (ref 74–99)
GLUCOSE BLD MANUAL STRIP-MCNC: 90 MG/DL (ref 74–99)
GLUCOSE SERPL-MCNC: 89 MG/DL (ref 74–99)
GRAM STN SPEC: ABNORMAL
GRAM STN SPEC: ABNORMAL
HCT VFR BLD AUTO: 26.6 % (ref 36–46)
HGB BLD-MCNC: 8.5 G/DL (ref 12–16)
HOLD SPECIMEN: NORMAL
IMM GRANULOCYTES # BLD AUTO: 0.21 X10*3/UL (ref 0–0.5)
IMM GRANULOCYTES NFR BLD AUTO: 1.2 % (ref 0–0.9)
LYMPHOCYTES # BLD AUTO: 1.27 X10*3/UL (ref 0.8–3)
LYMPHOCYTES NFR BLD AUTO: 7.4 %
MAGNESIUM SERPL-MCNC: 2.02 MG/DL (ref 1.6–2.4)
MCH RBC QN AUTO: 24.6 PG (ref 26–34)
MCHC RBC AUTO-ENTMCNC: 32 G/DL (ref 32–36)
MCV RBC AUTO: 77 FL (ref 80–100)
MONOCYTES # BLD AUTO: 1.26 X10*3/UL (ref 0.05–0.8)
MONOCYTES NFR BLD AUTO: 7.3 %
NEUTROPHILS # BLD AUTO: 14.34 X10*3/UL (ref 1.6–5.5)
NEUTROPHILS NFR BLD AUTO: 83.1 %
NRBC BLD-RTO: 0.1 /100 WBCS (ref 0–0)
PHOSPHATE SERPL-MCNC: 3 MG/DL (ref 2.5–4.9)
PLATELET # BLD AUTO: 298 X10*3/UL (ref 150–450)
POTASSIUM SERPL-SCNC: 4 MMOL/L (ref 3.5–5.3)
RBC # BLD AUTO: 3.46 X10*6/UL (ref 4–5.2)
SODIUM SERPL-SCNC: 136 MMOL/L (ref 136–145)
WBC # BLD AUTO: 17.3 X10*3/UL (ref 4.4–11.3)

## 2024-05-27 PROCEDURE — 99232 SBSQ HOSP IP/OBS MODERATE 35: CPT | Performed by: STUDENT IN AN ORGANIZED HEALTH CARE EDUCATION/TRAINING PROGRAM

## 2024-05-27 PROCEDURE — 84100 ASSAY OF PHOSPHORUS: CPT | Performed by: INTERNAL MEDICINE

## 2024-05-27 PROCEDURE — 82947 ASSAY GLUCOSE BLOOD QUANT: CPT | Mod: 91

## 2024-05-27 PROCEDURE — 1200000002 HC GENERAL ROOM WITH TELEMETRY DAILY

## 2024-05-27 PROCEDURE — 2500000005 HC RX 250 GENERAL PHARMACY W/O HCPCS: Performed by: STUDENT IN AN ORGANIZED HEALTH CARE EDUCATION/TRAINING PROGRAM

## 2024-05-27 PROCEDURE — 2500000004 HC RX 250 GENERAL PHARMACY W/ HCPCS (ALT 636 FOR OP/ED): Performed by: STUDENT IN AN ORGANIZED HEALTH CARE EDUCATION/TRAINING PROGRAM

## 2024-05-27 PROCEDURE — 2500000001 HC RX 250 WO HCPCS SELF ADMINISTERED DRUGS (ALT 637 FOR MEDICARE OP): Performed by: STUDENT IN AN ORGANIZED HEALTH CARE EDUCATION/TRAINING PROGRAM

## 2024-05-27 PROCEDURE — 80048 BASIC METABOLIC PNL TOTAL CA: CPT | Performed by: STUDENT IN AN ORGANIZED HEALTH CARE EDUCATION/TRAINING PROGRAM

## 2024-05-27 PROCEDURE — 2500000004 HC RX 250 GENERAL PHARMACY W/ HCPCS (ALT 636 FOR OP/ED): Performed by: INTERNAL MEDICINE

## 2024-05-27 PROCEDURE — 36415 COLL VENOUS BLD VENIPUNCTURE: CPT | Performed by: STUDENT IN AN ORGANIZED HEALTH CARE EDUCATION/TRAINING PROGRAM

## 2024-05-27 PROCEDURE — 82947 ASSAY GLUCOSE BLOOD QUANT: CPT

## 2024-05-27 PROCEDURE — 83735 ASSAY OF MAGNESIUM: CPT | Performed by: INTERNAL MEDICINE

## 2024-05-27 PROCEDURE — 85025 COMPLETE CBC W/AUTO DIFF WBC: CPT | Performed by: STUDENT IN AN ORGANIZED HEALTH CARE EDUCATION/TRAINING PROGRAM

## 2024-05-27 PROCEDURE — 2500000004 HC RX 250 GENERAL PHARMACY W/ HCPCS (ALT 636 FOR OP/ED)

## 2024-05-27 RX ORDER — FLUCONAZOLE 2 MG/ML
100 INJECTION, SOLUTION INTRAVENOUS
Qty: 50 ML | Refills: 0 | Status: DISCONTINUED | OUTPATIENT
Start: 2024-05-27 | End: 2024-05-27

## 2024-05-27 RX ADMIN — OXYCODONE AND ACETAMINOPHEN 1 TABLET: 10; 325 TABLET ORAL at 21:16

## 2024-05-27 RX ADMIN — POLYETHYLENE GLYCOL 3350 17 G: 17 POWDER, FOR SOLUTION ORAL at 09:16

## 2024-05-27 RX ADMIN — APIXABAN 5 MG: 5 TABLET, FILM COATED ORAL at 09:16

## 2024-05-27 RX ADMIN — HYDROXYZINE HYDROCHLORIDE 50 MG: 25 TABLET ORAL at 12:25

## 2024-05-27 RX ADMIN — Medication 1 L/MIN: at 08:00

## 2024-05-27 RX ADMIN — LEVOTHYROXINE SODIUM 150 MCG: 75 TABLET ORAL at 05:54

## 2024-05-27 RX ADMIN — OXYCODONE AND ACETAMINOPHEN 1 TABLET: 10; 325 TABLET ORAL at 12:25

## 2024-05-27 RX ADMIN — APIXABAN 5 MG: 5 TABLET, FILM COATED ORAL at 21:18

## 2024-05-27 RX ADMIN — HYDROXYZINE HYDROCHLORIDE 50 MG: 25 TABLET ORAL at 21:16

## 2024-05-27 RX ADMIN — VANCOMYCIN HYDROCHLORIDE 750 MG: 750 INJECTION, SOLUTION INTRAVENOUS at 09:16

## 2024-05-27 RX ADMIN — SODIUM CHLORIDE 10 ML: 9 INJECTION, SOLUTION INTRAVENOUS at 21:20

## 2024-05-27 RX ADMIN — OXYCODONE AND ACETAMINOPHEN 1 TABLET: 10; 325 TABLET ORAL at 05:54

## 2024-05-27 RX ADMIN — Medication 1 APPLICATION: at 21:16

## 2024-05-27 RX ADMIN — PIPERACILLIN SODIUM AND TAZOBACTAM SODIUM 3.38 G: 3; .375 INJECTION, SOLUTION INTRAVENOUS at 21:16

## 2024-05-27 RX ADMIN — FLUCONAZOLE IN SODIUM CHLORIDE 100 MG: 2 INJECTION, SOLUTION INTRAVENOUS at 05:54

## 2024-05-27 RX ADMIN — PIPERACILLIN SODIUM AND TAZOBACTAM SODIUM 3.38 G: 3; .375 INJECTION, SOLUTION INTRAVENOUS at 09:16

## 2024-05-27 RX ADMIN — SODIUM CHLORIDE 10 ML: 9 INJECTION, SOLUTION INTRAVENOUS at 10:00

## 2024-05-27 SDOH — SOCIAL STABILITY: SOCIAL INSECURITY: DO YOU FEEL ANYONE HAS EXPLOITED OR TAKEN ADVANTAGE OF YOU FINANCIALLY OR OF YOUR PERSONAL PROPERTY?: NO

## 2024-05-27 SDOH — SOCIAL STABILITY: SOCIAL INSECURITY: ARE YOU OR HAVE YOU BEEN THREATENED OR ABUSED PHYSICALLY, EMOTIONALLY, OR SEXUALLY BY ANYONE?: NO

## 2024-05-27 SDOH — SOCIAL STABILITY: SOCIAL INSECURITY: HAS ANYONE EVER THREATENED TO HURT YOUR FAMILY OR YOUR PETS?: NO

## 2024-05-27 SDOH — SOCIAL STABILITY: SOCIAL INSECURITY: DOES ANYONE TRY TO KEEP YOU FROM HAVING/CONTACTING OTHER FRIENDS OR DOING THINGS OUTSIDE YOUR HOME?: NO

## 2024-05-27 SDOH — SOCIAL STABILITY: SOCIAL INSECURITY: WERE YOU ABLE TO COMPLETE ALL THE BEHAVIORAL HEALTH SCREENINGS?: YES

## 2024-05-27 SDOH — SOCIAL STABILITY: SOCIAL INSECURITY: ABUSE: ADULT

## 2024-05-27 SDOH — SOCIAL STABILITY: SOCIAL INSECURITY: DO YOU FEEL UNSAFE GOING BACK TO THE PLACE WHERE YOU ARE LIVING?: NO

## 2024-05-27 SDOH — SOCIAL STABILITY: SOCIAL INSECURITY: HAVE YOU HAD THOUGHTS OF HARMING ANYONE ELSE?: NO

## 2024-05-27 SDOH — SOCIAL STABILITY: SOCIAL INSECURITY: ARE THERE ANY APPARENT SIGNS OF INJURIES/BEHAVIORS THAT COULD BE RELATED TO ABUSE/NEGLECT?: NO

## 2024-05-27 SDOH — SOCIAL STABILITY: SOCIAL INSECURITY: HAVE YOU HAD ANY THOUGHTS OF HARMING ANYONE ELSE?: NO

## 2024-05-27 ASSESSMENT — COGNITIVE AND FUNCTIONAL STATUS - GENERAL
PERSONAL GROOMING: A LITTLE
WALKING IN HOSPITAL ROOM: TOTAL
DAILY ACTIVITIY SCORE: 12
HELP NEEDED FOR BATHING: A LOT
EATING MEALS: A LITTLE
CLIMB 3 TO 5 STEPS WITH RAILING: TOTAL
TOILETING: A LOT
MOBILITY SCORE: 10
STANDING UP FROM CHAIR USING ARMS: A LOT
MOVING FROM LYING ON BACK TO SITTING ON SIDE OF FLAT BED WITH BEDRAILS: A LOT
MOVING TO AND FROM BED TO CHAIR: A LOT
TURNING FROM BACK TO SIDE WHILE IN FLAT BAD: A LOT
DRESSING REGULAR LOWER BODY CLOTHING: TOTAL
DRESSING REGULAR UPPER BODY CLOTHING: TOTAL

## 2024-05-27 ASSESSMENT — LIFESTYLE VARIABLES
SKIP TO QUESTIONS 9-10: 1
HOW OFTEN DO YOU HAVE A DRINK CONTAINING ALCOHOL: NEVER
SUBSTANCE_ABUSE_PAST_12_MONTHS: NO
AUDIT-C TOTAL SCORE: 0
HOW MANY STANDARD DRINKS CONTAINING ALCOHOL DO YOU HAVE ON A TYPICAL DAY: PATIENT DOES NOT DRINK
HOW OFTEN DO YOU HAVE 6 OR MORE DRINKS ON ONE OCCASION: NEVER
AUDIT-C TOTAL SCORE: 0

## 2024-05-27 ASSESSMENT — PAIN SCALES - GENERAL
PAINLEVEL_OUTOF10: 9
PAINLEVEL_OUTOF10: 0 - NO PAIN
PAINLEVEL_OUTOF10: 7
PAINLEVEL_OUTOF10: 10 - WORST POSSIBLE PAIN

## 2024-05-27 ASSESSMENT — PAIN - FUNCTIONAL ASSESSMENT
PAIN_FUNCTIONAL_ASSESSMENT: 0-10

## 2024-05-27 ASSESSMENT — PATIENT HEALTH QUESTIONNAIRE - PHQ9: 1. LITTLE INTEREST OR PLEASURE IN DOING THINGS: SEVERAL DAYS

## 2024-05-27 ASSESSMENT — ACTIVITIES OF DAILY LIVING (ADL): LACK_OF_TRANSPORTATION: NO

## 2024-05-27 NOTE — NURSING NOTE
Pt refused dressings changing all shift. Pt has been moaning loading a majority of this shift. When asked why she was moaning, Pt stated her legs were burning and she was in a lot of pain. Educated on dressing changing as well as the silvadene that she has ordered. Pt says silvadene makes everything worse. Asked if anything helps make it feel better, pt stated no.    Right before leaving, the night nurse was able to convince the Pt into allowing us to do her dressing change. Both of her lower leg look as if the may have gangrene. Together the night nurse and I cleanse both legs with soap and water, then covered them with 4x4 guaze, abd's and ace wraps.

## 2024-05-27 NOTE — PROGRESS NOTES
Pt let me change her dressings last night. Pt refused to have the silvadnene cream applied because she said that it burned. Pt was complaining of being itchy but when offered to have lotion or to get medicine to help pt refused. Pt was complaining of pain and offered to be repositioned, or receive ice, pt refused. I paged the  To see if we could get anything for breakthrough pain. Pt does have some behavior with yelling out. Her buttocks is extremely excoriated. She has been having liquid Bms, pt will refuse to get changed.  Pt was saying that the zosyn was was bruning and causing her legs to burn. Dr. Guzman with ID was up at bedside and gave a verbal okay to stop the zoysn early and they will reassess. She also put in fluconazole Q48hrs. Will continue plan of care.

## 2024-05-27 NOTE — CONSULTS
Inpatient consult to Infectious Diseases  Consult performed by: Jaelyn Guzman DO  Consult ordered by: Homero Giang MD            ID Consult:     84-year-old female with altered mental status, seen with nurse at bedside.  Patient has bilateral lower extremity erythema pain swelling onychomycosis.  Patient has not been eating well and most of her conversation involves her pain control.  She is very hard of hearing and is yelling out.      All 14 ROS discussed with the patient and negative other than as stated as above       has a past medical history of COPD (chronic obstructive pulmonary disease) (Multi), Diabetes mellitus (Multi), and Hypertension.     has no past surgical history on file.     reports that she quit smoking about 8 years ago. Her smoking use included cigarettes. She has never used smokeless tobacco.    No family history on file.      Physical exam  Vitals:    05/26/24 2050   BP: 125/58   Pulse: 75   Resp:    Temp: 36.1 °C (97 °F)   SpO2: 95%       Patient is awake and alert  NAD  Neck supple  Heart S1S2  Chest: Equal expansion, bilaterally clear to auscultation  Abdomen: soft, ND, NTTP, no guarding  Extrem: Very painful and she does not want the dressings changed from time to time.  She did have them changed today and nurse describes painful red sensitive legs as well as sensitive burning sacral area  Skin: no rashes, no diaphoresis  Neuro: CNS intact  Very confused yelling out    No results displayed because visit has over 200 results.          Assessment:   Bilateral lower extremity cellulitis -will cover broad-spectrum for bacterial infection  COPD history currently not in exacerbation  Acute hypoxic respiratory failure due to heart failure  Sacral decubitus ulcer present on admission  Diabetes mellitus type 2  History of DVT  Possible fungal dermatitis sacrum and bilateral legs    Plan:   Empirically started on vancomycin and Zosyn  1 dose of fluconazole  Optimal pain control  Optimal  glycemic control    All communication directed to consulting provider.   Thank you very much for this consultation.

## 2024-05-27 NOTE — PROGRESS NOTES
"Nephrology Progress Note    Assessment:  84 y.o. female with history s/f HTN, COPD and T2DM who presented for AMS.      RAYMUNDO on CKD stage III: 2/2 volume depletion, Scr 5.2 on presentation, 1.2 w/ eGFR mid 40s at baseline, CKD risk factors T2DM, HTN   HTN  Encephalopathy  Hyponatremia  Metabolic acidosis   Anemia   Fluid overload: echo w/ nml LVEF w/ DD      Plan:  - will hold off on further IVFs at this time, encourage PO, function improving   - holding on diuretics for now    Subjective:  Admit Date: 5/24/2024    Interval History: function improving, no acute overnight events     Medications:  Scheduled Meds:apixaban, 5 mg, oral, BID  fluconazole in NaCl (iso-osm) (Diflucan) 100 mg in empty bag/syringe 50 mL IV, 100 mg, intravenous, q48h  insulin lispro, 0-5 Units, subcutaneous, TID  levothyroxine, 150 mcg, oral, Daily before breakfast  oxygen, , inhalation, Continuous - Inhalation  piperacillin-tazobactam, 3.375 g, intravenous, q12h   And  sodium chloride, 10 mL, intravenous, q12h  polyethylene glycol, 17 g, oral, Daily  silver sulfADIAZINE, , Topical, BID      Continuous Infusions:     CBC:   Lab Results   Component Value Date    WBC 17.3 (H) 05/27/2024    RBC 3.46 (L) 05/27/2024    HGB 8.5 (L) 05/27/2024    HCT 26.6 (L) 05/27/2024    MCV 77 (L) 05/27/2024    MCH 24.6 (L) 05/27/2024    MCHC 32.0 05/27/2024    RDW 19.2 (H) 05/27/2024     05/27/2024     BMP:    Lab Results   Component Value Date     05/27/2024    K 4.0 05/27/2024     05/27/2024    CO2 20 (L) 05/27/2024    BUN 82 (H) 05/27/2024    CREATININE 2.41 (H) 05/27/2024    CALCIUM 7.9 (L) 05/27/2024    GLUCOSE 89 05/27/2024       Objective:  Vitals: /64 (BP Location: Right arm, Patient Position: Lying)   Pulse 77   Temp 36.6 °C (97.9 °F) (Temporal)   Resp 19   Ht 1.575 m (5' 2\")   Wt 86.2 kg (190 lb)   SpO2 95%   BMI 34.75 kg/m²    Wt Readings from Last 3 Encounters:   05/24/24 86.2 kg (190 lb)   05/15/24 104 kg (230 lb) "   12/08/21 89.4 kg (197 lb)      24HR INTAKE/OUTPUT:    Intake/Output Summary (Last 24 hours) at 5/27/2024 1545  Last data filed at 5/27/2024 1200  Gross per 24 hour   Intake 1484 ml   Output --   Net 1484 ml       General: alert, in no apparent distress  HEENT: normocephalic, atraumatic, anicteric  Lungs: non-labored respirations, clear to auscultation bilaterally  Heart: regular rate and rhythm, no murmurs or rubs  Abdomen: soft, non-tender, non-distended  Ext: no cyanosis, no peripheral edema  Neuro: alert and oriented, no gross abnormalities        Electronically signed by Sharita Lane MD, MD

## 2024-05-27 NOTE — PROGRESS NOTES
Cheryl Elena is a 84 y.o. female on day 2 of admission presenting with Acute renal failure (ARF) (CMS-HCC).      Subjective   Patient is a stable, in no acute distress, feeling better  Pending ID recommendation for IV antibiotic management on discharge    Objective     Last Recorded Vitals  /64 (BP Location: Right arm, Patient Position: Lying)   Pulse 77   Temp 36.6 °C (97.9 °F) (Temporal)   Resp 19   Wt 86.2 kg (190 lb)   SpO2 95%   Intake/Output last 3 Shifts:    Intake/Output Summary (Last 24 hours) at 5/27/2024 1451  Last data filed at 5/27/2024 0950  Gross per 24 hour   Intake 1240 ml   Output --   Net 1240 ml       Admission Weight  Weight: 86.2 kg (190 lb) (05/24/24 1925)    Daily Weight  05/24/24 : 86.2 kg (190 lb)    Image Results  Transthoracic Echo (TTE) [place] [id number]   Tel [phone] Fax [phone]    TRANSTHORACIC ECHOCARDIOGRAM REPORT       Patient Name:      CHERYL ELENA       Reading Physician:    [id number] Titi Mckinnon DO  Study Date:        5/25/2024             Ordering Provider:    [id number] AFIA WILL  MRN/PID:           [id number]              Fellow:  Accession#:        [id number]          Nurse:  Date of Birth/Age: 1940 / 84 years  Sonographer:          Alicia PANTOJA  Gender:            F                     Additional Staff:  Height:            157.48 cm             Admit Date:           5/24/2024  Weight:            87.09 kg              Admission Status:     Inpatient -                                                                 Routine  BSA / BMI:         1.88 m2 / 35.12 kg/m2 Department Location:  [address] [place] [address]  Blood Pressure: 180 /76 mmHg    Study Type:    TRANSTHORACIC ECHO (TTE)  COMPLETE  Diagnosis/ICD: Acute on chronic diastolic (congestive) heart failure                 (CHF)-I50.33  Indication:    Congestive Heart Failure  CPT Codes:     Echo Complete w Full [id number]    Patient History:  Diabetes:          Yes  Pertinent History: Previous DVT, Cardiomyopathy, CHF, Renal Failure and COPD.    Study Detail: The following Echo studies were performed: 2D, M-Mode, Doppler and                color flow. The patient was asleep.       PHYSICIAN INTERPRETATION:  Left Ventricle: Left ventricular systolic function is normal, with an estimated ejection fraction of 60-65%. There are no regional wall motion abnormalities. The left ventricular cavity size is normal. There is mild concentric left ventricular hypertrophy. Spectral Doppler shows an impaired relaxation pattern of left ventricular diastolic filling.  LV Wall Scoring:  All segments are normal.    Left Atrium: The left atrium is normal in size.  Right Ventricle: The right ventricle is normal in size. There is normal right ventricular global systolic function.  Right Atrium: The right atrium is normal in size.  Aortic Valve: The aortic valve appears structurally normal. The aortic valve appears tricuspid with restriction. There is mild to moderate aortic valve cusp calcification. There is evidence of mild to moderate aortic valve stenosis.  The peak aortic velocity was obtained from the apical view. There is mild aortic valve regurgitation. The peak instantaneous gradient of the aortic valve is 31.6 mmHg. The mean gradient of the aortic valve is 22.0 mmHg.  Mitral Valve: The mitral valve is normal in structure. There is no evidence of mitral valve stenosis. There is normal mitral valve leaflet mobility. There is mild mitral annular calcification. There is no evidence of mitral valve regurgitation.  Tricuspid Valve: The tricuspid valve is structurally normal. There is normal tricuspid valve leaflet mobility. There is mild tricuspid  regurgitation.  Pulmonic Valve: The pulmonic valve is structurally normal. There is no indication of pulmonic valve regurgitation.  Pericardium: There is a trivial pericardial effusion.  Aorta: The aortic root is normal.  Pulmonary Artery: The main pulmonary artery is normal in size, and position, with normal bifurcation into the left and right pulmonary arteries. The tricuspid regurgitant velocity is 2.59 m/s, and with an estimated right atrial pressure of 3 mmHg, the estimated pulmonary artery pressure is mildly elevated with the RVSP at 29.8 mmHg.  Systemic Veins: The inferior vena cava appears to be of normal size.  In comparison to the previous echocardiogram(s): The left ventricular function is unchanged. The left ventricular hypertrophy is unchanged. The left ventricular diastolic function is unchanged.       CONCLUSIONS:   1. Left ventricular systolic function is normal with a 60-65% estimated ejection fraction.   2. Spectral Doppler shows an impaired relaxation pattern of left ventricular diastolic filling.   3. There is no evidence of mitral valve stenosis.   4. No evidence of mitral valve regurgitation.   5. Mild to moderate aortic valve stenosis.   6. The aortic valve appears tricuspid with restriction.   7. Mild aortic valve regurgitation.   8. The main pulmonary artery is normal in size, and position, with normal bifurcation into the left and right pulmonary arteries.    QUANTITATIVE DATA SUMMARY:  2D MEASUREMENTS:                            Normal Ranges:  Ao Root d:     2.55 cm    (2.0-3.7cm)  LAs:           3.20 cm    (2.7-4.0cm)  IVSd:          1.17 cm    (0.6-1.1cm)  LVPWd:         1.18 cm    (0.6-1.1cm)  LVIDd:         4.51 cm    (3.9-5.9cm)  LVIDs:         3.24 cm  LV Mass Index: 102.7 g/m2  LV % FS        28.2 %    LA VOLUME:                                Normal Ranges:  LA Vol A4C:        37.2 ml    (22+/-6mL/m2)  LA Vol A2C:        31.4 ml  LA Vol BP:         35.1 ml  LA Vol Index A4C:   19.8ml/m2  LA Vol Index A2C:  16.7 ml/m2  LA Vol Index BP:   18.7 ml/m2  LA Area A4C:       14.5 cm2  LA Area A2C:       13.0 cm2  LA Major Axis A4C: 4.8 cm  LA Major Axis A2C: 4.6 cm  LA Volume Index:   17.7 ml/m2    RA VOLUME BY A/L METHOD:                                Normal Ranges:  RA Vol A4C:        24.8 ml    (8.3-19.5ml)  RA Vol Index A4C:  13.2 ml/m2  RA Area A4C:       12.0 cm2  RA Major Axis A4C: 4.9 cm    AORTA MEASUREMENTS:                     Normal Ranges:  Asc Ao, d: 2.39 cm (2.1-3.4cm)    LV SYSTOLIC FUNCTION BY 2D PLANIMETRY (MOD):                      Normal Ranges:  EF-A4C View: 65.7 % (>=55%)  EF-A2C View: 65.6 %  EF-Biplane:  65.0 %    LV DIASTOLIC FUNCTION:                                Normal Ranges:  MV Peak E:        0.84 m/s    (0.7-1.2 m/s)  MV Peak A:        1.42 m/s    (0.42-0.7 m/s)  E/A Ratio:        0.59        (1.0-2.2)  MV e'             0.08 m/s    (>8.0)  MV lateral e'     0.08 m/s  MV medial e'      0.08 m/s  E/e' Ratio:       10.74       (<8.0)  PulmV Sys Derek:    64.90 cm/s  PulmV Mchugh Derek:   42.70 cm/s  PulmV S/D Derek:    1.50  PulmV A Revs Derek: 38.10 cm/s  PulmV A Revs Dur: 174.00 msec    MITRAL VALVE:                  Normal Ranges:  MV DT: 302 msec (150-240msec)    AORTIC VALVE:                                     Normal Ranges:  AoV Vmax:                2.81 m/s  (<=1.7m/s)  AoV Peak P.6 mmHg (<20mmHg)  AoV Mean P.0 mmHg (1.7-11.5mmHg)  LVOT Max Derek:            1.24 m/s  (<=1.1m/s)  AoV VTI:                 58.70 cm  (18-25cm)  LVOT VTI:                23.40 cm  LVOT Diameter:           1.93 cm   (1.8-2.4cm)  AoV Area, VTI:           1.17 cm2  (2.5-5.5cm2)  AoV Area,Vmax:           1.29 cm2  (2.5-4.5cm2)  AoV Dimensionless Index: 0.40       RIGHT VENTRICLE:  RV Basal 2.58 cm  RV Mid   1.81 cm  RV Major 4.9 cm  TAPSE:   18.7 mm  RV s'    0.16 m/s    TRICUSPID VALVE/RVSP:                              Normal Ranges:  Peak TR Velocity: 2.59  m/s  RV Syst Pressure: 29.8 mmHg (< 30mmHg)  IVC Diam:         1.08 cm    PULMONIC VALVE:                           Normal Ranges:  PV Accel Time: 66 msec   (>120ms)  PV Max Derek:    1.6 m/s   (0.6-0.9m/s)  PV Max PG:     10.1 mmHg    Pulmonary Veins:  PulmV A Revs Dur: 174.00 msec  PulmV A Revs Derek: 38.10 cm/s  PulmV Mchugh Derek:   42.70 cm/s  PulmV S/D Derek:    1.50  PulmV Sys Derek:    64.90 cm/s       [id number] Titi Mckinnon   Electronically signed on 5/25/2024 at 11:34:54 AM       Wall Scoring       ** Final **      Physical Exam    General: Well-developed elderly female, in no acute distress  HEENT: AT, NC, no JVD, no lymphadenopathy, neck supple, hearing issue   Lungs: Clear, no wheezing, no crackles  Cardiac: Normal S1-S2, no murmur, no gallop  Abdomen: Soft, nontender, no distention, positive bowel sound  Extremities: No deformity, b/l LE wrapped, ROM intact  Neurological: Alert awake oriented x3, sensation intact, clear speech      Assessment/Plan      Principal Problem:    Acute renal failure (ARF) (CMS-HCC)  Active Problems:    Hyperkalemia    Acute hypoxic respiratory failure (Multi)    High anion gap metabolic acidosis    Sepsis due to cellulitis (Multi)    Pressure injury of sacral region, stage 2 (Multi)    Hyponatremia    Acute metabolic encephalopathy    Acute on chronic diastolic (congestive) heart failure (Multi)    Acute renal failure superimposed on stage 3b chronic kidney disease, unspecified acute renal failure type (Multi)    Cheryl Elena is a 84 y.o. female with a history of DVT on Eliquis, HFpEF, COPD, type 2 diabetes presenting with increasing confusion and altered mental status.  Was admitted for management of following issues:    #.  RAYMUNDO/CKD with severe high anion gap metabolic acidosis and hyperkalemia, resolved   #.  Sepsis secondary to bilateral leg cellulitis, resolved   #.  Acute hypoxic respiratory failure likely secondary to decompensated HFpEF versus new onset systolic heart  failure  #.  Acute metabolic encephalopathy likely secondary to uremia versus sepsis, resolved   #.  COPD - not in acute exacerbation  #.  Sacral decubitus ulcer  #.  Hyponatremia resolved   #.  History of DVT  #.  Long-term anticoagulation use  #.  Type 2 diabetes  -Elevated creatinine, hyperkalemia, CK, leukocytosis on arrival  -S/p IVF hydration and bicarb drip  -CXR showed evidence of pulmonary vascular congestion as well as possible small pleural effusions  -Last echocardiogram in 2021 showing EF of 55% and diastolic dysfunction  -Prior cultures from leg wounds have grown MRSA and Pseudomonas (susceptible to Zosyn)     Plan:  -Status post IV bicarb  -Nephrology recs appreciated, no more IVF  -Oxygen therapy, pain management, antiemetic, telemetry  -Wound culture positive for 4+ gram negative bacilli, cw Zosyn  -Pending ID recommendation for IV antibiotic at discharge  -Echo done and reviewed, EF 60 to 65%, diastolic dysfunction  -Strict intake and output, daily weights  -Continue home Eliquis  -Insulin sliding scale, hypoglycemic protocol  -DuoNebs as needed  -Wound care and plastic surgery following     VTE PPX: Eliquis  Disposition: Home once hemodynamically stable, patient prefers to go home and is refusing PT/OT evaluation    Swapnil Otero MD

## 2024-05-27 NOTE — Clinical Note
POCT Glucose was done at 11:00 sm with result of 118   Accu check machine is not transfering.    Mikayla Thorpe

## 2024-05-28 LAB
ANION GAP SERPL CALC-SCNC: 12 MMOL/L (ref 10–20)
BUN SERPL-MCNC: 66 MG/DL (ref 6–23)
CALCIUM SERPL-MCNC: 8.1 MG/DL (ref 8.6–10.3)
CHLORIDE SERPL-SCNC: 110 MMOL/L (ref 98–107)
CO2 SERPL-SCNC: 21 MMOL/L (ref 21–32)
CREAT SERPL-MCNC: 1.93 MG/DL (ref 0.5–1.05)
EGFRCR SERPLBLD CKD-EPI 2021: 25 ML/MIN/1.73M*2
ERYTHROCYTE [DISTWIDTH] IN BLOOD BY AUTOMATED COUNT: 19.6 % (ref 11.5–14.5)
GLUCOSE BLD MANUAL STRIP-MCNC: 111 MG/DL (ref 74–99)
GLUCOSE BLD MANUAL STRIP-MCNC: 118 MG/DL (ref 74–99)
GLUCOSE BLD MANUAL STRIP-MCNC: 153 MG/DL (ref 74–99)
GLUCOSE BLD MANUAL STRIP-MCNC: 82 MG/DL (ref 74–99)
GLUCOSE BLD MANUAL STRIP-MCNC: 92 MG/DL (ref 74–99)
GLUCOSE SERPL-MCNC: 80 MG/DL (ref 74–99)
HCT VFR BLD AUTO: 25.2 % (ref 36–46)
HGB BLD-MCNC: 8.2 G/DL (ref 12–16)
HOLD SPECIMEN: NORMAL
MCH RBC QN AUTO: 25.2 PG (ref 26–34)
MCHC RBC AUTO-ENTMCNC: 32.5 G/DL (ref 32–36)
MCV RBC AUTO: 77 FL (ref 80–100)
NRBC BLD-RTO: 0 /100 WBCS (ref 0–0)
PLATELET # BLD AUTO: 293 X10*3/UL (ref 150–450)
POTASSIUM SERPL-SCNC: 4.2 MMOL/L (ref 3.5–5.3)
RBC # BLD AUTO: 3.26 X10*6/UL (ref 4–5.2)
SODIUM SERPL-SCNC: 139 MMOL/L (ref 136–145)
WBC # BLD AUTO: 14 X10*3/UL (ref 4.4–11.3)

## 2024-05-28 PROCEDURE — 2500000004 HC RX 250 GENERAL PHARMACY W/ HCPCS (ALT 636 FOR OP/ED): Performed by: STUDENT IN AN ORGANIZED HEALTH CARE EDUCATION/TRAINING PROGRAM

## 2024-05-28 PROCEDURE — 85027 COMPLETE CBC AUTOMATED: CPT | Performed by: STUDENT IN AN ORGANIZED HEALTH CARE EDUCATION/TRAINING PROGRAM

## 2024-05-28 PROCEDURE — 82374 ASSAY BLOOD CARBON DIOXIDE: CPT | Performed by: STUDENT IN AN ORGANIZED HEALTH CARE EDUCATION/TRAINING PROGRAM

## 2024-05-28 PROCEDURE — 2500000001 HC RX 250 WO HCPCS SELF ADMINISTERED DRUGS (ALT 637 FOR MEDICARE OP): Performed by: STUDENT IN AN ORGANIZED HEALTH CARE EDUCATION/TRAINING PROGRAM

## 2024-05-28 PROCEDURE — 99222 1ST HOSP IP/OBS MODERATE 55: CPT | Performed by: REGISTERED NURSE

## 2024-05-28 PROCEDURE — 36415 COLL VENOUS BLD VENIPUNCTURE: CPT | Performed by: STUDENT IN AN ORGANIZED HEALTH CARE EDUCATION/TRAINING PROGRAM

## 2024-05-28 PROCEDURE — 99231 SBSQ HOSP IP/OBS SF/LOW 25: CPT | Performed by: PLASTIC SURGERY

## 2024-05-28 PROCEDURE — 2500000001 HC RX 250 WO HCPCS SELF ADMINISTERED DRUGS (ALT 637 FOR MEDICARE OP): Performed by: PLASTIC SURGERY

## 2024-05-28 PROCEDURE — 1200000002 HC GENERAL ROOM WITH TELEMETRY DAILY

## 2024-05-28 PROCEDURE — 99232 SBSQ HOSP IP/OBS MODERATE 35: CPT | Performed by: STUDENT IN AN ORGANIZED HEALTH CARE EDUCATION/TRAINING PROGRAM

## 2024-05-28 PROCEDURE — 82947 ASSAY GLUCOSE BLOOD QUANT: CPT

## 2024-05-28 RX ORDER — FLUCONAZOLE 2 MG/ML
100 INJECTION, SOLUTION INTRAVENOUS
Status: DISCONTINUED | OUTPATIENT
Start: 2024-05-29 | End: 2024-05-30 | Stop reason: HOSPADM

## 2024-05-28 RX ADMIN — PIPERACILLIN SODIUM AND TAZOBACTAM SODIUM 3.38 G: 3; .375 INJECTION, SOLUTION INTRAVENOUS at 08:47

## 2024-05-28 RX ADMIN — APIXABAN 5 MG: 5 TABLET, FILM COATED ORAL at 08:47

## 2024-05-28 RX ADMIN — OXYCODONE AND ACETAMINOPHEN 1 TABLET: 10; 325 TABLET ORAL at 19:02

## 2024-05-28 RX ADMIN — SODIUM CHLORIDE 10 ML: 9 INJECTION, SOLUTION INTRAVENOUS at 08:43

## 2024-05-28 RX ADMIN — SILVER SULFADIAZINE: 10 CREAM TOPICAL at 20:44

## 2024-05-28 RX ADMIN — SODIUM CHLORIDE 10 ML: 9 INJECTION, SOLUTION INTRAVENOUS at 22:16

## 2024-05-28 RX ADMIN — OXYCODONE AND ACETAMINOPHEN 1 TABLET: 10; 325 TABLET ORAL at 06:05

## 2024-05-28 RX ADMIN — LEVOTHYROXINE SODIUM 150 MCG: 75 TABLET ORAL at 06:06

## 2024-05-28 RX ADMIN — APIXABAN 5 MG: 5 TABLET, FILM COATED ORAL at 20:41

## 2024-05-28 RX ADMIN — SILVER SULFADIAZINE: 10 CREAM TOPICAL at 08:48

## 2024-05-28 RX ADMIN — PIPERACILLIN SODIUM AND TAZOBACTAM SODIUM 3.38 G: 3; .375 INJECTION, SOLUTION INTRAVENOUS at 22:15

## 2024-05-28 ASSESSMENT — PAIN SCALES - GENERAL
PAINLEVEL_OUTOF10: 0 - NO PAIN
PAINLEVEL_OUTOF10: 9
PAINLEVEL_OUTOF10: 7

## 2024-05-28 ASSESSMENT — PAIN - FUNCTIONAL ASSESSMENT
PAIN_FUNCTIONAL_ASSESSMENT: 0-10

## 2024-05-28 ASSESSMENT — COGNITIVE AND FUNCTIONAL STATUS - GENERAL
MOVING FROM LYING ON BACK TO SITTING ON SIDE OF FLAT BED WITH BEDRAILS: TOTAL
MOVING FROM LYING ON BACK TO SITTING ON SIDE OF FLAT BED WITH BEDRAILS: TOTAL
TURNING FROM BACK TO SIDE WHILE IN FLAT BAD: TOTAL
TURNING FROM BACK TO SIDE WHILE IN FLAT BAD: TOTAL
MOVING FROM LYING ON BACK TO SITTING ON SIDE OF FLAT BED WITH BEDRAILS: TOTAL
CLIMB 3 TO 5 STEPS WITH RAILING: TOTAL
WALKING IN HOSPITAL ROOM: TOTAL
PERSONAL GROOMING: A LOT
TOILETING: TOTAL
DAILY ACTIVITIY SCORE: 9
HELP NEEDED FOR BATHING: TOTAL
MOVING TO AND FROM BED TO CHAIR: TOTAL
TOILETING: TOTAL
DRESSING REGULAR LOWER BODY CLOTHING: TOTAL
WALKING IN HOSPITAL ROOM: TOTAL
DRESSING REGULAR UPPER BODY CLOTHING: TOTAL
EATING MEALS: A LITTLE
WALKING IN HOSPITAL ROOM: TOTAL
HELP NEEDED FOR BATHING: TOTAL
PERSONAL GROOMING: A LITTLE
MOVING TO AND FROM BED TO CHAIR: TOTAL
STANDING UP FROM CHAIR USING ARMS: TOTAL
MOVING TO AND FROM BED TO CHAIR: TOTAL
DRESSING REGULAR UPPER BODY CLOTHING: TOTAL
TOILETING: TOTAL
EATING MEALS: A LITTLE
STANDING UP FROM CHAIR USING ARMS: TOTAL
DAILY ACTIVITIY SCORE: 9
PERSONAL GROOMING: A LOT
DRESSING REGULAR LOWER BODY CLOTHING: TOTAL
DAILY ACTIVITIY SCORE: 10
HELP NEEDED FOR BATHING: TOTAL
MOBILITY SCORE: 6
MOBILITY SCORE: 6
CLIMB 3 TO 5 STEPS WITH RAILING: TOTAL
DRESSING REGULAR LOWER BODY CLOTHING: TOTAL
DRESSING REGULAR UPPER BODY CLOTHING: TOTAL
STANDING UP FROM CHAIR USING ARMS: TOTAL
TURNING FROM BACK TO SIDE WHILE IN FLAT BAD: TOTAL
EATING MEALS: A LITTLE
CLIMB 3 TO 5 STEPS WITH RAILING: TOTAL
MOBILITY SCORE: 6

## 2024-05-28 ASSESSMENT — PAIN DESCRIPTION - LOCATION: LOCATION: GENERALIZED

## 2024-05-28 ASSESSMENT — PAIN DESCRIPTION - ORIENTATION: ORIENTATION: INNER

## 2024-05-28 NOTE — PROGRESS NOTES
Occupational Therapy                 Therapy Communication Note    Patient Name: Cheryl Elena  MRN: [id number]  Today's Date: 5/28/2024     Discipline: Occupational Therapy    Missed Visit Reason: Missed Visit Reason: Parent refused    Comment: Attempted OT evaluation. Patient supine in bed, adamantly refusing participation in therapy. Despite encouragement/education on role of OT in the acute hospital, patient continued to refuse. Will re attempt as patient agreeable/appropriate.

## 2024-05-28 NOTE — PROGRESS NOTES
"Nephrology Progress Note    Assessment:  84 y.o. female with history s/f HTN, COPD and T2DM who presented for AMS.      RAYMUNDO on CKD stage III: 2/2 volume depletion, Scr 5.2 on presentation, 1.2 w/ eGFR mid 40s at baseline, CKD risk factors T2DM, HTN   HTN  Encephalopathy  Hyponatremia  Metabolic acidosis   Anemia   Fluid overload: echo w/ nml LVEF w/ DD      Plan:  - will hold off on further IVFs at this time, encourage PO, function improving   - holding on diuretics for now    Subjective:  Admit Date: 5/24/2024    Interval History: function improving, no acute overnight events     Medications:  Scheduled Meds:apixaban, 5 mg, oral, BID  fluconazole in NaCl (iso-osm) (Diflucan) 100 mg in empty bag/syringe 50 mL IV, 100 mg, intravenous, q48h  insulin lispro, 0-5 Units, subcutaneous, TID  levothyroxine, 150 mcg, oral, Daily before breakfast  oxygen, , inhalation, Continuous - Inhalation  piperacillin-tazobactam, 3.375 g, intravenous, q12h   And  sodium chloride, 10 mL, intravenous, q12h  polyethylene glycol, 17 g, oral, Daily  silver sulfADIAZINE, , Topical, BID      Continuous Infusions:     CBC:   Lab Results   Component Value Date    WBC 14.0 (H) 05/28/2024    RBC 3.26 (L) 05/28/2024    HGB 8.2 (L) 05/28/2024    HCT 25.2 (L) 05/28/2024    MCV 77 (L) 05/28/2024    MCH 25.2 (L) 05/28/2024    MCHC 32.5 05/28/2024    RDW 19.6 (H) 05/28/2024     05/28/2024     BMP:    Lab Results   Component Value Date     05/28/2024    K 4.2 05/28/2024     (H) 05/28/2024    CO2 21 05/28/2024    BUN 66 (H) 05/28/2024    CREATININE 1.93 (H) 05/28/2024    CALCIUM 8.1 (L) 05/28/2024    GLUCOSE 80 05/28/2024       Objective:  Vitals: /65 (BP Location: Right arm, Patient Position: Lying)   Pulse 70   Temp 36.5 °C (97.7 °F) (Temporal)   Resp 17   Ht 1.575 m (5' 2\")   Wt 86.2 kg (190 lb)   SpO2 94%   BMI 34.75 kg/m²    Wt Readings from Last 3 Encounters:   05/24/24 86.2 kg (190 lb)   05/15/24 104 kg (230 lb) "   12/08/21 89.4 kg (197 lb)      24HR INTAKE/OUTPUT:    Intake/Output Summary (Last 24 hours) at 5/28/2024 1009  Last data filed at 5/28/2024 0900  Gross per 24 hour   Intake 732 ml   Output 500 ml   Net 232 ml       General: alert, in no apparent distress  HEENT: normocephalic, atraumatic, anicteric  Lungs: non-labored respirations, clear to auscultation bilaterally  Heart: regular rate and rhythm, no murmurs or rubs  Abdomen: soft, non-tender, non-distended  Ext: no cyanosis, no peripheral edema  Neuro: alert, follows commands       Electronically signed by Sharita Lane MD, MD

## 2024-05-28 NOTE — PROGRESS NOTES
Cheryl Elena is a 84 y.o. female on day 3 of admission presenting with Acute renal failure (ARF) (CMS-[facility]).      Subjective   Patient is stable, no acute distress, no complaint, lying down on her bed  Psych was consulted for determine competency of making medical decisions  Renal function improved significantly, leukocytosis improved    Objective     Last Recorded Vitals  /65 (BP Location: Right arm, Patient Position: Lying)   Pulse 70   Temp 36.5 °C (97.7 °F) (Temporal)   Resp 17   Wt 86.2 kg (190 lb)   SpO2 94%   Intake/Output last 3 Shifts:    Intake/Output Summary (Last 24 hours) at 5/28/2024 1400  Last data filed at 5/28/2024 1200  Gross per 24 hour   Intake 850 ml   Output 500 ml   Net 350 ml       Admission Weight  Weight: 86.2 kg (190 lb) (05/24/24 1925)    Daily Weight  05/24/24 : 86.2 kg (190 lb)    Image Results  Transthoracic Echo (TTE) [place] [id number]   Tel [phone] Fax [phone]    TRANSTHORACIC ECHOCARDIOGRAM REPORT       Patient Name:      CHERYL ELENA       Reading Physician:    [id number] Titi Mckinnon DO  Study Date:        5/25/2024             Ordering Provider:    [id number] AFIA WILL  MRN/PID:           [id number]              Fellow:  Accession#:        [id number]          Nurse:  Date of Birth/Age: 1940 / 84 years  Sonographer:          Alicia PANTOJA  Gender:            F                     Additional Staff:  Height:            157.48 cm             Admit Date:           5/24/2024  Weight:            87.09 kg              Admission Status:     Inpatient -                                                                 Routine  BSA / BMI:         1.88 m2 / 35.12 kg/m2 Department Location:  [address] [place] [address]  Pressure: 180 /76 mmHg    Study Type:    TRANSTHORACIC ECHO (TTE) COMPLETE  Diagnosis/ICD: Acute on chronic diastolic (congestive) heart failure                 (CHF)-I50.33  Indication:    Congestive Heart Failure  CPT Codes:     Echo Complete w Full Doppler-[id number]    Patient History:  Diabetes:          Yes  Pertinent History: Previous DVT, Cardiomyopathy, CHF, Renal Failure and COPD.    Study Detail: The following Echo studies were performed: 2D, M-Mode, Doppler and                color flow. The patient was asleep.       PHYSICIAN INTERPRETATION:  Left Ventricle: Left ventricular systolic function is normal, with an estimated ejection fraction of 60-65%. There are no regional wall motion abnormalities. The left ventricular cavity size is normal. There is mild concentric left ventricular hypertrophy. Spectral Doppler shows an impaired relaxation pattern of left ventricular diastolic filling.  LV Wall Scoring:  All segments are normal.    Left Atrium: The left atrium is normal in size.  Right Ventricle: The right ventricle is normal in size. There is normal right ventricular global systolic function.  Right Atrium: The right atrium is normal in size.  Aortic Valve: The aortic valve appears structurally normal. The aortic valve appears tricuspid with restriction. There is mild to moderate aortic valve cusp calcification. There is evidence of mild to moderate aortic valve stenosis.  The peak aortic velocity was obtained from the apical view. There is mild aortic valve regurgitation. The peak instantaneous gradient of the aortic valve is 31.6 mmHg. The mean gradient of the aortic valve is 22.0 mmHg.  Mitral Valve: The mitral valve is normal in structure. There is no evidence of mitral valve stenosis. There is normal mitral valve leaflet mobility. There is mild mitral annular calcification. There is no evidence of mitral valve regurgitation.  Tricuspid Valve: The tricuspid valve is structurally normal. There is normal  tricuspid valve leaflet mobility. There is mild tricuspid regurgitation.  Pulmonic Valve: The pulmonic valve is structurally normal. There is no indication of pulmonic valve regurgitation.  Pericardium: There is a trivial pericardial effusion.  Aorta: The aortic root is normal.  Pulmonary Artery: The main pulmonary artery is normal in size, and position, with normal bifurcation into the left and right pulmonary arteries. The tricuspid regurgitant velocity is 2.59 m/s, and with an estimated right atrial pressure of 3 mmHg, the estimated pulmonary artery pressure is mildly elevated with the RVSP at 29.8 mmHg.  Systemic Veins: The inferior vena cava appears to be of normal size.  In comparison to the previous echocardiogram(s): The left ventricular function is unchanged. The left ventricular hypertrophy is unchanged. The left ventricular diastolic function is unchanged.       CONCLUSIONS:   1. Left ventricular systolic function is normal with a 60-65% estimated ejection fraction.   2. Spectral Doppler shows an impaired relaxation pattern of left ventricular diastolic filling.   3. There is no evidence of mitral valve stenosis.   4. No evidence of mitral valve regurgitation.   5. Mild to moderate aortic valve stenosis.   6. The aortic valve appears tricuspid with restriction.   7. Mild aortic valve regurgitation.   8. The main pulmonary artery is normal in size, and position, with normal bifurcation into the left and right pulmonary arteries.    QUANTITATIVE DATA SUMMARY:  2D MEASUREMENTS:                            Normal Ranges:  Ao Root d:     2.55 cm    (2.0-3.7cm)  LAs:           3.20 cm    (2.7-4.0cm)  IVSd:          1.17 cm    (0.6-1.1cm)  LVPWd:         1.18 cm    (0.6-1.1cm)  LVIDd:         4.51 cm    (3.9-5.9cm)  LVIDs:         3.24 cm  LV Mass Index: 102.7 g/m2  LV % FS        28.2 %    LA VOLUME:                                Normal Ranges:  LA Vol A4C:        37.2 ml    (22+/-6mL/m2)  LA Vol A2C:         31.4 ml  LA Vol BP:         35.1 ml  LA Vol Index A4C:  19.8ml/m2  LA Vol Index A2C:  16.7 ml/m2  LA Vol Index BP:   18.7 ml/m2  LA Area A4C:       14.5 cm2  LA Area A2C:       13.0 cm2  LA Major Axis A4C: 4.8 cm  LA Major Axis A2C: 4.6 cm  LA Volume Index:   17.7 ml/m2    RA VOLUME BY A/L METHOD:                                Normal Ranges:  RA Vol A4C:        24.8 ml    (8.3-19.5ml)  RA Vol Index A4C:  13.2 ml/m2  RA Area A4C:       12.0 cm2  RA Major Axis A4C: 4.9 cm    AORTA MEASUREMENTS:                     Normal Ranges:  Asc Ao, d: 2.39 cm (2.1-3.4cm)    LV SYSTOLIC FUNCTION BY 2D PLANIMETRY (MOD):                      Normal Ranges:  EF-A4C View: 65.7 % (>=55%)  EF-A2C View: 65.6 %  EF-Biplane:  65.0 %    LV DIASTOLIC FUNCTION:                                Normal Ranges:  MV Peak E:        0.84 m/s    (0.7-1.2 m/s)  MV Peak A:        1.42 m/s    (0.42-0.7 m/s)  E/A Ratio:        0.59        (1.0-2.2)  MV e'             0.08 m/s    (>8.0)  MV lateral e'     0.08 m/s  MV medial e'      0.08 m/s  E/e' Ratio:       10.74       (<8.0)  PulmV Sys Derek:    64.90 cm/s  PulmV Mchugh Derek:   42.70 cm/s  PulmV S/D Derek:    1.50  PulmV A Revs Derek: 38.10 cm/s  PulmV A Revs Dur: 174.00 msec    MITRAL VALVE:                  Normal Ranges:  MV DT: 302 msec (150-240msec)    AORTIC VALVE:                                     Normal Ranges:  AoV Vmax:                2.81 m/s  (<=1.7m/s)  AoV Peak P.6 mmHg (<20mmHg)  AoV Mean P.0 mmHg (1.7-11.5mmHg)  LVOT Max Derek:            1.24 m/s  (<=1.1m/s)  AoV VTI:                 58.70 cm  (18-25cm)  LVOT VTI:                23.40 cm  LVOT Diameter:           1.93 cm   (1.8-2.4cm)  AoV Area, VTI:           1.17 cm2  (2.5-5.5cm2)  AoV Area,Vmax:           1.29 cm2  (2.5-4.5cm2)  AoV Dimensionless Index: 0.40       RIGHT VENTRICLE:  RV Basal 2.58 cm  RV Mid   1.81 cm  RV Major 4.9 cm  TAPSE:   18.7 mm  RV s'    0.16 m/s    TRICUSPID VALVE/RVSP:                               Normal Ranges:  Peak TR Velocity: 2.59 m/s  RV Syst Pressure: 29.8 mmHg (< 30mmHg)  IVC Diam:         1.08 cm    PULMONIC VALVE:                           Normal Ranges:  PV Accel Time: 66 msec   (>120ms)  PV Max Derek:    1.6 m/s   (0.6-0.9m/s)  PV Max PG:     10.1 mmHg    Pulmonary Veins:  PulmV A Revs Dur: 174.00 msec  PulmV A Revs Derek: 38.10 cm/s  PulmV Mchugh Derek:   42.70 cm/s  PulmV S/D Derek:    1.50  PulmV Sys Derek:    64.90 cm/s       [id number] Titi Ino DO  Electronically signed on 5/25/2024 at 11:34:54 AM       Wall Scoring       ** Final **      Physical Exam    General: Well-developed elderly female, in no acute distress  HEENT: AT, NC, no JVD, no lymphadenopathy, neck supple, hearing issue   Lungs: Clear, no wheezing, no crackles  Cardiac: Normal S1-S2, no murmur, no gallop  Abdomen: Soft, nontender, no distention, positive bowel sound  Extremities: No deformity, b/l LE wrapped, ROM intact  Neurological: Alert awake oriented x3, sensation intact, clear speech    Assessment/Plan      Principal Problem:    Acute renal failure (ARF) (CMS-HCC)  Active Problems:    Hyperkalemia    Acute hypoxic respiratory failure (Multi)    High anion gap metabolic acidosis    Sepsis due to cellulitis (Multi)    Pressure injury of sacral region, stage 2 (Multi)    Hyponatremia    Acute metabolic encephalopathy    Acute on chronic diastolic (congestive) heart failure (Multi)    Acute renal failure superimposed on stage 3b chronic kidney disease, unspecified acute renal failure type (Multi)    Cheryl Elena is a 84 y.o. female with a history of DVT on Eliquis, HFpEF, COPD, type 2 diabetes presenting with increasing confusion and altered mental status.  Was admitted for management of following issues:     #.  RAYMUNDO/CKD with severe high anion gap metabolic acidosis and hyperkalemia, resolved   #.  Sepsis secondary to bilateral leg cellulitis, resolved   #.  Acute hypoxic respiratory failure likely secondary to  decompensated HFpEF versus new onset systolic heart failure  #.  Acute metabolic encephalopathy likely secondary to uremia versus sepsis, resolved   #.  COPD - not in acute exacerbation  #.  Sacral decubitus ulcer  #.  Hyponatremia resolved   #.  History of DVT  #.  Long-term anticoagulation use  #.  Type 2 diabetes  -Elevated creatinine, hyperkalemia, CK, leukocytosis on arrival  -S/p IVF hydration and bicarb drip  -CXR showed evidence of pulmonary vascular congestion as well as possible small pleural effusions  -Last echocardiogram in 2021 showing EF of 55% and diastolic dysfunction  -Prior cultures from leg wounds have grown MRSA and Pseudomonas (susceptible to Zosyn)     Plan:  -Status post IV bicarb  -Nephrology recs appreciated, no more IVF  -Oxygen therapy, pain management, antiemetic, telemetry  -Wound culture positive for 4+ gram negative bacilli, cw Zosyn  -Pending ID recommendation for IV antibiotic at discharge  -Echo done and reviewed, EF 60 to 65%, diastolic dysfunction  -Strict intake and output, daily weights  -Continue home Eliquis  -Insulin sliding scale, hypoglycemic protocol  -DuoNebs as needed  -Wound care and plastic surgery following  -psych consult for decision making capacity      VTE PPX: Eliquis  Disposition: pending PT/OT evaluation       Swapnil Otero MD

## 2024-05-28 NOTE — PROGRESS NOTES
05/28/24 0953   Discharge Planning   Living Arrangements Alone   Support Systems Family members   Assistance Needed SNF   Type of Residence Private residence   Do you have animals or pets at home? No   Who is requesting discharge planning? Provider   Home or Post Acute Services Post acute facilities (Rehab/SNF/etc)   Type of Post Acute Facility Services Skilled nursing   Patient expects to be discharged to: SNF   Does the patient need discharge transport arranged? Yes   RoundTrip coordination needed? Yes   Patient Choice   Provider Choice list and CMS website (https://medicare.gov/care-compare#search) for post-acute Quality and Resource Measure Data were provided and reviewed with: Patient   Patient / Family choosing to utilize agency / facility established prior to hospitalization No     Patient was at [facility] for quite some time, she was in the assisted living. Patient went home, lives alone and ended up back in the hospital. I asked patient what the plan would be if PT/OT recommended inpatient rehab again, she stated she did not know. Patient states she did not care for the assisted living at [facility], advised her that she should reconsider as taking care of herself has become difficult. Will continue to follow, patient refused PT/OT the other day, advised her she needs therapy in order to help us with a discharge plan. Patient would not answer me.

## 2024-05-28 NOTE — PROGRESS NOTES
Spoke with patient's brother Uvaldo who is POA. Uvaldo states patient left [facility] d/t her divorce hearing was coming up and she needed to be home for it. Patient went back to her home on [address] in [place] that she still shares with her . They have been in the process of a divorce for quite some time. Patient insists on going home with  until the divorce is final. Patient was found in her own feces and urine in her wheelchair and was pink slipped to the hospital. Patient will need to be seen by psyche to determine competency of making medical decisions. Will continue to follow.     UPDATE:  Per psyche, patient does not meet capacity to make medical decisions, Uvaldo, patient's brother is POA. Uvaldo would like patient at [facility], will send for SNF, patient also has caresource may be LTC. Spoke with [facility], they do  have LTC beds, sent referral will wait for their response and start precert.     [place] accepted, asked team to start precert.

## 2024-05-28 NOTE — NURSING NOTE
"Patient has stool on hands and body. Patient is refusing to get cleaned and be repositioned, screaming \"don't touch me\". Educated over importance of cleanliness and repositioning. Promised to return in one hour after administration of pain med to check and change and do skin assessment. Patient continues to verbalize unwillingness to cooperate.    "

## 2024-05-28 NOTE — CONSULTS
"Chief Complaint  Altered mental status     History Of Present Illness  Cheryl Elena is a 84 y.o. year old female patient with past medical history of DVT on Eliquis, HFpEF, COPD, type 2 diabetes presenting with increasing confusion and altered mental status.  Patient treated for acute renal failure, sepsis secondary to bilateral leg cellulitis, acute hypoxic respiratory failure, acute metabolic encephalopathy likely secondary to uremia.  Patient known to psychiatric team from previous admissions under similar circumstances.  Patient recently left [facility] AMA.  She returned to her previous home where her  lives.  They are currently getting .  They harbor feelings of animosity towards each other, and do not get along.  Patient reports that prior to coming to the hospital she had not eaten for 5 days.  Patient asked if anyone in the family checks on her, she states \"no one checks on me \".  Patient asked why her  did not call EMS earlier due to her mental state.  She indicated that he does not care about her.  Patient has had multiple admissions to the hospital due to the inability to care for herself at home.  Patient often found sitting in her own urine and feces.  Patient acknowledges that when she was at [facility] she had much fewer hospital admissions.  The patient is alert to self, setting, and situation.  She is hyperfocused on returning home.  Patient does not demonstrate ability to consistently demonstrate choice and certainly does not appreciate the situation and possible consequences.  At this time patient does not have capacity for medical decision-making.     Past Medical History  Past Medical History:   Diagnosis Date    COPD (chronic obstructive pulmonary disease) (Multi)     Diabetes mellitus (Multi)     Hypertension        Allergies  Allergies   Allergen Reactions    Silver (Bulk) Itching    Cephalexin Other     Other reaction(s): Unknown   Tolerated " ceftriaxone during 8/2021 admission    Cortisone Swelling    Diphenhydramine Hcl Other    Prednisone Other    Codeine Other and Rash    Penicillins Other and Rash        MSE  General: Appropriately groomed and dressed.  Appearance: Appears stated age.  Attitude: Calm, cooperative.  Behavior: Appropriate eye contact.  Motor activity: No agitation or retardation. no EPS.  Normal gait.  Speech: Regular rate, rhythm, volume and tone.  Mood: Depressed  Affect: Restricted  Thought process: Organized, linear, goal-directed.  Associations are logical.  Thought content: Does not endorse suicidal or homicidal ideation, no delusions elicited.  Thought perception: Did not endorse auditory or visual hallucinations.  Cognition: Alert, oriented x3.    Insight: Poor  Judgment: Impaired    Psychiatric Risk Assessment  Violence Risk Assessment: major mental illness  Acute Risk of Harm to Others is Considered: low   Suicide Risk Assessment:  and chronic medical illness  Protective Factors against Suicide: adherence to  treatment and hopefulness/future orientation  Acute Risk of Harm to Self is Considered: low    Last Recorded Vitals  Vitals:    05/28/24 1544   BP: 148/65   Pulse: 71   Resp: 17   Temp: 36.7 °C (98.1 °F)   SpO2: 96%        Relevant Results  Scheduled medications  apixaban, 5 mg, oral, BID  [START ON 5/29/2024] fluconazole in NaCl (iso-osm), 100 mg, intravenous, q48h  insulin lispro, 0-5 Units, subcutaneous, TID  levothyroxine, 150 mcg, oral, Daily before breakfast  oxygen, , inhalation, Continuous - Inhalation  piperacillin-tazobactam, 3.375 g, intravenous, q12h   And  sodium chloride, 10 mL, intravenous, q12h  polyethylene glycol, 17 g, oral, Daily  silver sulfADIAZINE, , Topical, BID      Continuous medications     PRN medications  PRN medications: acetaminophen **OR** acetaminophen **OR** acetaminophen, dextrose, dextrose, glucagon, glucagon, hydrOXYzine HCL, ipratropium-albuteroL, melatonin, menthol-zinc  oxide, oxyCODONE-acetaminophen     Results for orders placed or performed during the hospital encounter of 05/24/24 (from the past 96 hour(s))   CBC and Auto Differential   Result Value Ref Range    WBC 31.3 (H) 4.4 - 11.3 x10*3/uL    nRBC 0.2 (H) 0.0 - 0.0 /100 WBCs    RBC 4.50 4.00 - 5.20 x10*6/uL    Hemoglobin 11.2 (L) 12.0 - 16.0 g/dL    Hematocrit 34.8 (L) 36.0 - 46.0 %    MCV 77 (L) 80 - 100 fL    MCH 24.9 (L) 26.0 - 34.0 pg    MCHC 32.2 32.0 - 36.0 g/dL    RDW 19.4 (H) 11.5 - 14.5 %    Platelets 419 150 - 450 x10*3/uL    Neutrophils % 90.7 40.0 - 80.0 %    Immature Granulocytes %, Automated 3.0 (H) 0.0 - 0.9 %    Lymphocytes % 2.2 13.0 - 44.0 %    Monocytes % 3.9 2.0 - 10.0 %    Eosinophils % 0.0 0.0 - 6.0 %    Basophils % 0.2 0.0 - 2.0 %    Neutrophils Absolute 28.37 (H) 1.60 - 5.50 x10*3/uL    Immature Granulocytes Absolute, Automated 0.93 (H) 0.00 - 0.50 x10*3/uL    Lymphocytes Absolute 0.68 (L) 0.80 - 3.00 x10*3/uL    Monocytes Absolute 1.23 (H) 0.05 - 0.80 x10*3/uL    Eosinophils Absolute 0.00 0.00 - 0.40 x10*3/uL    Basophils Absolute 0.07 0.00 - 0.10 x10*3/uL   Comprehensive Metabolic Panel   Result Value Ref Range    Glucose 120 (H) 74 - 99 mg/dL    Sodium 128 (L) 136 - 145 mmol/L    Potassium 6.7 (HH) 3.5 - 5.3 mmol/L    Chloride 104 98 - 107 mmol/L    Bicarbonate 8 (LL) 21 - 32 mmol/L    Anion Gap 23 (H) 10 - 20 mmol/L    Urea Nitrogen 138 (HH) 6 - 23 mg/dL    Creatinine 5.73 (H) 0.50 - 1.05 mg/dL    eGFR 7 (L) >60 mL/min/1.73m*2    Calcium 8.3 (L) 8.6 - 10.3 mg/dL    Albumin 3.6 3.4 - 5.0 g/dL    Alkaline Phosphatase 91 33 - 136 U/L    Total Protein 6.9 6.4 - 8.2 g/dL    AST 32 9 - 39 U/L    Bilirubin, Total 0.4 0.0 - 1.2 mg/dL    ALT 11 7 - 45 U/L   Lactate   Result Value Ref Range    Lactate 2.0 0.4 - 2.0 mmol/L   Blood Culture    Specimen: Peripheral Venipuncture; Blood culture   Result Value Ref Range    Blood Culture No growth at 3 days    Blood Culture    Specimen: Peripheral Venipuncture;  Blood culture   Result Value Ref Range    Blood Culture No growth at 3 days    Lipase   Result Value Ref Range    Lipase 119 (H) 9 - 82 U/L   B-Type Natriuretic Peptide   Result Value Ref Range     (H) 0 - 99 pg/mL   C-Reactive Protein   Result Value Ref Range    C-Reactive Protein 6.22 (H) <1.00 mg/dL   Sedimentation rate, automated   Result Value Ref Range    Sedimentation Rate 29 0 - 30 mm/h   Troponin I, High Sensitivity, Initial   Result Value Ref Range    Troponin I, High Sensitivity 23 (H) 0 - 13 ng/L   Blood Gas Venous Full Panel   Result Value Ref Range    POCT pH, Venous 7.16 (LL) 7.33 - 7.43 pH    POCT pCO2, Venous 28 (L) 41 - 51 mm Hg    POCT pO2, Venous 47 (H) 35 - 45 mm Hg    POCT SO2, Venous 66 45 - 75 %    POCT Oxy Hemoglobin, Venous 66.1 45.0 - 75.0 %    POCT Hematocrit Calculated, Venous 32.0 (L) 36.0 - 46.0 %    POCT Sodium, Venous 125 (L) 136 - 145 mmol/L    POCT Potassium, Venous 7.6 (HH) 3.5 - 5.3 mmol/L    POCT Chloride, Venous 104 98 - 107 mmol/L    POCT Ionized Calicum, Venous 1.13 1.10 - 1.33 mmol/L    POCT Glucose, Venous 97 74 - 99 mg/dL    POCT Lactate, Venous 2.3 (H) 0.4 - 2.0 mmol/L    POCT Base Excess, Venous -17.3 (L) -2.0 - 3.0 mmol/L    POCT HCO3 Calculated, Venous 10.0 (L) 22.0 - 26.0 mmol/L    POCT Hemoglobin, Venous 10.6 (L) 12.0 - 16.0 g/dL    POCT Anion Gap, Venous 19.0 10.0 - 25.0 mmol/L    Patient Temperature      FiO2 100 %    Critical Called By JEAN-PIERRE RRT     Critical Called To MAITE DO     Critical Call Time 2117     Critical Read Back Y    Protime-INR   Result Value Ref Range    Protime 23.2 (H) 9.8 - 12.8 seconds    INR 2.0 (H) 0.9 - 1.1   Troponin, High Sensitivity, 1 Hour   Result Value Ref Range    Troponin I, High Sensitivity 25 (H) 0 - 13 ng/L   POCT GLUCOSE   Result Value Ref Range    POCT Glucose 81 74 - 99 mg/dL   Comprehensive metabolic panel   Result Value Ref Range    Glucose 173 (H) 74 - 99 mg/dL    Sodium 129 (L) 136 - 145 mmol/L    Potassium 5.2 3.5  - 5.3 mmol/L    Chloride 106 98 - 107 mmol/L    Bicarbonate 11 (L) 21 - 32 mmol/L    Anion Gap 17 10 - 20 mmol/L    Urea Nitrogen 129 (HH) 6 - 23 mg/dL    Creatinine 5.21 (H) 0.50 - 1.05 mg/dL    eGFR 8 (L) >60 mL/min/1.73m*2    Calcium 7.8 (L) 8.6 - 10.3 mg/dL    Albumin 3.1 (L) 3.4 - 5.0 g/dL    Alkaline Phosphatase 80 33 - 136 U/L    Total Protein 6.0 (L) 6.4 - 8.2 g/dL    AST 23 9 - 39 U/L    Bilirubin, Total 0.3 0.0 - 1.2 mg/dL    ALT 11 7 - 45 U/L   Creatine Kinase   Result Value Ref Range    Creatine Kinase 633 (H) 0 - 215 U/L   Blood Gas Arterial   Result Value Ref Range    POCT pH, Arterial 7.16 (LL) 7.38 - 7.42 pH    POCT pCO2, Arterial 30 (L) 38 - 42 mm Hg    POCT pO2, Arterial 35 (LL) 85 - 95 mm Hg    POCT SO2, Arterial 42 (L) 94 - 100 %    POCT Oxy Hemoglobin, Arterial 40.4 (L) 94.0 - 98.0 %    POCT Base Excess, Arterial -16.7 (L) -2.0 - 3.0 mmol/L    POCT HCO3 Calculated, Arterial 10.7 (L) 22.0 - 26.0 mmol/L    Patient Temperature      FiO2 32 %    Apparatus CANNULA     Critical Called By JEAN-PIERRE RRT     Critical Called To SUMAN ADAM     Critical Call Time 2320     Critical Read Back Y    POCT GLUCOSE   Result Value Ref Range    POCT Glucose 102 (H) 74 - 99 mg/dL   BLOOD GAS VENOUS FULL PANEL   Result Value Ref Range    POCT pH, Venous 7.31 (L) 7.33 - 7.43 pH    POCT pCO2, Venous 29 (L) 41 - 51 mm Hg    POCT pO2, Venous 33 (L) 35 - 45 mm Hg    POCT SO2, Venous 44 (L) 45 - 75 %    POCT Oxy Hemoglobin, Venous 42.8 (L) 45.0 - 75.0 %    POCT Hematocrit Calculated, Venous 35.0 (L) 36.0 - 46.0 %    POCT Sodium, Venous 132 (L) 136 - 145 mmol/L    POCT Potassium, Venous 4.3 3.5 - 5.3 mmol/L    POCT Chloride, Venous 106 98 - 107 mmol/L    POCT Ionized Calicum, Venous 1.14 1.10 - 1.33 mmol/L    POCT Glucose, Venous 65 (L) 74 - 99 mg/dL    POCT Lactate, Venous 2.2 (H) 0.4 - 2.0 mmol/L    POCT Base Excess, Venous -10.4 (L) -2.0 - 3.0 mmol/L    POCT HCO3 Calculated, Venous 14.6 (L) 22.0 - 26.0 mmol/L    POCT  Hemoglobin, Venous 11.5 (L) 12.0 - 16.0 g/dL    POCT Anion Gap, Venous 16.0 10.0 - 25.0 mmol/L    Patient Temperature      FiO2 100 %   Urinalysis with Reflex Culture and Microscopic   Result Value Ref Range    Color, Urine Yellow Straw, Yellow    Appearance, Urine Clear Clear    Specific Gravity, Urine 1.015 1.005 - 1.035    pH, Urine 5.0 5.0, 5.5, 6.0, 6.5, 7.0, 7.5, 8.0    Protein, Urine NEGATIVE NEGATIVE mg/dL    Glucose, Urine NEGATIVE NEGATIVE mg/dL    Blood, Urine MODERATE (2+) (A) NEGATIVE    Ketones, Urine NEGATIVE NEGATIVE mg/dL    Bilirubin, Urine NEGATIVE NEGATIVE    Urobilinogen, Urine <2.0 <2.0 mg/dL    Nitrite, Urine NEGATIVE NEGATIVE    Leukocyte Esterase, Urine NEGATIVE NEGATIVE   Extra Urine Gray Tube   Result Value Ref Range    Extra Tube Hold for add-ons.    Urinalysis Microscopic   Result Value Ref Range    WBC, Urine 1-5 1-5, NONE /HPF    RBC, Urine 1-2 NONE, 1-2, 3-5 /HPF    Squamous Epithelial Cells, Urine 1-9 (SPARSE) Reference range not established. /HPF    Bacteria, Urine 1+ (A) NONE SEEN /HPF    Mucus, Urine 1+ Reference range not established. /LPF    Hyaline Casts, Urine 3+ (A) NONE /LPF   Sodium, Urine Random   Result Value Ref Range    Sodium, Urine Random 38 mmol/L    Creatinine, Urine Random 61.2 20.0 - 320.0 mg/dL    Sodium/Creatinine Ratio 62 Not established. mmol/g Creat   Osmolality, urine   Result Value Ref Range    Osmolality, Urine Random 399 200 - 1,200 mOsm/kg   POCT GLUCOSE   Result Value Ref Range    POCT Glucose 77 74 - 99 mg/dL   CBC and Auto Differential   Result Value Ref Range    WBC 27.3 (H) 4.4 - 11.3 x10*3/uL    nRBC 0.1 (H) 0.0 - 0.0 /100 WBCs    RBC 3.64 (L) 4.00 - 5.20 x10*6/uL    Hemoglobin 8.9 (L) 12.0 - 16.0 g/dL    Hematocrit 27.5 (L) 36.0 - 46.0 %    MCV 76 (L) 80 - 100 fL    MCH 24.5 (L) 26.0 - 34.0 pg    MCHC 32.4 32.0 - 36.0 g/dL    RDW 18.8 (H) 11.5 - 14.5 %    Platelets 311 150 - 450 x10*3/uL    Neutrophils % 92.2 40.0 - 80.0 %    Immature Granulocytes  %, Automated 1.2 (H) 0.0 - 0.9 %    Lymphocytes % 2.0 13.0 - 44.0 %    Monocytes % 4.5 2.0 - 10.0 %    Eosinophils % 0.0 0.0 - 6.0 %    Basophils % 0.1 0.0 - 2.0 %    Neutrophils Absolute 25.14 (H) 1.60 - 5.50 x10*3/uL    Immature Granulocytes Absolute, Automated 0.32 0.00 - 0.50 x10*3/uL    Lymphocytes Absolute 0.54 (L) 0.80 - 3.00 x10*3/uL    Monocytes Absolute 1.23 (H) 0.05 - 0.80 x10*3/uL    Eosinophils Absolute 0.00 0.00 - 0.40 x10*3/uL    Basophils Absolute 0.02 0.00 - 0.10 x10*3/uL   Basic metabolic panel   Result Value Ref Range    Glucose 125 (H) 74 - 99 mg/dL    Sodium 133 (L) 136 - 145 mmol/L    Potassium 4.5 3.5 - 5.3 mmol/L    Chloride 105 98 - 107 mmol/L    Bicarbonate 14 (L) 21 - 32 mmol/L    Anion Gap 19 10 - 20 mmol/L    Urea Nitrogen 128 (HH) 6 - 23 mg/dL    Creatinine 4.88 (H) 0.50 - 1.05 mg/dL    eGFR 8 (L) >60 mL/min/1.73m*2    Calcium 8.4 (L) 8.6 - 10.3 mg/dL   Hemoglobin A1C   Result Value Ref Range    Hemoglobin A1C 5.4 see below %    Estimated Average Glucose 108 Not Established mg/dL   TSH with reflex to Free T4 if abnormal   Result Value Ref Range    Thyroid Stimulating Hormone 0.53 0.44 - 3.98 mIU/L   Hepatic function panel   Result Value Ref Range    Albumin 3.1 (L) 3.4 - 5.0 g/dL    Bilirubin, Total 0.3 0.0 - 1.2 mg/dL    Bilirubin, Direct 0.1 0.0 - 0.3 mg/dL    Alkaline Phosphatase 70 33 - 136 U/L    ALT 13 7 - 45 U/L    AST 31 9 - 39 U/L    Total Protein 5.7 (L) 6.4 - 8.2 g/dL   POCT GLUCOSE   Result Value Ref Range    POCT Glucose 121 (H) 74 - 99 mg/dL   Procalcitonin   Result Value Ref Range    Procalcitonin 1.79 (H) <=0.07 ng/mL   BLOOD GAS ARTERIAL FULL PANEL   Result Value Ref Range    POCT pH, Arterial 7.39 7.38 - 7.42 pH    POCT pCO2, Arterial 23 (L) 38 - 42 mm Hg    POCT pO2, Arterial 96 (H) 85 - 95 mm Hg    POCT SO2, Arterial 96 94 - 100 %    POCT Oxy Hemoglobin, Arterial 94.8 94.0 - 98.0 %    POCT Hematocrit Calculated, Arterial 28.0 (L) 36.0 - 46.0 %    POCT Sodium,  Arterial 130 (L) 136 - 145 mmol/L    POCT Potassium, Arterial 4.5 3.5 - 5.3 mmol/L    POCT Chloride, Arterial 106 98 - 107 mmol/L    POCT Ionized Calcium, Arterial 1.19 1.10 - 1.33 mmol/L    POCT Glucose, Arterial 101 (H) 74 - 99 mg/dL    POCT Lactate, Arterial 1.0 0.4 - 2.0 mmol/L    POCT Base Excess, Arterial -9.7 (L) -2.0 - 3.0 mmol/L    POCT HCO3 Calculated, Arterial 13.9 (L) 22.0 - 26.0 mmol/L    POCT Hemoglobin, Arterial 9.2 (L) 12.0 - 16.0 g/dL    POCT Anion Gap, Arterial 15 10 - 25 mmo/L    Patient Temperature      FiO2 21 %    Site of Arterial Puncture Radial Left     Rudy's Test Positive    Tissue/Wound Culture/Smear    Specimen: Wound/Tissue; Tissue/Biopsy   Result Value Ref Range    Tissue/Wound Culture/Smear       (4+) Abundant Mixed Gram-Positive and Gram-Negative Bacteria    Gram Stain No polymorphonuclear leukocytes seen (A)     Gram Stain (4+) Abundant Gram negative bacilli (A)    Transthoracic Echo (TTE) Complete   Result Value Ref Range    AV mn grad 22.0 mmHg    AV pk isai 2.81 m/s    LV Biplane EF 65 %    LVOT diam 1.93 cm    MV E/A ratio 0.59     Tricuspid annular plane systolic excursion 1.9 cm    MV avg E/e' ratio 10.74     LA vol index A/L 18.7 ml/m2    RV free wall pk S' 15.90 cm/s    RVSP 29.8 mmHg    LVIDd 4.51 cm    Aortic Valve Area by Continuity of Peak Velocity 1.29 cm2    AV pk grad 31.6 mmHg    Aortic Valve Area by Continuity of VTI 1.17 cm2    LV A4C EF 65.7    POCT GLUCOSE   Result Value Ref Range    POCT Glucose 101 (H) 74 - 99 mg/dL   POCT GLUCOSE   Result Value Ref Range    POCT Glucose 108 (H) 74 - 99 mg/dL   POCT GLUCOSE   Result Value Ref Range    POCT Glucose 113 (H) 74 - 99 mg/dL   Vancomycin   Result Value Ref Range    Vancomycin 21.1 (H) 5.0 - 20.0 ug/mL   SST TOP   Result Value Ref Range    Extra Tube Hold for add-ons.    CBC and Auto Differential   Result Value Ref Range    WBC 19.7 (H) 4.4 - 11.3 x10*3/uL    nRBC 0.2 (H) 0.0 - 0.0 /100 WBCs    RBC 3.47 (L) 4.00 - 5.20  x10*6/uL    Hemoglobin 8.6 (L) 12.0 - 16.0 g/dL    Hematocrit 25.7 (L) 36.0 - 46.0 %    MCV 74 (L) 80 - 100 fL    MCH 24.8 (L) 26.0 - 34.0 pg    MCHC 33.5 32.0 - 36.0 g/dL    RDW 18.6 (H) 11.5 - 14.5 %    Platelets 309 150 - 450 x10*3/uL    Neutrophils % 87.3 40.0 - 80.0 %    Immature Granulocytes %, Automated 1.3 (H) 0.0 - 0.9 %    Lymphocytes % 4.9 13.0 - 44.0 %    Monocytes % 6.1 2.0 - 10.0 %    Eosinophils % 0.3 0.0 - 6.0 %    Basophils % 0.1 0.0 - 2.0 %    Neutrophils Absolute 17.21 (H) 1.60 - 5.50 x10*3/uL    Immature Granulocytes Absolute, Automated 0.25 0.00 - 0.50 x10*3/uL    Lymphocytes Absolute 0.96 0.80 - 3.00 x10*3/uL    Monocytes Absolute 1.20 (H) 0.05 - 0.80 x10*3/uL    Eosinophils Absolute 0.05 0.00 - 0.40 x10*3/uL    Basophils Absolute 0.02 0.00 - 0.10 x10*3/uL   Basic metabolic panel   Result Value Ref Range    Glucose 105 (H) 74 - 99 mg/dL    Sodium 136 136 - 145 mmol/L    Potassium 4.0 3.5 - 5.3 mmol/L    Chloride 105 98 - 107 mmol/L    Bicarbonate 21 21 - 32 mmol/L    Anion Gap 14 10 - 20 mmol/L    Urea Nitrogen 110 (HH) 6 - 23 mg/dL    Creatinine 3.61 (H) 0.50 - 1.05 mg/dL    eGFR 12 (L) >60 mL/min/1.73m*2    Calcium 7.8 (L) 8.6 - 10.3 mg/dL   Magnesium   Result Value Ref Range    Magnesium 2.03 1.60 - 2.40 mg/dL   Phosphorus   Result Value Ref Range    Phosphorus 3.8 2.5 - 4.9 mg/dL   SST TOP   Result Value Ref Range    Extra Tube Hold for add-ons.    Albumin   Result Value Ref Range    Albumin 2.9 (L) 3.4 - 5.0 g/dL   POCT GLUCOSE   Result Value Ref Range    POCT Glucose 99 74 - 99 mg/dL   POCT GLUCOSE   Result Value Ref Range    POCT Glucose 166 (H) 74 - 99 mg/dL   POCT GLUCOSE   Result Value Ref Range    POCT Glucose 110 (H) 74 - 99 mg/dL   POCT GLUCOSE   Result Value Ref Range    POCT Glucose 116 (H) 74 - 99 mg/dL   Vancomycin   Result Value Ref Range    Vancomycin 17.4 5.0 - 20.0 ug/mL   CBC and Auto Differential   Result Value Ref Range    WBC 17.3 (H) 4.4 - 11.3 x10*3/uL    nRBC 0.1 (H)  0.0 - 0.0 /100 WBCs    RBC 3.46 (L) 4.00 - 5.20 x10*6/uL    Hemoglobin 8.5 (L) 12.0 - 16.0 g/dL    Hematocrit 26.6 (L) 36.0 - 46.0 %    MCV 77 (L) 80 - 100 fL    MCH 24.6 (L) 26.0 - 34.0 pg    MCHC 32.0 32.0 - 36.0 g/dL    RDW 19.2 (H) 11.5 - 14.5 %    Platelets 298 150 - 450 x10*3/uL    Neutrophils % 83.1 40.0 - 80.0 %    Immature Granulocytes %, Automated 1.2 (H) 0.0 - 0.9 %    Lymphocytes % 7.4 13.0 - 44.0 %    Monocytes % 7.3 2.0 - 10.0 %    Eosinophils % 0.9 0.0 - 6.0 %    Basophils % 0.1 0.0 - 2.0 %    Neutrophils Absolute 14.34 (H) 1.60 - 5.50 x10*3/uL    Immature Granulocytes Absolute, Automated 0.21 0.00 - 0.50 x10*3/uL    Lymphocytes Absolute 1.27 0.80 - 3.00 x10*3/uL    Monocytes Absolute 1.26 (H) 0.05 - 0.80 x10*3/uL    Eosinophils Absolute 0.16 0.00 - 0.40 x10*3/uL    Basophils Absolute 0.01 0.00 - 0.10 x10*3/uL   Basic metabolic panel   Result Value Ref Range    Glucose 89 74 - 99 mg/dL    Sodium 136 136 - 145 mmol/L    Potassium 4.0 3.5 - 5.3 mmol/L    Chloride 106 98 - 107 mmol/L    Bicarbonate 20 (L) 21 - 32 mmol/L    Anion Gap 14 10 - 20 mmol/L    Urea Nitrogen 82 (H) 6 - 23 mg/dL    Creatinine 2.41 (H) 0.50 - 1.05 mg/dL    eGFR 19 (L) >60 mL/min/1.73m*2    Calcium 7.9 (L) 8.6 - 10.3 mg/dL   Magnesium   Result Value Ref Range    Magnesium 2.02 1.60 - 2.40 mg/dL   Phosphorus   Result Value Ref Range    Phosphorus 3.0 2.5 - 4.9 mg/dL   SST TOP   Result Value Ref Range    Extra Tube Hold for add-ons.    POCT GLUCOSE   Result Value Ref Range    POCT Glucose 118 (H) 74 - 99 mg/dL   POCT GLUCOSE   Result Value Ref Range    POCT Glucose 90 74 - 99 mg/dL   POCT GLUCOSE   Result Value Ref Range    POCT Glucose 90 74 - 99 mg/dL   SST TOP   Result Value Ref Range    Extra Tube Hold for add-ons.    Basic metabolic panel   Result Value Ref Range    Glucose 80 74 - 99 mg/dL    Sodium 139 136 - 145 mmol/L    Potassium 4.2 3.5 - 5.3 mmol/L    Chloride 110 (H) 98 - 107 mmol/L    Bicarbonate 21 21 - 32 mmol/L     Anion Gap 12 10 - 20 mmol/L    Urea Nitrogen 66 (H) 6 - 23 mg/dL    Creatinine 1.93 (H) 0.50 - 1.05 mg/dL    eGFR 25 (L) >60 mL/min/1.73m*2    Calcium 8.1 (L) 8.6 - 10.3 mg/dL   CBC   Result Value Ref Range    WBC 14.0 (H) 4.4 - 11.3 x10*3/uL    nRBC 0.0 0.0 - 0.0 /100 WBCs    RBC 3.26 (L) 4.00 - 5.20 x10*6/uL    Hemoglobin 8.2 (L) 12.0 - 16.0 g/dL    Hematocrit 25.2 (L) 36.0 - 46.0 %    MCV 77 (L) 80 - 100 fL    MCH 25.2 (L) 26.0 - 34.0 pg    MCHC 32.5 32.0 - 36.0 g/dL    RDW 19.6 (H) 11.5 - 14.5 %    Platelets 293 150 - 450 x10*3/uL   POCT GLUCOSE   Result Value Ref Range    POCT Glucose 82 74 - 99 mg/dL   POCT GLUCOSE   Result Value Ref Range    POCT Glucose 92 74 - 99 mg/dL   POCT GLUCOSE   Result Value Ref Range    POCT Glucose 111 (H) 74 - 99 mg/dL         Assessment/Plan   Patient is an 84-year-old female who presented to the ED with altered mental status.  Psych consulted for assessment of capacity for medical decision-making.  Patient unable to care for herself at home as demonstrated by multiple admissions in which patient presents with altered mental status, often found in unhygienic conditions.  Patient recently left [facility] AM.  At this time patient does not appreciate the consequences of her returning home, she acknowledges that she has no family to check in on her.  Her , who she is currently getting  from, lives in the home but she acknowledges he does not provide any care for her in any way.  Patient does present with some depression, but denied any symptoms of psychosis or brett, no signs of agitation.  At this time the patient DOES NOT have capacity for medical decision making.    Recommendations:  -- Patient DOES NOT meet criteria for inpatient psychiatric admission.    -- Patient lacks the capacity to leave AMA at this time and thus cannot leave AM, call [facility] if patient attempts to leave [place].     -- Discussed recommendations with primary team.

## 2024-05-28 NOTE — PROGRESS NOTES
Physical Therapy                 Therapy Communication Note    Patient Name: Cheryl Elena  MRN: [id number]  Today's Date: 5/28/2024     Discipline: Physical Therapy    Missed Visit Reason: Received order for P.T. eval. Chart reviewed. Attempted to see Pt. for P.T. eval. but Pt. adamantly refused. Pt. Turned her heard away and shoo'd Pt away with her hand. Will re-attempt P.T. eval when Pt. is medically appropriate and agreeable.

## 2024-05-28 NOTE — SIGNIFICANT EVENT

## 2024-05-28 NOTE — CARE PLAN
The patient's goals for the shift include  rest    The clinical goals for the shift include pain control    Over the shift, the patient did not make progress toward the following goals.       Problem: Nutrition  Goal: Less than 5 days NPO/clear liquids  Outcome: Progressing  Goal: Oral intake greater than 50%  Outcome: Progressing  Goal: Oral intake greater 75%  Outcome: Progressing  Goal: Consume prescribed supplement  Outcome: Progressing  Goal: Adequate PO fluid intake  Outcome: Progressing  Goal: Nutrition support goals are met within 48 hrs  Outcome: Progressing  Goal: Nutrition support is meeting 75% of nutrient needs  Outcome: Progressing  Goal: Tube feed tolerance  Outcome: Progressing  Goal: BG  mg/dL  Outcome: Progressing  Goal: Lab values WNL  Outcome: Progressing  Goal: Electrolytes WNL  Outcome: Progressing  Goal: Promote healing  Outcome: Progressing  Goal: Maintain stable weight  Outcome: Progressing  Goal: Reduce weight from edema/fluid  Outcome: Progressing  Goal: Gradual weight gain  Outcome: Progressing  Goal: Improve ostomy output  Outcome: Progressing     Problem: Diabetes  Goal: Achieve decreasing blood glucose levels by end of shift  Outcome: Progressing  Goal: Increase stability of blood glucose readings by end of shift  Outcome: Progressing  Goal: Decrease in ketones present in urine by end of shift  Outcome: Progressing  Goal: Maintain electrolyte levels within acceptable range throughout shift  Outcome: Progressing  Goal: Maintain glucose levels >70mg/dl to <250mg/dl throughout shift  Outcome: Progressing  Goal: No changes in neurological exam by end of shift  Outcome: Progressing  Goal: Learn about and adhere to nutrition recommendations by end of shift  Outcome: Progressing  Goal: Vital signs within normal range for age by end of shift  Outcome: Progressing  Goal: Increase self care and/or family involovement by end of shift  Outcome: Progressing  Goal: Receive DSME education by  end of shift  Outcome: Progressing     Problem: Skin  Goal: Decreased wound size/increased tissue granulation at next dressing change  Outcome: Progressing  Goal: Participates in plan/prevention/treatment measures  Outcome: Progressing  Goal: Prevent/manage excess moisture  Outcome: Progressing  Goal: Prevent/minimize sheer/friction injuries  Outcome: Progressing  Goal: Promote/optimize nutrition  Outcome: Progressing  Goal: Promote skin healing  Outcome: Progressing     Problem: Pain  Goal: My pain/discomfort is manageable  Outcome: Progressing

## 2024-05-28 NOTE — PROGRESS NOTES
Bilateral lower extremity cellulitis  Bilateral lower extremity ulcerations  Sacral decubital ulceration    Vancomycin and Zosyn       Past Medical History  She has a past medical history of COPD (chronic obstructive pulmonary disease) (Multi), Diabetes mellitus (Multi), and Hypertension.    Surgical History  She has no past surgical history on file.     Social History  She reports that she quit smoking about 8 years ago. Her smoking use included cigarettes. She has never used smokeless tobacco. No history on file for alcohol use and drug use.      Family History  No family history on file.  Allergies  Silver (bulk), Cephalexin, Cortisone, Diphenhydramine hcl, Prednisone, Codeine, and Penicillins       There is no immunization history on file for this patient.  Review of Systems   Unable to perform ROS: Mental status change        Physical Exam  Cardiovascular:      Heart sounds: Normal heart sounds. No murmur heard.  Pulmonary:      Effort: Pulmonary effort is normal. No respiratory distress.      Breath sounds: Normal breath sounds. No wheezing or rales.   Abdominal:      General: Abdomen is flat. There is no distension.      Palpations: Abdomen is soft. There is no mass.      Tenderness: There is no abdominal tenderness. There is no right CVA tenderness or guarding.   Skin:     Comments: Bilateral lower extremity ulcerations with surrounding erythema and tenderness    Sacral decubital ulceration          Range of Vitals (last 24 hours)  Heart Rate:  [70-80]   Temp:  [36.5 °C (97.7 °F)-36.8 °C (98.2 °F)]   Resp:  [17-18]   BP: (140-159)/(58-67)   SpO2:  [94 %-98 %]     Relevant Results  Results from last 72 hours   Lab Units 05/28/24  0549 05/27/24  0643   WBC AUTO x10*3/uL 14.0* 17.3*   HEMOGLOBIN g/dL 8.2* 8.5*   HEMATOCRIT % 25.2* 26.6*   PLATELETS AUTO x10*3/uL 293 298   NEUTROS PCT AUTO %  --  83.1   LYMPHS PCT AUTO %  --  7.4   MONOS PCT AUTO %  --  7.3   EOS PCT AUTO %  --  0.9     Results from last  "72 hours   Lab Units 05/28/24  0549   SODIUM mmol/L 139   POTASSIUM mmol/L 4.2   CHLORIDE mmol/L 110*   CO2 mmol/L 21   BUN mg/dL 66*   CREATININE mg/dL 1.93*   GLUCOSE mg/dL 80   CALCIUM mg/dL 8.1*   ANION GAP mmol/L 12   EGFR mL/min/1.73m*2 25*     Results from last 72 hours   Lab Units 05/26/24  0640   ALBUMIN g/dL 2.9*     Estimated Creatinine Clearance: 21.7 mL/min (A) (by C-G formula based on SCr of 1.93 mg/dL (H)).  C-Reactive Protein   Date/Time Value Ref Range Status   05/24/2024 07:39 PM 6.22 (H) <1.00 mg/dL Final   01/03/2024 12:25 PM 7.09 (H) <1.00 mg/dL Final     CRP   Date/Time Value Ref Range Status   02/07/2023 04:04 AM 9.80 (A) mg/dL Final     Comment:     REF VALUE  < 1.00     09/07/2022 07:15 PM 1.21 (A) mg/dL Final     Comment:     REF VALUE  < 1.00     03/20/2022 05:33 AM 4.70 (A) mg/dL Final     Comment:     REF VALUE  < 1.00       Sedimentation Rate   Date/Time Value Ref Range Status   05/24/2024 07:39 PM 29 0 - 30 mm/h Final   01/03/2024 12:25 PM 33 (H) 0 - 30 mm/h Final   02/07/2023 04:04 AM 39 (H) 0 - 30 mm/h Final     Comment:     Please note new reference ranges as of 5/9/2022.   09/13/2022 07:19 AM 45 (H) 0 - 30 mm/h Final     Comment:     Please note new reference ranges as of 5/9/2022.  Ran on alternate instrument  Reference Ranges: Males: 0-15                    Females: 0-20                    Children under 18: 0-10     09/07/2022 07:15 PM 43 (H) 0 - 30 mm/h Final     Comment:     Please note new reference ranges as of 5/9/2022.  Ran on alternate instrument  Reference Ranges:  Males: 0-15                     Females: 0-20                     Children under 18: 0-10       No results found for: \"HIV1X2\", \"HIVCONF\", \"[id number]\"  No results found for: \"HCVPCRQUANT\"  Cultures  Susceptibility data from last 14 days.  Collected Specimen Info Organism Clindamycin Erythromycin Oxacillin Tetracycline Trimethoprim/Sulfamethoxazole Vancomycin   05/25/24 Tissue/Biopsy from Wound/Tissue Mixed " Gram-Positive and Gram-Negative Bacteria         05/15/24 Tissue/Biopsy from Wound/Tissue Methicillin Susceptible Staphylococcus aureus (MSSA)  R  R  S  R  S  S     Mixed Anaerobic Bacteria           Mixed Gram-Positive and Gram-Negative Bacteria                Assessment/Plan     Lateral lower extremity cellulitis  Secondary to lower extremity ulcerations    Continue on IV antibiotics with Zosyn

## 2024-05-28 NOTE — PROGRESS NOTES
Plastic Surgery Note    Afebrile, vital signs stable  Bilateral lower extremity wounds clean  Impression: Partial and full-thickness skin loss is bilateral lower extremities worse on the left and right  Continue Silvadene cream dressing changes

## 2024-05-29 VITALS
HEIGHT: 62 IN | SYSTOLIC BLOOD PRESSURE: 166 MMHG | WEIGHT: 190 LBS | RESPIRATION RATE: 18 BRPM | BODY MASS INDEX: 34.96 KG/M2 | DIASTOLIC BLOOD PRESSURE: 72 MMHG | TEMPERATURE: 97.2 F | HEART RATE: 66 BPM | OXYGEN SATURATION: 98 %

## 2024-05-29 LAB
ANION GAP SERPL CALC-SCNC: 10 MMOL/L (ref 10–20)
BACTERIA BLD CULT: NORMAL
BACTERIA BLD CULT: NORMAL
BUN SERPL-MCNC: 48 MG/DL (ref 6–23)
CALCIUM SERPL-MCNC: 8.1 MG/DL (ref 8.6–10.3)
CHLORIDE SERPL-SCNC: 111 MMOL/L (ref 98–107)
CO2 SERPL-SCNC: 23 MMOL/L (ref 21–32)
CREAT SERPL-MCNC: 1.63 MG/DL (ref 0.5–1.05)
EGFRCR SERPLBLD CKD-EPI 2021: 31 ML/MIN/1.73M*2
ERYTHROCYTE [DISTWIDTH] IN BLOOD BY AUTOMATED COUNT: 19.9 % (ref 11.5–14.5)
GLUCOSE BLD MANUAL STRIP-MCNC: 108 MG/DL (ref 74–99)
GLUCOSE BLD MANUAL STRIP-MCNC: 108 MG/DL (ref 74–99)
GLUCOSE BLD MANUAL STRIP-MCNC: 156 MG/DL (ref 74–99)
GLUCOSE BLD MANUAL STRIP-MCNC: 93 MG/DL (ref 74–99)
GLUCOSE SERPL-MCNC: 78 MG/DL (ref 74–99)
HCT VFR BLD AUTO: 28 % (ref 36–46)
HGB BLD-MCNC: 8.7 G/DL (ref 12–16)
HOLD SPECIMEN: NORMAL
MCH RBC QN AUTO: 24.8 PG (ref 26–34)
MCHC RBC AUTO-ENTMCNC: 31.1 G/DL (ref 32–36)
MCV RBC AUTO: 80 FL (ref 80–100)
NRBC BLD-RTO: 0 /100 WBCS (ref 0–0)
PLATELET # BLD AUTO: 319 X10*3/UL (ref 150–450)
POTASSIUM SERPL-SCNC: 4.1 MMOL/L (ref 3.5–5.3)
RBC # BLD AUTO: 3.51 X10*6/UL (ref 4–5.2)
SODIUM SERPL-SCNC: 140 MMOL/L (ref 136–145)
WBC # BLD AUTO: 11.2 X10*3/UL (ref 4.4–11.3)

## 2024-05-29 PROCEDURE — 99239 HOSP IP/OBS DSCHRG MGMT >30: CPT | Performed by: STUDENT IN AN ORGANIZED HEALTH CARE EDUCATION/TRAINING PROGRAM

## 2024-05-29 PROCEDURE — 85027 COMPLETE CBC AUTOMATED: CPT | Performed by: STUDENT IN AN ORGANIZED HEALTH CARE EDUCATION/TRAINING PROGRAM

## 2024-05-29 PROCEDURE — 2500000004 HC RX 250 GENERAL PHARMACY W/ HCPCS (ALT 636 FOR OP/ED): Performed by: STUDENT IN AN ORGANIZED HEALTH CARE EDUCATION/TRAINING PROGRAM

## 2024-05-29 PROCEDURE — 36415 COLL VENOUS BLD VENIPUNCTURE: CPT | Performed by: STUDENT IN AN ORGANIZED HEALTH CARE EDUCATION/TRAINING PROGRAM

## 2024-05-29 PROCEDURE — 82374 ASSAY BLOOD CARBON DIOXIDE: CPT | Performed by: STUDENT IN AN ORGANIZED HEALTH CARE EDUCATION/TRAINING PROGRAM

## 2024-05-29 PROCEDURE — 2500000001 HC RX 250 WO HCPCS SELF ADMINISTERED DRUGS (ALT 637 FOR MEDICARE OP): Performed by: STUDENT IN AN ORGANIZED HEALTH CARE EDUCATION/TRAINING PROGRAM

## 2024-05-29 PROCEDURE — 82947 ASSAY GLUCOSE BLOOD QUANT: CPT

## 2024-05-29 PROCEDURE — 2500000004 HC RX 250 GENERAL PHARMACY W/ HCPCS (ALT 636 FOR OP/ED): Performed by: INTERNAL MEDICINE

## 2024-05-29 PROCEDURE — 99231 SBSQ HOSP IP/OBS SF/LOW 25: CPT | Performed by: PLASTIC SURGERY

## 2024-05-29 RX ORDER — AMOXICILLIN AND CLAVULANATE POTASSIUM 875; 125 MG/1; MG/1
1 TABLET, FILM COATED ORAL 2 TIMES DAILY
Qty: 28 TABLET | Refills: 0 | Status: SHIPPED | OUTPATIENT
Start: 2024-05-29 | End: 2024-06-12

## 2024-05-29 RX ORDER — SILVER SULFADIAZINE 10 G/1000G
CREAM TOPICAL 2 TIMES DAILY
Start: 2024-05-29

## 2024-05-29 RX ADMIN — LEVOTHYROXINE SODIUM 150 MCG: 75 TABLET ORAL at 05:49

## 2024-05-29 RX ADMIN — PIPERACILLIN SODIUM AND TAZOBACTAM SODIUM 3.38 G: 3; .375 INJECTION, SOLUTION INTRAVENOUS at 08:58

## 2024-05-29 RX ADMIN — APIXABAN 5 MG: 5 TABLET, FILM COATED ORAL at 08:00

## 2024-05-29 RX ADMIN — APIXABAN 5 MG: 5 TABLET, FILM COATED ORAL at 21:15

## 2024-05-29 RX ADMIN — HYDROXYZINE HYDROCHLORIDE 50 MG: 25 TABLET ORAL at 18:50

## 2024-05-29 RX ADMIN — OXYCODONE AND ACETAMINOPHEN 1 TABLET: 10; 325 TABLET ORAL at 07:05

## 2024-05-29 RX ADMIN — SODIUM CHLORIDE 10 ML: 9 INJECTION, SOLUTION INTRAVENOUS at 10:00

## 2024-05-29 RX ADMIN — HYDROXYZINE HYDROCHLORIDE 50 MG: 25 TABLET ORAL at 13:07

## 2024-05-29 RX ADMIN — SILVER SULFADIAZINE: 10 CREAM TOPICAL at 21:00

## 2024-05-29 RX ADMIN — SILVER SULFADIAZINE: 10 CREAM TOPICAL at 08:00

## 2024-05-29 RX ADMIN — OXYCODONE AND ACETAMINOPHEN 1 TABLET: 10; 325 TABLET ORAL at 13:06

## 2024-05-29 RX ADMIN — FLUCONAZOLE IN SODIUM CHLORIDE 100 MG: 2 INJECTION, SOLUTION INTRAVENOUS at 05:46

## 2024-05-29 ASSESSMENT — PAIN SCALES - GENERAL
PAINLEVEL_OUTOF10: 0 - NO PAIN
PAINLEVEL_OUTOF10: 8
PAINLEVEL_OUTOF10: 8

## 2024-05-29 ASSESSMENT — PAIN DESCRIPTION - ORIENTATION
ORIENTATION: RIGHT;LEFT
ORIENTATION: RIGHT;LEFT

## 2024-05-29 ASSESSMENT — PAIN DESCRIPTION - LOCATION
LOCATION: LEG
LOCATION: FOOT

## 2024-05-29 ASSESSMENT — PAIN - FUNCTIONAL ASSESSMENT
PAIN_FUNCTIONAL_ASSESSMENT: 0-10
PAIN_FUNCTIONAL_ASSESSMENT: 0-10

## 2024-05-29 NOTE — PROGRESS NOTES
Bilateral lower extremity cellulitis  Bilateral lower extremity ulcerations  Sacral decubital ulceration    Vancomycin and Zosyn       Review of Systems   Unable to perform ROS: Mental status change        Physical Exam  Cardiovascular:      Heart sounds: Normal heart sounds. No murmur heard.  Pulmonary:      Effort: Pulmonary effort is normal. No respiratory distress.      Breath sounds: Normal breath sounds. No wheezing or rales.   Abdominal:      General: Abdomen is flat. There is no distension.      Palpations: Abdomen is soft. There is no mass.      Tenderness: There is no abdominal tenderness. There is no right CVA tenderness or guarding.   Skin:     Comments: Bilateral lower extremity ulcerations with surrounding erythema and tenderness    Sacral decubital ulceration          Range of Vitals (last 24 hours)  Heart Rate:  [71-89]   Temp:  [36.2 °C (97.2 °F)-37.4 °C (99.3 °F)]   Resp:  [17]   BP: (146-165)/(64-70)   SpO2:  [95 %-100 %]     Relevant Results  Results from last 72 hours   Lab Units 05/29/24  0534 05/28/24  0549 05/27/24  0643   WBC AUTO x10*3/uL 11.2   < > 17.3*   HEMOGLOBIN g/dL 8.7*   < > 8.5*   HEMATOCRIT % 28.0*   < > 26.6*   PLATELETS AUTO x10*3/uL 319   < > 298   NEUTROS PCT AUTO %  --   --  83.1   LYMPHS PCT AUTO %  --   --  7.4   MONOS PCT AUTO %  --   --  7.3   EOS PCT AUTO %  --   --  0.9    < > = values in this interval not displayed.     Results from last 72 hours   Lab Units 05/29/24  0534   SODIUM mmol/L 140   POTASSIUM mmol/L 4.1   CHLORIDE mmol/L 111*   CO2 mmol/L 23   BUN mg/dL 48*   CREATININE mg/dL 1.63*   GLUCOSE mg/dL 78   CALCIUM mg/dL 8.1*   ANION GAP mmol/L 10   EGFR mL/min/1.73m*2 31*           Estimated Creatinine Clearance: 25.7 mL/min (A) (by C-G formula based on SCr of 1.63 mg/dL (H)).  C-Reactive Protein   Date/Time Value Ref Range Status   05/24/2024 07:39 PM 6.22 (H) <1.00 mg/dL Final   01/03/2024 12:25 PM 7.09 (H) <1.00 mg/dL Final     CRP   Date/Time Value  "Ref Range Status   02/07/2023 04:04 AM 9.80 (A) mg/dL Final     Comment:     REF VALUE  < 1.00     09/07/2022 07:15 PM 1.21 (A) mg/dL Final     Comment:     REF VALUE  < 1.00     03/20/2022 05:33 AM 4.70 (A) mg/dL Final     Comment:     REF VALUE  < 1.00       Sedimentation Rate   Date/Time Value Ref Range Status   05/24/2024 07:39 PM 29 0 - 30 mm/h Final   01/03/2024 12:25 PM 33 (H) 0 - 30 mm/h Final   02/07/2023 04:04 AM 39 (H) 0 - 30 mm/h Final     Comment:     Please note new reference ranges as of 5/9/2022.   09/13/2022 07:19 AM 45 (H) 0 - 30 mm/h Final     Comment:     Please note new reference ranges as of 5/9/2022.  Ran on alternate instrument  Reference Ranges: Males: 0-15                    Females: 0-20                    Children under 18: 0-10     09/07/2022 07:15 PM 43 (H) 0 - 30 mm/h Final     Comment:     Please note new reference ranges as of 5/9/2022.  Ran on alternate instrument  Reference Ranges:  Males: 0-15                     Females: 0-20                     Children under 18: 0-10       No results found for: \"HIV1X2\", \"HIVCONF\", \"OHBOHE8TY\"  No results found for: \"HCVPCRQUANT\"  Cultures  Susceptibility data from last 14 days.  Collected Specimen Info Organism Clindamycin Erythromycin Oxacillin Tetracycline Trimethoprim/Sulfamethoxazole Vancomycin   05/25/24 Tissue/Biopsy from Wound/Tissue Mixed Gram-Positive and Gram-Negative Bacteria         05/15/24 Tissue/Biopsy from Wound/Tissue Methicillin Susceptible Staphylococcus aureus (MSSA)  R  R  S  R  S  S     Mixed Anaerobic Bacteria           Mixed Gram-Positive and Gram-Negative Bacteria                Assessment/Plan     Lateral lower extremity cellulitis  Secondary to lower extremity ulcerations    Plan to switch to oral Augmentin for 2 weeks at discharge        "

## 2024-05-29 NOTE — PROGRESS NOTES
Per psyc. Pt. Lacks capacity for decision making.  Milindbirgit has spoken to brother eliza who is requesting pt. Go to [facility].  Pt. Not participating in therapy,  [facility].   Will need completed gold form to obtain [id number] needed for ins. Precert.      Update:  gold for complete, requested cnc complete [id number] [facility]    Update:  [id number] is complete, notified [facility] and requested they start ins. Precert.

## 2024-05-29 NOTE — CARE PLAN
The patient's goals for the shift include      The clinical goals for the shift include dressing changes BID      Problem: Nutrition  Goal: Less than 5 days NPO/clear liquids  Outcome: Progressing  Goal: Oral intake greater than 50%  Outcome: Progressing  Goal: Oral intake greater 75%  Outcome: Progressing  Goal: Consume prescribed supplement  Outcome: Progressing  Goal: Adequate PO fluid intake  Outcome: Progressing  Goal: Nutrition support goals are met within 48 hrs  Outcome: Progressing  Goal: Nutrition support is meeting 75% of nutrient needs  Outcome: Progressing  Goal: Tube feed tolerance  Outcome: Progressing  Goal: BG  mg/dL  Outcome: Progressing  Goal: Lab values WNL  Outcome: Progressing  Goal: Electrolytes WNL  Outcome: Progressing  Goal: Promote healing  Outcome: Progressing  Goal: Maintain stable weight  Outcome: Progressing  Goal: Reduce weight from edema/fluid  Outcome: Progressing  Goal: Gradual weight gain  Outcome: Progressing  Goal: Improve ostomy output  Outcome: Progressing     Problem: Diabetes  Goal: Achieve decreasing blood glucose levels by end of shift  Outcome: Progressing  Goal: Increase stability of blood glucose readings by end of shift  Outcome: Progressing  Goal: Decrease in ketones present in urine by end of shift  Outcome: Progressing  Goal: Maintain electrolyte levels within acceptable range throughout shift  Outcome: Progressing  Goal: Maintain glucose levels >70mg/dl to <250mg/dl throughout shift  Outcome: Progressing  Goal: No changes in neurological exam by end of shift  Outcome: Progressing  Goal: Learn about and adhere to nutrition recommendations by end of shift  Outcome: Progressing  Goal: Vital signs within normal range for age by end of shift  Outcome: Progressing  Goal: Increase self care and/or family involovement by end of shift  Outcome: Progressing  Goal: Receive DSME education by end of shift  Outcome: Progressing     Problem: Skin  Goal: Decreased wound  size/increased tissue granulation at next dressing change  Outcome: Not Progressing  Goal: Participates in plan/prevention/treatment measures  Outcome: Not Progressing  Goal: Prevent/manage excess moisture  Outcome: Not Progressing  Goal: Prevent/minimize sheer/friction injuries  Outcome: Not Progressing  Goal: Promote/optimize nutrition  Outcome: Not Progressing  Goal: Promote skin healing  Outcome: Not Progressing     Problem: Pain  Goal: My pain/discomfort is manageable  Outcome: Progressing       Problem: Skin  Goal: Decreased wound size/increased tissue granulation at next dressing change  Outcome: Not Progressing  Goal: Participates in plan/prevention/treatment measures  Outcome: Not Progressing  Goal: Prevent/manage excess moisture  Outcome: Not Progressing  Goal: Prevent/minimize sheer/friction injuries  Outcome: Not Progressing  Goal: Promote/optimize nutrition  Outcome: Not Progressing  Goal: Promote skin healing  Outcome: Not Progressing

## 2024-05-29 NOTE — PROGRESS NOTES
05/29/24 1510   Current Planned Discharge Disposition   Current Planned Discharge Disposition SNF  ([facility])     InsMckenzie Sawant has been obtained for icf/ltc at [facility].  Pt. Will discharge this date at 5:30 pm via ambulance.  Shaq has spoken to brother eliza who is aware and in agreement to transfer.  Pt. Informed.

## 2024-05-29 NOTE — PROGRESS NOTES
"Occupational Therapy                 Therapy Communication Note    Patient Name: Cheryl Elena  MRN: [id number]  Today's Date: 5/29/2024     Discipline: Occupational Therapy    Missed Visit Reason: Missed Visit Reason: Patient refused    Comment: Attempted OT evaluation. Patient adamantly refusing therapy this date, stated \"No therapy!\". Multiple attempts made to complete therapy evaluation. Will discharge OT order. Discussed with TCC.   "

## 2024-05-29 NOTE — DISCHARGE SUMMARY
Discharge Diagnosis  Acute renal failure (ARF) (CMS-[facility])  Sepsis secondary to bilateral lower extremity cellulitis  Acute metabolic encephalopathy likely secondary to sepsis  Sacral decubitus ulcer    Issues Requiring Follow-Up  Outpatient follow-up with primary care as needed    Discharge Meds     Your medication list        START taking these medications        Instructions Last Dose Given Next Dose Due   amoxicillin-pot clavulanate 875-125 mg tablet  Commonly known as: Augmentin      Take 1 tablet by mouth 2 times a day for 14 days.       silver sulfADIAZINE 1 % cream  Commonly known as: Silvadene      Apply topically 2 times a day.              CONTINUE taking these medications        Instructions Last Dose Given Next Dose Due   Eliquis 5 mg tablet  Generic drug: apixaban           furosemide 20 mg tablet  Commonly known as: Lasix           levothyroxine 150 mcg tablet  Commonly known as: Synthroid, Levoxyl           oxyCODONE-acetaminophen  mg tablet  Commonly known as: Percocet      Take 1 tablet by mouth every 8 hours if needed for severe pain (7 - 10).              STOP taking these medications      sulfamethoxazole-trimethoprim 800-160 mg tablet  Commonly known as: Bactrim DS                  Where to Get Your Medications        These medications were sent to [id number] - [place], [place] - [address] [facility]  [address] [address] [facility] [address]      Phone: [phone]   amoxicillin-pot clavulanate 875-125 mg tablet       Information about where to get these medications is not yet available    Ask your nurse or doctor about these medications  silver sulfADIAZINE 1 % cream         Test Results Pending At Discharge  Pending Labs       No current pending labs.            Hospital Course    Cheryl Elena is a 84 y.o. female with a history of DVT on Eliquis, HFpEF, COPD, type 2 diabetes presenting with increasing confusion and altered mental status.  Was admitted for management of following  issues:     #.  RAYMUNDO/CKD with severe high anion gap metabolic acidosis and hyperkalemia, resolved   #.  Sepsis secondary to bilateral leg cellulitis, resolved   #.  Acute hypoxic respiratory failure likely secondary to decompensated HFpEF versus new onset systolic heart failure  #.  Acute metabolic encephalopathy likely secondary to uremia versus sepsis, resolved   #.  COPD - not in acute exacerbation  #.  Sacral decubitus ulcer  #.  Hyponatremia resolved   #.  History of DVT  #.  Long-term anticoagulation use  #.  Type 2 diabetes  -Elevated creatinine, hyperkalemia, CK, leukocytosis on arrival  -S/p IVF hydration and bicarb drip  -CXR showed evidence of pulmonary vascular congestion as well as possible small pleural effusions  -Last echocardiogram in 2021 showing EF of 55% and diastolic dysfunction  -Prior cultures from leg wounds had grown MRSA and Pseudomonas (susceptible to Zosyn)     Plan:  -Nephrology consulted for RAYMUNDO, monitored renal function, meds adjusted    -Oxygen therapy, pain management, antiemetic, telemetry, considered   -Wound culture positive for 4+ gram negative bacilli, placed on Zosyn per ID recs and switched to oral Augmentin on discharge. Pt did not have any allergic reaction to penicillin in hospital.   -Echo done and reviewed, EF 60 to 65%, diastolic dysfunction  -Strict intake and output, daily weights, was considered   -Continued home Eliquis  -psych consulted for medical decision capacity: they mentioned pt has no capacity to leave AMA   -Plastic surgery consulted for LE wounds: continued dressing change daily   -Insulin sliding scale, hypoglycemic protocol, placed   -DuoNebs as needed was placed      VTE PPX: was with home Eliquis  Disposition: Frequently refused PT/OT evaluation    Currently patient is hemodynamically stable and ready for discharge to [facility].  IV antibiotic was switched to oral Augmentin for 2 more weeks on discharge.  She will be continued on the rest of her  home medication.  She will continue dressing change of bilateral lower extremity wounds.  She will follow-up as outpatient with her primary care as needed.  She will also follow-up with wound care clinic as needed.    Pertinent Physical Exam At Time of Discharge  Physical Exam    General: Well-developed elderly female, in no acute distress  HEENT: AT, NC, no JVD, no lymphadenopathy, neck supple, hearing issue   Lungs: Clear, no wheezing, no crackles  Cardiac: Normal S1-S2, no murmur, no gallop  Abdomen: Soft, nontender, no distention, positive bowel sound  Extremities: No deformity, b/l LE necrotic wound noted, ROM intact  Neurological: Alert awake oriented x3, sensation intact, clear speech      Time spent 40 minutes  Swapnil Oetro MD

## 2024-05-29 NOTE — PROGRESS NOTES
"Nephrology Progress Note    Assessment:  84 y.o. female with history s/f HTN, COPD and T2DM who presented for AMS.      RAYMUNDO on CKD stage III: 2/2 volume depletion, Scr 5.2 on presentation, 1.2 w/ eGFR mid 40s at baseline, CKD risk factors T2DM, HTN   HTN  Encephalopathy  Hyponatremia  Metabolic acidosis: corrected   Anemia   Fluid overload: echo w/ nml LVEF w/ DD      Plan:  - function improving, supportive care from renal standpoint   - no current need for diuretics     Subjective:  Admit Date: 5/24/2024    Interval History: function improving, no acute overnight events     Medications:  Scheduled Meds:apixaban, 5 mg, oral, BID  fluconazole in NaCl (iso-osm), 100 mg, intravenous, q48h  insulin lispro, 0-5 Units, subcutaneous, TID  levothyroxine, 150 mcg, oral, Daily before breakfast  oxygen, , inhalation, Continuous - Inhalation  piperacillin-tazobactam, 3.375 g, intravenous, q8h   And  sodium chloride, 10 mL, intravenous, q8h  polyethylene glycol, 17 g, oral, Daily  silver sulfADIAZINE, , Topical, BID      Continuous Infusions:     CBC:   Lab Results   Component Value Date    WBC 11.2 05/29/2024    RBC 3.51 (L) 05/29/2024    HGB 8.7 (L) 05/29/2024    HCT 28.0 (L) 05/29/2024    MCV 80 05/29/2024    MCH 24.8 (L) 05/29/2024    MCHC 31.1 (L) 05/29/2024    RDW 19.9 (H) 05/29/2024     05/29/2024     BMP:    Lab Results   Component Value Date     05/29/2024    K 4.1 05/29/2024     (H) 05/29/2024    CO2 23 05/29/2024    BUN 48 (H) 05/29/2024    CREATININE 1.63 (H) 05/29/2024    CALCIUM 8.1 (L) 05/29/2024    GLUCOSE 78 05/29/2024       Objective:  Vitals: /70   Pulse 79   Temp 36.5 °C (97.7 °F)   Resp 17   Ht 1.575 m (5' 2\")   Wt 86.2 kg (190 lb)   SpO2 98%   BMI 34.75 kg/m²    Wt Readings from Last 3 Encounters:   05/24/24 86.2 kg (190 lb)   05/15/24 104 kg (230 lb)   12/08/21 89.4 kg (197 lb)      24HR INTAKE/OUTPUT:    Intake/Output Summary (Last 24 hours) at 5/29/2024 1119  Last data " filed at 5/29/2024 0215  Gross per 24 hour   Intake 320 ml   Output 600 ml   Net -280 ml       General: alert, in no apparent distress  HEENT: normocephalic, atraumatic, anicteric  Lungs: non-labored respirations, clear to auscultation bilaterally  Heart: regular rate and rhythm, no murmurs or rubs  Abdomen: soft, non-tender, non-distended  Ext: no cyanosis, no peripheral edema  Neuro: alert, follows commands       Electronically signed by Sharita Lane MD, MD

## 2024-05-29 NOTE — CARE PLAN
The patient's goals for the shift include      The clinical goals for the shift include dressing changes BID    Over the shift, the patient did not make progress toward the following goals. Barriers to progression include patient will not cooperate with staff and allow dressing changes. Recommendations to address these barriers include attempt to continue to try to do dressing changes.    Problem: Skin  Goal: Decreased wound size/increased tissue granulation at next dressing change  Outcome: Not Progressing  Goal: Participates in plan/prevention/treatment measures  Outcome: Not Progressing  Goal: Prevent/manage excess moisture  Outcome: Not Progressing  Goal: Prevent/minimize sheer/friction injuries  Outcome: Not Progressing  Goal: Promote skin healing  Outcome: Not Progressing     Problem: Pain  Goal: My pain/discomfort is manageable  Outcome: Not Progressing

## 2024-05-29 NOTE — NURSING NOTE
Pt very agitated and upset.  Demanding dressings on BLE be removed.  Already had to remove to do evening dressing change.  Removed, washed per order, and pt refusing to have BLE wounds redone.  Pt also, encouraged to shift and move in bed.  In bed side report dayshift said pt had stage two pressure injury on sacrum.  Pt educated on importance of moving and shifting in bed, and asked to shift to side.  Pt refused.

## 2024-05-29 NOTE — PROGRESS NOTES
Plastic Surgery Note    Afebrile, vital signs stable  Patient refusing dressings  Discussed with patient the need to have wounds covered  Left leg with scab sloughing and some epithelization noted  Right leg with firmly attached eschar  Impression: Bilateral lower extremity wounds with some improvement  Continue current dressing changes

## 2024-05-29 NOTE — PROGRESS NOTES
Physical Therapy                 Therapy Communication Note    Patient Name: Cheryl Elena  MRN: [id number]  Today's Date: 5/29/2024     Discipline: Physical Therapy    Missed Visit Reason: Attempted to see Pt. For 3rd time for P.T. eval. Pt. again adamantly refused therapy.  Will re-attempt discharge P.T. order at this timee due to repeated refusals. TCC notified.

## 2024-05-29 NOTE — PROGRESS NOTES
Cheryl Elena is a 84 y.o. female on day 4 of admission presenting with Acute renal failure (ARF) (CMS-HCC).      Subjective   Patient is stable, no acute distress  Was seen by psychiatrist, does not have capacity to leave AMA  Awaiting pre-CERT for placement, patient continued to refuse PT/OT evaluation  Renal function improved significantly  Plastic surgery recommended to cw dressing change    Objective     Last Recorded Vitals  /70   Pulse 79   Temp 36.5 °C (97.7 °F)   Resp 17   Wt 86.2 kg (190 lb)   SpO2 98%   Intake/Output last 3 Shifts:    Intake/Output Summary (Last 24 hours) at 5/29/2024 1310  Last data filed at 5/29/2024 1258  Gross per 24 hour   Intake 110 ml   Output 600 ml   Net -490 ml       Admission Weight  Weight: 86.2 kg (190 lb) (05/24/24 1925)    Daily Weight  05/24/24 : 86.2 kg (190 lb)    Image Results  Transthoracic Echo (TTE) [place] [id number]   Tel [phone] Fax [phone]    TRANSTHORACIC ECHOCARDIOGRAM REPORT       Patient Name:      CHERYL ELENA       Reading Physician:    [id number] Titi Mckinnon DO  Study Date:        5/25/2024             Ordering Provider:    [id number] AFIA WILL  MRN/PID:           [id number]              Fellow:  Accession#:        [id number]          Nurse:  Date of Birth/Age: 1940 / 84 years  Sonographer:          Alicia PANTOJA  Gender:            F                     Additional Staff:  Height:            157.48 cm             Admit Date:           5/24/2024  Weight:            87.09 kg              Admission Status:     Inpatient -                                                                 Routine  BSA / BMI:         1.88 m2 / 35.12 kg/m2 Department Location:  [address] [place] [address]  Pressure: 180 /76 mmHg    Study Type:    TRANSTHORACIC ECHO (TTE) COMPLETE  Diagnosis/ICD: Acute on chronic diastolic (congestive) heart failure                 (CHF)-I50.33  Indication:    Congestive Heart Failure  CPT Codes:     Echo Complete w Full [id number]    Patient History:  Diabetes:          Yes  Pertinent History: Previous DVT, Cardiomyopathy, CHF, Renal Failure and COPD.    Study Detail: The following Echo studies were performed: 2D, M-Mode, Doppler and                color flow. The patient was asleep.       PHYSICIAN INTERPRETATION:  Left Ventricle: Left ventricular systolic function is normal, with an estimated ejection fraction of 60-65%. There are no regional wall motion abnormalities. The left ventricular cavity size is normal. There is mild concentric left ventricular hypertrophy. Spectral Doppler shows an impaired relaxation pattern of left ventricular diastolic filling.  LV Wall Scoring:  All segments are normal.    Left Atrium: The left atrium is normal in size.  Right Ventricle: The right ventricle is normal in size. There is normal right ventricular global systolic function.  Right Atrium: The right atrium is normal in size.  Aortic Valve: The aortic valve appears structurally normal. The aortic valve appears tricuspid with restriction. There is mild to moderate aortic valve cusp calcification. There is evidence of mild to moderate aortic valve stenosis.  The peak aortic velocity was obtained from the apical view. There is mild aortic valve regurgitation. The peak instantaneous gradient of the aortic valve is 31.6 mmHg. The mean gradient of the aortic valve is 22.0 mmHg.  Mitral Valve: The mitral valve is normal in structure. There is no evidence of mitral valve stenosis. There is normal mitral valve leaflet mobility. There is mild mitral annular calcification. There is no evidence of mitral valve regurgitation.  Tricuspid Valve: The tricuspid valve is structurally normal. There is normal  tricuspid valve leaflet mobility. There is mild tricuspid regurgitation.  Pulmonic Valve: The pulmonic valve is structurally normal. There is no indication of pulmonic valve regurgitation.  Pericardium: There is a trivial pericardial effusion.  Aorta: The aortic root is normal.  Pulmonary Artery: The main pulmonary artery is normal in size, and position, with normal bifurcation into the left and right pulmonary arteries. The tricuspid regurgitant velocity is 2.59 m/s, and with an estimated right atrial pressure of 3 mmHg, the estimated pulmonary artery pressure is mildly elevated with the RVSP at 29.8 mmHg.  Systemic Veins: The inferior vena cava appears to be of normal size.  In comparison to the previous echocardiogram(s): The left ventricular function is unchanged. The left ventricular hypertrophy is unchanged. The left ventricular diastolic function is unchanged.       CONCLUSIONS:   1. Left ventricular systolic function is normal with a 60-65% estimated ejection fraction.   2. Spectral Doppler shows an impaired relaxation pattern of left ventricular diastolic filling.   3. There is no evidence of mitral valve stenosis.   4. No evidence of mitral valve regurgitation.   5. Mild to moderate aortic valve stenosis.   6. The aortic valve appears tricuspid with restriction.   7. Mild aortic valve regurgitation.   8. The main pulmonary artery is normal in size, and position, with normal bifurcation into the left and right pulmonary arteries.    QUANTITATIVE DATA SUMMARY:  2D MEASUREMENTS:                            Normal Ranges:  Ao Root d:     2.55 cm    (2.0-3.7cm)  LAs:           3.20 cm    (2.7-4.0cm)  IVSd:          1.17 cm    (0.6-1.1cm)  LVPWd:         1.18 cm    (0.6-1.1cm)  LVIDd:         4.51 cm    (3.9-5.9cm)  LVIDs:         3.24 cm  LV Mass Index: 102.7 g/m2  LV % FS        28.2 %    LA VOLUME:                                Normal Ranges:  LA Vol A4C:        37.2 ml    (22+/-6mL/m2)  LA Vol A2C:         31.4 ml  LA Vol BP:         35.1 ml  LA Vol Index A4C:  19.8ml/m2  LA Vol Index A2C:  16.7 ml/m2  LA Vol Index BP:   18.7 ml/m2  LA Area A4C:       14.5 cm2  LA Area A2C:       13.0 cm2  LA Major Axis A4C: 4.8 cm  LA Major Axis A2C: 4.6 cm  LA Volume Index:   17.7 ml/m2    RA VOLUME BY A/L METHOD:                                Normal Ranges:  RA Vol A4C:        24.8 ml    (8.3-19.5ml)  RA Vol Index A4C:  13.2 ml/m2  RA Area A4C:       12.0 cm2  RA Major Axis A4C: 4.9 cm    AORTA MEASUREMENTS:                     Normal Ranges:  Asc Ao, d: 2.39 cm (2.1-3.4cm)    LV SYSTOLIC FUNCTION BY 2D PLANIMETRY (MOD):                      Normal Ranges:  EF-A4C View: 65.7 % (>=55%)  EF-A2C View: 65.6 %  EF-Biplane:  65.0 %    LV DIASTOLIC FUNCTION:                                Normal Ranges:  MV Peak E:        0.84 m/s    (0.7-1.2 m/s)  MV Peak A:        1.42 m/s    (0.42-0.7 m/s)  E/A Ratio:        0.59        (1.0-2.2)  MV e'             0.08 m/s    (>8.0)  MV lateral e'     0.08 m/s  MV medial e'      0.08 m/s  E/e' Ratio:       10.74       (<8.0)  PulmV Sys Derek:    64.90 cm/s  PulmV Mchugh Derek:   42.70 cm/s  PulmV S/D Derek:    1.50  PulmV A Revs Derek: 38.10 cm/s  PulmV A Revs Dur: 174.00 msec    MITRAL VALVE:                  Normal Ranges:  MV DT: 302 msec (150-240msec)    AORTIC VALVE:                                     Normal Ranges:  AoV Vmax:                2.81 m/s  (<=1.7m/s)  AoV Peak P.6 mmHg (<20mmHg)  AoV Mean P.0 mmHg (1.7-11.5mmHg)  LVOT Max Derek:            1.24 m/s  (<=1.1m/s)  AoV VTI:                 58.70 cm  (18-25cm)  LVOT VTI:                23.40 cm  LVOT Diameter:           1.93 cm   (1.8-2.4cm)  AoV Area, VTI:           1.17 cm2  (2.5-5.5cm2)  AoV Area,Vmax:           1.29 cm2  (2.5-4.5cm2)  AoV Dimensionless Index: 0.40       RIGHT VENTRICLE:  RV Basal 2.58 cm  RV Mid   1.81 cm  RV Major 4.9 cm  TAPSE:   18.7 mm  RV s'    0.16 m/s    TRICUSPID VALVE/RVSP:                               Normal Ranges:  Peak TR Velocity: 2.59 m/s  RV Syst Pressure: 29.8 mmHg (< 30mmHg)  IVC Diam:         1.08 cm    PULMONIC VALVE:                           Normal Ranges:  PV Accel Time: 66 msec   (>120ms)  PV Max Derek:    1.6 m/s   (0.6-0.9m/s)  PV Max PG:     10.1 mmHg    Pulmonary Veins:  PulmV A Revs Dur: 174.00 msec  PulmV A Revs Derek: 38.10 cm/s  PulmV Mchugh Derek:   42.70 cm/s  PulmV S/D Derek:    1.50  PulmV Sys Derek:    64.90 cm/s       [id number] Titi Ino DO  Electronically signed on 5/25/2024 at 11:34:54 AM       Wall Scoring       ** Final **      Physical Exam    General: Well-developed elderly female, in no acute distress  HEENT: AT, NC, no JVD, no lymphadenopathy, neck supple, hearing issue   Lungs: Clear, no wheezing, no crackles  Cardiac: Normal S1-S2, no murmur, no gallop  Abdomen: Soft, nontender, no distention, positive bowel sound  Extremities: No deformity, b/l LE necrotic wound noted, ROM intact  Neurological: Alert awake oriented x3, sensation intact, clear speech    Assessment/Plan      Principal Problem:    Acute renal failure (ARF) (CMS-HCC)  Active Problems:    Hyperkalemia    Acute hypoxic respiratory failure (Multi)    High anion gap metabolic acidosis    Sepsis due to cellulitis (Multi)    Pressure injury of sacral region, stage 2 (Multi)    Hyponatremia    Acute metabolic encephalopathy    Acute on chronic diastolic (congestive) heart failure (Multi)    Acute renal failure superimposed on stage 3b chronic kidney disease, unspecified acute renal failure type (Multi)    Cheryl Elena is a 84 y.o. female with a history of DVT on Eliquis, HFpEF, COPD, type 2 diabetes presenting with increasing confusion and altered mental status.  Was admitted for management of following issues:     #.  RAYMUNDO/CKD with severe high anion gap metabolic acidosis and hyperkalemia, resolved   #.  Sepsis secondary to bilateral leg cellulitis, resolved   #.  Acute hypoxic respiratory failure likely  secondary to decompensated HFpEF versus new onset systolic heart failure  #.  Acute metabolic encephalopathy likely secondary to uremia versus sepsis, resolved   #.  COPD - not in acute exacerbation  #.  Sacral decubitus ulcer  #.  Hyponatremia resolved   #.  History of DVT  #.  Long-term anticoagulation use  #.  Type 2 diabetes  -Elevated creatinine, hyperkalemia, CK, leukocytosis on arrival  -S/p IVF hydration and bicarb drip  -CXR showed evidence of pulmonary vascular congestion as well as possible small pleural effusions  -Last echocardiogram in 2021 showing EF of 55% and diastolic dysfunction  -Prior cultures from leg wounds have grown MRSA and Pseudomonas (susceptible to Zosyn)     Plan:  -Nephrology recs appreciated, monitor renal function   -Oxygen therapy, pain management, antiemetic, telemetry  -Wound culture positive for 4+ gram negative bacilli, cw Zosyn  -Pending ID recommendation for IV antibiotic at discharge  -Echo done and reviewed, EF 60 to 65%, diastolic dysfunction  -Strict intake and output, daily weights  -Continue home Eliquis  -psych: no capacity to leave AMA   -Plastic surgery: cw dressing change   -Insulin sliding scale, hypoglycemic protocol  -DuoNebs as needed  -Wound care and plastic surgery following     VTE PPX: Eliquis  Disposition: Frequently refused PT/OT evaluation  TCC following    Swapnil Otero MD

## 2024-05-30 DIAGNOSIS — G89.4 CHRONIC PAIN SYNDROME: Primary | ICD-10-CM

## 2024-05-30 RX ORDER — ACETAMINOPHEN 325 MG/1
650 TABLET ORAL EVERY 8 HOURS PRN
COMMUNITY

## 2024-05-30 RX ORDER — AMOXICILLIN AND CLAVULANATE POTASSIUM 875; 125 MG/1; MG/1
1 TABLET, FILM COATED ORAL 2 TIMES DAILY
COMMUNITY

## 2024-05-30 RX ORDER — FUROSEMIDE 20 MG/1
20 TABLET ORAL 2 TIMES DAILY
COMMUNITY

## 2024-05-31 RX ORDER — OXYCODONE HYDROCHLORIDE AND ACETAMINOPHEN 5; 325 MG/1; MG/1
2 TABLET ORAL EVERY 8 HOURS PRN
Qty: 21 TABLET | Refills: 0 | Status: SHIPPED | OUTPATIENT
Start: 2024-05-31 | End: 2024-06-07

## 2024-06-04 ENCOUNTER — OFFICE VISIT (OUTPATIENT)
Dept: GERIATRIC MEDICINE | Age: 84
End: 2024-06-04

## 2024-06-04 DIAGNOSIS — J44.9 CHRONIC OBSTRUCTIVE PULMONARY DISEASE, UNSPECIFIED COPD TYPE (HCC): ICD-10-CM

## 2024-06-04 DIAGNOSIS — E03.9 ACQUIRED HYPOTHYROIDISM: ICD-10-CM

## 2024-06-04 DIAGNOSIS — N18.30 STAGE 3 CHRONIC KIDNEY DISEASE, UNSPECIFIED WHETHER STAGE 3A OR 3B CKD (HCC): ICD-10-CM

## 2024-06-04 DIAGNOSIS — I50.9 HEART FAILURE, UNSPECIFIED HF CHRONICITY, UNSPECIFIED HEART FAILURE TYPE (HCC): ICD-10-CM

## 2024-06-04 DIAGNOSIS — L03.116 BILATERAL LOWER LEG CELLULITIS: Primary | ICD-10-CM

## 2024-06-04 DIAGNOSIS — L03.115 BILATERAL LOWER LEG CELLULITIS: Primary | ICD-10-CM

## 2024-06-07 PROBLEM — R63.5 ABNORMAL WEIGHT GAIN: Status: RESOLVED | Noted: 2023-10-22 | Resolved: 2024-06-07

## 2024-06-07 PROBLEM — T14.8XXA BLEEDING FROM WOUND: Status: RESOLVED | Noted: 2023-04-20 | Resolved: 2024-06-07

## 2024-06-07 PROBLEM — U07.1 COVID-19: Status: RESOLVED | Noted: 2022-02-24 | Resolved: 2024-06-07

## 2024-06-07 PROBLEM — R53.83 FATIGUE: Status: RESOLVED | Noted: 2022-03-10 | Resolved: 2024-06-07

## 2024-06-07 PROBLEM — R14.3 FLATULENCE: Status: RESOLVED | Noted: 2023-09-29 | Resolved: 2024-06-07

## 2024-06-07 PROBLEM — J44.1 COPD EXACERBATION (HCC): Status: RESOLVED | Noted: 2022-02-24 | Resolved: 2024-06-07

## 2024-06-07 PROBLEM — I82.402 ACUTE EMBOLISM AND THROMBOSIS OF DEEP VEIN OF LEFT LOWER EXTREMITY (HCC): Status: RESOLVED | Noted: 2022-03-22 | Resolved: 2024-06-07

## 2024-06-07 ASSESSMENT — ENCOUNTER SYMPTOMS
GASTROINTESTINAL NEGATIVE: 1
RESPIRATORY NEGATIVE: 1

## 2024-06-07 NOTE — PROGRESS NOTES
[address] [address]    6/4/2024    Ines Perez  is a 84 y.o. in the NF being seen for a f/u of No chief complaint on file.      Ines Perez is an 84-year-old female recently admitted to [facility] after hospitalization for ROCHELLE, sepsis secondary to bilateral lower extremity cellulitis, acute respiratory failure.  Patient initially presented to the ED with complaints of increased confusion and altered mental status.  Per notes patient had reported worsening pain and swelling to bilateral lower extremities as well as minimal food and water intake over the last 5 days prior to ED visit.  Patient followed with nephrology for management of ROCHELLE.  Patient initially started on IV Zosyn but transition to Augmentin at time of discharge.     Patient with significant past medical history of DVT, heart failure, type 2 diabetes and COPD.    Staff reporting patient's refusal with dressing changes and wound care to bilateral lower extremities and buttock.  At time of visit patient resting in bed, she is alert and oriented x 3.  Bilateral lower extremities currently wrapped.  Patient appears very agitated today.  Per patient she is having pain in both of her legs and request pain medication.  Per patient this staff is \"not doing dressing changes correctly and she does not want them to touch her legs or buttock anymore.\"  Patient states \"nobody in here knows how to do the dressing changes the right way.\"  Patient denies nausea, vomiting, diarrhea or constipation.  No recent fever or fall.  No change in bowel or bladder habits.          Past Medical History:   Diagnosis Date    Acute embolism and thrombosis of deep vein of left lower extremity ([facility]) 03/22/2022    ROCHELLE (acute kidney injury) ([facility]) 07/25/2021    Chronic kidney disease     COPD exacerbation ([facility]) 02/24/2022    Gastroesophageal reflux disease 06/10/2002    Hypothyroidism     Obesity, Class III, BMI 40-49.9 (morbid

## 2024-06-11 ENCOUNTER — OFFICE VISIT (OUTPATIENT)
Dept: GERIATRIC MEDICINE | Age: 84
End: 2024-06-11
Payer: MEDICARE

## 2024-06-11 DIAGNOSIS — J44.9 CHRONIC OBSTRUCTIVE PULMONARY DISEASE, UNSPECIFIED COPD TYPE (HCC): ICD-10-CM

## 2024-06-11 DIAGNOSIS — L03.115 BILATERAL LOWER LEG CELLULITIS: Primary | ICD-10-CM

## 2024-06-11 DIAGNOSIS — I50.9 HEART FAILURE, UNSPECIFIED HF CHRONICITY, UNSPECIFIED HEART FAILURE TYPE (HCC): ICD-10-CM

## 2024-06-11 DIAGNOSIS — L03.116 BILATERAL LOWER LEG CELLULITIS: Primary | ICD-10-CM

## 2024-06-11 PROCEDURE — 1123F ACP DISCUSS/DSCN MKR DOCD: CPT | Performed by: NURSE PRACTITIONER

## 2024-06-11 PROCEDURE — 99308 SBSQ NF CARE LOW MDM 20: CPT | Performed by: NURSE PRACTITIONER

## 2024-06-14 DIAGNOSIS — L03.116 BILATERAL LOWER LEG CELLULITIS: ICD-10-CM

## 2024-06-14 DIAGNOSIS — L03.115 BILATERAL LOWER LEG CELLULITIS: ICD-10-CM

## 2024-06-14 DIAGNOSIS — G89.4 CHRONIC PAIN SYNDROME: Primary | ICD-10-CM

## 2024-06-14 RX ORDER — OXYCODONE AND ACETAMINOPHEN 10; 325 MG/1; MG/1
1 TABLET ORAL EVERY 8 HOURS PRN
Qty: 42 TABLET | Refills: 0 | Status: SHIPPED | OUTPATIENT
Start: 2024-06-14 | End: 2024-06-28

## 2024-06-14 RX ORDER — OXYCODONE AND ACETAMINOPHEN 10; 325 MG/1; MG/1
1 TABLET ORAL EVERY 8 HOURS PRN
COMMUNITY
Start: 2024-06-04 | End: 2024-06-14 | Stop reason: SDUPTHER

## 2024-06-17 NOTE — PROGRESS NOTES
[place] [address]    6/11/2024    Ines Perez  is a 84 y.o. in the  being seen for a f/u of   Chief Complaint   Patient presents with    Follow-up       Ines Perez is an 84-year-old female being seen as follow-up for bilateral lower extremity cellulitis, heart failure and COPD.  Per staff patient continues to refuse dressing changes and wound care as ordered to lower extremities.  Patient also refusing to work with therapy and refusing certain medications.  At time of visit patient resting in bed.  She is alert and oriented x 3.  Patient appears agitated and frustrated today.  Patient states she is not working with therapy because her legs hurt.  Patient also states that she just received pain medication and is now tired and would like to take a nap.  Patient refused any additional questioning and physical exam.           Past Medical History:   Diagnosis Date    Acute embolism and thrombosis of deep vein of left lower extremity ([facility]) 03/22/2022    ROCHELLE (acute kidney injury) ([facility]) 07/25/2021    Chronic kidney disease     COPD exacerbation ([facility]) 02/24/2022    Gastroesophageal reflux disease 06/10/2002    Hypothyroidism     Obesity, Class III, BMI 40-49.9 (morbid obesity) ([facility]) 01/04/2016    Primary hypertension 02/24/2022    Tobacco abuse      Past Surgical History:   Procedure Laterality Date    APPENDECTOMY      CHOLECYSTECTOMY       Family History   Problem Relation Age of Onset    Kidney Disease Mother     High Blood Pressure Mother     Stroke Mother     Heart Disease Father      Social History     Socioeconomic History    Marital status:      Spouse name: Not on file    Number of children: Not on file    Years of education: Not on file    Highest education level: Not on file   Occupational History    Not on file   Tobacco Use    Smoking status: Former     Current packs/day: 0.00     Average packs/day: 5.0 packs/day for 30.0 years (150.0 ttl pk-yrs)     Types:

## 2024-06-18 ENCOUNTER — OFFICE VISIT (OUTPATIENT)
Dept: GERIATRIC MEDICINE | Age: 84
End: 2024-06-18
Payer: MEDICARE

## 2024-06-18 DIAGNOSIS — J44.9 CHRONIC OBSTRUCTIVE PULMONARY DISEASE, UNSPECIFIED COPD TYPE (HCC): Primary | ICD-10-CM

## 2024-06-18 DIAGNOSIS — N18.30 STAGE 3 CHRONIC KIDNEY DISEASE, UNSPECIFIED WHETHER STAGE 3A OR 3B CKD (HCC): ICD-10-CM

## 2024-06-18 DIAGNOSIS — I50.9 HEART FAILURE, UNSPECIFIED HF CHRONICITY, UNSPECIFIED HEART FAILURE TYPE (HCC): ICD-10-CM

## 2024-06-18 PROCEDURE — 1123F ACP DISCUSS/DSCN MKR DOCD: CPT | Performed by: NURSE PRACTITIONER

## 2024-06-18 PROCEDURE — 99308 SBSQ NF CARE LOW MDM 20: CPT | Performed by: NURSE PRACTITIONER

## 2024-06-24 LAB — POTASSIUM (K+): 5

## 2024-06-25 ENCOUNTER — OFFICE VISIT (OUTPATIENT)
Dept: GERIATRIC MEDICINE | Age: 84
End: 2024-06-25

## 2024-06-25 DIAGNOSIS — L03.116 BILATERAL LOWER LEG CELLULITIS: Primary | ICD-10-CM

## 2024-06-25 DIAGNOSIS — J44.9 CHRONIC OBSTRUCTIVE PULMONARY DISEASE, UNSPECIFIED COPD TYPE (HCC): ICD-10-CM

## 2024-06-25 DIAGNOSIS — L03.115 BILATERAL LOWER LEG CELLULITIS: Primary | ICD-10-CM

## 2024-06-25 DIAGNOSIS — G89.4 CHRONIC PAIN SYNDROME: ICD-10-CM

## 2024-06-26 DIAGNOSIS — L03.116 BILATERAL LOWER LEG CELLULITIS: ICD-10-CM

## 2024-06-26 DIAGNOSIS — L03.115 BILATERAL LOWER LEG CELLULITIS: ICD-10-CM

## 2024-06-26 DIAGNOSIS — G89.4 CHRONIC PAIN SYNDROME: ICD-10-CM

## 2024-06-26 RX ORDER — OXYCODONE AND ACETAMINOPHEN 10; 325 MG/1; MG/1
1 TABLET ORAL EVERY 8 HOURS PRN
Qty: 42 TABLET | Refills: 0 | Status: SHIPPED | OUTPATIENT
Start: 2024-06-26 | End: 2024-07-10

## 2024-06-26 ASSESSMENT — ENCOUNTER SYMPTOMS
GASTROINTESTINAL NEGATIVE: 1
RESPIRATORY NEGATIVE: 1

## 2024-06-26 NOTE — PROGRESS NOTES
[address] [address]    6/18/2024    Ines Perez  is a 84 y.o. in the  being seen for a f/u of   Chief Complaint   Patient presents with    Follow-up       Ines Perez is an 84-year-old female being seen as follow-up for CKD, heart failure and COPD.  Per staff patient continues to intermittently refuse dressing changes to bilateral lower extremities.  Staff also reports patient noncompliance with current medications.  Patient resting in bed at time of visit, she is alert and oriented x 3.  Patient anxious today and expressing concern with history of high potassium level and decreased kidney function.  Patient reports feeling weak.  She denies nausea, vomiting, diarrhea or constipation.  She denies chest pain or heart palpitations.  No recent fever or fall.  No change in bowel or bladder habits.            Past Medical History:   Diagnosis Date    Acute embolism and thrombosis of deep vein of left lower extremity ([facility]) 03/22/2022    ROCHELLE (acute kidney injury) ([facility]) 07/25/2021    Chronic kidney disease     COPD exacerbation ([facility]) 02/24/2022    Gastroesophageal reflux disease 06/10/2002    Hypothyroidism     Obesity, Class III, BMI 40-49.9 (morbid obesity) ([facility]) 01/04/2016    Primary hypertension 02/24/2022    Tobacco abuse      Past Surgical History:   Procedure Laterality Date    APPENDECTOMY      CHOLECYSTECTOMY       Family History   Problem Relation Age of Onset    Kidney Disease Mother     High Blood Pressure Mother     Stroke Mother     Heart Disease Father      Social History     Socioeconomic History    Marital status:      Spouse name: Not on file    Number of children: Not on file    Years of education: Not on file    Highest education level: Not on file   Occupational History    Not on file   Tobacco Use    Smoking status: Former     Current packs/day: 0.00     Average packs/day: 5.0 packs/day for 30.0 years (150.0 ttl pk-yrs)     Types: Cigarettes     Start

## 2024-06-28 ASSESSMENT — ENCOUNTER SYMPTOMS
RESPIRATORY NEGATIVE: 1
GASTROINTESTINAL NEGATIVE: 1

## 2024-06-28 NOTE — PROGRESS NOTES
Vaping Use: Never used   Substance and Sexual Activity    Alcohol use: No     Alcohol/week: 0.0 standard drinks of alcohol    Drug use: No    Sexual activity: Not Currently   Other Topics Concern    Not on file   Social History Narrative    Not on file     Social Determinants of Health     Financial Resource Strain: Low Risk  (7/19/2023)    Overall Financial Resource Strain (CARDIA)     Difficulty of Paying Living Expenses: Not very hard   Food Insecurity: Not on file (7/19/2023)   Transportation Needs: Unknown (7/19/2023)    PRAPARE - Transportation     Lack of Transportation (Medical): Not on file     Lack of Transportation (Non-Medical): No   Physical Activity: Insufficiently Active (7/19/2023)    Exercise Vital Sign     Days of Exercise per Week: 3 days     Minutes of Exercise per Session: 10 min   Stress: No Stress Concern Present (7/25/2021)    [employer] [place] [employer] - Occupational Stress Questionnaire     Feeling of Stress : Only a little   Social Connections: Moderately Isolated (7/25/2021)    Social Connection and Isolation Panel [NHANES]     Frequency of Communication with Friends and Family: Three times a week     Frequency of Social Gatherings with Friends and Family: Three times a week     Attends Shinto Services: Never     Active Member of Clubs or Organizations: No     Attends Club or Organization Meetings: Never     Marital Status:    Intimate Partner Violence: Not At Risk (7/25/2021)    Humiliation, Afraid, Rape, and Kick questionnaire     Fear of Current or Ex-Partner: No     Emotionally Abused: No     Physically Abused: No     Sexually Abused: No   Housing Stability: Unknown (7/19/2023)    Housing Stability Vital Sign     Unable to Pay for Housing in the Last Year: Not on file     Number of Places Lived in the Last Year: Not on file     Unstable Housing in the Last Year: No       Allergies: Benadryl [diphenhydramine hcl], Cephalexin, Cortisone, Prednisone, Codeine, and

## 2024-07-09 ENCOUNTER — HOSPITAL ENCOUNTER (OUTPATIENT)
Age: 84
Setting detail: SPECIMEN
Discharge: HOME | End: 2024-07-09
Payer: MEDICARE

## 2024-07-09 ENCOUNTER — OFFICE VISIT (OUTPATIENT)
Dept: GERIATRIC MEDICINE | Age: 84
End: 2024-07-09

## 2024-07-09 DIAGNOSIS — L97.212 VENOUS STASIS ULCER OF RIGHT CALF WITH FAT LAYER EXPOSED, UNSPECIFIED WHETHER VARICOSE VEINS PRESENT (HCC): ICD-10-CM

## 2024-07-09 DIAGNOSIS — S81.809D MULTIPLE OPENS WOUND OF LOWER EXTREMITY, UNSPECIFIED LATERALITY, SUBSEQUENT ENCOUNTER: ICD-10-CM

## 2024-07-09 DIAGNOSIS — E87.5 HYPERKALEMIA: Primary | ICD-10-CM

## 2024-07-09 DIAGNOSIS — Z91.199 MEDICAL NON-COMPLIANCE: ICD-10-CM

## 2024-07-09 DIAGNOSIS — I83.012 VENOUS STASIS ULCER OF RIGHT CALF WITH FAT LAYER EXPOSED, UNSPECIFIED WHETHER VARICOSE VEINS PRESENT (HCC): ICD-10-CM

## 2024-07-09 LAB
ANION GAP SERPL CALCULATED.3IONS-SCNC: 11 MEQ/L (ref 9–15)
BUN SERPL-MCNC: 23 MG/DL (ref 8–23)
CALCIUM SERPL-MCNC: 9.1 MG/DL (ref 8.5–9.9)
CHLORIDE SERPL-SCNC: 107 MEQ/L (ref 95–107)
CO2 SERPL-SCNC: 23 MEQ/L (ref 20–31)
CREAT SERPL-MCNC: 1.18 MG/DL (ref 0.5–0.9)
GLUCOSE SERPL-MCNC: 148 MG/DL (ref 70–99)
POTASSIUM SERPL-SCNC: 4.9 MEQ/L (ref 3.4–4.9)
SODIUM SERPL-SCNC: 141 MEQ/L (ref 135–144)

## 2024-07-09 PROCEDURE — 80048 BASIC METABOLIC PNL TOTAL CA: CPT

## 2024-07-12 LAB
BUN BLDV-MCNC: 22 MG/DL
CALCIUM SERPL-MCNC: 9.1 MG/DL
CHLORIDE BLD-SCNC: 108 MMOL/L
CO2: 23 MMOL/L
CREAT SERPL-MCNC: 1.2 MG/DL
EGFR: 43
GLUCOSE BLD-MCNC: 73 MG/DL
POTASSIUM SERPL-SCNC: 5 MMOL/L
SODIUM BLD-SCNC: 144 MMOL/L

## 2024-07-17 ASSESSMENT — ENCOUNTER SYMPTOMS
GASTROINTESTINAL NEGATIVE: 1
RESPIRATORY NEGATIVE: 1

## 2024-07-17 NOTE — PROGRESS NOTES
nutritional staff. Current longstanding medical problems and acute medical issues addressed with staff. Available data and data elements in on site paper chart reviewed and analyzed.  Current external consultant notes reviewed in on site chart. Ordered laboratory testing and imaging will be reviewed when available.    Please note this report is partially produced by using speech recognition hardware.  It may contain errors related to the system, including grammar, punctuation and spelling as well as words and phrases that may seem inaccurate.  For any questions or concerns feel free to contact me for clarification

## 2024-07-23 ENCOUNTER — OFFICE VISIT (OUTPATIENT)
Dept: GERIATRIC MEDICINE | Age: 84
End: 2024-07-23
Payer: MEDICARE

## 2024-07-23 DIAGNOSIS — Z91.199 MEDICAL NON-COMPLIANCE: ICD-10-CM

## 2024-07-23 DIAGNOSIS — L97.212 VENOUS STASIS ULCER OF RIGHT CALF WITH FAT LAYER EXPOSED, UNSPECIFIED WHETHER VARICOSE VEINS PRESENT (HCC): ICD-10-CM

## 2024-07-23 DIAGNOSIS — G89.4 CHRONIC PAIN SYNDROME: ICD-10-CM

## 2024-07-23 DIAGNOSIS — E87.5 HYPERKALEMIA: ICD-10-CM

## 2024-07-23 DIAGNOSIS — S81.809D MULTIPLE OPENS WOUND OF LOWER EXTREMITY, UNSPECIFIED LATERALITY, SUBSEQUENT ENCOUNTER: ICD-10-CM

## 2024-07-23 DIAGNOSIS — I10 PRIMARY HYPERTENSION: Primary | ICD-10-CM

## 2024-07-23 DIAGNOSIS — I83.012 VENOUS STASIS ULCER OF RIGHT CALF WITH FAT LAYER EXPOSED, UNSPECIFIED WHETHER VARICOSE VEINS PRESENT (HCC): ICD-10-CM

## 2024-07-23 PROCEDURE — 1123F ACP DISCUSS/DSCN MKR DOCD: CPT | Performed by: NURSE PRACTITIONER

## 2024-07-23 PROCEDURE — 99309 SBSQ NF CARE MODERATE MDM 30: CPT | Performed by: NURSE PRACTITIONER

## 2024-07-26 DIAGNOSIS — L03.115 BILATERAL LOWER LEG CELLULITIS: ICD-10-CM

## 2024-07-26 DIAGNOSIS — L03.116 BILATERAL LOWER LEG CELLULITIS: ICD-10-CM

## 2024-07-26 DIAGNOSIS — G89.4 CHRONIC PAIN SYNDROME: ICD-10-CM

## 2024-07-29 RX ORDER — OXYCODONE AND ACETAMINOPHEN 10; 325 MG/1; MG/1
1 TABLET ORAL EVERY 8 HOURS PRN
Qty: 42 TABLET | Refills: 0 | Status: SHIPPED | OUTPATIENT
Start: 2024-07-29 | End: 2024-08-12

## 2024-07-29 ASSESSMENT — ENCOUNTER SYMPTOMS
GASTROINTESTINAL NEGATIVE: 1
RESPIRATORY NEGATIVE: 1

## 2024-07-29 NOTE — PROGRESS NOTES
[address] [address]    2024    Ines Perez  is a 84 y.o. in the  being seen for    Chief Complaint   Patient presents with    Follow-up       Ines Perez is an 84-year-old female in long-term care at [employer].  Patient being seen as follow-up for hypertension, wounds to lower extremities, chronic pain.  Staff continues to report patient's noncompliance with wound care and medications.  Patient refusing hydralazine.  Patient resting in bed at time of visit.  Patient agitated today.  Patient complaining of pain to bilateral lower extremities stating \"this is a deep pain.\"  Patient also reports she does not need medication for her blood pressure and will continue to refuse.          Past Medical History:   Diagnosis Date    Acute embolism and thrombosis of deep vein of left lower extremity ([facility]) 2022    ROCHELLE (acute kidney injury) ([facility]) 2021    Chronic kidney disease     COPD exacerbation ([facility]) 2022    Gastroesophageal reflux disease 06/10/2002    Hypothyroidism     Obesity, Class III, BMI 40-49.9 (morbid obesity) ([facility]) 2016    Primary hypertension 2022    Tobacco abuse      Past Surgical History:   Procedure Laterality Date    APPENDECTOMY      CHOLECYSTECTOMY       Family History   Problem Relation Age of Onset    Kidney Disease Mother     High Blood Pressure Mother     Stroke Mother     Heart Disease Father      Social History     Socioeconomic History    Marital status:      Spouse name: Not on file    Number of children: Not on file    Years of education: Not on file    Highest education level: Not on file   Occupational History    Not on file   Tobacco Use    Smoking status: Former     Current packs/day: 0.00     Average packs/day: 5.0 packs/day for 30.0 years (150.0 ttl pk-yrs)     Types: Cigarettes     Start date: 10/1/1987     Quit date: 10/1/2017     Years since quittin.8    Smokeless tobacco: Never

## 2024-07-30 ENCOUNTER — OFFICE VISIT (OUTPATIENT)
Dept: GERIATRIC MEDICINE | Age: 84
End: 2024-07-30

## 2024-07-30 DIAGNOSIS — F33.9 RECURRENT MAJOR DEPRESSIVE DISORDER, REMISSION STATUS UNSPECIFIED (HCC): ICD-10-CM

## 2024-07-30 DIAGNOSIS — I83.012 VENOUS STASIS ULCER OF RIGHT CALF WITH FAT LAYER EXPOSED, UNSPECIFIED WHETHER VARICOSE VEINS PRESENT (HCC): ICD-10-CM

## 2024-07-30 DIAGNOSIS — S81.809D MULTIPLE OPENS WOUND OF LOWER EXTREMITY, UNSPECIFIED LATERALITY, SUBSEQUENT ENCOUNTER: ICD-10-CM

## 2024-07-30 DIAGNOSIS — I10 PRIMARY HYPERTENSION: Primary | ICD-10-CM

## 2024-07-30 DIAGNOSIS — L97.212 VENOUS STASIS ULCER OF RIGHT CALF WITH FAT LAYER EXPOSED, UNSPECIFIED WHETHER VARICOSE VEINS PRESENT (HCC): ICD-10-CM

## 2024-08-02 ASSESSMENT — ENCOUNTER SYMPTOMS
RESPIRATORY NEGATIVE: 1
GASTROINTESTINAL NEGATIVE: 1

## 2024-08-02 NOTE — PROGRESS NOTES
[address] [address]    7/30/2024    Ines Perez  is a 84 y.o. in the  being seen for    Chief Complaint   Patient presents with    Follow-up       Ines Perez is an 84-year-old female residing in long-term care at [facility].  Patient is being seen today for follow-up of hypertension, bilateral leg wounds and depression.  Patient resting in bed at time of visit, she is alert and oriented x 3.  Patient is very agitated today.  Patient continues to refuse hydralazine for her blood pressure stating \"I do not need medication and I do not have high blood pressure.\"  Patient reports anxiety today related to personal issues.  Per patient she is in the midst of a divorce and her and her  are attempting to find living arrangements for when she goes home?  Staff reports patient also refusing Zoloft.  Patient continues to refuse dressing changes frequently.  Discussion had with patient regarding CODE STATUS as patient is currently a full code-patient would like to remain a full code and would like all measures taken.          Past Medical History:   Diagnosis Date    Acute embolism and thrombosis of deep vein of left lower extremity ([facility]) 03/22/2022    ROCHELLE (acute kidney injury) ([facility]) 07/25/2021    Chronic kidney disease     COPD exacerbation ([facility]) 02/24/2022    Gastroesophageal reflux disease 06/10/2002    Hypothyroidism     Obesity, Class III, BMI 40-49.9 (morbid obesity) ([facility]) 01/04/2016    Primary hypertension 02/24/2022    Tobacco abuse      Past Surgical History:   Procedure Laterality Date    APPENDECTOMY      CHOLECYSTECTOMY       Family History   Problem Relation Age of Onset    Kidney Disease Mother     High Blood Pressure Mother     Stroke Mother     Heart Disease Father      Social History     Socioeconomic History    Marital status:      Spouse name: Not on file    Number of children: Not on file    Years of education: Not on file

## 2024-08-13 ENCOUNTER — OFFICE VISIT (OUTPATIENT)
Dept: GERIATRIC MEDICINE | Age: 84
End: 2024-08-13
Payer: MEDICARE

## 2024-08-13 DIAGNOSIS — E87.5 HYPERKALEMIA: Primary | ICD-10-CM

## 2024-08-13 DIAGNOSIS — G89.4 CHRONIC PAIN SYNDROME: ICD-10-CM

## 2024-08-13 DIAGNOSIS — S81.809D MULTIPLE OPENS WOUND OF LOWER EXTREMITY, UNSPECIFIED LATERALITY, SUBSEQUENT ENCOUNTER: ICD-10-CM

## 2024-08-13 PROCEDURE — 1123F ACP DISCUSS/DSCN MKR DOCD: CPT | Performed by: NURSE PRACTITIONER

## 2024-08-13 PROCEDURE — 99309 SBSQ NF CARE MODERATE MDM 30: CPT | Performed by: NURSE PRACTITIONER

## 2024-08-19 ASSESSMENT — ENCOUNTER SYMPTOMS
RESPIRATORY NEGATIVE: 1
GASTROINTESTINAL NEGATIVE: 1

## 2024-08-19 NOTE — PROGRESS NOTES
[address] [address]    2024    Ines Perez  is a 84 y.o. in the  being seen for    Chief Complaint   Patient presents with    Follow-up       Ines Perez is an 84-year-old female residing at [facility].  Patient resting in bed at time of visit.  At time of visit patient very agitated as she states weekend nurse wrapped her left leg and caused the wound to open back up.  Patient continues to report ongoing pain to bilateral lower extremities.  Per staff patient continues to refuse dressing changes and wound care.  Patient also continues to refuse blood pressure medication.  Patient's potassium remains elevated, refusing Lasix.  Patient does take Kayexalate when ordered.            Past Medical History:   Diagnosis Date    Acute embolism and thrombosis of deep vein of left lower extremity ([facility]) 2022    ROCHELLE (acute kidney injury) ([facility]) 2021    Chronic kidney disease     COPD exacerbation ([facility]) 2022    Gastroesophageal reflux disease 06/10/2002    Hypothyroidism     Obesity, Class III, BMI 40-49.9 (morbid obesity) ([facility]) 2016    Primary hypertension 2022    Tobacco abuse      Past Surgical History:   Procedure Laterality Date    APPENDECTOMY      CHOLECYSTECTOMY       Family History   Problem Relation Age of Onset    Kidney Disease Mother     High Blood Pressure Mother     Stroke Mother     Heart Disease Father      Social History     Socioeconomic History    Marital status:      Spouse name: Not on file    Number of children: Not on file    Years of education: Not on file    Highest education level: Not on file   Occupational History    Not on file   Tobacco Use    Smoking status: Former     Current packs/day: 0.00     Average packs/day: 5.0 packs/day for 30.0 years (150.0 ttl pk-yrs)     Types: Cigarettes     Start date: 10/1/1987     Quit date: 10/1/2017     Years since quittin.8    Smokeless tobacco: Never

## 2024-08-20 ENCOUNTER — OFFICE VISIT (OUTPATIENT)
Dept: GERIATRIC MEDICINE | Age: 84
End: 2024-08-20
Payer: MEDICARE

## 2024-08-20 DIAGNOSIS — N18.31 STAGE 3A CHRONIC KIDNEY DISEASE (HCC): ICD-10-CM

## 2024-08-20 DIAGNOSIS — E87.5 HYPERKALEMIA: Primary | ICD-10-CM

## 2024-08-20 PROCEDURE — 1123F ACP DISCUSS/DSCN MKR DOCD: CPT | Performed by: NURSE PRACTITIONER

## 2024-08-20 PROCEDURE — 99309 SBSQ NF CARE MODERATE MDM 30: CPT | Performed by: NURSE PRACTITIONER

## 2024-08-22 DIAGNOSIS — G89.4 CHRONIC PAIN SYNDROME: ICD-10-CM

## 2024-08-22 DIAGNOSIS — L03.115 BILATERAL LOWER LEG CELLULITIS: ICD-10-CM

## 2024-08-22 DIAGNOSIS — L03.116 BILATERAL LOWER LEG CELLULITIS: ICD-10-CM

## 2024-08-22 RX ORDER — OXYCODONE AND ACETAMINOPHEN 10; 325 MG/1; MG/1
1 TABLET ORAL EVERY 8 HOURS PRN
Qty: 42 TABLET | Refills: 0 | OUTPATIENT
Start: 2024-08-22 | End: 2024-09-05

## 2024-08-28 ASSESSMENT — ENCOUNTER SYMPTOMS
RESPIRATORY NEGATIVE: 1
GASTROINTESTINAL NEGATIVE: 1

## 2024-08-28 NOTE — PROGRESS NOTES
[address] [place] [address]    8/20/2024    Ines Perez  is a 84 y.o. in the  being seen for    Chief Complaint   Patient presents with    Follow-up       Ines Perez is an 84-year-old female residing long-term care at [facility].  Patient resting in bed at time of visit.  Patient agitated today as she states she had a long day in court regarding her divorce yesterday.  Discussed with patient her recent lab work.  Potassium continues to be elevated.  Patient's blood pressure also continued to be elevated SBP 140s-170s. patient continues to refuse Lasix and blood pressure medication.  She denies chest pain or heart palpitations.  No complaints of shortness of breath.  Staff reports patient also continues to refuse wound care and dressing changes to her lower extremities.  Patient states she just wants to work with therapy so she can return home however patient does not participate in therapy.  No recent fever or fall.          Past Medical History:   Diagnosis Date    Acute embolism and thrombosis of deep vein of left lower extremity ([facility]) 03/22/2022    ROCHELLE (acute kidney injury) ([facility]) 07/25/2021    Chronic kidney disease     COPD exacerbation ([facility]) 02/24/2022    Gastroesophageal reflux disease 06/10/2002    Hypothyroidism     Obesity, Class III, BMI 40-49.9 (morbid obesity) ([facility]) 01/04/2016    Primary hypertension 02/24/2022    Tobacco abuse      Past Surgical History:   Procedure Laterality Date    APPENDECTOMY      CHOLECYSTECTOMY       Family History   Problem Relation Age of Onset    Kidney Disease Mother     High Blood Pressure Mother     Stroke Mother     Heart Disease Father      Social History     Socioeconomic History    Marital status:      Spouse name: Not on file    Number of children: Not on file    Years of education: Not on file    Highest education level: Not on file   Occupational History    Not on file   Tobacco Use    Smoking status: Former      visit    Pertinent POC, medications, labs, have been reviewed, continue same.  Encourage fluids and good nutrition.  Stress fall prevention strategies.    Electronically signed by: MARLEE De Los Santos CNP on 8/20/2024    Please note orders entered on site at facility after discussion with appropriate facility nursing/therapy/ / nutritional staff. Current longstanding medical problems and acute medical issues addressed with staff. Available data and data elements in on site paper chart reviewed and analyzed.  Current external consultant notes reviewed in on site chart. Ordered laboratory testing and imaging will be reviewed when available.    Please note this report is partially produced by using speech recognition hardware.  It may contain errors related to the system, including grammar, punctuation and spelling as well as words and phrases that may seem inaccurate.  For any questions or concerns feel free to contact me for clarification

## 2024-09-03 ENCOUNTER — OFFICE VISIT (OUTPATIENT)
Dept: GERIATRIC MEDICINE | Age: 84
End: 2024-09-03

## 2024-09-03 DIAGNOSIS — E87.5 HYPERKALEMIA: Primary | ICD-10-CM

## 2024-09-03 DIAGNOSIS — I10 PRIMARY HYPERTENSION: ICD-10-CM

## 2024-09-06 ASSESSMENT — ENCOUNTER SYMPTOMS
GASTROINTESTINAL NEGATIVE: 1
RESPIRATORY NEGATIVE: 1

## 2024-09-06 NOTE — PROGRESS NOTES
facility-administered medications on file prior to visit.         Review of Systems   Respiratory: Negative.     Cardiovascular: Negative.    Gastrointestinal: Negative.    Musculoskeletal: Negative.    All other systems reviewed and are negative.         There were no vitals taken for this visit.  VS per facility records    Physical Exam  Cardiovascular:      Rate and Rhythm: Normal rate and regular rhythm.   Pulmonary:      Effort: Pulmonary effort is normal.      Breath sounds: Normal breath sounds.   Abdominal:      General: Bowel sounds are normal.      Palpations: Abdomen is soft.   Musculoskeletal:         General: No swelling.   Skin:     Findings: Wound present.   Neurological:      Mental Status: She is alert.      Motor: Weakness present.        Refer to detailed nursing notes for skin assessment       ASSESSMENT:     Diagnosis Orders   1. Hyperkalemia        2. Primary hypertension            PLAN:  Monitor potassium levels  Monitor VS  Continue to encourage patient compliance  Weekly cbc/bmp      Pt/POA agrees with POC   No orders of the defined types were placed in this encounter.      adhere to the JNC VIII guidelines for HTN management and the NCEP ATP III guidelines for LDL-C management.    No orders of the defined types were placed in this encounter.      No follow-ups on file.    25\" spent on visit    Pertinent POC, medications, labs, have been reviewed, continue same.  Encourage fluids and good nutrition.  Stress fall prevention strategies.    Electronically signed by: MARLEE De Los Santos CNP on 9/3/2024    Please note orders entered on site at facility after discussion with appropriate facility nursing/therapy/ / nutritional staff. Current longstanding medical problems and acute medical issues addressed with staff. Available data and data elements in on site paper chart reviewed and analyzed.  Current external consultant notes reviewed in on site chart. Ordered laboratory testing

## 2024-09-10 LAB — POTASSIUM SERPL-SCNC: 5.4 MEQ/L (ref 3.4–4.9)

## 2024-09-16 LAB
BASOPHILS ABSOLUTE: ABNORMAL
BASOPHILS RELATIVE PERCENT: 0.5 %
BUN BLDV-MCNC: 43 MG/DL
CALCIUM SERPL-MCNC: 9 MG/DL
CHLORIDE BLD-SCNC: 108 MMOL/L
CO2: 23 MMOL/L
CREAT SERPL-MCNC: 1.6 MG/DL
EGFR: 31
EOSINOPHILS ABSOLUTE: 0.2 /ΜL
EOSINOPHILS RELATIVE PERCENT: 4.5 %
GLUCOSE BLD-MCNC: 71 MG/DL
HCT VFR BLD CALC: 33.3 % (ref 36–46)
HEMOGLOBIN: 9.9 G/DL (ref 12–16)
LYMPHOCYTES ABSOLUTE: 1.3 /ΜL
LYMPHOCYTES RELATIVE PERCENT: 24.4 %
MCH RBC QN AUTO: 24.4 PG
MCHC RBC AUTO-ENTMCNC: 29.8 G/DL
MCV RBC AUTO: 81.9 FL
MONOCYTES ABSOLUTE: 0.6 /ΜL
MONOCYTES RELATIVE PERCENT: 10.3 %
NEUTROPHILS ABSOLUTE: 3.3 /ΜL
NEUTROPHILS RELATIVE PERCENT: 60.3 %
PLATELET # BLD: 236 K/ΜL
PMV BLD AUTO: 8.6 FL
POTASSIUM SERPL-SCNC: 5.9 MMOL/L
RBC # BLD: 4.06 10^6/ΜL
SODIUM BLD-SCNC: 141 MMOL/L
WBC # BLD: 5.5 10^3/ML

## 2024-09-23 LAB
BASOPHILS ABSOLUTE: ABNORMAL
BASOPHILS RELATIVE PERCENT: 0.4 %
BUN BLDV-MCNC: 33 MG/DL
CALCIUM SERPL-MCNC: 8.9 MG/DL
CHLORIDE BLD-SCNC: 110 MMOL/L
CO2: 24 MMOL/L
CREAT SERPL-MCNC: 1.5 MG/DL
EGFR: 33
EOSINOPHILS ABSOLUTE: 0.2 /ΜL
EOSINOPHILS RELATIVE PERCENT: 3.4 %
GLUCOSE BLD-MCNC: 70 MG/DL
HCT VFR BLD CALC: 31 % (ref 36–46)
HEMOGLOBIN: 10.2 G/DL (ref 12–16)
LYMPHOCYTES ABSOLUTE: 1.5 /ΜL
LYMPHOCYTES RELATIVE PERCENT: 23.8 %
MCH RBC QN AUTO: 25.2 PG
MCHC RBC AUTO-ENTMCNC: 32.8 G/DL
MCV RBC AUTO: 76.7 FL
MONOCYTES ABSOLUTE: 0.5 /ΜL
MONOCYTES RELATIVE PERCENT: 8.1 %
NEUTROPHILS ABSOLUTE: 3.9 /ΜL
NEUTROPHILS RELATIVE PERCENT: 64.3 %
PLATELET # BLD: 222 K/ΜL
PMV BLD AUTO: 8.6 FL
POTASSIUM SERPL-SCNC: 4.5 MMOL/L
RBC # BLD: 4.04 10^6/ΜL
SODIUM BLD-SCNC: 145 MMOL/L
T3 TOTAL: 0.47
T4 FREE: 7.13
TSH SERPL DL<=0.05 MIU/L-ACNC: 3.53 UIU/ML
WBC # BLD: 6.1 10^3/ML

## 2024-09-30 LAB
BASOPHILS ABSOLUTE: ABNORMAL
BASOPHILS ABSOLUTE: NORMAL
BASOPHILS ABSOLUTE: NORMAL
BASOPHILS RELATIVE PERCENT: 0.4 %
BASOPHILS RELATIVE PERCENT: NORMAL
BASOPHILS RELATIVE PERCENT: NORMAL
BUN BLDV-MCNC: 35 MG/DL
BUN BLDV-MCNC: NORMAL MG/DL
CALCIUM SERPL-MCNC: 8.5 MG/DL
CALCIUM SERPL-MCNC: NORMAL MG/DL
CHLORIDE BLD-SCNC: 107 MMOL/L
CHLORIDE BLD-SCNC: NORMAL MMOL/L
CO2: 25 MMOL/L
CO2: NORMAL
CREAT SERPL-MCNC: 1.6 MG/DL
CREAT SERPL-MCNC: NORMAL MG/DL
EGFR: 31
EGFR: NORMAL
EOSINOPHILS ABSOLUTE: 0.2 /ΜL
EOSINOPHILS ABSOLUTE: NORMAL
EOSINOPHILS ABSOLUTE: NORMAL
EOSINOPHILS RELATIVE PERCENT: 2.9 %
EOSINOPHILS RELATIVE PERCENT: NORMAL
EOSINOPHILS RELATIVE PERCENT: NORMAL
GLUCOSE BLD-MCNC: 82 MG/DL
GLUCOSE BLD-MCNC: NORMAL MG/DL
HCT VFR BLD CALC: 29.9 % (ref 36–46)
HCT VFR BLD CALC: NORMAL %
HCT VFR BLD CALC: NORMAL %
HEMOGLOBIN: 9.4 G/DL (ref 12–16)
HEMOGLOBIN: NORMAL
HEMOGLOBIN: NORMAL
LYMPHOCYTES ABSOLUTE: 1.2 /ΜL
LYMPHOCYTES ABSOLUTE: NORMAL
LYMPHOCYTES ABSOLUTE: NORMAL
LYMPHOCYTES RELATIVE PERCENT: 21.9 %
LYMPHOCYTES RELATIVE PERCENT: NORMAL
LYMPHOCYTES RELATIVE PERCENT: NORMAL
MCH RBC QN AUTO: 25 PG
MCH RBC QN AUTO: NORMAL PG
MCH RBC QN AUTO: NORMAL PG
MCHC RBC AUTO-ENTMCNC: 31.3 G/DL
MCHC RBC AUTO-ENTMCNC: NORMAL G/DL
MCHC RBC AUTO-ENTMCNC: NORMAL G/DL
MCV RBC AUTO: 79.8 FL
MCV RBC AUTO: NORMAL FL
MCV RBC AUTO: NORMAL FL
MONOCYTES ABSOLUTE: 0.5 /ΜL
MONOCYTES ABSOLUTE: NORMAL
MONOCYTES ABSOLUTE: NORMAL
MONOCYTES RELATIVE PERCENT: 9.3 %
MONOCYTES RELATIVE PERCENT: NORMAL
MONOCYTES RELATIVE PERCENT: NORMAL
NEUTROPHILS ABSOLUTE: 3.7 /ΜL
NEUTROPHILS ABSOLUTE: NORMAL
NEUTROPHILS ABSOLUTE: NORMAL
NEUTROPHILS RELATIVE PERCENT: 65.5 %
NEUTROPHILS RELATIVE PERCENT: NORMAL
NEUTROPHILS RELATIVE PERCENT: NORMAL
PLATELET # BLD: 193 K/ΜL
PLATELET # BLD: NORMAL 10*3/UL
PLATELET # BLD: NORMAL 10*3/UL
PMV BLD AUTO: 8.8 FL
PMV BLD AUTO: NORMAL FL
PMV BLD AUTO: NORMAL FL
POTASSIUM SERPL-SCNC: 4.5 MMOL/L
POTASSIUM SERPL-SCNC: NORMAL MMOL/L
RBC # BLD: 3.75 10^6/ΜL
RBC # BLD: NORMAL 10*6/UL
RBC # BLD: NORMAL 10*6/UL
SODIUM BLD-SCNC: 142 MMOL/L
SODIUM BLD-SCNC: NORMAL MMOL/L
WBC # BLD: 5.6 10^3/ML
WBC # BLD: NORMAL 10*3/UL
WBC # BLD: NORMAL 10*3/UL

## 2024-10-02 ENCOUNTER — OFFICE VISIT (OUTPATIENT)
Dept: GERIATRIC MEDICINE | Age: 84
End: 2024-10-02
Payer: MEDICARE

## 2024-10-02 DIAGNOSIS — E87.5 HYPERKALEMIA: Primary | ICD-10-CM

## 2024-10-02 DIAGNOSIS — I10 PRIMARY HYPERTENSION: ICD-10-CM

## 2024-10-02 PROCEDURE — 99309 SBSQ NF CARE MODERATE MDM 30: CPT | Performed by: NURSE PRACTITIONER

## 2024-10-02 PROCEDURE — 1123F ACP DISCUSS/DSCN MKR DOCD: CPT | Performed by: NURSE PRACTITIONER

## 2024-10-07 ASSESSMENT — ENCOUNTER SYMPTOMS
RESPIRATORY NEGATIVE: 1
GASTROINTESTINAL NEGATIVE: 1

## 2024-10-07 NOTE — PROGRESS NOTES
[address] [address]    10/2/2024    Ines Perez  is a 84 y.o. in the  being seen for    Chief Complaint   Patient presents with    Follow-up       Ines Perez is an 84-year-old female residing in long-term care at [facility].  Patient is being seen today for monthly follow-up.  Hyperkalemia has improved.  Staff does report that patient continues to be noncompliant with medications including Lasix, Zoloft, hydralazine.  Patient resting in bed at time of visit.  She is alert and oriented x 4.  She denies nausea vomiting diarrhea or constipation.  She denies chest pain or heart palpitations.  No recent fever or fall reported.  No change in bowel or bladder habits.          Past Medical History:   Diagnosis Date    Acute embolism and thrombosis of deep vein of left lower extremity ([facility]) 2022    ROCHELLE (acute kidney injury) ([facility]) 2021    Chronic kidney disease     COPD exacerbation ([facility]) 2022    Gastroesophageal reflux disease 06/10/2002    Hypothyroidism     Obesity, Class III, BMI 40-49.9 (morbid obesity) 2016    Primary hypertension 2022    Tobacco abuse      Past Surgical History:   Procedure Laterality Date    APPENDECTOMY      CHOLECYSTECTOMY       Family History   Problem Relation Age of Onset    Kidney Disease Mother     High Blood Pressure Mother     Stroke Mother     Heart Disease Father      Social History     Socioeconomic History    Marital status:      Spouse name: Not on file    Number of children: Not on file    Years of education: Not on file    Highest education level: Not on file   Occupational History    Not on file   Tobacco Use    Smoking status: Former     Current packs/day: 0.00     Average packs/day: 5.0 packs/day for 30.0 years (150.0 ttl pk-yrs)     Types: Cigarettes     Start date: 10/1/1987     Quit date: 10/1/2017     Years since quittin.0    Smokeless tobacco: Never    Tobacco comments:

## 2024-10-14 ENCOUNTER — OFFICE VISIT (OUTPATIENT)
Dept: GERIATRIC MEDICINE | Age: 84
End: 2024-10-14
Payer: MEDICARE

## 2024-10-14 DIAGNOSIS — F02.818 ALZHEIMER'S DEMENTIA WITH OTHER BEHAVIORAL DISTURBANCE, UNSPECIFIED DEMENTIA SEVERITY, UNSPECIFIED TIMING OF DEMENTIA ONSET (HCC): ICD-10-CM

## 2024-10-14 DIAGNOSIS — G30.9 ALZHEIMER'S DEMENTIA WITH OTHER BEHAVIORAL DISTURBANCE, UNSPECIFIED DEMENTIA SEVERITY, UNSPECIFIED TIMING OF DEMENTIA ONSET (HCC): ICD-10-CM

## 2024-10-14 DIAGNOSIS — E11.9 TYPE 2 DIABETES MELLITUS WITHOUT COMPLICATION, WITHOUT LONG-TERM CURRENT USE OF INSULIN (HCC): ICD-10-CM

## 2024-10-14 DIAGNOSIS — J44.9 CHRONIC OBSTRUCTIVE PULMONARY DISEASE, UNSPECIFIED COPD TYPE (HCC): Primary | ICD-10-CM

## 2024-10-14 PROCEDURE — 99309 SBSQ NF CARE MODERATE MDM 30: CPT | Performed by: INTERNAL MEDICINE

## 2024-10-14 PROCEDURE — 1123F ACP DISCUSS/DSCN MKR DOCD: CPT | Performed by: INTERNAL MEDICINE

## 2024-10-21 LAB
BASOPHILS ABSOLUTE: ABNORMAL
BASOPHILS RELATIVE PERCENT: 0.3 %
BUN BLDV-MCNC: 35 MG/DL
CALCIUM SERPL-MCNC: 9.3 MG/DL
CHLORIDE BLD-SCNC: 110 MMOL/L
CO2: 22 MMOL/L
CREAT SERPL-MCNC: 1.5 MG/DL
EGFR: 33
EOSINOPHILS ABSOLUTE: 0.2 /ΜL
EOSINOPHILS RELATIVE PERCENT: 2.9 %
GLUCOSE BLD-MCNC: 59 MG/DL
HCT VFR BLD CALC: 34.7 % (ref 36–46)
HEMOGLOBIN: 10.5 G/DL (ref 12–16)
LYMPHOCYTES ABSOLUTE: 1.8 /ΜL
LYMPHOCYTES RELATIVE PERCENT: 26.8 %
MCH RBC QN AUTO: 24.2 PG
MCHC RBC AUTO-ENTMCNC: 30.3 G/DL
MCV RBC AUTO: 79.9 FL
MONOCYTES ABSOLUTE: 0.6 /ΜL
MONOCYTES RELATIVE PERCENT: 8.5 %
NEUTROPHILS ABSOLUTE: 4.2 /ΜL
NEUTROPHILS RELATIVE PERCENT: 61.5 %
PLATELET # BLD: 300 K/ΜL
PMV BLD AUTO: 8.3 FL
POTASSIUM SERPL-SCNC: 5.5 MMOL/L
RBC # BLD: 4.34 10^6/ΜL
SODIUM BLD-SCNC: 142 MMOL/L
WBC # BLD: 6.9 10^3/ML

## 2024-11-04 DIAGNOSIS — L03.115 BILATERAL LOWER LEG CELLULITIS: ICD-10-CM

## 2024-11-04 DIAGNOSIS — G89.4 CHRONIC PAIN SYNDROME: ICD-10-CM

## 2024-11-04 DIAGNOSIS — L03.116 BILATERAL LOWER LEG CELLULITIS: ICD-10-CM

## 2024-11-04 RX ORDER — OXYCODONE AND ACETAMINOPHEN 10; 325 MG/1; MG/1
1 TABLET ORAL EVERY 8 HOURS PRN
Qty: 42 TABLET | Refills: 0 | Status: SHIPPED | OUTPATIENT
Start: 2024-11-04 | End: 2024-11-18

## 2024-11-13 ENCOUNTER — OFFICE VISIT (OUTPATIENT)
Dept: GERIATRIC MEDICINE | Age: 84
End: 2024-11-13
Payer: MEDICARE

## 2024-11-13 DIAGNOSIS — N18.31 STAGE 3A CHRONIC KIDNEY DISEASE (HCC): ICD-10-CM

## 2024-11-13 DIAGNOSIS — G89.29 CHRONIC PAIN OF LOWER EXTREMITY, BILATERAL: ICD-10-CM

## 2024-11-13 DIAGNOSIS — M79.605 CHRONIC PAIN OF LOWER EXTREMITY, BILATERAL: ICD-10-CM

## 2024-11-13 DIAGNOSIS — E87.5 HYPERKALEMIA: Primary | ICD-10-CM

## 2024-11-13 DIAGNOSIS — M79.604 CHRONIC PAIN OF LOWER EXTREMITY, BILATERAL: ICD-10-CM

## 2024-11-13 PROCEDURE — 99309 SBSQ NF CARE MODERATE MDM 30: CPT | Performed by: NURSE PRACTITIONER

## 2024-11-13 PROCEDURE — 1123F ACP DISCUSS/DSCN MKR DOCD: CPT | Performed by: NURSE PRACTITIONER

## 2024-11-13 NOTE — PROGRESS NOTES
SUBJECTIVE:  84-year-old woman seen follow-up visit for COPD dementia diabetes patient function at baseline no symptomatic lightheadedness no recent emesis fevers or chills no change in her bowel bladder habits no acute confusional episodes.      ROS: Coughing interim  The rest of the 14 point ROS negative    PHYSICAL EXAM: VSS per facility record  Oral mucosa moist chest-breath sounds cardiovascular showed a regular rate abdomen soft nontender extremities trace edema    ASSESSMENT & PLAN:   Diagnosis Orders   1. Chronic obstructive pulmonary disease, unspecified COPD type ([facility])        2. Alzheimer's dementia with other behavioral disturbance, unspecified dementia severity, unspecified timing of dementia onset ([facility])        3. Type 2 diabetes mellitus without complication, without long-term current use of insulin ([facility])          At baseline terms of cognition no acute psychosis has been anticoagulated for atrial fibrillation in the past.  Function stable remains on Synthroid remains on beta-blocker.  Consider consider acetylcholinesterase inhibitor            Past Medical History:   Diagnosis Date    Acute embolism and thrombosis of deep vein of left lower extremity ([facility]) 03/22/2022    ROCHELLE (acute kidney injury) ([facility]) 07/25/2021    Chronic kidney disease     COPD exacerbation ([facility]) 02/24/2022    Gastroesophageal reflux disease 06/10/2002    Hypothyroidism     Obesity, Class III, BMI 40-49.9 (morbid obesity) 01/04/2016    Primary hypertension 02/24/2022    Tobacco abuse          Past Surgical History:   Procedure Laterality Date    APPENDECTOMY      CHOLECYSTECTOMY           Current Outpatient Medications on File Prior to Visit   Medication Sig Dispense Refill    amoxicillin-clavulanate (AUGMENTIN) 875-125 MG per tablet Take 1 tablet by mouth 2 times daily Indications: Infection Under the Skin      furosemide (LASIX) 20 MG tablet Take 1 tablet by mouth 2 times daily Indications: Edema      silver sulfADIAZINE

## 2024-11-18 PROBLEM — E87.5 HYPERKALEMIA: Status: ACTIVE | Noted: 2024-11-18

## 2024-11-18 PROBLEM — M79.604 CHRONIC PAIN OF LOWER EXTREMITY, BILATERAL: Status: ACTIVE | Noted: 2024-11-18

## 2024-11-18 PROBLEM — G89.29 CHRONIC PAIN OF LOWER EXTREMITY, BILATERAL: Status: ACTIVE | Noted: 2024-11-18

## 2024-11-18 PROBLEM — M79.605 CHRONIC PAIN OF LOWER EXTREMITY, BILATERAL: Status: ACTIVE | Noted: 2024-11-18

## 2024-11-18 ASSESSMENT — ENCOUNTER SYMPTOMS: BACK PAIN: 1

## 2024-11-18 NOTE — PROGRESS NOTES
[address] [address]    2024    Ines Perez  is a 84 y.o. in the  being seen for  No chief complaint on file.      Remains at [facility] in long-term care.  Patient is being seen today as follow-up from lab work.  Patient's recent potassium 5.7.  Patient reports he \"sometimes feels like her heart is skipping a beat.\"  Staff reports patient continues to refuse medications.  She denies nausea vomiting diarrhea constipation.  Patient reports that she is concerned with her kidneys.  Patient also continues to complain of pain to bilateral lower extremities.          Past Medical History:   Diagnosis Date    Acute embolism and thrombosis of deep vein of left lower extremity ([facility]) 2022    ROCHELLE (acute kidney injury) ([facility]) 2021    Chronic kidney disease     COPD exacerbation ([facility]) 2022    Gastroesophageal reflux disease 06/10/2002    Hypothyroidism     Obesity, Class III, BMI 40-49.9 (morbid obesity) 2016    Primary hypertension 2022    Tobacco abuse      Past Surgical History:   Procedure Laterality Date    APPENDECTOMY      CHOLECYSTECTOMY       Family History   Problem Relation Age of Onset    Kidney Disease Mother     High Blood Pressure Mother     Stroke Mother     Heart Disease Father      Social History     Socioeconomic History    Marital status:      Spouse name: Not on file    Number of children: Not on file    Years of education: Not on file    Highest education level: Not on file   Occupational History    Not on file   Tobacco Use    Smoking status: Former     Current packs/day: 0.00     Average packs/day: 5.0 packs/day for 30.0 years (150.0 ttl pk-yrs)     Types: Cigarettes     Start date: 10/1/1987     Quit date: 10/1/2017     Years since quittin.1    Smokeless tobacco: Never    Tobacco comments:     Has been trying to quit since January   Vaping Use    Vaping status: Never Used   Substance and Sexual

## 2024-11-20 LAB — POTASSIUM (K+): 5.5

## 2024-11-21 ENCOUNTER — OFFICE VISIT (OUTPATIENT)
Dept: GERIATRIC MEDICINE | Age: 84
End: 2024-11-21
Payer: MEDICARE

## 2024-11-21 DIAGNOSIS — M79.604 CHRONIC PAIN OF LOWER EXTREMITY, BILATERAL: Primary | ICD-10-CM

## 2024-11-21 DIAGNOSIS — I10 PRIMARY HYPERTENSION: ICD-10-CM

## 2024-11-21 DIAGNOSIS — E87.5 HYPERKALEMIA: ICD-10-CM

## 2024-11-21 DIAGNOSIS — G89.29 CHRONIC PAIN OF LOWER EXTREMITY, BILATERAL: Primary | ICD-10-CM

## 2024-11-21 DIAGNOSIS — M79.605 CHRONIC PAIN OF LOWER EXTREMITY, BILATERAL: Primary | ICD-10-CM

## 2024-11-21 DIAGNOSIS — Z91.199 MEDICAL NON-COMPLIANCE: ICD-10-CM

## 2024-11-21 PROCEDURE — 99309 SBSQ NF CARE MODERATE MDM 30: CPT | Performed by: NURSE PRACTITIONER

## 2024-11-21 PROCEDURE — 1123F ACP DISCUSS/DSCN MKR DOCD: CPT | Performed by: NURSE PRACTITIONER

## 2024-11-25 ASSESSMENT — ENCOUNTER SYMPTOMS
GASTROINTESTINAL NEGATIVE: 1
RESPIRATORY NEGATIVE: 1
BACK PAIN: 1

## 2024-11-25 NOTE — PROGRESS NOTES
[address] [address]    11/21/2024    Ines Perez  is a 84 y.o. in the  being seen for    Chief Complaint   Patient presents with    Follow-up       Ines Perez is an 84-year-old female residing in long-term care at [facility].  Patient is being seen as follow-up for hyperkalemia, chronic pain, hypertension and noncompliance.  Staff continues to report that patient refuses Lokelma, Lasix, hydralazine, and Zoloft.  Staff also reports patient refuses to let staff help her clean up and get reposition in bed at times.  At time of visit patient resting in bed.  Patient reports oxycodone 5 mg has not been effective for her pain.  Patient refuses to try gabapentin or muscle relaxants.  Patient refusing Lokelma and potassium is now 5.6.  Patient's SBP ranging from 117-178 over the last week.  When asked why she refuses medications patient states \"I do not need them and I do not want to take them.\"  Patient concerned that she has a \"kidney problem\" but refuses to go see nephrology.  BUN and creatinine at baseline since admission.          Past Medical History:   Diagnosis Date    Acute embolism and thrombosis of deep vein of left lower extremity ([facility]) 03/22/2022    ROCHELLE (acute kidney injury) ([facility]) 07/25/2021    Chronic kidney disease     COPD exacerbation ([facility]) 02/24/2022    Gastroesophageal reflux disease 06/10/2002    Hypothyroidism     Obesity, Class III, BMI 40-49.9 (morbid obesity) 01/04/2016    Primary hypertension 02/24/2022    Tobacco abuse      Past Surgical History:   Procedure Laterality Date    APPENDECTOMY      CHOLECYSTECTOMY       Family History   Problem Relation Age of Onset    Kidney Disease Mother     High Blood Pressure Mother     Stroke Mother     Heart Disease Father      Social History     Socioeconomic History    Marital status:      Spouse name: Not on file    Number of children: Not on file    Years of education: Not on file

## 2024-11-27 ENCOUNTER — OFFICE VISIT (OUTPATIENT)
Dept: GERIATRIC MEDICINE | Age: 84
End: 2024-11-27

## 2024-11-27 DIAGNOSIS — E87.5 HYPERKALEMIA: ICD-10-CM

## 2024-11-27 DIAGNOSIS — N18.31 STAGE 3A CHRONIC KIDNEY DISEASE (HCC): ICD-10-CM

## 2024-11-27 DIAGNOSIS — G89.29 CHRONIC MIDLINE THORACIC BACK PAIN: Primary | ICD-10-CM

## 2024-11-27 DIAGNOSIS — M54.6 CHRONIC MIDLINE THORACIC BACK PAIN: Primary | ICD-10-CM

## 2024-12-10 ASSESSMENT — ENCOUNTER SYMPTOMS
RESPIRATORY NEGATIVE: 1
BACK PAIN: 1
GASTROINTESTINAL NEGATIVE: 1

## 2024-12-10 NOTE — PROGRESS NOTES
[address] [address]    11/27/2024    Ines Perez  is a 84 y.o. in the  being seen for    Chief Complaint   Patient presents with    Back Pain    Other     Hyperkalemia  ckd       Ines Perez is an 84-year-old female residing in long-term care at [facility].  Staff reports patient continues to intermittently refuse Lokelma, Lasix, hydralazine and Zoloft.  At time of visit patient resting in bed, alert and oriented x 4.  Patient reports chronic back pain.  Patient states she had an injury several years ago that was exacerbated by therapy when she first came to the facility in May 2024.  X-rays have been completed, no acute process.  Patient currently on Percocet 5-3 25 every 8 hours as needed.  Patient refusing muscle relaxer, topical analgesics.  Patient participates minimally with therapy.  Patient requesting an increase in pain medication.  Patient also reports that her urine has been \"foul-smelling.\"  She denies burning with urination, abnormal discharge, frequency or urgency.    She denies nausea vomiting diarrhea constipation.  No recent fever or fall reported.  No change in bowel or bladder habits.    Back Pain  This is a chronic problem. The current episode started more than 1 year ago. The problem occurs intermittently. The problem is unchanged. The pain is present in the thoracic spine. The quality of the pain is described as aching. Associated symptoms include leg pain. Risk factors include sedentary lifestyle. She has tried analgesics, bed rest, home exercises, muscle relaxant and ice for the symptoms. The treatment provided no relief.         Past Medical History:   Diagnosis Date    Acute embolism and thrombosis of deep vein of left lower extremity ([facility]) 03/22/2022    ROCHELLE (acute kidney injury) ([facility]) 07/25/2021    Chronic kidney disease     COPD exacerbation ([facility]) 02/24/2022    Gastroesophageal reflux disease 06/10/2002    Hypothyroidism